# Patient Record
Sex: FEMALE | Race: WHITE | Employment: UNEMPLOYED | URBAN - METROPOLITAN AREA
[De-identification: names, ages, dates, MRNs, and addresses within clinical notes are randomized per-mention and may not be internally consistent; named-entity substitution may affect disease eponyms.]

---

## 2018-04-30 ENCOUNTER — TELEPHONE (OUTPATIENT)
Dept: OTHER | Facility: CLINIC | Age: 53
End: 2018-04-30

## 2018-04-30 DIAGNOSIS — R94.31 ABNORMAL ELECTROCARDIOGRAM: Primary | ICD-10-CM

## 2018-04-30 DIAGNOSIS — R00.0 TACHYCARDIA: ICD-10-CM

## 2018-04-30 NOTE — TELEPHONE ENCOUNTER
4/30/2018    Call Regarding Onboarding [facility] CHOICES     Attempt 1    Message on voicemail     Comments:       Outreach   SB

## 2018-05-09 ENCOUNTER — HOSPITAL ENCOUNTER (OUTPATIENT)
Dept: CARDIOLOGY | Facility: CLINIC | Age: 53
Discharge: HOME | End: 2018-05-09
Attending: PHYSICIAN ASSISTANT | Admitting: PHYSICIAN ASSISTANT
Payer: COMMERCIAL

## 2018-05-09 DIAGNOSIS — R94.31 ABNORMAL ELECTROCARDIOGRAM: ICD-10-CM

## 2018-05-09 DIAGNOSIS — R00.0 TACHYCARDIA: ICD-10-CM

## 2018-05-09 PROCEDURE — 93306 TTE W/DOPPLER COMPLETE: CPT | Mod: 26 | Performed by: INTERNAL MEDICINE

## 2018-05-09 PROCEDURE — 40000264 ECHO COMPLETE WITH OPTISON

## 2018-05-09 PROCEDURE — 25500064 ZZH RX 255 OP 636: Performed by: INTERNAL MEDICINE

## 2018-05-09 RX ADMIN — HUMAN ALBUMIN MICROSPHERES AND PERFLUTREN 3 ML: 10; .22 INJECTION, SOLUTION INTRAVENOUS at 09:40

## 2018-05-24 NOTE — TELEPHONE ENCOUNTER
5/24/2018    Call Regarding Onboarding are Choices    Attempt 2    Message on voicemail     Comments: 0 DEP      Outreach   CC

## 2018-06-14 NOTE — TELEPHONE ENCOUNTER
6/14/2018    Call Regarding Onboarding [facility]     Attempt 3    Message on voicemail     Comments:       Outreach   SB

## 2019-09-07 ENCOUNTER — HOSPITAL ENCOUNTER (INPATIENT)
Facility: CLINIC | Age: 54
LOS: 3 days | Discharge: HOME | DRG: 872 | End: 2019-09-10
Attending: EMERGENCY MEDICINE | Admitting: INTERNAL MEDICINE
Payer: COMMERCIAL

## 2019-09-07 ENCOUNTER — APPOINTMENT (OUTPATIENT)
Dept: ULTRASOUND IMAGING | Facility: CLINIC | Age: 54
DRG: 872 | End: 2019-09-07
Attending: EMERGENCY MEDICINE
Payer: COMMERCIAL

## 2019-09-07 DIAGNOSIS — Z79.4 OTHER SPECIFIED DIABETES MELLITUS WITH COMPLICATION, WITH LONG-TERM CURRENT USE OF INSULIN: Primary | ICD-10-CM

## 2019-09-07 DIAGNOSIS — A41.9 SEPSIS, DUE TO UNSPECIFIED ORGANISM: ICD-10-CM

## 2019-09-07 DIAGNOSIS — E13.8 OTHER SPECIFIED DIABETES MELLITUS WITH COMPLICATION, WITH LONG-TERM CURRENT USE OF INSULIN: Primary | ICD-10-CM

## 2019-09-07 DIAGNOSIS — L03.116 CELLULITIS OF LEFT LOWER EXTREMITY: ICD-10-CM

## 2019-09-07 LAB
ANION GAP SERPL CALCULATED.3IONS-SCNC: 6 MMOL/L (ref 3–14)
BASOPHILS # BLD AUTO: 0 10E9/L (ref 0–0.2)
BASOPHILS NFR BLD AUTO: 0.1 %
BUN SERPL-MCNC: 16 MG/DL (ref 7–30)
CALCIUM SERPL-MCNC: 9.2 MG/DL (ref 8.5–10.1)
CHLORIDE SERPL-SCNC: 102 MMOL/L (ref 94–109)
CO2 BLDCOV-SCNC: 28 MMOL/L (ref 21–28)
CO2 SERPL-SCNC: 30 MMOL/L (ref 20–32)
CREAT SERPL-MCNC: 0.9 MG/DL (ref 0.52–1.04)
CREAT SERPL-MCNC: 0.98 MG/DL (ref 0.52–1.04)
DIFFERENTIAL METHOD BLD: ABNORMAL
EOSINOPHIL # BLD AUTO: 0 10E9/L (ref 0–0.7)
EOSINOPHIL NFR BLD AUTO: 0 %
ERYTHROCYTE [DISTWIDTH] IN BLOOD BY AUTOMATED COUNT: 13.8 % (ref 10–15)
GFR SERPL CREATININE-BSD FRML MDRD: 67 ML/MIN/{1.73_M2}
GFR SERPL CREATININE-BSD FRML MDRD: 73 ML/MIN/{1.73_M2}
GLUCOSE BLDC GLUCOMTR-MCNC: 132 MG/DL (ref 70–99)
GLUCOSE BLDC GLUCOMTR-MCNC: 57 MG/DL (ref 70–99)
GLUCOSE BLDC GLUCOMTR-MCNC: 95 MG/DL (ref 70–99)
GLUCOSE SERPL-MCNC: 73 MG/DL (ref 70–99)
HBA1C MFR BLD: 6.5 % (ref 0–5.6)
HCT VFR BLD AUTO: 37.4 % (ref 35–47)
HGB BLD-MCNC: 12.6 G/DL (ref 11.7–15.7)
IMM GRANULOCYTES # BLD: 0.1 10E9/L (ref 0–0.4)
IMM GRANULOCYTES NFR BLD: 0.4 %
LACTATE BLD-SCNC: 1.5 MMOL/L (ref 0.7–2)
LACTATE BLD-SCNC: 2.3 MMOL/L (ref 0.7–2.1)
LYMPHOCYTES # BLD AUTO: 0.9 10E9/L (ref 0.8–5.3)
LYMPHOCYTES NFR BLD AUTO: 5.6 %
MCH RBC QN AUTO: 32.9 PG (ref 26.5–33)
MCHC RBC AUTO-ENTMCNC: 33.7 G/DL (ref 31.5–36.5)
MCV RBC AUTO: 98 FL (ref 78–100)
MONOCYTES # BLD AUTO: 1 10E9/L (ref 0–1.3)
MONOCYTES NFR BLD AUTO: 6.3 %
NEUTROPHILS # BLD AUTO: 13.9 10E9/L (ref 1.6–8.3)
NEUTROPHILS NFR BLD AUTO: 87.6 %
NRBC # BLD AUTO: 0 10*3/UL
NRBC BLD AUTO-RTO: 0 /100
PCO2 BLDV: 42 MM HG (ref 40–50)
PH BLDV: 7.43 PH (ref 7.32–7.43)
PLATELET # BLD AUTO: 196 10E9/L (ref 150–450)
PLATELET # BLD AUTO: 198 10E9/L (ref 150–450)
PO2 BLDV: 23 MM HG (ref 25–47)
POTASSIUM SERPL-SCNC: 3.7 MMOL/L (ref 3.4–5.3)
RBC # BLD AUTO: 3.83 10E12/L (ref 3.8–5.2)
SAO2 % BLDV FROM PO2: 42 %
SODIUM SERPL-SCNC: 138 MMOL/L (ref 133–144)
WBC # BLD AUTO: 15.8 10E9/L (ref 4–11)

## 2019-09-07 PROCEDURE — 25000132 ZZH RX MED GY IP 250 OP 250 PS 637: Performed by: INTERNAL MEDICINE

## 2019-09-07 PROCEDURE — 25800030 ZZH RX IP 258 OP 636: Performed by: INTERNAL MEDICINE

## 2019-09-07 PROCEDURE — 82803 BLOOD GASES ANY COMBINATION: CPT

## 2019-09-07 PROCEDURE — 80048 BASIC METABOLIC PNL TOTAL CA: CPT | Performed by: EMERGENCY MEDICINE

## 2019-09-07 PROCEDURE — 00000146 ZZHCL STATISTIC GLUCOSE BY METER IP

## 2019-09-07 PROCEDURE — 85049 AUTOMATED PLATELET COUNT: CPT | Performed by: INTERNAL MEDICINE

## 2019-09-07 PROCEDURE — 83036 HEMOGLOBIN GLYCOSYLATED A1C: CPT | Performed by: INTERNAL MEDICINE

## 2019-09-07 PROCEDURE — 25000128 H RX IP 250 OP 636: Performed by: EMERGENCY MEDICINE

## 2019-09-07 PROCEDURE — 36415 COLL VENOUS BLD VENIPUNCTURE: CPT | Performed by: INTERNAL MEDICINE

## 2019-09-07 PROCEDURE — 12000000 ZZH R&B MED SURG/OB

## 2019-09-07 PROCEDURE — 96365 THER/PROPH/DIAG IV INF INIT: CPT

## 2019-09-07 PROCEDURE — 93971 EXTREMITY STUDY: CPT | Mod: LT

## 2019-09-07 PROCEDURE — 99285 EMERGENCY DEPT VISIT HI MDM: CPT | Mod: 25

## 2019-09-07 PROCEDURE — 25000131 ZZH RX MED GY IP 250 OP 636 PS 637: Performed by: INTERNAL MEDICINE

## 2019-09-07 PROCEDURE — 83605 ASSAY OF LACTIC ACID: CPT

## 2019-09-07 PROCEDURE — 25000128 H RX IP 250 OP 636: Performed by: INTERNAL MEDICINE

## 2019-09-07 PROCEDURE — 87040 BLOOD CULTURE FOR BACTERIA: CPT | Performed by: INTERNAL MEDICINE

## 2019-09-07 PROCEDURE — 83605 ASSAY OF LACTIC ACID: CPT | Performed by: INTERNAL MEDICINE

## 2019-09-07 PROCEDURE — 25000132 ZZH RX MED GY IP 250 OP 250 PS 637: Performed by: EMERGENCY MEDICINE

## 2019-09-07 PROCEDURE — 85025 COMPLETE CBC W/AUTO DIFF WBC: CPT | Performed by: EMERGENCY MEDICINE

## 2019-09-07 PROCEDURE — 99223 1ST HOSP IP/OBS HIGH 75: CPT | Mod: AI | Performed by: INTERNAL MEDICINE

## 2019-09-07 PROCEDURE — 82565 ASSAY OF CREATININE: CPT | Performed by: INTERNAL MEDICINE

## 2019-09-07 PROCEDURE — 96361 HYDRATE IV INFUSION ADD-ON: CPT

## 2019-09-07 RX ORDER — PROCHLORPERAZINE 25 MG
25 SUPPOSITORY, RECTAL RECTAL EVERY 12 HOURS PRN
Status: DISCONTINUED | OUTPATIENT
Start: 2019-09-07 | End: 2019-09-10 | Stop reason: HOSPADM

## 2019-09-07 RX ORDER — ATORVASTATIN CALCIUM 80 MG/1
80 TABLET, FILM COATED ORAL DAILY
COMMUNITY

## 2019-09-07 RX ORDER — ACETAMINOPHEN 500 MG
1000 TABLET ORAL 2 TIMES DAILY PRN
Status: DISCONTINUED | OUTPATIENT
Start: 2019-09-07 | End: 2019-09-10 | Stop reason: HOSPADM

## 2019-09-07 RX ORDER — SULFASALAZINE 500 MG/1
1000 TABLET ORAL 2 TIMES DAILY
Status: DISCONTINUED | OUTPATIENT
Start: 2019-09-07 | End: 2019-09-10 | Stop reason: HOSPADM

## 2019-09-07 RX ORDER — IBUPROFEN 400 MG/1
400 TABLET, FILM COATED ORAL 2 TIMES DAILY PRN
Status: DISCONTINUED | OUTPATIENT
Start: 2019-09-07 | End: 2019-09-10 | Stop reason: HOSPADM

## 2019-09-07 RX ORDER — LISINOPRIL 20 MG/1
20 TABLET ORAL DAILY
COMMUNITY

## 2019-09-07 RX ORDER — LISINOPRIL 20 MG/1
20 TABLET ORAL DAILY
Status: DISCONTINUED | OUTPATIENT
Start: 2019-09-07 | End: 2019-09-10 | Stop reason: HOSPADM

## 2019-09-07 RX ORDER — ONDANSETRON 2 MG/ML
4 INJECTION INTRAMUSCULAR; INTRAVENOUS EVERY 6 HOURS PRN
Status: DISCONTINUED | OUTPATIENT
Start: 2019-09-07 | End: 2019-09-10 | Stop reason: HOSPADM

## 2019-09-07 RX ORDER — ASPIRIN 81 MG/1
81 TABLET ORAL DAILY
COMMUNITY

## 2019-09-07 RX ORDER — LACTOBACILLUS RHAMNOSUS GG 10B CELL
1 CAPSULE ORAL AT BEDTIME
Status: DISCONTINUED | OUTPATIENT
Start: 2019-09-07 | End: 2019-09-10 | Stop reason: HOSPADM

## 2019-09-07 RX ORDER — IBUPROFEN 200 MG
400 TABLET ORAL SEE ADMIN INSTRUCTIONS
COMMUNITY

## 2019-09-07 RX ORDER — ASPIRIN 81 MG/1
81 TABLET ORAL DAILY
Status: DISCONTINUED | OUTPATIENT
Start: 2019-09-07 | End: 2019-09-10 | Stop reason: HOSPADM

## 2019-09-07 RX ORDER — MULTIVITAMIN,THERAPEUTIC
1 TABLET ORAL DAILY
COMMUNITY

## 2019-09-07 RX ORDER — CEFAZOLIN SODIUM 2 G/100ML
2 INJECTION, SOLUTION INTRAVENOUS EVERY 8 HOURS
Status: DISCONTINUED | OUTPATIENT
Start: 2019-09-07 | End: 2019-09-10 | Stop reason: HOSPADM

## 2019-09-07 RX ORDER — PREDNISOLONE 5 MG/1
10 TABLET ORAL DAILY
COMMUNITY
End: 2019-09-07

## 2019-09-07 RX ORDER — ACETAMINOPHEN 500 MG
1000 TABLET ORAL ONCE
Status: COMPLETED | OUTPATIENT
Start: 2019-09-07 | End: 2019-09-07

## 2019-09-07 RX ORDER — PREDNISONE 10 MG/1
10 TABLET ORAL DAILY
Status: DISCONTINUED | OUTPATIENT
Start: 2019-09-08 | End: 2019-09-10 | Stop reason: HOSPADM

## 2019-09-07 RX ORDER — NAPROXEN SODIUM 220 MG
440 TABLET ORAL 2 TIMES DAILY PRN
Status: DISCONTINUED | OUTPATIENT
Start: 2019-09-07 | End: 2019-09-09

## 2019-09-07 RX ORDER — NICOTINE POLACRILEX 4 MG
15-30 LOZENGE BUCCAL
Status: DISCONTINUED | OUTPATIENT
Start: 2019-09-07 | End: 2019-09-10 | Stop reason: HOSPADM

## 2019-09-07 RX ORDER — LEVOTHYROXINE SODIUM 137 UG/1
137 TABLET ORAL DAILY
COMMUNITY

## 2019-09-07 RX ORDER — GLIMEPIRIDE 4 MG/1
4 TABLET ORAL 2 TIMES DAILY
Status: ON HOLD | COMMUNITY
End: 2021-01-01

## 2019-09-07 RX ORDER — ACETAMINOPHEN 500 MG
1000 TABLET ORAL SEE ADMIN INSTRUCTIONS
Status: ON HOLD | COMMUNITY
End: 2020-04-20

## 2019-09-07 RX ORDER — ATORVASTATIN CALCIUM 40 MG/1
80 TABLET, FILM COATED ORAL DAILY
Status: DISCONTINUED | OUTPATIENT
Start: 2019-09-07 | End: 2019-09-10 | Stop reason: HOSPADM

## 2019-09-07 RX ORDER — CEFAZOLIN SODIUM 1 G/3ML
1 INJECTION, POWDER, FOR SOLUTION INTRAMUSCULAR; INTRAVENOUS ONCE
Status: COMPLETED | OUTPATIENT
Start: 2019-09-07 | End: 2019-09-07

## 2019-09-07 RX ORDER — PROCHLORPERAZINE MALEATE 10 MG
10 TABLET ORAL EVERY 6 HOURS PRN
Status: DISCONTINUED | OUTPATIENT
Start: 2019-09-07 | End: 2019-09-10 | Stop reason: HOSPADM

## 2019-09-07 RX ORDER — DEXTROSE MONOHYDRATE 25 G/50ML
25-50 INJECTION, SOLUTION INTRAVENOUS
Status: DISCONTINUED | OUTPATIENT
Start: 2019-09-07 | End: 2019-09-10 | Stop reason: HOSPADM

## 2019-09-07 RX ORDER — HYDROXYCHLOROQUINE SULFATE 200 MG/1
200 TABLET, FILM COATED ORAL 2 TIMES DAILY
COMMUNITY

## 2019-09-07 RX ORDER — NALOXONE HYDROCHLORIDE 0.4 MG/ML
.1-.4 INJECTION, SOLUTION INTRAMUSCULAR; INTRAVENOUS; SUBCUTANEOUS
Status: DISCONTINUED | OUTPATIENT
Start: 2019-09-07 | End: 2019-09-10 | Stop reason: HOSPADM

## 2019-09-07 RX ORDER — GLIMEPIRIDE 4 MG/1
4 TABLET ORAL 2 TIMES DAILY WITH MEALS
Status: DISCONTINUED | OUTPATIENT
Start: 2019-09-07 | End: 2019-09-08

## 2019-09-07 RX ORDER — NAPROXEN SODIUM 220 MG
440 TABLET ORAL SEE ADMIN INSTRUCTIONS
COMMUNITY

## 2019-09-07 RX ORDER — LEVOTHYROXINE SODIUM 125 UG/1
125 TABLET ORAL
Status: DISCONTINUED | OUTPATIENT
Start: 2019-09-08 | End: 2019-09-10 | Stop reason: HOSPADM

## 2019-09-07 RX ORDER — INSULIN GLARGINE 100 [IU]/ML
50 INJECTION, SOLUTION SUBCUTANEOUS AT BEDTIME
Status: ON HOLD | COMMUNITY
End: 2019-09-10

## 2019-09-07 RX ORDER — SODIUM CHLORIDE 9 MG/ML
INJECTION, SOLUTION INTRAVENOUS CONTINUOUS
Status: DISCONTINUED | OUTPATIENT
Start: 2019-09-07 | End: 2019-09-08

## 2019-09-07 RX ORDER — LIDOCAINE 40 MG/G
CREAM TOPICAL
Status: DISCONTINUED | OUTPATIENT
Start: 2019-09-07 | End: 2019-09-10 | Stop reason: HOSPADM

## 2019-09-07 RX ORDER — HYDROXYCHLOROQUINE SULFATE 200 MG/1
200 TABLET, FILM COATED ORAL 2 TIMES DAILY
Status: DISCONTINUED | OUTPATIENT
Start: 2019-09-07 | End: 2019-09-10 | Stop reason: HOSPADM

## 2019-09-07 RX ORDER — ONDANSETRON 4 MG/1
4 TABLET, ORALLY DISINTEGRATING ORAL EVERY 6 HOURS PRN
Status: DISCONTINUED | OUTPATIENT
Start: 2019-09-07 | End: 2019-09-10 | Stop reason: HOSPADM

## 2019-09-07 RX ORDER — SULFASALAZINE 500 MG/1
1000 TABLET ORAL 2 TIMES DAILY
COMMUNITY

## 2019-09-07 RX ORDER — PREDNISONE 5 MG/1
10 TABLET ORAL DAILY
Status: ON HOLD | COMMUNITY
End: 2020-04-20

## 2019-09-07 RX ADMIN — HYDROXYCHLOROQUINE SULFATE 200 MG: 200 TABLET, FILM COATED ENTERAL at 20:31

## 2019-09-07 RX ADMIN — INSULIN GLARGINE 50 UNITS: 100 INJECTION, SOLUTION SUBCUTANEOUS at 20:57

## 2019-09-07 RX ADMIN — LISINOPRIL 20 MG: 20 TABLET ORAL at 15:12

## 2019-09-07 RX ADMIN — GLIMEPIRIDE 4 MG: 4 TABLET ORAL at 17:49

## 2019-09-07 RX ADMIN — ENOXAPARIN SODIUM 40 MG: 40 INJECTION SUBCUTANEOUS at 17:50

## 2019-09-07 RX ADMIN — CEFAZOLIN SODIUM 2 G: 2 INJECTION, SOLUTION INTRAVENOUS at 20:31

## 2019-09-07 RX ADMIN — SULFASALAZINE 1000 MG: 500 TABLET ORAL at 20:31

## 2019-09-07 RX ADMIN — ATORVASTATIN CALCIUM 80 MG: 40 TABLET, FILM COATED ORAL at 15:12

## 2019-09-07 RX ADMIN — ACETAMINOPHEN 1000 MG: 500 TABLET, FILM COATED ORAL at 17:50

## 2019-09-07 RX ADMIN — Medication 1 CAPSULE: at 20:57

## 2019-09-07 RX ADMIN — CEFAZOLIN 1 G: 1 INJECTION, POWDER, FOR SOLUTION INTRAMUSCULAR; INTRAVENOUS at 13:04

## 2019-09-07 RX ADMIN — SODIUM CHLORIDE 1000 ML: 9 INJECTION, SOLUTION INTRAVENOUS at 12:05

## 2019-09-07 RX ADMIN — SODIUM CHLORIDE: 9 INJECTION, SOLUTION INTRAVENOUS at 15:14

## 2019-09-07 RX ADMIN — ACETAMINOPHEN 1000 MG: 500 TABLET, FILM COATED ORAL at 11:40

## 2019-09-07 RX ADMIN — IBUPROFEN 400 MG: 400 TABLET ORAL at 15:11

## 2019-09-07 RX ADMIN — ASPIRIN 81 MG: 81 TABLET, COATED ORAL at 15:12

## 2019-09-07 ASSESSMENT — ACTIVITIES OF DAILY LIVING (ADL)
BATHING: 0-->INDEPENDENT
ADLS_ACUITY_SCORE: 12
AMBULATION: 1-->ASSISTIVE EQUIPMENT
ADLS_ACUITY_SCORE: 12
SWALLOWING: 0-->SWALLOWS FOODS/LIQUIDS WITHOUT DIFFICULTY
DRESS: 0-->INDEPENDENT
RETIRED_EATING: 0-->INDEPENDENT
TOILETING: 0-->INDEPENDENT
FALL_HISTORY_WITHIN_LAST_SIX_MONTHS: NO
TRANSFERRING: 0-->INDEPENDENT
COGNITION: 0 - NO COGNITION ISSUES REPORTED
RETIRED_COMMUNICATION: 0-->UNDERSTANDS/COMMUNICATES WITHOUT DIFFICULTY

## 2019-09-07 ASSESSMENT — MIFFLIN-ST. JEOR: SCORE: 1854.2

## 2019-09-07 NOTE — LETTER
[place] [address] [address]  [address] [place] [address]  [phone]          September 10, 2019    RE:  Pinky Whatley                                                                                                                                                       [address]   [facility] [place] [facility] [address]            To whom it may concern:    Pinky Whatley is under my professional care and was admitted to [facility] from 9/7/19 to 9/10/19. She can return to work from 9/11/19.      Sincerely,        Scot Burns  Hospitalist   [facility]  [phone]

## 2019-09-07 NOTE — PROGRESS NOTES
RECEIVING UNIT ED HANDOFF REVIEW    ED Nurse Handoff Report was reviewed by: Alex Gallo RN on September 7, 2019 at 2:12 PM

## 2019-09-07 NOTE — ED NOTES
"[facility]  ED Nurse Handoff Report    ED Chief complaint: Leg Pain      ED Diagnosis:   Final diagnoses:   Cellulitis of left lower extremity   Sepsis, due to unspecified organism (H)       Code Status: Ask hospitalist    Allergies: No Known Allergies    Activity level - Baseline/Home:  Independent  Activity Level - Current:   Independent    Patient's Preferred language: English   Needed?: No    Isolation: No  Infection: Not Applicable  Bariatric?: No    Vital Signs:   Vitals:    09/07/19 1108   BP: (!) 159/81   Resp: 16   Temp: 100.1  F (37.8  C)   TempSrc: Oral   SpO2: 95%   Weight: 130.2 kg (287 lb)   Height: 1.549 m (5' 1\")       Cardiac Rhythm: ,        Pain level: 0-10 Pain Scale: 6    Is this patient confused?: No   Does this patient have a guardian?  No         If yes, is there guardianship documents in the Epic \"Code/ACP\" activity?  N/A         Guardian Notified?  N/A  Independence - Suicide Severity Rating Scale Completed?  Yes  If yes, what color did the patient score?  White    Patient Report: Initial Complaint: Leg pain  Focused Assessment: Pt developed leg pain and redness yesterday. Stated she had a fever of 101 F last night and woke up in sweat this morning. Redness includes entire foot up to mid calf. Tender to the touch.   Tests Performed: Ancef  Abnormal Results:   Results for orders placed or performed during the hospital encounter of 09/07/19   US Lower Extremity Venous Duplex Left    Narrative    ULTRASOUND  LOWER EXTREMITY VENOUS DUPLEX LEFT   9/7/2019 12:19 PM     HISTORY: Erythema/warmth/edema.    COMPARISON: None.    FINDINGS: Gray-scale, color and Doppler spectral analysis ultrasound  was performed of the left leg. Compression and augmentation imaging  was performed.    There is no evidence for deep venous thrombosis. There is a Baker cyst  at the left popliteal fossa that is 3.2 x 0.9 x 2.4 cm.      Impression    IMPRESSION: No evidence for DVT within the left " leg. Left Baker's cyst  identified.    HEATHER PALMER MD   CBC with platelets differential   Result Value Ref Range    WBC 15.8 (H) 4.0 - 11.0 10e9/L    RBC Count 3.83 3.8 - 5.2 10e12/L    Hemoglobin 12.6 11.7 - 15.7 g/dL    Hematocrit 37.4 35.0 - 47.0 %    MCV 98 78 - 100 fl    MCH 32.9 26.5 - 33.0 pg    MCHC 33.7 31.5 - 36.5 g/dL    RDW 13.8 10.0 - 15.0 %    Platelet Count 196 150 - 450 10e9/L    Diff Method Automated Method     % Neutrophils 87.6 %    % Lymphocytes 5.6 %    % Monocytes 6.3 %    % Eosinophils 0.0 %    % Basophils 0.1 %    % Immature Granulocytes 0.4 %    Nucleated RBCs 0 0 /100    Absolute Neutrophil 13.9 (H) 1.6 - 8.3 10e9/L    Absolute Lymphocytes 0.9 0.8 - 5.3 10e9/L    Absolute Monocytes 1.0 0.0 - 1.3 10e9/L    Absolute Eosinophils 0.0 0.0 - 0.7 10e9/L    Absolute Basophils 0.0 0.0 - 0.2 10e9/L    Abs Immature Granulocytes 0.1 0 - 0.4 10e9/L    Absolute Nucleated RBC 0.0    Basic metabolic panel   Result Value Ref Range    Sodium 138 133 - 144 mmol/L    Potassium 3.7 3.4 - 5.3 mmol/L    Chloride 102 94 - 109 mmol/L    Carbon Dioxide 30 20 - 32 mmol/L    Anion Gap 6 3 - 14 mmol/L    Glucose 73 70 - 99 mg/dL    Urea Nitrogen 16 7 - 30 mg/dL    Creatinine 0.98 0.52 - 1.04 mg/dL    GFR Estimate 67 >60 mL/min/[1.73_m2]    GFR Estimate If Black 77 >60 mL/min/[1.73_m2]    Calcium 9.2 8.5 - 10.1 mg/dL   ISTAT gases lactate roxann POCT   Result Value Ref Range    Ph Venous 7.43 7.32 - 7.43 pH    PCO2 Venous 42 40 - 50 mm Hg    PO2 Venous 23 (L) 25 - 47 mm Hg    Bicarbonate Venous 28 21 - 28 mmol/L    O2 Sat Venous 42 %    Lactic Acid 2.3 (H) 0.7 - 2.1 mmol/L       Treatments provided: Ancef and fluid    Family Comments: Mom at bedside    OBS brochure/video discussed/provided to patient/family: N/A              Name of person given brochure if not patient: NA              Relationship to patient: NA    ED Medications:   Medications   ceFAZolin (ANCEF) 1 g vial to attach to  ml bag for ADULT or 50 ml bag  for PEDS (1 g Intravenous New Bag 9/7/19 1304)   acetaminophen (TYLENOL) tablet 1,000 mg (1,000 mg Oral Given 9/7/19 1140)   0.9% sodium chloride BOLUS (1,000 mLs Intravenous New Bag 9/7/19 1205)       Drips infusing?:  No    For the majority of the shift this patient was Green.   Interventions performed were None.    Severe Sepsis OR Septic Shock Diagnosis Present:   Yes    Per the ED Provider, Time Zero for severe sepsis or septic shock is:      3 Hour Severe Sepsis Bundle Completion:  1. Initial Lactic Acid Result:   Recent Labs   Lab Test 09/07/19  1139   LACT 2.3*     2. Blood Cultures before Antibiotics: No  3. Broad Spectrum Antibiotics Administered:     Anti-infectives (From now, onward)    Start     Dose/Rate Route Frequency Ordered Stop    09/07/19 1228  ceFAZolin (ANCEF) 1 g vial to attach to  ml bag for ADULT or 50 ml bag for PEDS      1 g  over 30 Minutes Intravenous ONCE 09/07/19 1227          4. 1100 ml of IV fluids have been given so far      6 Hour Severe Sepsis Bundle Completion:    1. Repeat Lactic Acid Level: Not severe sepsis  2. Patient currently on Vasopressors =  No    To be done/followed up on inpatient unit:  Unknown    ED NURSE PHONE NUMBER: [phone]

## 2019-09-07 NOTE — H&P
[facility] History and Physical    Pinky Whatley MRN# [id number]   Age: 53 year old YOB: 1965     Date of Admission:  9/7/2019    [facility]    Primary care provider: Laura Evans          Chief Complaint:   LLE erythema/ fever/chills.    History is obtained from the patient, electronic health record and emergency department physician          History of Present Illness:   This patient is a 53 year old  female with a significant past medical history of RA, diabetes, hypertension, immunosupression and morbid obesity who presents with acute onset fever/chillslast evening. Noticed LLE erythema/ pain. This morning temp was 102. Last night was 101 with chills/ rigors. No recent trauma. No recent illness/ travel. On xeljanz/ prednisone/ methotrexate for RA. Has h/o MRSA but last was tols she is culture free.            Past Medical History:   There are no active problems to display for this patient.    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 7/17 NIL Pap, HPV negative. Plan: cotest in 3 years (7/2020)                                                                                                                            Past Surgical History:    No past surgical history on file.     Bunion - right 2005        Carpel tunnel- left 1993        WISDOM TEETH EXTRACTION          TONSILLECTOMY          bilateral salpingectomy 2013        COLPOSCOPY 2013   normal                  Social History:     Social History     Socioeconomic History     Marital status: Single     Spouse name: Not on file     Number of children: Not on file     Years of education: Not on file     Highest education level: Not on file   Occupational History     Not on file   Social Needs     Financial resource strain: Not on file     Food insecurity:     Worry: Not on file     Inability: Not on file     Transportation needs:     Medical: Not on file     Non-medical: Not on file   Tobacco Use     Smoking status: Never Smoker     Smokeless tobacco: Never Used   Substance and Sexual Activity     Alcohol use: Yes     Comment: rare     Drug use: Never     Sexual activity: Not on file   Lifestyle     Physical activity:     Days per week: Not on file     Minutes per session: Not on file     Stress: Not on file   Relationships     Social connections:     Talks on phone: Not on file     Gets together: Not on file     Attends Je[employer] witness service: Not on file     Active member of club or organization: Not on file     Attends meetings of clubs or organizations: Not on file     Relationship status: Not on file     Intimate partner violence:     Fear of current or ex partner: Not on file     Emotionally abused: Not on file     Physically abused: Not on file     Forced sexual activity: Not on file   Other Topics Concern     Not on file   Social History Narrative     Not on file             Family History:   No family history on file.     Heart Disease Father       Arthritis Mother       Hyperlipidemia Mother       Cancer Paternal Aunt 54yo Brain Ca   Cancer-breast  Paternal Aunt 54yo     Cancer-colon No Family History       Cancer-ovarian No Family History       Cancer-prostate No Family History                Allergies:   No Known Allergies          Medications:     Prior to Admission medications    Medication Sig Last Dose Taking? Auth Provider   acetaminophen (TYLENOL) 500 MG tablet Take 1,000 mg by mouth 2 times daily as needed PRN Yes Unknown, Entered By History   aspirin 81 MG EC tablet Take 81 mg by mouth daily 9/6/2019 Yes Unknown, Entered By History   atorvastatin (LIPITOR) 80 MG tablet Take 80 mg by mouth daily 9/6/2019 Yes Unknown, Entered By History   diphenhydrAMINE-acetaminophen (TYLENOL PM)  MG tablet Take 2 tablets by mouth At Bedtime 9/6/2019 Yes Unknown, Entered By History   glimepiride (AMARYL) 4 MG tablet Take 4 mg by mouth 2 times daily 9/5/2019 Yes Unknown, Entered By History   GLUCOSAMINE-CHONDROITIN PO Take 1 tablet by mouth 2 times daily 9/7/2019 Yes Unknown, Entered By History   hydroxychloroquine (PLAQUENIL) 200 MG tablet Take 200 mg by mouth 2 times daily 9/7/2019 Yes Unknown, Entered By History   ibuprofen (ADVIL/MOTRIN) 200 MG tablet Take 400 mg by mouth 2 times daily as needed PRN Yes Unknown, Entered By History   insulin glargine (BASAGLAR KWIKPEN) 100 UNIT/ML pen Inject 50 Units Subcutaneous At Bedtime 9/6/2019 Yes Unknown, Entered By History   levothyroxine (SYNTHROID/LEVOTHROID) 125 MCG tablet Take 125 mcg by mouth daily 9/7/2019 Yes Unknown, Entered By History   lisinopril (PRINIVIL/ZESTRIL) 20 MG tablet Take 20 mg by mouth daily 9/5/2019 Yes Unknown, Entered By History   metFORMIN (GLUCOPHAGE) 500 MG tablet Take 1,000 mg by mouth 2 times daily (with meals) 9/7/2019 Yes Unknown, Entered By History   methotrexate 2.5 MG tablet Take 22.5 mg by mouth once a week on Monday (9 x 2.5 mg tablet) 9/2/2019 Yes Unknown, Entered By History   multivitamin, therapeutic (THERA-VIT) TABS tablet Take 1 tablet by mouth daily 9/7/2019 Yes Unknown,  "Entered By History   naproxen sodium (ANAPROX) 220 MG tablet Take 440 mg by mouth 2 times daily as needed PRN Yes Unknown, Entered By History   predniSONE (DELTASONE) 5 MG tablet Take 10 mg by mouth daily (2 x 5 mg tablet) 9/7/2019 Yes Unknown, Entered By History   Probiotic Product (PROBIOTIC PO) Take 1 capsule by mouth At Bedtime 9/6/2019 Yes Unknown, Entered By History   sulfaSALAzine (AZULFIDINE) 500 MG tablet Take 1,000 mg by mouth 2 times daily 9/7/2019 Yes Unknown, Entered By History   tofacitinib (XELJANZ) 5 MG tablet Take 5 mg by mouth 2 times daily 9/7/2019 Yes Unknown, Entered By History            Review of Systems:   The Review of Systems is negative in ALL other than noted in the HPI          Physical Exam:   Blood pressure 124/77, pulse 116, temperature 100.1  F (37.8  C), temperature source Oral, resp. rate 16, height 1.549 m (5' 1\"), weight 130.2 kg (287 lb), SpO2 95 %.  GENERAL APPEARANCE: healthy, alert and no distress  EYES: conjunctiva clear, eyes grossly normal  HENT: external ears and nose normal   NECK: supple, no masses or adenopathy  RESP: lungs clear to auscultation - no rales, rhonchi or wheezes  CV: regular  Rhythm, tachycardic, normal S1 S2, no S3 or S4 and no murmur, click or rub   ABDOMEN: soft, mild LLQ tenderness, no HSM or masses and bowel sounds normal  MS: no clubbing, cyanosis; no edema  SKIN: erythema LLE from toes-foot to lower 1/2 for leg in circumferential distribution-small blister-ruptured on the shin. No fungal nails.  NEURO: Normal strength and tone, sensory exam grossly normal, mentation intact and speech normal         Data:     Lab Results   Component Value Date    WBC 15.8 (H) 09/07/2019    HGB 12.6 09/07/2019    HCT 37.4 09/07/2019    MCV 98 09/07/2019     09/07/2019     Lab Results   Component Value Date     09/07/2019    CO2 30 09/07/2019     Lab Results   Component Value Date    BUN 16 09/07/2019     No components found for: SEDRATE  No components " found for: DDIMER  No results found for: BNP  No results found for: TSH  No results found for: TROPONIN  UA RESULTS:  No results for input(s): COLOR, APPEARANCE, URINEGLC, URINEBILI, URINEKETONE, SG, UBLD, URINEPH, PROTEIN, UROBILINOGEN, NITRITE, LEUKEST, RBCU, WBCU in the last 25905 hours.  Liver Function Studies - No results for input(s): PROTTOTAL, ALBUMIN, BILITOTAL, ALKPHOS, AST, ALT, BILIDIRECT in the last 95634 hours.      RADIOLOGY: US LE-neg for DVT     A/P  LLE CELLULITIS with SEPSIS  Presenting with acute onset fever/chills/ tachycardia.. Has significant erythema as described above. Has fever/ leucocytosis. Lactic acidosis. Small open blister seen-?nidus of infection. Is significantly immunosuppressed. Got ancef 1gm/ 1L fluid bolus  in ED prior to blood cultures.  Has h/o MRSA-but later in '17 was told she is culture negative. On prednisone chronically.  -will continue ancef at 2gm q8hrs, blood cx, iv fluids, follow counts in AM. Repeat LA at 3 pm. Will give another liter fluid  bolus.If still spiking high temp, consider adding vanco until cx back. If hypotensive, would hold off meds and give fluids/  stress steroids(on chronic prednisone 10mg/day).    H/O FACIAL ABSCESS/ MRSA '16  Apparently culture free for MRSA 7/26/17 in care everywhere.    DM2  Insulin dependent. Last a1c 6.9 in '17.   -will continue lantus/ ISS high dose. Continue amaryl. Hold metformin until sepsis resolved.    HTN  Stable  -continue meds    HLD  Continue meds    HYPO T4  Continue meds    RA  Continue prednisone/ sulfasalazine. Hold xeljanz -has >10% risk of infections. Methotrexate is weekly.     DVT PROF-lovenox    DISPO  Will be in hospital 1-2 days.     Jea-nPierre Mcfarland MD  [phone]

## 2019-09-07 NOTE — ED TRIAGE NOTES
Left lower ext redness and pain. Started last night with chills. Today woke up with sweating. 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

## 2019-09-07 NOTE — ED PROVIDER NOTES
"History     Chief Complaint:  Leg Pain       HPI   Pinky Whatley is a 53 year old female with a history of diabetes, rheumatoid arthritis on 10 mg prednisone daily and Xelganz, hypertension who presents with left lower extremity pain.  It began being painful last night and she noted a fever to 101.7.  Just prior to going to bed she noticed erythema, warmth, tenderness to her distal foot and ankle.  When she woke up this morning the redness had spread and is more proximal up to her mid shin.  She is having hot and cold flashes.  She does not notice any specific swelling to her left lower extremity that is greater than her right lower extremity.      Allergies:  NKDA    Medications:    Metformin  Lisinopril  Synthroid  Glimepiride  Atorvastatin  Xeljanz  Prednisone  Past Medical History:    Rheumatoid arthritis  Hypertension  Diabetes  GERD  Hypothyroidism  Anemia    Past Surgical History:    Bilateral salpingectomy  Bunionectomy  Tonsillectomy  Colposcopy  Left carpal tunnel surgery  [place] teeth extraction    Family History:    Non-contributory    Social History:   reports that she has never smoked. She has never used smokeless tobacco. She reports that she drinks alcohol. She reports that she does not use drugs.    PCP: Laura Evans     Review of Systems  Positive for erythema, warmth, tenderness, leg pain, fevers  Negative for rash, chest pain, shortness of breath, joint pain  All other review of systems, 10 points, negative    Physical Exam     Patient Vitals for the past 24 hrs:   BP Temp Temp src Heart Rate Resp SpO2 Height Weight   09/07/19 1108 (!) 159/81 100.1  F (37.8  C) Oral 121 16 95 % 1.549 m (5' 1\") 130.2 kg (287 lb)        Physical Exam  General/Appearance: appears stated age, well-groomed, appears mildly uncomfortable, elevated BMI  Eyes: EOMI, no scleral injection, no icterus  ENT: MMM  Neck: supple, nl ROM, no stiffness  Cardiovascular: tachy but regular, nl S1S2, no m/r/g, 2+ pulses in all 4 " extremities, cap refill <2sec  Respiratory: CTAB, good air movement throughout, no wheezes/rhonchi/rales, no increased WOB, no retractions  Back: no lesions  GI: abd soft, non-distended, nttp,  no HSM, no rebound, no guarding, nl BS  MSK: FELIZ, good tone, no bony abnormality  Skin: warm and well-perfused, erythema/warmth/ttp distal to L mid shin  Neuro: GCS 15, alert and oriented, no gross focal neuro deficits  Psych: interacts appropriately  Heme: no petechia, no purpura, no active bleeding      Emergency Department Course     Imaging:    US Lower Extremity Venous Duplex Left   Final Result   IMPRESSION: No evidence for DVT within the left leg. Left Baker's cyst   identified.      HEATHER PALMER MD          Laboratory:  , 3.7, 107, 31, 0.98, 67  Lactic acid 2.3  Glucose 73  CBC 15.8, 12.6, 37.4, 196    Interventions:  Tylenol 1g  Ancef 500mg IV  NS 1L    Emergency Department Course:  Past medical records, nursing notes, and vitals reviewed.  I performed an exam of the patient and obtained history, as documented above.   1330 Pt updated. Will admit    Impression & Plan      Medical Decision Making:  This patient is a 53-year-old female, immunosuppressed on prednisone and Xeljanz, who presents with cellulitis to her left lower extremity.  She is also had a fever to 101.7.  Here she is tachycardic, with an elevated white count and elevated lactate.  Given this I think she would benefit from coming in for IV antibiotics.  She is getting Ancef here.  She will come into the hospitalist service.  Of note I also considered underlying DVT ultrasound is negative except for a Baker's cyst.    Diagnosis:    ICD-10-CM    1. Cellulitis of left lower extremity L03.116    2. Sepsis, due to unspecified organism (H) A41.9             9/7/2019   Kayli Case*        Kayli Case MD  09/07/19 6560

## 2019-09-07 NOTE — PHARMACY-ADMISSION MEDICATION HISTORY
Admission medication history interview status for the 9/7/2019  admission is complete. See EPIC admission navigator for prior to admission medications     Medication history source reliability:Good. Patient had medication list.     Actions taken by pharmacist (provider contacted, etc):None     Additional medication history information not noted on PTA med list :  1. Patient alternates between PRN acetaminophen, ibuprofen, & aleve.    Medication reconciliation/reorder completed by provider prior to medication history? No    Time spent in this activity: 20 minutes     Prior to Admission medications    Medication Sig Last Dose Taking? Auth Provider   acetaminophen (TYLENOL) 500 MG tablet Take 1,000 mg by mouth 2 times daily as needed PRN Yes Unknown, Entered By History   aspirin 81 MG EC tablet Take 81 mg by mouth daily 9/6/2019 Yes Unknown, Entered By History   atorvastatin (LIPITOR) 80 MG tablet Take 80 mg by mouth daily 9/6/2019 Yes Unknown, Entered By History   diphenhydrAMINE-acetaminophen (TYLENOL PM)  MG tablet Take 2 tablets by mouth At Bedtime 9/6/2019 Yes Unknown, Entered By History   glimepiride (AMARYL) 4 MG tablet Take 4 mg by mouth 2 times daily 9/5/2019 Yes Unknown, Entered By History   GLUCOSAMINE-CHONDROITIN PO Take 1 tablet by mouth 2 times daily 9/7/2019 Yes Unknown, Entered By History   hydroxychloroquine (PLAQUENIL) 200 MG tablet Take 200 mg by mouth 2 times daily 9/7/2019 Yes Unknown, Entered By History   ibuprofen (ADVIL/MOTRIN) 200 MG tablet Take 400 mg by mouth 2 times daily as needed PRN Yes Unknown, Entered By History   insulin glargine (BASAGLAR KWIKPEN) 100 UNIT/ML pen Inject 50 Units Subcutaneous At Bedtime 9/6/2019 Yes Unknown, Entered By History   levothyroxine (SYNTHROID/LEVOTHROID) 125 MCG tablet Take 125 mcg by mouth daily 9/7/2019 Yes Unknown, Entered By History   lisinopril (PRINIVIL/ZESTRIL) 20 MG tablet Take 20 mg by mouth daily 9/5/2019 Yes Unknown, Entered By History    metFORMIN (GLUCOPHAGE) 500 MG tablet Take 1,000 mg by mouth 2 times daily (with meals) 9/7/2019 Yes Unknown, Entered By History   methotrexate 2.5 MG tablet Take 22.5 mg by mouth once a week on Monday (9 x 2.5 mg tablet) 9/2/2019 Yes Unknown, Entered By History   multivitamin, therapeutic (THERA-VIT) TABS tablet Take 1 tablet by mouth daily 9/7/2019 Yes Unknown, Entered By History   naproxen sodium (ANAPROX) 220 MG tablet Take 440 mg by mouth 2 times daily as needed PRN Yes Unknown, Entered By History   predniSONE (DELTASONE) 5 MG tablet Take 10 mg by mouth daily (2 x 5 mg tablet) 9/7/2019 Yes Unknown, Entered By History   Probiotic Product (PROBIOTIC PO) Take 1 capsule by mouth At Bedtime 9/6/2019 Yes Unknown, Entered By History   sulfaSALAzine (AZULFIDINE) 500 MG tablet Take 1,000 mg by mouth 2 times daily 9/7/2019 Yes Unknown, Entered By History   tofacitinib (XELJANZ) 5 MG tablet Take 5 mg by mouth 2 times daily 9/7/2019 Yes Unknown, Entered By History

## 2019-09-08 ENCOUNTER — APPOINTMENT (OUTPATIENT)
Dept: GENERAL RADIOLOGY | Facility: CLINIC | Age: 54
DRG: 872 | End: 2019-09-08
Attending: INTERNAL MEDICINE
Payer: COMMERCIAL

## 2019-09-08 ENCOUNTER — APPOINTMENT (OUTPATIENT)
Dept: CT IMAGING | Facility: CLINIC | Age: 54
DRG: 872 | End: 2019-09-08
Attending: INTERNAL MEDICINE
Payer: COMMERCIAL

## 2019-09-08 ENCOUNTER — APPOINTMENT (OUTPATIENT)
Dept: CARDIOLOGY | Facility: CLINIC | Age: 54
DRG: 872 | End: 2019-09-08
Attending: INTERNAL MEDICINE
Payer: COMMERCIAL

## 2019-09-08 LAB
ANION GAP SERPL CALCULATED.3IONS-SCNC: 5 MMOL/L (ref 3–14)
BUN SERPL-MCNC: 11 MG/DL (ref 7–30)
CALCIUM SERPL-MCNC: 8.7 MG/DL (ref 8.5–10.1)
CHLORIDE SERPL-SCNC: 103 MMOL/L (ref 94–109)
CO2 SERPL-SCNC: 26 MMOL/L (ref 20–32)
CREAT SERPL-MCNC: 0.72 MG/DL (ref 0.52–1.04)
ERYTHROCYTE [DISTWIDTH] IN BLOOD BY AUTOMATED COUNT: 13.9 % (ref 10–15)
GFR SERPL CREATININE-BSD FRML MDRD: >90 ML/MIN/{1.73_M2}
GLUCOSE BLDC GLUCOMTR-MCNC: 110 MG/DL (ref 70–99)
GLUCOSE BLDC GLUCOMTR-MCNC: 114 MG/DL (ref 70–99)
GLUCOSE BLDC GLUCOMTR-MCNC: 141 MG/DL (ref 70–99)
GLUCOSE BLDC GLUCOMTR-MCNC: 203 MG/DL (ref 70–99)
GLUCOSE BLDC GLUCOMTR-MCNC: 216 MG/DL (ref 70–99)
GLUCOSE BLDC GLUCOMTR-MCNC: 59 MG/DL (ref 70–99)
GLUCOSE BLDC GLUCOMTR-MCNC: 65 MG/DL (ref 70–99)
GLUCOSE BLDC GLUCOMTR-MCNC: 68 MG/DL (ref 70–99)
GLUCOSE BLDC GLUCOMTR-MCNC: 75 MG/DL (ref 70–99)
GLUCOSE BLDC GLUCOMTR-MCNC: 79 MG/DL (ref 70–99)
GLUCOSE BLDC GLUCOMTR-MCNC: 87 MG/DL (ref 70–99)
GLUCOSE BLDC GLUCOMTR-MCNC: 94 MG/DL (ref 70–99)
GLUCOSE SERPL-MCNC: 140 MG/DL (ref 70–99)
HCT VFR BLD AUTO: 35.1 % (ref 35–47)
HGB BLD-MCNC: 11.4 G/DL (ref 11.7–15.7)
MCH RBC QN AUTO: 32.1 PG (ref 26.5–33)
MCHC RBC AUTO-ENTMCNC: 32.5 G/DL (ref 31.5–36.5)
MCV RBC AUTO: 99 FL (ref 78–100)
NT-PROBNP SERPL-MCNC: 76 PG/ML (ref 0–900)
PLATELET # BLD AUTO: 171 10E9/L (ref 150–450)
POTASSIUM SERPL-SCNC: 3.7 MMOL/L (ref 3.4–5.3)
RBC # BLD AUTO: 3.55 10E12/L (ref 3.8–5.2)
SODIUM SERPL-SCNC: 134 MMOL/L (ref 133–144)
WBC # BLD AUTO: 11.5 10E9/L (ref 4–11)

## 2019-09-08 PROCEDURE — 25800030 ZZH RX IP 258 OP 636: Performed by: INTERNAL MEDICINE

## 2019-09-08 PROCEDURE — 85027 COMPLETE CBC AUTOMATED: CPT | Performed by: INTERNAL MEDICINE

## 2019-09-08 PROCEDURE — 80048 BASIC METABOLIC PNL TOTAL CA: CPT | Performed by: INTERNAL MEDICINE

## 2019-09-08 PROCEDURE — 93306 TTE W/DOPPLER COMPLETE: CPT

## 2019-09-08 PROCEDURE — 71275 CT ANGIOGRAPHY CHEST: CPT

## 2019-09-08 PROCEDURE — 93306 TTE W/DOPPLER COMPLETE: CPT | Mod: 26 | Performed by: INTERNAL MEDICINE

## 2019-09-08 PROCEDURE — 25000128 H RX IP 250 OP 636: Performed by: INTERNAL MEDICINE

## 2019-09-08 PROCEDURE — 83880 ASSAY OF NATRIURETIC PEPTIDE: CPT | Performed by: INTERNAL MEDICINE

## 2019-09-08 PROCEDURE — 71046 X-RAY EXAM CHEST 2 VIEWS: CPT

## 2019-09-08 PROCEDURE — 00000146 ZZHCL STATISTIC GLUCOSE BY METER IP

## 2019-09-08 PROCEDURE — 12000000 ZZH R&B MED SURG/OB

## 2019-09-08 PROCEDURE — 36415 COLL VENOUS BLD VENIPUNCTURE: CPT | Performed by: INTERNAL MEDICINE

## 2019-09-08 PROCEDURE — 25000131 ZZH RX MED GY IP 250 OP 636 PS 637: Performed by: INTERNAL MEDICINE

## 2019-09-08 PROCEDURE — 99232 SBSQ HOSP IP/OBS MODERATE 35: CPT | Performed by: INTERNAL MEDICINE

## 2019-09-08 PROCEDURE — 99222 1ST HOSP IP/OBS MODERATE 55: CPT | Performed by: INTERNAL MEDICINE

## 2019-09-08 PROCEDURE — 25000125 ZZHC RX 250: Performed by: INTERNAL MEDICINE

## 2019-09-08 PROCEDURE — 25000132 ZZH RX MED GY IP 250 OP 250 PS 637: Performed by: INTERNAL MEDICINE

## 2019-09-08 RX ORDER — DIPHENHYDRAMINE HCL 25 MG
50 CAPSULE ORAL
Status: DISCONTINUED | OUTPATIENT
Start: 2019-09-08 | End: 2019-09-10 | Stop reason: HOSPADM

## 2019-09-08 RX ORDER — IOPAMIDOL 755 MG/ML
83 INJECTION, SOLUTION INTRAVASCULAR ONCE
Status: COMPLETED | OUTPATIENT
Start: 2019-09-08 | End: 2019-09-08

## 2019-09-08 RX ORDER — HYDROXYZINE HYDROCHLORIDE 25 MG/1
25 TABLET, FILM COATED ORAL EVERY 6 HOURS PRN
Status: DISCONTINUED | OUTPATIENT
Start: 2019-09-08 | End: 2019-09-10 | Stop reason: HOSPADM

## 2019-09-08 RX ORDER — ACETAMINOPHEN 325 MG/1
650 TABLET ORAL
Status: DISCONTINUED | OUTPATIENT
Start: 2019-09-08 | End: 2019-09-10 | Stop reason: HOSPADM

## 2019-09-08 RX ORDER — FUROSEMIDE 10 MG/ML
20 INJECTION INTRAMUSCULAR; INTRAVENOUS ONCE
Status: COMPLETED | OUTPATIENT
Start: 2019-09-08 | End: 2019-09-08

## 2019-09-08 RX ORDER — HYDROXYZINE HYDROCHLORIDE 25 MG/1
50 TABLET, FILM COATED ORAL EVERY 6 HOURS PRN
Status: DISCONTINUED | OUTPATIENT
Start: 2019-09-08 | End: 2019-09-10 | Stop reason: HOSPADM

## 2019-09-08 RX ADMIN — HYDROXYCHLOROQUINE SULFATE 200 MG: 200 TABLET, FILM COATED ENTERAL at 09:52

## 2019-09-08 RX ADMIN — ATORVASTATIN CALCIUM 80 MG: 40 TABLET, FILM COATED ORAL at 09:51

## 2019-09-08 RX ADMIN — IOPAMIDOL 83 ML: 755 INJECTION, SOLUTION INTRAVENOUS at 18:18

## 2019-09-08 RX ADMIN — DIPHENHYDRAMINE HYDROCHLORIDE 50 MG: 25 CAPSULE ORAL at 22:59

## 2019-09-08 RX ADMIN — SODIUM CHLORIDE: 9 INJECTION, SOLUTION INTRAVENOUS at 01:35

## 2019-09-08 RX ADMIN — DEXTROSE 15 G: 15 GEL ORAL at 06:27

## 2019-09-08 RX ADMIN — CEFAZOLIN SODIUM 2 G: 2 INJECTION, SOLUTION INTRAVENOUS at 13:04

## 2019-09-08 RX ADMIN — HYDROXYCHLOROQUINE SULFATE 200 MG: 200 TABLET, FILM COATED ENTERAL at 21:21

## 2019-09-08 RX ADMIN — FUROSEMIDE 20 MG: 10 INJECTION, SOLUTION INTRAVENOUS at 14:47

## 2019-09-08 RX ADMIN — SULFASALAZINE 1000 MG: 500 TABLET ORAL at 21:21

## 2019-09-08 RX ADMIN — SODIUM CHLORIDE 100 ML: 9 INJECTION, SOLUTION INTRAVENOUS at 18:18

## 2019-09-08 RX ADMIN — ENOXAPARIN SODIUM 40 MG: 40 INJECTION SUBCUTANEOUS at 18:29

## 2019-09-08 RX ADMIN — SULFASALAZINE 1000 MG: 500 TABLET ORAL at 09:51

## 2019-09-08 RX ADMIN — CEFAZOLIN SODIUM 2 G: 2 INJECTION, SOLUTION INTRAVENOUS at 21:20

## 2019-09-08 RX ADMIN — PREDNISONE 10 MG: 10 TABLET ORAL at 09:52

## 2019-09-08 RX ADMIN — LEVOTHYROXINE SODIUM 125 MCG: 125 TABLET ORAL at 09:51

## 2019-09-08 RX ADMIN — INSULIN GLARGINE 40 UNITS: 100 INJECTION, SOLUTION SUBCUTANEOUS at 21:20

## 2019-09-08 RX ADMIN — DEXTROSE 15 G: 15 GEL ORAL at 02:45

## 2019-09-08 RX ADMIN — IBUPROFEN 400 MG: 400 TABLET ORAL at 09:52

## 2019-09-08 RX ADMIN — ACETAMINOPHEN 650 MG: 325 TABLET ORAL at 22:59

## 2019-09-08 RX ADMIN — ASPIRIN 81 MG: 81 TABLET, COATED ORAL at 09:51

## 2019-09-08 RX ADMIN — Medication 1 CAPSULE: at 21:21

## 2019-09-08 RX ADMIN — CEFAZOLIN SODIUM 2 G: 2 INJECTION, SOLUTION INTRAVENOUS at 06:02

## 2019-09-08 RX ADMIN — LISINOPRIL 20 MG: 20 TABLET ORAL at 09:51

## 2019-09-08 RX ADMIN — INSULIN ASPART 3 UNITS: 100 INJECTION, SOLUTION INTRAVENOUS; SUBCUTANEOUS at 18:29

## 2019-09-08 ASSESSMENT — ACTIVITIES OF DAILY LIVING (ADL)
ADLS_ACUITY_SCORE: 12

## 2019-09-08 NOTE — PROVIDER NOTIFICATION
MD Notification    Notified Person: MD Rojas     Notified Person Name:    Notification Date/Time: 9/8/19 1510     Notification Interaction: text page     Purpose of Notification: Babatunde Nguyen, just would like clarification for the history of MRSA with patient and our hospital policy-shouldn't the pt have ISO precautions due to the history? Please advise, thank you!     Orders Received:    Comments:

## 2019-09-08 NOTE — PLAN OF CARE
VSS on RA, CMS intact, up independently in room, voiding in BR, one episode of diarrhea this am, MD aware, SOB with exertion and @ rest, MD aware CXR done IV Lasix 20 mg given, echo done, has cardiology consult and NM Lexiscan ordered, and 114.

## 2019-09-08 NOTE — PLAN OF CARE
DATE & TIME: 9/7/2019 0616-2513     Cognitive Concerns/ Orientation : A+Ox4   BEHAVIOR & AGGRESSION TOOL COLOR: Green, Calm & Cooperative  CIWA SCORE: NA   ABNL VS/O2: RA  MOBILITY: Independent in room with cane  PAIN MANAGMENT: Acetaminophen + Ibuprofen  DIET: Mod Carb  BOWEL/BLADDER: WDL  ABNL LAB/BG: None  DRAIN/DEVICES: None  TELEMETRY RHYTHM: NA  SKIN: Cellulitis of LLE  TESTS/PROCEDURES: None  D/C DAY/GOALS/PLACE: TBD  OTHER IMPORTANT INFO: NA

## 2019-09-08 NOTE — PROVIDER NOTIFICATION
MD Notification    Notified Person: MD    Notified Person Name: Dr Pike     Notification Date/Time:09/08/19 at 0355    Notification Interaction: spoke with Dr Pike via phone    Purpose of Notification: BG of 59 initially, given glucose gel and apple juice, followed hypoglycemic protocol. BG restored, currently . MD notified.     Orders Received: Discontinue Amaryl pills, and continue to monitor.      Comments:

## 2019-09-08 NOTE — PROGRESS NOTES
[facility]    Hospitalist Progress Note    Date of Service (when I saw the patient): 09/08/2019    Assessment & Plan   Pinky Whatley is a 53 year old female who was admitted on 9/7/2019.  A/P  LLE CELLULITIS with SEPSIS  Presenting with acute onset fever/chills/ tachycardia.. Has significant erythema as described above. Has fever/ leucocytosis. Lactic acidosis. Small open blister seen-?nidus of infection. Is significantly immunosuppressed. Got ancef 1gm/ 1L fluid bolus  in ED prior to blood cultures.  Has h/o MRSA-but later in '17 was told she is culture negative. On prednisone chronically.  -will continue ancef at 2gm q8hrs  Ultrasound left lower extremity negative for DVT   Blood cultures negative so far   Wbc count improving from 15k to 11k today     DYSPNEA ON EXERTION;  She gets SOb with exertion and was SOB in room per nursing    Attributes it to anxiety    echo in 2018 showed grade 1 or mild diastolic dysfunction   Check BNP< chest xray today   Stop IVF   Check Echo and marla scan   Cardiology conult placed for evaluation of SOB   High risk for ischemic heart disease with HTN, hyperlipidemia, obesity and DM type2   Needs out pt sleep study      H/O FACIAL ABSCESS/ MRSA '16  Apparently culture free for MRSA 7/26/17 in care everywhere.     DM2  Insulin dependent. Last a1c 6.9 in '17.   -will continue lantus/ ISS high dose. Continued on  amaryl. Hold metformin until sepsis resolved.  Blood sugar was low at 58 overnight   Stopped amaryl  Reduce lantus from 50units to 40units at bedtime today      HTN  Stable  -continue meds     HLD  Continue meds     HYPO T4  Continue meds     RA  Continue prednisone/ sulfasalazine. Hold xeljanz -has >10% risk of infections. Methotrexate is weekly.      DVT PROF-lovenox     DISPO  Will be in hospital 1-2 days.        Peyton Rojas MD  [phone] (P)      Interval History   Was sob with walking in room per nursing today. LLE cellulitis improving. Afebrile  since admission.     -Data reviewed today: I reviewed all new labs and imaging results over the last 24 hours. I personally reviewed no images or EKG's today.    Physical Exam   Temp: 98.3  F (36.8  C) Temp src: Oral BP: 113/65 Pulse: 91 Heart Rate: 98 Resp: 16 SpO2: 95 % O2 Device: None (Room air)    Vitals:    09/07/19 1108   Weight: 130.2 kg (287 lb)     Vital Signs with Ranges  Temp:  [97.1  F (36.2  C)-99.3  F (37.4  C)] 98.3  F (36.8  C)  Pulse:  [] 91  Heart Rate:  [] 98  Resp:  [16-18] 16  BP: (101-136)/(51-84) 113/65  SpO2:  [92 %-97 %] 95 %  I/O last 3 completed shifts:  In: 240 [P.O.:240]  Out: -     Constitutional: Awake, alert, cooperative, no apparent distress, obese . Breathing little heavily   Respiratory: Clear to auscultation bilaterally, no crackles or wheezing  Cardiovascular: Regular rate and rhythm, normal S1 and S2, and no murmur noted  GI: Normal bowel sounds, soft, non-distended, non-tender  Skin/Integumen: No rashes, no cyanosis, LLE cellulitis is improving   Other:     Medications       aspirin  81 mg Oral Daily     atorvastatin  80 mg Oral Daily     ceFAZolin  2 g Intravenous Q8H     enoxaparin  40 mg Subcutaneous Q24H     hydroxychloroquine  200 mg Oral BID     insulin aspart  1-10 Units Subcutaneous TID AC     insulin aspart  1-7 Units Subcutaneous At Bedtime     insulin glargine  50 Units Subcutaneous At Bedtime     lactobacillus rhamnosus (GG)  1 capsule Oral At Bedtime     levothyroxine  125 mcg Oral QAM AC     lisinopril  20 mg Oral Daily     predniSONE  10 mg Oral Daily     sodium chloride (PF)  3 mL Intracatheter Q8H     sulfaSALAzine  1,000 mg Oral BID       Data   Recent Labs   Lab 09/08/19  0655 09/07/19  1451 09/07/19  1137   WBC 11.5*  --  15.8*   HGB 11.4*  --  12.6   MCV 99  --  98    198 196     --  138   POTASSIUM 3.7  --  3.7   CHLORIDE 103  --  102   CO2 26  --  30   BUN 11  --  16   CR 0.72 0.90 0.98   ANIONGAP 5  --  6   CHANTELLE 8.7  --  9.2   GLC  140*  --  73       Recent Results (from the past 24 hour(s))   US Lower Extremity Venous Duplex Left    Narrative    ULTRASOUND  LOWER EXTREMITY VENOUS DUPLEX LEFT   9/7/2019 12:19 PM     HISTORY: Erythema/warmth/edema.    COMPARISON: None.    FINDINGS: Gray-scale, color and Doppler spectral analysis ultrasound  was performed of the left leg. Compression and augmentation imaging  was performed.    There is no evidence for deep venous thrombosis. There is a Baker cyst  at the left popliteal fossa that is 3.2 x 0.9 x 2.4 cm.      Impression    IMPRESSION: No evidence for DVT within the left leg. Left Baker's cyst  identified.    HEATHER PALMER MD

## 2019-09-08 NOTE — CONSULTS
[facility]    Cardiology Consultation     Date of Admission:  9/7/2019    Assessment & Plan   Pinky Whatley is a 53 year old female who was admitted on 9/7/2019. I was asked to see the patient for shortness of breath.    1.  Admission for left lower extremity cellulitis  2.  Shortness of breath and dyspnea on exertion  3.  Hypertension  4.  Hyperlipidemia  5.  Diabetes mellitus  6.  Morbid obesity  7.  Rheumatoid arthritis    It was a pleasure seeing Mrs. Whatley today with the cardiology consult service.  I reviewed these recommendations with her in the hospital in detail today.    She is admitted to the hospital with left lower extremity cellulitis.  There was concern for increasing shortness of breath with minimal ambulation as well as significant shortness of breath whenever she got back into bed until she rested for a while.  She tells me that these been going on for quite some time.  However, she denies symptoms of congestive heart failure including orthopnea, paroxysmal nocturnal dyspnea, or significant lower extremity edema aside from the edema associated with her current cellulitis.    My suspicion that this represents atypical chest pain or acute heart failure is quite low.  Her echocardiogram demonstrates normal left ventricular size and function with an ejection fraction of 60% and no regional wall motion abnormalities.  Her RV size and function is normal.  She has no significant valvular heart disease.  IVC size was normal with normal inspiratory collapse.  No definite signs of pulmonary hypertension.    Given this, I do not think she warrants stress testing at this point nor does she require any diuresis.  She does have elevation of the left hemidiaphragm for an undetermined reason.  It is difficult to say if there is a left lower lobe opacity versus atelectasis.  I think it be reasonable to obtain a CT scan to evaluate for opacity versus atelectasis.  Given that we are obtaining  the CT scan I would favor obtaining with contrast so that we can evaluate for any evidence of pulmonary embolism as well.    We will visit with her tomorrow regarding the results of the CT scan when it returns.  Otherwise, continue current medical therapy.  Cardiology will continue to follow along.  Please page with any questions or concerns.    Michoacano Tadeo MD    Code Status    Full Code    Reason for Consult   Reason for consult: Shortness of breath    Primary Care Physician   Laura Evans    Chief Complaint   Shortness of breath    History is obtained from the patient    History of Present Illness   Pinky Whatley is a 53 year old female who presents with left lower extremity cellulitis.  Cardiology is consulted for recommendations regarding shortness of breath.    She was initially admitted on 9/7/2019 with worsening left lower extremity cellulitis as well as fevers and chills.  Lower extremity ultrasound was negative for DVT.  While ambulating around the hospital or with getting back into bed after ambulating she was severely short of breath.  She attributes this predominantly to anxiety and states this has been going for some time.  However, given her severe symptoms and echocardiogram was obtained and cardiology was consulted.    At the time that I visited with her she was resting comfortably in bed on room air.  She denies any shortness of breath at rest.  She denies any orthopnea or paroxysmal nocturnal dyspnea.  She has not had significant lower extremity edema aside from that of the left lower extremity with the cellulitis.  She denies any recent chest pain or chest discomfort of any kind.    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 7/17 NIL Pap, HPV negative. Plan: cotest in 3 years (7/2020)       Past Surgical History   Bunion - right 2005        Carpel tunnel- left 1993        WISDOM TEETH EXTRACTION          TONSILLECTOMY          bilateral salpingectomy 2013        COLPOSCOPY 2013   normal          Prior to Admission Medications   Prior to Admission Medications   Prescriptions Last Dose Informant Patient Reported? Taking?   GLUCOSAMINE-CHONDROITIN PO 9/7/2019 Self Yes Yes   Sig: Take 1 tablet by mouth 2 times daily   Probiotic Product (PROBIOTIC PO) 9/6/2019 Self Yes Yes   Sig: Take 1 capsule by mouth At Bedtime   acetaminophen (TYLENOL) 500 MG tablet PRN Self Yes Yes   Sig: Take 1,000 mg by mouth 2 times daily as needed   aspirin 81 MG EC tablet 9/6/2019 Self Yes Yes   Sig: Take 81 mg by mouth daily   atorvastatin (LIPITOR) 80 MG tablet 9/6/2019 Self Yes Yes   Sig: Take 80 mg by mouth daily   diphenhydrAMINE-acetaminophen (TYLENOL PM)  MG tablet 9/6/2019 Self Yes Yes   Sig: Take 2 tablets by mouth At Bedtime   glimepiride (AMARYL) 4 MG tablet 9/5/2019 Self Yes Yes   Sig: Take 4 mg by mouth 2 times daily   hydroxychloroquine (PLAQUENIL) 200 MG tablet 9/7/2019 Self Yes Yes   Sig: Take 200 mg by mouth 2 times daily   ibuprofen (ADVIL/MOTRIN) 200 MG tablet PRN Self Yes Yes   Sig: Take 400 mg by mouth 2 times daily as  needed   insulin glargine (BASAGLAR KWIKPEN) 100 UNIT/ML pen 9/6/2019 Self Yes Yes   Sig: Inject 50 Units Subcutaneous At Bedtime   levothyroxine (SYNTHROID/LEVOTHROID) 125 MCG tablet 9/7/2019 Self Yes Yes   Sig: Take 125 mcg by mouth daily   lisinopril (PRINIVIL/ZESTRIL) 20 MG tablet 9/5/2019 Self Yes Yes   Sig: Take 20 mg by mouth daily   metFORMIN (GLUCOPHAGE) 500 MG tablet 9/7/2019 Self Yes Yes   Sig: Take 1,000 mg by mouth 2 times daily (with meals)   methotrexate 2.5 MG tablet 9/2/2019 Self Yes Yes   Sig: Take 22.5 mg by mouth once a week on Monday (9 x 2.5 mg tablet)   multivitamin, therapeutic (THERA-VIT) TABS tablet 9/7/2019 Self Yes Yes   Sig: Take 1 tablet by mouth daily   naproxen sodium (ANAPROX) 220 MG tablet PRN Self Yes Yes   Sig: Take 440 mg by mouth 2 times daily as needed   predniSONE (DELTASONE) 5 MG tablet 9/7/2019 Self Yes Yes   Sig: Take 10 mg by mouth daily (2 x 5 mg tablet)   sulfaSALAzine (AZULFIDINE) 500 MG tablet 9/7/2019 Self Yes Yes   Sig: Take 1,000 mg by mouth 2 times daily   tofacitinib (XELJANZ) 5 MG tablet 9/7/2019 Self Yes Yes   Sig: Take 5 mg by mouth 2 times daily      Facility-Administered Medications: None     Allergies   No Known Allergies    Social History   I have reviewed this patient's social history and updated it with pertinent information if needed. Pinky Whatley  reports that she has never smoked. She has never used smokeless tobacco. She reports that she drinks alcohol. She reports that she does not use drugs.    Family History   Heart Disease Father       Arthritis Mother       Hyperlipidemia Mother       Cancer Paternal Aunt 54yo Brain Ca   Cancer-breast Paternal Aunt 54yo     Cancer-colon No Family History       Cancer-ovarian No Family History       Cancer-prostate No Family History           Review of Systems   The 10 point Review of Systems is negative other than noted in the HPI or here.     Physical Exam   Temp: 98.2  F (36.8  C) Temp src: Oral BP: 114/57  Pulse: 95 Heart Rate: 95 Resp: 16 SpO2: 96 % O2 Device: None (Room air)    Vital Signs with Ranges  Temp:  [97.1  F (36.2  C)-98.6  F (37  C)] 98.2  F (36.8  C)  Pulse:  [91-95] 95  Heart Rate:  [] 95  Resp:  [16-18] 16  BP: (101-117)/(51-69) 114/57  SpO2:  [95 %-97 %] 96 %  287 lbs 0 oz    Constitutional: No apparent distress.   Eyes: No xanthelasma or conjunctivitis  HEENT: Moist oral mucosa  Respiratory: Decreased breath sounds at left lung base.  Otherwise lungs clear to auscultation.  Cardiovascular: Regular rate and rhythm. Normal S1 and S2. No murmurs. No extra heart sounds.  Unable to assess JVP due to body habitus.  Lymph/Hematologic: No purpura or petechiae.  Skin: Left lower extremity cellulitis with skin pen markings to delineate extent.  Extremities: 2+ left and no right peripheral edema.  Neurologic: Moving all extremities. No facial assymmetry.  Psychiatric: Alert and oriented. Answers questions appropriately.    Data   Results for orders placed or performed during the hospital encounter of 09/07/19 (from the past 24 hour(s))   Glucose by meter   Result Value Ref Range    Glucose 57 (L) 70 - 99 mg/dL   Glucose by meter   Result Value Ref Range    Glucose 132 (H) 70 - 99 mg/dL   Glucose by meter   Result Value Ref Range    Glucose 95 70 - 99 mg/dL   Glucose by meter   Result Value Ref Range    Glucose 59 (L) 70 - 99 mg/dL   Glucose by meter   Result Value Ref Range    Glucose 65 (L) 70 - 99 mg/dL   Glucose by meter   Result Value Ref Range    Glucose 79 70 - 99 mg/dL   Glucose by meter   Result Value Ref Range    Glucose 87 70 - 99 mg/dL   Glucose by meter   Result Value Ref Range    Glucose 110 (H) 70 - 99 mg/dL   Glucose by meter   Result Value Ref Range    Glucose 68 (L) 70 - 99 mg/dL   Glucose by meter   Result Value Ref Range    Glucose 75 70 - 99 mg/dL   Glucose by meter   Result Value Ref Range    Glucose 94 70 - 99 mg/dL   Basic metabolic panel   Result Value Ref Range    Sodium 134 133 -  144 mmol/L    Potassium 3.7 3.4 - 5.3 mmol/L    Chloride 103 94 - 109 mmol/L    Carbon Dioxide 26 20 - 32 mmol/L    Anion Gap 5 3 - 14 mmol/L    Glucose 140 (H) 70 - 99 mg/dL    Urea Nitrogen 11 7 - 30 mg/dL    Creatinine 0.72 0.52 - 1.04 mg/dL    GFR Estimate >90 >60 mL/min/[1.73_m2]    GFR Estimate If Black >90 >60 mL/min/[1.73_m2]    Calcium 8.7 8.5 - 10.1 mg/dL   CBC with platelets   Result Value Ref Range    WBC 11.5 (H) 4.0 - 11.0 10e9/L    RBC Count 3.55 (L) 3.8 - 5.2 10e12/L    Hemoglobin 11.4 (L) 11.7 - 15.7 g/dL    Hematocrit 35.1 35.0 - 47.0 %    MCV 99 78 - 100 fl    MCH 32.1 26.5 - 33.0 pg    MCHC 32.5 31.5 - 36.5 g/dL    RDW 13.9 10.0 - 15.0 %    Platelet Count 171 150 - 450 10e9/L   Nt probnp inpatient   Result Value Ref Range    N-Terminal Pro BNP Inpatient 76 0 - 900 pg/mL   Glucose by meter   Result Value Ref Range    Glucose 141 (H) 70 - 99 mg/dL   Glucose by meter   Result Value Ref Range    Glucose 114 (H) 70 - 99 mg/dL   XR Chest 2 Views    Narrative    CHEST TWO VIEWS  2019 12:19 PM     HISTORY: SOB.    COMPARISON: None.      Impression    IMPRESSION: Elevated left hemidiaphragm. Left base atelectasis. Right  lung appears clear. Cardiomegaly.    HEATHER PALMER MD   Echocardiogram Complete    Narrative    [id number]  [id number]  [id number]  [id number]^SANTOS^ANGELA           [facility]  Echocardiography Laboratory  [address] [place] [address]        Name: RADHA JACOBO  MRN: [id number]  : 1965  Study Date: 2019 01:45 PM  Age: 53 yrs  Gender: Female  Patient Location: [facility]  Reason For Study: SOB  Ordering Physician: ANGELA GRAHAM  Referring Physician: Laura Evans  Performed By: Wild Forbes     BSA: 2.2 m2  Height: 61 in  Weight: 287 lb  HR: 90  BP: 117/69 mmHg  _____________________________________________________________________________  __        Procedure  Complete Portable Echo  Adult.  _____________________________________________________________________________  __        Interpretation Summary     1. The left ventricle is normal in structure, function and size. The visual  ejection fraction is estimated at 60%. Normal left ventricular wall motion  2. The right ventricle is normal in structure, function and size.  3. No valve disease.     No changes from echo 5/2018.  _____________________________________________________________________________  __        Left Ventricle  The left ventricle is normal in structure, function and size. There is  borderline concentric left ventricular hypertrophy. The visual ejection  fraction is estimated at 60%. Left ventricular diastolic function is normal.  Normal left ventricular wall motion.     Right Ventricle  The right ventricle is normal in structure, function and size.     Atria  Normal left atrial size. Right atrial size is normal. There is no atrial shunt  seen.     Mitral Valve  There is no mitral regurgitation noted.        Tricuspid Valve  There is mild (1+) tricuspid regurgitation. The right ventricular systolic  pressure is approximated at 36.0 mmHg plus the right atrial pressure.     Aortic Valve  The aortic valve is normal in structure and function.     Pulmonic Valve  The pulmonic valve is normal in structure and function.     Vessels  Normal ascending, transverse (arch), and descending aorta. The inferior vena  cava was normal in size with preserved respiratory variability.     Pericardium  There is no pericardial effusion.        Rhythm  Sinus rhythm was noted.  _____________________________________________________________________________  __  MMode/2D Measurements & Calculations  IVSd: 1.2 cm     LVIDd: 5.1 cm  LVIDs: 3.4 cm  LVPWd: 1.2 cm  FS: 34.5 %  LV mass(C)d: 239.8 grams  LV mass(C)dI: 108.9 grams/m2  Ao root diam: 3.1 cm  LA dimension: 3.1 cm  asc Aorta Diam: 2.9 cm  LA/Ao: 1.00  LVOT diam: 2.2 cm  LVOT area: 3.7 cm2  RWT: 0.46         Doppler Measurements & Calculations  MV E max cash: 64.5 cm/sec  MV A max cash: 87.3 cm/sec  MV E/A: 0.74  MV dec slope: 291.3 cm/sec2  PA acc time: 0.08 sec  TR max cash: 261.5 cm/sec  TR max P.0 mmHg  Pulm Sys Cash: 62.8 cm/sec  Pulm Martinez Cash: 41.9 cm/sec  Pulm S/D: 1.5  E/E' av.2  Lateral E/e': 7.1     Medial E/e': 9.3           _____________________________________________________________________________  __           Report approved by: Saud Baxter 2019 03:16 PM

## 2019-09-08 NOTE — PLAN OF CARE
DATE & TIME: 9/8 from 2300 to 0730  Cognitive Concerns/ Orientation : A&O x 4   BEHAVIOR & AGGRESSION TOOL COLOR: Green, calm and cooperative for cares  CIWA SCORE: N/A   ABNL VS/O2: VSS on RA  MOBILITY: Up independent in the room  PAIN MANAGMENT: Denied  DIET: Mod CHO diet  BOWEL/BLADDER: Continent   ABNL LAB/BG: BG 59 hypoglycemic protocol followed BG restored to 110. Had other hypoglycemic episode, latest   DRAIN/DEVICES: IVF infusing at 100 mL/hr   TELEMETRY RHYTHM: N/A  SKIN: Erythema on left leg,  not exceeding to marking borders, on IV abx  TESTS/PROCEDURES: N/A  D/C DAY/GOALS/PLACE: pending in progress   OTHER IMPORTANT INFO:

## 2019-09-08 NOTE — PLAN OF CARE
Contact Isolation precautions initiated for history of MRSA in a facial wound from Jan 2017. A&Ox4. CMS intermittent baseline tingling in bilateral toes. Bowel sounds audible, passing flatus, no BM's this shift, tolerating mod cho, 2g Na, low fat, and no caffeine diet. VSS. Up independently. C/o left lower leg discomfort, decreased with ibuprofen. Pt scoring green on the Aggression Stop Light Tool. Chest CT with contrast completed this evening. Strict I&O's.  Plan pending-continue to monitor.

## 2019-09-09 LAB
ANION GAP SERPL CALCULATED.3IONS-SCNC: 4 MMOL/L (ref 3–14)
BUN SERPL-MCNC: 9 MG/DL (ref 7–30)
CALCIUM SERPL-MCNC: 8.7 MG/DL (ref 8.5–10.1)
CHLORIDE SERPL-SCNC: 104 MMOL/L (ref 94–109)
CO2 SERPL-SCNC: 29 MMOL/L (ref 20–32)
CREAT SERPL-MCNC: 0.71 MG/DL (ref 0.52–1.04)
ERYTHROCYTE [DISTWIDTH] IN BLOOD BY AUTOMATED COUNT: 13.7 % (ref 10–15)
GFR SERPL CREATININE-BSD FRML MDRD: >90 ML/MIN/{1.73_M2}
GLUCOSE BLDC GLUCOMTR-MCNC: 145 MG/DL (ref 70–99)
GLUCOSE BLDC GLUCOMTR-MCNC: 167 MG/DL (ref 70–99)
GLUCOSE BLDC GLUCOMTR-MCNC: 182 MG/DL (ref 70–99)
GLUCOSE BLDC GLUCOMTR-MCNC: 203 MG/DL (ref 70–99)
GLUCOSE SERPL-MCNC: 146 MG/DL (ref 70–99)
HCT VFR BLD AUTO: 33.9 % (ref 35–47)
HGB BLD-MCNC: 11.1 G/DL (ref 11.7–15.7)
MCH RBC QN AUTO: 32.5 PG (ref 26.5–33)
MCHC RBC AUTO-ENTMCNC: 32.7 G/DL (ref 31.5–36.5)
MCV RBC AUTO: 99 FL (ref 78–100)
PLATELET # BLD AUTO: 171 10E9/L (ref 150–450)
POTASSIUM SERPL-SCNC: 4 MMOL/L (ref 3.4–5.3)
RBC # BLD AUTO: 3.42 10E12/L (ref 3.8–5.2)
SODIUM SERPL-SCNC: 137 MMOL/L (ref 133–144)
T4 FREE SERPL-MCNC: 1.34 NG/DL (ref 0.76–1.46)
TSH SERPL DL<=0.005 MIU/L-ACNC: 5.63 MU/L (ref 0.4–4)
WBC # BLD AUTO: 6.5 10E9/L (ref 4–11)

## 2019-09-09 PROCEDURE — 84443 ASSAY THYROID STIM HORMONE: CPT | Performed by: INTERNAL MEDICINE

## 2019-09-09 PROCEDURE — 93010 ELECTROCARDIOGRAM REPORT: CPT | Performed by: INTERNAL MEDICINE

## 2019-09-09 PROCEDURE — 80048 BASIC METABOLIC PNL TOTAL CA: CPT | Performed by: INTERNAL MEDICINE

## 2019-09-09 PROCEDURE — 00000146 ZZHCL STATISTIC GLUCOSE BY METER IP

## 2019-09-09 PROCEDURE — 12000000 ZZH R&B MED SURG/OB

## 2019-09-09 PROCEDURE — 25000132 ZZH RX MED GY IP 250 OP 250 PS 637: Performed by: INTERNAL MEDICINE

## 2019-09-09 PROCEDURE — 25000131 ZZH RX MED GY IP 250 OP 636 PS 637: Performed by: INTERNAL MEDICINE

## 2019-09-09 PROCEDURE — 84439 ASSAY OF FREE THYROXINE: CPT | Performed by: INTERNAL MEDICINE

## 2019-09-09 PROCEDURE — 25000128 H RX IP 250 OP 636: Performed by: INTERNAL MEDICINE

## 2019-09-09 PROCEDURE — 85027 COMPLETE CBC AUTOMATED: CPT | Performed by: INTERNAL MEDICINE

## 2019-09-09 PROCEDURE — 99232 SBSQ HOSP IP/OBS MODERATE 35: CPT | Performed by: INTERNAL MEDICINE

## 2019-09-09 PROCEDURE — 36415 COLL VENOUS BLD VENIPUNCTURE: CPT | Performed by: INTERNAL MEDICINE

## 2019-09-09 PROCEDURE — 93005 ELECTROCARDIOGRAM TRACING: CPT

## 2019-09-09 RX ADMIN — ATORVASTATIN CALCIUM 80 MG: 40 TABLET, FILM COATED ORAL at 08:33

## 2019-09-09 RX ADMIN — SULFASALAZINE 1000 MG: 500 TABLET ORAL at 08:33

## 2019-09-09 RX ADMIN — CEFAZOLIN SODIUM 2 G: 2 INJECTION, SOLUTION INTRAVENOUS at 21:27

## 2019-09-09 RX ADMIN — INSULIN ASPART 1 UNITS: 100 INJECTION, SOLUTION INTRAVENOUS; SUBCUTANEOUS at 08:33

## 2019-09-09 RX ADMIN — HYDROXYCHLOROQUINE SULFATE 200 MG: 200 TABLET, FILM COATED ENTERAL at 08:33

## 2019-09-09 RX ADMIN — ENOXAPARIN SODIUM 40 MG: 40 INJECTION SUBCUTANEOUS at 05:33

## 2019-09-09 RX ADMIN — LISINOPRIL 20 MG: 20 TABLET ORAL at 08:33

## 2019-09-09 RX ADMIN — PREDNISONE 10 MG: 10 TABLET ORAL at 08:33

## 2019-09-09 RX ADMIN — SULFASALAZINE 1000 MG: 500 TABLET ORAL at 21:26

## 2019-09-09 RX ADMIN — ENOXAPARIN SODIUM 40 MG: 40 INJECTION SUBCUTANEOUS at 17:50

## 2019-09-09 RX ADMIN — DIPHENHYDRAMINE HYDROCHLORIDE 50 MG: 25 CAPSULE ORAL at 21:26

## 2019-09-09 RX ADMIN — ASPIRIN 81 MG: 81 TABLET, COATED ORAL at 08:33

## 2019-09-09 RX ADMIN — CEFAZOLIN SODIUM 2 G: 2 INJECTION, SOLUTION INTRAVENOUS at 12:34

## 2019-09-09 RX ADMIN — LEVOTHYROXINE SODIUM 125 MCG: 125 TABLET ORAL at 07:01

## 2019-09-09 RX ADMIN — Medication 1 CAPSULE: at 21:25

## 2019-09-09 RX ADMIN — CEFAZOLIN SODIUM 2 G: 2 INJECTION, SOLUTION INTRAVENOUS at 05:34

## 2019-09-09 RX ADMIN — INSULIN ASPART 2 UNITS: 100 INJECTION, SOLUTION INTRAVENOUS; SUBCUTANEOUS at 17:50

## 2019-09-09 RX ADMIN — INSULIN ASPART 3 UNITS: 100 INJECTION, SOLUTION INTRAVENOUS; SUBCUTANEOUS at 12:35

## 2019-09-09 RX ADMIN — HYDROXYZINE HYDROCHLORIDE 50 MG: 25 TABLET ORAL at 21:26

## 2019-09-09 RX ADMIN — HYDROXYCHLOROQUINE SULFATE 200 MG: 200 TABLET, FILM COATED ENTERAL at 21:25

## 2019-09-09 RX ADMIN — INSULIN GLARGINE 40 UNITS: 100 INJECTION, SOLUTION SUBCUTANEOUS at 22:41

## 2019-09-09 ASSESSMENT — ACTIVITIES OF DAILY LIVING (ADL)
ADLS_ACUITY_SCORE: 12

## 2019-09-09 NOTE — PLAN OF CARE
A/Ox4. Up independently with cane. Voiding adequately per BR. VSS on RA. CMS intact with edema and redness to LE. Denies pain. Progressing per POC. Will cont to monitor.

## 2019-09-09 NOTE — PROGRESS NOTES
"[facility] Cardiology Progress Note  Date of Service: 2019  Primary Cardiologist: None    Pinky Whatley is a 53 year old female admitted on 2019 with cellulitis, cardiology consulted for shortness of breath.     Subjective: Feeling well today. MALCOLM unchanged, chronic, when she \"over-does it.\" No chest pain.     Assessment:  1. Dyspnea on exertion, chronic   - Low suspicion for CHF or angina.    - BNP WNL. Unchanged with single dose IV Lasix.   - TTE unremarkable.   - CT chest showed atelectasis, cannot rule out superimposed PNA. No PE or coronary calcification appreciated.   - Patient personally attributes to anxiety.     2. LLE cellulitis with some associated edema, sepsis   - Ultrasound neg for DVT.   - Abx per hospitalist service.    3. HTN, controlled  4. HLP - LDL 58 in 2017, on Lipitor 80  5. DMII - a1c 6.5, on metformin  6. Morbid obesity - Body mass index is 54.23 kg/m .  7. Rheumatoid arthritis on methotrexate, limited mobility from this  8. Hypothyroid  9. FH of CAD - father  of MI in late 60's  10. No hx tobacco use    Plan:   1. Will obtain an EKG and TFT's. If benign, no further cardiac w/u is warranted at this time.   2. Agree with outpatient sleep study.   3. We will be happy to see her on a PRN basis in the clinic. She should follow up with her PCP upon discharge.     Elsa Skinner PA-C, DEVIN  Pager: [phone]  Text Page  (7:30am - 4pm M-F)   __________________________________________________________________________    Physical Exam   Temp: 98.6  F (37  C) Temp src: Oral BP: 121/77 Pulse: 87 Heart Rate: 87 Resp: 16 SpO2: 98 % O2 Device: None (Room air)    Vitals:    19 1108   Weight: 130.2 kg (287 lb)       GENERAL:  The patient is in no apparent distress.   NECK: CVP appears normal, no masses or thyromegaly.  PULMONARY:  There is a normal respiratory effort. Clear lungs to auscultation bilaterally.   CARDIOVASCULAR:  RRR, normal S1 S2, no m/r/g.  GI:  Non " tender abdomen with normoactive bowel sounds and no hepatosplenomegaly. There are no masses palpable.   EXTREMITIES:  LLE erythema confined to lower leg, with 1+ edema pedal-pretibial region.   VASCULAR: 2+ Pulses bilaterally in upper and lower extremities.    Medications       aspirin  81 mg Oral Daily     atorvastatin  80 mg Oral Daily     ceFAZolin  2 g Intravenous Q8H     enoxaparin  40 mg Subcutaneous Q12H     hydroxychloroquine  200 mg Oral BID     insulin aspart  1-10 Units Subcutaneous TID AC     insulin aspart  1-7 Units Subcutaneous At Bedtime     insulin glargine  40 Units Subcutaneous At Bedtime     lactobacillus rhamnosus (GG)  1 capsule Oral At Bedtime     levothyroxine  125 mcg Oral QAM AC     lisinopril  20 mg Oral Daily     predniSONE  10 mg Oral Daily     sodium chloride (PF)  3 mL Intracatheter Q8H     sulfaSALAzine  1,000 mg Oral BID       Data   Most Recent 3 CBC's:  Recent Labs   Lab Test 09/09/19  0609 09/08/19  0655 09/07/19  1451 09/07/19  1137   WBC 6.5 11.5*  --  15.8*   HGB 11.1* 11.4*  --  12.6   MCV 99 99  --  98    171 198 196     Most Recent 3 BMP's:  Recent Labs   Lab Test 09/09/19  0609 09/08/19  0655 09/07/19  1451 09/07/19  1137    134  --  138   POTASSIUM 4.0 3.7  --  3.7   CHLORIDE 104 103  --  102   CO2 29 26  --  30   BUN 9 11  --  16   CR 0.71 0.72 0.90 0.98   ANIONGAP 4 5  --  6   CHANTELLE 8.7 8.7  --  9.2   * 140*  --  73     Most Recent 2 LFT's:No lab results found.  Most Recent 3 Troponin's:No lab results found.  Most Recent 3 BNP's:  Recent Labs   Lab Test 09/08/19  0655   NTBNPI 76     Most Recent Cholesterol Panel:No lab results found.  Most Recent TSH and T4:No lab results found.  Most Recent Hemoglobin A1c:  Recent Labs   Lab Test 09/07/19  1451   A1C 6.5*

## 2019-09-09 NOTE — PLAN OF CARE
Alert and oriented x 4. VS stable, on RA and no complaints of pain. She stated that her right leg still felt stiff but was not painful. She was up walking once in the room this afternoon and tolerated that activity well.

## 2019-09-09 NOTE — PLAN OF CARE
Pt up IND in room.  Redness improving.  Voiding in BR - strict I&Os.   Plan for possible discharge tomorrow.

## 2019-09-09 NOTE — CONSULTS
Outpatient Sleep Study has been scheduled.      Oct 14, 2019  9:00 AM CDT  New Sleep Patient with Bennett Ezra Goltz, PA-C  [facility] ([facility]) [address] [address] [phone]

## 2019-09-09 NOTE — PLAN OF CARE
Alert and oriented x 4. VSS, on room air. Up independently in room. Denies pain.  +3 edema on left foot. Redness LLE.  IV saline lock

## 2019-09-09 NOTE — PROGRESS NOTES
Quick note, addendum to cards progress note from earlier today:  - EKG reviewed, SR with PRWP, probably due to obesity  - TSH high but T4f WNL.     Cardiology will sign off.   Discussed with Dr. Broussard.

## 2019-09-09 NOTE — PROGRESS NOTES
[facility]    Hospitalist Progress Note    Date of Service (when I saw the patient): 09/09/2019    Assessment & Plan   Pinky Whatley is a 53 year old female who was admitted on 9/7/2019.  A/P  LLE CELLULITIS with SEPSIS  Presenting with acute onset fever/chills/ tachycardia.. Has significant erythema as described above. Has fever/ leucocytosis. Lactic acidosis. Small open blister seen-?nidus of infection. Is significantly immunosuppressed. Got ancef 1gm/ 1L fluid bolus  in ED prior to blood cultures.  Has h/o MRSA-but later in '17 was told she is culture negative. On prednisone chronically.  -will continue ancef at 2gm q8hrs today   Will plan on discharging with oral keflex   Ultrasound left lower extremity negative for DVT   Blood cultures negative so far   Wbc count improving from 15k to 11k- 6.5K  today     DYSPNEA ON EXERTION due to mild fluid overload, obesity and anxiety ;  She gets SOb with exertion and was SOB in room per nursing    Attributes it to anxiety   Stopped  IVF . Given 20mg lasix one dose with good diuresis   Echo showed normal EF at 60%, no wall motion abnormalities, RV was normal and no valvular disease    Cardiology conult placed  And thought her symptoms are due to MARIA L needs out pt sleep study   Cardiology signed off   Ct chest was done was negative for PE and showed LLL and left upper lobe atelectasis and left diaphragmatic elevation seen       H/O FACIAL ABSCESS/ MRSA '16  Apparently culture free for MRSA 7/26/17 in care everywhere.     DM2  Insulin dependent. Last a1c 6.9 in '17.   -will continue lantus/ ISS high dose. Continued on  amaryl. Hold metformin until sepsis resolved.  Blood sugar was low at 58 overnight on 9/7   Stopped amaryl  Reduced lantus from 50units to 40units at bedtime on 9/8/19  Blood sugars are variable      HTN  Stable  -continue meds     HLD  Continue meds     HYPO T4  Continue meds     RA  Continue prednisone/ sulfasalazine. Hold xeljanz -has >10%  risk of infections. Methotrexate is weekly.      DVT PROF-lovenox     DISPO  Will be in hospital 1-2 days.        Peyton Rojas MD  [phone] (P)      Interval History   SOB improved today . LLE cellulitis improving. Afebrile since admission.     -Data reviewed today: I reviewed all new labs and imaging results over the last 24 hours. I personally reviewed no images or EKG's today.    Physical Exam   Temp: 98.6  F (37  C) Temp src: Oral BP: 121/77 Pulse: 87 Heart Rate: 87 Resp: 16 SpO2: 98 % O2 Device: None (Room air)    Vitals:    09/07/19 1108   Weight: 130.2 kg (287 lb)     Vital Signs with Ranges  Temp:  [98  F (36.7  C)-98.6  F (37  C)] 98.6  F (37  C)  Pulse:  [87-97] 87  Heart Rate:  [87-95] 87  Resp:  [16] 16  BP: (114-123)/(57-77) 121/77  SpO2:  [96 %-98 %] 98 %  I/O last 3 completed shifts:  In: 2956.67 [P.O.:960; I.V.:1996.67]  Out: 2800 [Urine:2800]    Constitutional: Awake, alert, cooperative, no apparent distress, obese   Respiratory: Clear to auscultation bilaterally, no crackles or wheezing  Cardiovascular: Regular rate and rhythm, normal S1 and S2, and no murmur noted  GI: Normal bowel sounds, soft, non-distended, non-tender  Skin/Integumen: No rashes, no cyanosis, LLE cellulitis is slowly improving   Other:     Medications       aspirin  81 mg Oral Daily     atorvastatin  80 mg Oral Daily     ceFAZolin  2 g Intravenous Q8H     enoxaparin  40 mg Subcutaneous Q12H     hydroxychloroquine  200 mg Oral BID     insulin aspart  1-10 Units Subcutaneous TID AC     insulin aspart  1-7 Units Subcutaneous At Bedtime     insulin glargine  40 Units Subcutaneous At Bedtime     lactobacillus rhamnosus (GG)  1 capsule Oral At Bedtime     levothyroxine  125 mcg Oral QAM AC     lisinopril  20 mg Oral Daily     predniSONE  10 mg Oral Daily     sodium chloride (PF)  3 mL Intracatheter Q8H     sulfaSALAzine  1,000 mg Oral BID       Data   Recent Labs   Lab 09/09/19  0609 09/08/19  0655 09/07/19  1451 09/07/19  1137    WBC 6.5 11.5*  --  15.8*   HGB 11.1* 11.4*  --  12.6   MCV 99 99  --  98    171 198 196    134  --  138   POTASSIUM 4.0 3.7  --  3.7   CHLORIDE 104 103  --  102   CO2 29 26  --  30   BUN 9 11  --  16   CR 0.71 0.72 0.90 0.98   ANIONGAP 4 5  --  6   CHANTELLE 8.7 8.7  --  9.2   * 140*  --  73       Recent Results (from the past 24 hour(s))   CT Chest Pulmonary Embolism w Contrast    Narrative    CT CHEST PULMONARY EMBOLISM WITH CONTRAST   9/8/2019 6:28 PM     HISTORY: Shortness of breath. Worsening shortness of breath and  dyspnea on exertion.  Elevated left hemidiaphragm on x-ray.    TECHNIQUE:  83 mL Isovue-370. Radiation dose for this scan was reduced  using automated exposure control, adjustment of the mA and/or kV  according to patient size, or iterative reconstruction technique.    COMPARISON: None.    FINDINGS:  No evidence of pulmonary embolism or acute thoracic aortic  abnormality. No pneumothorax. No pleural or pericardial effusion.  There is airspace consolidation with volume loss in the left lower  lobe. There is also some volume loss in the posterior aspect of the  left upper lobe. No pathologically enlarged thoracic or axillary lymph  nodes. No pulmonary nodule or mass.    No worrisome abnormality in the visualized portions of the upper  abdomen. No lytic or blastic bone lesions.      Impression    IMPRESSION:  1. No evidence of pulmonary embolism.  2. Airspace consolidation in the left lower lobe and posterior aspect  of the left upper lobe associated with volume loss predominantly  represents atelectasis. A superimposed pneumonia is not excluded.    KAY POTTER MD

## 2019-09-10 VITALS
OXYGEN SATURATION: 97 % | WEIGHT: 287 LBS | BODY MASS INDEX: 54.19 KG/M2 | DIASTOLIC BLOOD PRESSURE: 80 MMHG | SYSTOLIC BLOOD PRESSURE: 126 MMHG | RESPIRATION RATE: 16 BRPM | HEIGHT: 61 IN | HEART RATE: 83 BPM | TEMPERATURE: 97.6 F

## 2019-09-10 LAB
CREAT SERPL-MCNC: 0.7 MG/DL (ref 0.52–1.04)
GFR SERPL CREATININE-BSD FRML MDRD: >90 ML/MIN/{1.73_M2}
GLUCOSE BLDC GLUCOMTR-MCNC: 136 MG/DL (ref 70–99)
GLUCOSE BLDC GLUCOMTR-MCNC: 221 MG/DL (ref 70–99)
GLUCOSE SERPL-MCNC: 141 MG/DL (ref 70–99)
PLATELET # BLD AUTO: 177 10E9/L (ref 150–450)

## 2019-09-10 PROCEDURE — 00000146 ZZHCL STATISTIC GLUCOSE BY METER IP

## 2019-09-10 PROCEDURE — 85049 AUTOMATED PLATELET COUNT: CPT | Performed by: INTERNAL MEDICINE

## 2019-09-10 PROCEDURE — 25000128 H RX IP 250 OP 636: Performed by: INTERNAL MEDICINE

## 2019-09-10 PROCEDURE — 82947 ASSAY GLUCOSE BLOOD QUANT: CPT | Performed by: INTERNAL MEDICINE

## 2019-09-10 PROCEDURE — 25000131 ZZH RX MED GY IP 250 OP 636 PS 637: Performed by: INTERNAL MEDICINE

## 2019-09-10 PROCEDURE — 36415 COLL VENOUS BLD VENIPUNCTURE: CPT | Performed by: INTERNAL MEDICINE

## 2019-09-10 PROCEDURE — 82565 ASSAY OF CREATININE: CPT | Performed by: INTERNAL MEDICINE

## 2019-09-10 PROCEDURE — 25000132 ZZH RX MED GY IP 250 OP 250 PS 637: Performed by: INTERNAL MEDICINE

## 2019-09-10 PROCEDURE — 99239 HOSP IP/OBS DSCHRG MGMT >30: CPT | Performed by: HOSPITALIST

## 2019-09-10 RX ORDER — INSULIN GLARGINE 100 [IU]/ML
40 INJECTION, SOLUTION SUBCUTANEOUS AT BEDTIME
Status: ON HOLD
Start: 2019-09-10 | End: 2020-04-20

## 2019-09-10 RX ADMIN — SULFASALAZINE 1000 MG: 500 TABLET ORAL at 09:20

## 2019-09-10 RX ADMIN — HYDROXYCHLOROQUINE SULFATE 200 MG: 200 TABLET, FILM COATED ENTERAL at 09:19

## 2019-09-10 RX ADMIN — IBUPROFEN 400 MG: 400 TABLET ORAL at 06:17

## 2019-09-10 RX ADMIN — ATORVASTATIN CALCIUM 80 MG: 40 TABLET, FILM COATED ORAL at 09:19

## 2019-09-10 RX ADMIN — LISINOPRIL 20 MG: 20 TABLET ORAL at 09:20

## 2019-09-10 RX ADMIN — CEFAZOLIN SODIUM 2 G: 2 INJECTION, SOLUTION INTRAVENOUS at 05:38

## 2019-09-10 RX ADMIN — INSULIN ASPART 1 UNITS: 100 INJECTION, SOLUTION INTRAVENOUS; SUBCUTANEOUS at 09:18

## 2019-09-10 RX ADMIN — LEVOTHYROXINE SODIUM 125 MCG: 125 TABLET ORAL at 06:47

## 2019-09-10 RX ADMIN — PREDNISONE 10 MG: 10 TABLET ORAL at 09:19

## 2019-09-10 RX ADMIN — ASPIRIN 81 MG: 81 TABLET, COATED ORAL at 09:20

## 2019-09-10 RX ADMIN — INSULIN ASPART 3 UNITS: 100 INJECTION, SOLUTION INTRAVENOUS; SUBCUTANEOUS at 13:16

## 2019-09-10 RX ADMIN — ENOXAPARIN SODIUM 40 MG: 40 INJECTION SUBCUTANEOUS at 05:38

## 2019-09-10 ASSESSMENT — ACTIVITIES OF DAILY LIVING (ADL)
ADLS_ACUITY_SCORE: 12
ADLS_ACUITY_SCORE: 12
ADLS_ACUITY_SCORE: 14
ADLS_ACUITY_SCORE: 12

## 2019-09-10 NOTE — DISCHARGE SUMMARY
[facility]  Discharge Summary        Pinky Whatley MRN# [id number]   YOB: 1965 Age: 53 year old     Date of Admission:  9/7/2019  Date of Discharge:  9/10/2019  Admitting Physician:  Jean-Pierre Mcfarland MD  Discharge Physician: Scot Burns MD  Discharging Service: Hospitalist     Primary Provider: Laura Evans  Primary Care Physician Phone Number: [phone]         Discharge Diagnoses/Problem Oriented Hospital Course (Providers):    Pinky Whatley was admitted on 9/7/2019 by Jean-Pierre Mcfarland MD and I would refer you to their history and physical.  The following problems were addressed during her hospitalization:    Pinky Whatley is a 53 year old female with PMH of RA (on chronic prednisone), diabetes, hypertension, dyslipidemia, hypothyroidism, GERD, chronic anemia who was admitted on 9/7/2019 after she presented with fever, chills and noted with likely LLE cellulitis.     # likely LLE CELLULITIS with SEPSIS, sepsis resolved  - clinically improved and sepsis resolved  - does have significant left LE swelling, more than right  -was empirically started on IV Ancef,  on the day of discharge cellulitis improving and seems more like venous stasis changes  - Fever and leukocytosis trended down, wbc 15.8---6.5  - blood cultures from 9/7 with no growth so far; US 9/7 was negative for DVT  -discharge on PO Augmentin to complete one week of antibiotic course  -also ordered ACE wraps to decrease leg swelling     # Dyspnea on exertion due to mild fluid overload, obesity and anxiety ;  - CT chest 9/8 was negative for PE and showed LLL and left upper lobe atelectasis and left diaphragmatic elevation   - not hypoxic, saturated high 90s on room air  - outpatient sleep study arranged  - got intermittent lasix dose  - Echo showed normal EF at 60%, no wall motion abnormalities, RV was normal and no valvular disease    - evaluated by Cardiology and thought her symptoms are due to MARIA L     # DM2  with episode of hypoglycemia  - Insulin dependent, a1c 6.5  - Blood sugar was low at 58 on admission, likely sec to sepsis; PTA Lantus decreased from 50units to 40units  - subsequent blood sugars have been 150s--200s with no episodes of hypoglycemia  - resumed PTA metformin and Amaryl on discharge     # HTN, DLD, Hypothyroidism  Stable  -continue PTA meds     # RA  -Continue PTA prednisone/ sulfasalazine; resumed xeljanz and methotrexate on discharge    DVT prophylaxis: Full code            Brief Hospital Stay Summary Sent Home With Patient in AVS:               Pending Results:        Unresulted Labs Ordered in the Past 30 Days of this Admission     Date and Time Order Name Status Description    9/9/2019 0609 T4 free In process     9/7/2019 1429 Blood culture Preliminary     9/7/2019 1429 Blood culture Preliminary             Discharge Instructions and Follow-Up:      Follow-up Appointments     Follow-up and recommended labs and tests       Follow up with primary care provider, Laura Evans, within 7 days for   hospital follow- up.  The following labs/tests are recommended: repeat   CBC, BMP.    Apply ACE wraps 12 hours a day to decrease leg swelling.    Sleep study as scheduled                 Discharge Disposition:      Discharged to home        Discharge Medications:        Current Discharge Medication List      START taking these medications    Details   amoxicillin-clavulanate (AUGMENTIN) 875-125 MG tablet Take 1 tablet by mouth 2 times daily for 5 days  Qty: 10 tablet, Refills: 0    Associated Diagnoses: Cellulitis of left lower extremity         CONTINUE these medications which have CHANGED    Details   insulin glargine (BASAGLAR KWIKPEN) 100 UNIT/ML pen Inject 40 Units Subcutaneous At Bedtime    Comments: If Basaglar is not covered by insurance, may substitute Lantus at same dose and frequency.    Associated Diagnoses: Other specified diabetes mellitus with complication, with long-term current use of  "insulin (H)         CONTINUE these medications which have NOT CHANGED    Details   acetaminophen (TYLENOL) 500 MG tablet Take 1,000 mg by mouth 2 times daily as needed      aspirin 81 MG EC tablet Take 81 mg by mouth daily      atorvastatin (LIPITOR) 80 MG tablet Take 80 mg by mouth daily      diphenhydrAMINE-acetaminophen (TYLENOL PM)  MG tablet Take 2 tablets by mouth At Bedtime      glimepiride (AMARYL) 4 MG tablet Take 4 mg by mouth 2 times daily      GLUCOSAMINE-CHONDROITIN PO Take 1 tablet by mouth 2 times daily      hydroxychloroquine (PLAQUENIL) 200 MG tablet Take 200 mg by mouth 2 times daily      ibuprofen (ADVIL/MOTRIN) 200 MG tablet Take 400 mg by mouth 2 times daily as needed      levothyroxine (SYNTHROID/LEVOTHROID) 125 MCG tablet Take 125 mcg by mouth daily      lisinopril (PRINIVIL/ZESTRIL) 20 MG tablet Take 20 mg by mouth daily      metFORMIN (GLUCOPHAGE) 500 MG tablet Take 1,000 mg by mouth 2 times daily (with meals)      methotrexate 2.5 MG tablet Take 22.5 mg by mouth once a week on Monday (9 x 2.5 mg tablet)      multivitamin, therapeutic (THERA-VIT) TABS tablet Take 1 tablet by mouth daily      naproxen sodium (ANAPROX) 220 MG tablet Take 440 mg by mouth 2 times daily as needed      predniSONE (DELTASONE) 5 MG tablet Take 10 mg by mouth daily (2 x 5 mg tablet)      Probiotic Product (PROBIOTIC PO) Take 1 capsule by mouth At Bedtime      sulfaSALAzine (AZULFIDINE) 500 MG tablet Take 1,000 mg by mouth 2 times daily      tofacitinib (XELJANZ) 5 MG tablet Take 5 mg by mouth 2 times daily               Allergies:       No Known Allergies        Consultations This Hospital Stay:      Consultation during this admission received from cardiology        Condition and Physical on Discharge:      Discharge condition: Stable   Vitals: Blood pressure 126/80, pulse 83, temperature 97.6  F (36.4  C), temperature source Oral, resp. rate 16, height 1.549 m (5' 1\"), weight 130.2 kg (287 lb), SpO2 97 %. "     Constitutional: Awake, alert, cooperative, no apparent distress, obese   Respiratory: Clear to auscultation bilaterally, no crackles or wheezing  Cardiovascular: Regular rate and rhythm, normal S1 and S2, and no murmur noted  GI: Normal bowel sounds, soft, non-distended, non-tender  Skin/Integumen: No rashes, no cyanosis, LLE cellulitis is slowly improving, no local warmth or tenderness, seems more like venous stasis changes; LLE edema >right   Other:                  Discharge Time:      Greater than 30 minutes.        Image Results From This Hospital Stay (For Non-EPIC Providers):        Results for orders placed or performed during the hospital encounter of 09/07/19   US Lower Extremity Venous Duplex Left    Narrative    ULTRASOUND  LOWER EXTREMITY VENOUS DUPLEX LEFT   9/7/2019 12:19 PM     HISTORY: Erythema/warmth/edema.    COMPARISON: None.    FINDINGS: Gray-scale, color and Doppler spectral analysis ultrasound  was performed of the left leg. Compression and augmentation imaging  was performed.    There is no evidence for deep venous thrombosis. There is a Baker cyst  at the left popliteal fossa that is 3.2 x 0.9 x 2.4 cm.      Impression    IMPRESSION: No evidence for DVT within the left leg. Left Baker's cyst  identified.    HEATHER PALMER MD   XR Chest 2 Views    Narrative    CHEST TWO VIEWS  9/8/2019 12:19 PM     HISTORY: SOB.    COMPARISON: None.      Impression    IMPRESSION: Elevated left hemidiaphragm. Left base atelectasis. Right  lung appears clear. Cardiomegaly.    HEATHER PALMER MD   CT Chest Pulmonary Embolism w Contrast    Narrative    CT CHEST PULMONARY EMBOLISM WITH CONTRAST   9/8/2019 6:28 PM     HISTORY: Shortness of breath. Worsening shortness of breath and  dyspnea on exertion.  Elevated left hemidiaphragm on x-ray.    TECHNIQUE:  83 mL Isovue-370. Radiation dose for this scan was reduced  using automated exposure control, adjustment of the mA and/or kV  according to patient size, or iterative  reconstruction technique.    COMPARISON: None.    FINDINGS:  No evidence of pulmonary embolism or acute thoracic aortic  abnormality. No pneumothorax. No pleural or pericardial effusion.  There is airspace consolidation with volume loss in the left lower  lobe. There is also some volume loss in the posterior aspect of the  left upper lobe. No pathologically enlarged thoracic or axillary lymph  nodes. No pulmonary nodule or mass.    No worrisome abnormality in the visualized portions of the upper  abdomen. No lytic or blastic bone lesions.      Impression    IMPRESSION:  1. No evidence of pulmonary embolism.  2. Airspace consolidation in the left lower lobe and posterior aspect  of the left upper lobe associated with volume loss predominantly  represents atelectasis. A superimposed pneumonia is not excluded.    KAY POTTER MD           Most Recent Lab Results In EPIC (For Non-EPIC Providers):    Most Recent 3 CBC's:  Recent Labs   Lab Test 09/10/19  0700 09/09/19  0609 09/08/19 0655 09/07/19  1137   WBC  --  6.5 11.5*  --  15.8*   HGB  --  11.1* 11.4*  --  12.6   MCV  --  99 99  --  98    171 171   < > 196    < > = values in this interval not displayed.      Most Recent 3 BMP's:  Recent Labs   Lab Test 09/10/19  0700 09/09/19 0609 09/08/19 0655 09/07/19  1137   NA  --  137 134  --  138   POTASSIUM  --  4.0 3.7  --  3.7   CHLORIDE  --  104 103  --  102   CO2  --  29 26  --  30   BUN  --  9 11  --  16   CR 0.70 0.71 0.72   < > 0.98   ANIONGAP  --  4 5  --  6   CHANTELLE  --  8.7 8.7  --  9.2   * 146* 140*  --  73    < > = values in this interval not displayed.     Most Recent 3 Troponin's:No lab results found.    Invalid input(s): TROP, TROPONINIES  Most Recent 3 INR's:No lab results found.  Most Recent 2 LFT's:No lab results found.  Most Recent Cholesterol Panel:No lab results found.  Most Recent 6 Bacteria Isolates From Any Culture (See EPIC Reports for Culture Details):  Recent Labs   Lab Test  09/07/19  1450 09/07/19  1415   CULT No growth after 3 days No growth after 3 days     Most Recent TSH, T4 and HgbA1c:   Recent Labs   Lab Test 09/09/19  0609 09/07/19  1451   TSH 5.63*  --    T4 1.34  --    A1C  --  6.5*

## 2019-09-10 NOTE — PROGRESS NOTES
Patient seen and examined    No acute issues overnight  remains afebrile  no episodes of hypoglycemia    Eager to go home today    Discharge home today on PO Augmentin  ordered ACE wraps    Please see discharge summary for details

## 2019-09-10 NOTE — PLAN OF CARE
Pt a/o up with cane lt leg swellon pink. Ac done with insulin coverage. Aces put on lt leg . Pt dced with mom belongings and meds and work sheet.   02-Mar-2018

## 2019-09-10 NOTE — PROGRESS NOTES
Pt A/O x4. CMS intact. LLE is pink from the cellulitis, but improving from original marked outline on calf. VSS on RA. Up independently; Voiding adequately in BR. Taking Ibuprofen for pain; well controlled. Continue to monitor.

## 2019-09-11 LAB — INTERPRETATION ECG - MUSE: NORMAL

## 2019-09-13 LAB
BACTERIA SPEC CULT: NO GROWTH
BACTERIA SPEC CULT: NO GROWTH
Lab: NORMAL
Lab: NORMAL
SPECIMEN SOURCE: NORMAL
SPECIMEN SOURCE: NORMAL

## 2019-10-01 ENCOUNTER — HEALTH MAINTENANCE LETTER (OUTPATIENT)
Age: 54
End: 2019-10-01

## 2019-10-14 ENCOUNTER — OFFICE VISIT (OUTPATIENT)
Dept: SLEEP MEDICINE | Facility: CLINIC | Age: 54
End: 2019-10-14
Payer: COMMERCIAL

## 2019-10-14 VITALS
OXYGEN SATURATION: 92 % | DIASTOLIC BLOOD PRESSURE: 83 MMHG | BODY MASS INDEX: 55.32 KG/M2 | HEIGHT: 61 IN | WEIGHT: 293 LBS | HEART RATE: 120 BPM | RESPIRATION RATE: 18 BRPM | SYSTOLIC BLOOD PRESSURE: 138 MMHG

## 2019-10-14 DIAGNOSIS — R06.83 SNORING: Primary | ICD-10-CM

## 2019-10-14 DIAGNOSIS — I10 ESSENTIAL HYPERTENSION: ICD-10-CM

## 2019-10-14 DIAGNOSIS — R40.0 SLEEPINESS: ICD-10-CM

## 2019-10-14 PROCEDURE — 99204 OFFICE O/P NEW MOD 45 MIN: CPT | Performed by: PHYSICIAN ASSISTANT

## 2019-10-14 RX ORDER — ZOLPIDEM TARTRATE 5 MG/1
TABLET ORAL
Qty: 1 TABLET | Refills: 0 | Status: ON HOLD | OUTPATIENT
Start: 2019-10-14 | End: 2020-04-20

## 2019-10-14 ASSESSMENT — MIFFLIN-ST. JEOR: SCORE: 1902.28

## 2019-10-14 NOTE — PROGRESS NOTES
"Chief Complaint   Patient presents with     Sleep Problem       Initial /83   Pulse 120   Resp 18   Ht 1.549 m (5' 1\")   Wt 135 kg (297 lb 9.6 oz)   LMP  (LMP Unknown)   SpO2 92%   BMI 56.23 kg/m   Estimated body mass index is 56.23 kg/m  as calculated from the following:    Height as of this encounter: 1.549 m (5' 1\").    Weight as of this encounter: 135 kg (297 lb 9.6 oz).    Medication Reconciliation: complete    Neck circumference: 43 centimeters.    Isabella Mccallum  [facility] - Specialist    "

## 2019-10-14 NOTE — PROGRESS NOTES
Sleep Consultation:    Date on this visit: 10/14/2019    Pinky Whatley is sent by No ref. provider found for a sleep consultation regarding sleepiness.    Primary Physician: Laura Evans     Pinky Whatley presents at the request of her cardiologist during recent admission due dyspnea and due to feeling tired for about 6 months. Her medical history is significant for rheumatoid arthritis, DM 2, HTN, hypothyroidism, chronic anemia, recent admission for LLE cellulitis and sepsis.     She has recently been undergoing evaluation for shortness of breath with activity at least a year. Chest XR showed elevated left hemidiaphragm, left base atelectasis and cardiomegaly. A chest CT showed: Airspace consolidation in the left lower lobe and posterior aspect of the left upper lobe associated with volume loss predominantly represents atelectasis. A superimposed pneumonia is not excluded.    ECHO on 9/7/19 showed: 1. The left ventricle is normal in structure, function and size. The visual  ejection fraction is estimated at 60%. Normal left ventricular wall motion  2. The right ventricle is normal in structure, function and size.  3. No valve disease.    Pinky goes to bed at 10 PM and goes to sleep at 11:00 PM during the week. She used to go bed an hour earlier until she moved in with her mother about 1.5 years ago. She stays up to watch TV with her mother. She wakes up at 5:00 AM with an alarm. She has been hitting the snooze more. She falls asleep in 15 minutes.  Pinky denies difficulty falling asleep.  She takes Tylenol PM for her knee pain and help her stay asleep. She wakes up 1-2 times a night for 15 minutes before falling back to sleep.  Pinky wakes up to go to the bathroom (1-2x) and pain.  On weekends, Pinky goes to sleep at 10:00-11:00 PM.  She wakes up at 7:30 AM without an alarm. She used to stay in bed resting until 8-8:30 AM. She falls asleep in 15 minutes.  Patient gets an average of 5-6 hours of sleep per  night. She feels rested when she wakes, but after about an hour and a half later, she dozes inadvertently. That can happen 2-3 times per day.     Patient does use electronics in bed and watch TV in bed and does not worry in bed about anything and read in bed.     Pinky does not do shift work.  She works day shifts. She works for [employer]. 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  She lives with her mother.    Pinky does snore but she does not know how often or how loudly. Patient does not have a regular bed partner, so has not had anyone comment. She was at a camp recently and her roommate did not comment. There is no report of gasping and snorting.  She does not have witnessed apneas.  Patient sleeps on her side. She has no snort arousals, morning dry mouth, morning confusion and restless legs. She infrequently wakes with headaches and it is usually related to her blood sugar being off. Pinky denies any bruxism, sleep walking, sleep talking, dream enactment, sleep paralysis, cataplexy and hypnogogic/hypnopompic hallucinations.    Pinky has difficulty breathing through her nose, denies claustrophobia, reflux at night, heartburn and depression.      Pinky has lost and regained about 40 pounds over the last 5 years.  Patient describes themself as a night person.  She would prefer to go to sleep at 10:00-11:00 PM and wake up at 8:00 AM.  Patient's [place] Sleepiness score 10/24 consistent with mild daytime sleepiness.      Pinky denies taking naps. She takes frequent inadvertant naps (2-3 times per day). She dozes at work and watching TV in the evening. She denies dozing while driving.  Patient was counseled on the importance of driving while alert, to pull over if drowsy, or nap before getting into the vehicle if sleepy.  She uses 1-2 cups/day of coffee, 0-1 sodas/day. Last caffeine intake is usually before mid afternoon.    Allergies:    No Known Allergies    Medications:    Current  Outpatient Medications   Medication Sig Dispense Refill     aspirin 81 MG EC tablet Take 81 mg by mouth daily       atorvastatin (LIPITOR) 80 MG tablet Take 80 mg by mouth daily       diphenhydrAMINE-acetaminophen (TYLENOL PM)  MG tablet Take 2 tablets by mouth At Bedtime       glimepiride (AMARYL) 4 MG tablet Take 4 mg by mouth 2 times daily       hydroxychloroquine (PLAQUENIL) 200 MG tablet Take 200 mg by mouth 2 times daily       insulin glargine (BASAGLAR KWIKPEN) 100 UNIT/ML pen Inject 40 Units Subcutaneous At Bedtime       levothyroxine (SYNTHROID/LEVOTHROID) 125 MCG tablet Take 125 mcg by mouth daily       lisinopril (PRINIVIL/ZESTRIL) 20 MG tablet Take 20 mg by mouth daily       metFORMIN (GLUCOPHAGE) 500 MG tablet Take 1,000 mg by mouth 2 times daily (with meals)       methotrexate 2.5 MG tablet Take 22.5 mg by mouth once a week on Monday (9 x 2.5 mg tablet)       multivitamin, therapeutic (THERA-VIT) TABS tablet Take 1 tablet by mouth daily       naproxen sodium (ANAPROX) 220 MG tablet Take 440 mg by mouth 2 times daily as needed       Probiotic Product (PROBIOTIC PO) Take 1 capsule by mouth At Bedtime       sulfaSALAzine (AZULFIDINE) 500 MG tablet Take 1,000 mg by mouth 2 times daily       tofacitinib (XELJANZ) 5 MG tablet Take 5 mg by mouth 2 times daily       acetaminophen (TYLENOL) 500 MG tablet Take 1,000 mg by mouth 2 times daily as needed       GLUCOSAMINE-CHONDROITIN PO Take 1 tablet by mouth 2 times daily       ibuprofen (ADVIL/MOTRIN) 200 MG tablet Take 400 mg by mouth 2 times daily as needed       predniSONE (DELTASONE) 5 MG tablet Take 10 mg by mouth daily (2 x 5 mg tablet)         Problem List:  Patient Active Problem List    Diagnosis Date Noted     Sepsis (H) 09/07/2019     Priority: Medium     Hypercholesteremia 06/15/2016     Priority: Medium     Hypothyroidism (acquired) 11/16/2015     Priority: Medium     Morbid obesity with BMI of 40.0-44.9, adult (H) 11/16/2015     Priority:  Medium     Anemia of chronic disease 11/16/2015     Priority: Medium     Gastroesophageal reflux disease without esophagitis 08/20/2015     Priority: Medium     Allergic rhinitis due to pollen 08/20/2015     Priority: Medium     Uncontrolled type 2 diabetes mellitus with complication, with long-term current use of insulin (H) 05/18/2015     Priority: Medium     Herpes simplex type 2 infection 12/02/2013     Priority: Medium     Overview:   On buttock cleft 12/2/2013        HTN (hypertension) 11/21/2013     Priority: Medium     Carpal tunnel syndrome 02/27/2013     Priority: Medium     Overview:   Right side       Arthritis, rheumatoid (H) 03/25/2010     Priority: Medium        Past Medical/Surgical History:  Past Medical History:   Diagnosis Date     Arthritis     rheumatoid     Diabetes (H)      Past Surgical History:   Procedure Laterality Date     CARPAL TUNNEL RELEASE RT/LT       ENT SURGERY      tonsillectomy     ORTHOPEDIC SURGERY      bunion       Social History:  Social History     Socioeconomic History     Marital status: Single     Spouse name: Not on file     Number of children: Not on file     Years of education: Not on file     Highest education level: Not on file   Occupational History     Not on file   Social Needs     Financial resource strain: Not on file     Food insecurity:     Worry: Not on file     Inability: Not on file     Transportation needs:     Medical: Not on file     Non-medical: Not on file   Tobacco Use     Smoking status: Never Smoker     Smokeless tobacco: Never Used   Substance and Sexual Activity     Alcohol use: Yes     Comment: rare     Drug use: Never     Sexual activity: Not on file   Lifestyle     Physical activity:     Days per week: Not on file     Minutes per session: Not on file     Stress: Not on file   Relationships     Social connections:     Talks on phone: Not on file     Gets together: Not on file     Attends Christianity service: Not on file     Active member of club  or organization: Not on file     Attends meetings of clubs or organizations: Not on file     Relationship status: Not on file     Intimate partner violence:     Fear of current or ex partner: Not on file     Emotionally abused: Not on file     Physically abused: Not on file     Forced sexual activity: Not on file   Other Topics Concern     Parent/sibling w/ CABG, MI or angioplasty before 65F 55M? Not Asked   Social History Narrative     Not on file       Family History:  Family History   Problem Relation Age of Onset     Hypertension Mother      Heart Murmur Mother      Coronary Artery Disease Father      Valvular heart disease Paternal Grandfather        Review of Systems:  A complete review of systems reviewed by me is negative with the exeption of what has been mentioned in the history of present illness.  CONSTITUTIONAL: NEGATIVE for fever, chills, sweats or night sweats, drug allergies.  CONSTITUTIONAL:  POSITIVE for  weight gain and weight loss  EYES: NEGATIVE for changes in vision, blind spots, double vision.  ENT: NEGATIVE for ear pain, sore throat, post-nasal drip, runny nose, bloody nose  ENT:  POSITIVE for  sinus pain  CARDIAC: NEGATIVE for fast heartbeats or fluttering in chest, chest pain or pressure, breathlessness when lying flat.  CARDIAC:  POSITIVE for  swollen legs, swollen feet and HTN  NEUROLOGIC: NEGATIVE headaches, weakness or numbness in the arms or legs.  DERMATOLOGIC: NEGATIVE for rashes, new moles or change in mole(s)  DERMATOLOGIC:  POSITIVE for  Skin changes  PULMONARY: NEGATIVE SOB at rest, SOB with activity, dry cough, productive cough, coughing up blood, wheezing or whistling when breathing.    GASTROINTESTINAL: NEGATIVE for nausea or vomitting, loose or watery stools, fat or grease in stools, constipation, abdominal pain, bowel movements black in color or blood noted.  GENITOURINARY: NEGATIVE for pain during urination, blood in urine, urinating more frequently than usual, irregular  "menstrual periods.  MUSCULOSKELETAL:  POSITIVE for  muscle pain, bone or joint pain and swollen joints  ENDOCRINE: NEGATIVE for increased thirst or urination.  ENDOCRINE:  POSITIVE for  diabetes: morning glucose 85; afternoon glucose 113  LYMPHATIC: NEGATIVE for swollen lymph nodes, lumps or bumps in the breasts or nipple discharge.    Physical Examination:  Vitals: /83   Pulse 120   Resp 18   Ht 1.549 m (5' 1\")   Wt 135 kg (297 lb 9.6 oz)   LMP  (LMP Unknown)   SpO2 92%   BMI 56.23 kg/m   When rechecked later in the visit, her pulse was 110 bpm and SpO2 94-95%.     Neck Cir (cm): 43 cm    Colon Total Score 10/14/2019   Total score - Colon 10       NGUYỄN Total Score: 11 (10/14/19 0909)    GENERAL APPEARANCE: healthy, alert, no distress and cooperative  EYES: Eyes grossly normal to inspection, PERRL, conjunctivae and sclerae normal and lids and lashes normal  HENT: nose and mouth without ulcers or lesions, oropharynx small and tongue base enlarged  NECK: no adenopathy, no asymmetry, masses, or scars, thyroid normal to palpation and trachea midline and normal to palpation  RESP: lungs clear to auscultation - no rales, rhonchi or wheezes  CV: rates fast and rhythm regular, normal S1 S2, no S3 or S4, no murmur, click or rub and no irregular beats  LYMPHATICS: no cervical adenopathy  MS: left lower leg had a reddish discoloration and 1+ pitting.  NEURO: Normal strength and tone, mentation intact, speech normal and cranial nerves 2-12 intact  Mallampati Class: I.  Tonsillar Stage: 0  surgically removed.    Impression/Plan:    (R06.83) Snoring  (primary encounter diagnosis), (R40.0) Sleepiness, (Z68.43) BMI 50.0-59.9, adult (H), (I10) Essential hypertension  Comment: Pinky presents at the request of her cardiologist for evaluation of sleep disordered breathing. Pinky has been more tired in the last 6 months or so and having shortness of breath (primarily with activity) in the last year.  She does believe that " she snores, but does not know how frequently or how severely as she does not have a bed partner.  Therefore, she also does not have observed apneas, snorts or gasps.  Her ESS is 10/24 suggesting mildly excessive sleepiness.  She dozes at work occasionally, as well as while watching TV in the evening.  She has reduced her sleep time since moving in with her mother.  She stays up later watching TV and sleeps in less on weekends.  Her STOP BANG score is elevated at 6; snoring, tired, HTN, BMI >35 (56), age> 50 (53), neck circumference> 40 cm (43 cm).  Negative risk factors include; no observed apnea, female gender.  Her SPO2 was 92% when she was being roomed.  It was 94-95% after she was allowed to rest.  Her CO2 on metabolic panel has been as high as 30.  She is at risk for hypoventilation given her BMI. Chest XR showed elevated left hemidiaphragm, left base atelectasis and cardiomegaly. A chest CT showed: Airspace consolidation in the left lower lobe and posterior aspect of the left upper lobe associated with volume loss predominantly represents atelectasis. A superimposed pneumonia is not excluded.  Recent echo was normal, LVEF 60%.  Plan: Comprehensive Sleep Study        Split night PSG with CPAP/BiPAP titration if indicated. TCM. She was given a prescription for a tab of zolpidem 5 mg for the study      Literature provided regarding sleep apnea.      She will follow up with me in approximately two weeks after her sleep study has been competed to review the results and discuss plan of care.       Polysomnography reviewed.  Obstructive sleep apnea reviewed.  Complications of untreated sleep apnea were reviewed.  45 minutes was spent during this visit, over 50% in counseling and coordination of care.   Bennett Goltz, PA-C    CC: No ref. provider found

## 2019-10-14 NOTE — PATIENT INSTRUCTIONS
"MY TREATMENT INFORMATION FOR SLEEP APNEA-  Pinky Whatley    DOCTOR : Bennett Ezra Goltz, PA-C  SLEEP CENTER : [facility]     MY CONTACT NUMBER: [phone]    Am I having a sleep study at a sleep center?  Make sure you have an appointment for the study before you leave!    Am I having a home sleep study?  Watch this video:  [email or web address]/watch?v=CteI_[email or web address]&[email or web address]  Please verify your insurance coverage with your insurance carrier    Frequently asked questions:  1. What is Obstructive Sleep Apnea (MARIA L)? MARIA L is the most common type of sleep apnea. Apnea means, \"without breath.\"  Apnea is most often caused by narrowing or collapse of the upper airway as muscles relax during sleep.   Almost everyone has occasional apneas. Most people with sleep apnea have had brief interruptions at night frequently for many years.  The severity of sleep apnea is related to how frequent and severe the events are.   2. What are the consequences of MARIA L? Symptoms include: feeling sleepy during the day, snoring loudly, gasping or stopping of breathing, trouble sleeping, and occasionally morning headaches or heartburn at night.  Sleepiness can be serious and even increase the risk of falling asleep while driving. Other health consequences may include development of high blood pressure and other cardiovascular disease in persons who are susceptible. Untreated MARIA L  can contribute to heart disease, stroke and diabetes.   3. What are the treatment options? In most situations, sleep apnea is a lifelong disease that must be managed with daily therapy. Medications are not effective for sleep apnea and surgery is generally not considered until other therapies have been tried. Your treatment is your choice . Continuous Positive Airway (CPAP) works right away and is the therapy that is effective in nearly everyone. An oral device to hold your jaw forward is usually the next most reliable option. Other options " include postioning devices (to keep you off your back), weight loss, and surgery including a tongue pacing device. There is more detail about some of these options below.    Important tips for using CPAP and similar devices   Know your equipment:  CPAP is continuous positive airway pressure that prevents obstructive sleep apnea by keeping the throat from collapsing while you are sleeping. In most cases, the device is  smart  and can slowly self-adjusts if your throat collapses and keeps a record every day of how well you are treated-this information is available to you and your care team.  BPAP is bilevel positive airway pressure that keeps your throat open and also assists each breath with a pressure boost to maintain adequate breathing.  Special kinds of BPAP are used in patients who have inadequate breathing from lung or heart disease. In most cases, the device is  smart  and can slowly self-adjusts to assist breathing. Like CPAP, the device keeps a record of how well you are treated.  Your mask is your connection to the device. You get to choose what feels most comfortable and the staff will help to make sure if fits. Here: are some examples of the different masks that are available:       Key points to remember on your journey with sleep apnea:  1. Sleep study.  PAP devices often need to be adjusted during a sleep study to show that they are effective and adjusted right.  2. Good tips to remember: Try wearing just the mask during a quiet time during the day so your body adapts to wearing it. A humidifier is recommended for comfort in most cases to prevent drying of your nose and throat. Allergy medication from your provider may help you if you are having nasal congestion.  3. Getting settled-in. It takes more than one night for most of us to get used to wearing a mask. Try wearing just the mask during a quiet time during the day so your body adapts to wearing it. A humidifier is recommended for comfort in most  cases. Our team will work with you carefully on the first day and will be in contact within 4 days and again at 2 and 4 weeks for advice and remote device adjustments. Your therapy is evaluated by the device each day.   4. Use it every night. The more you are able to sleep naturally for 7-8 hours, the more likely you will have good sleep and to prevent health risks or symptoms from sleep apnea. Even if you use it 4 hours it helps. Occasionally all of us are unable to use a medical therapy, in sleep apnea, it is not dangerous to miss one night.   5. Communicate. Call our skilled team on the number provided on the first day if your visit for problems that make it difficult to wear the device. Over 2 out of 3 patients can learn to wear the device long-term with help from our team. Remember to call our team or your sleep providers if you are unable to wear the device as we may have other solutions for those who cannot adapt to mask CPAP therapy. It is recommended that you sleep your sleep provider within the first 3 months and yearly after that if you are not having problems.   6. Use it for your health. We encourage use of CPAP masks during daytime quiet periods to allow your face and brain to adapt to the sensation of CPAP so that it will be a more natural sensation to awaken to at night or during naps. This can be very useful during the first few weeks or months of adapting to CPAP though it does not help medically to wear CPAP during wakefulness and  should not be used as a strategy just to meet guidelines.  7. Take care of your equipment. Make sure you clean your mask and tubing using directions every day and that your filter and mask are replaced as recommended or if they are not working.     BESIDES CPAP, WHAT OTHER THERAPIES ARE THERE?    Positioning Device  Positioning devices are generally used when sleep apnea is mild and only occurs on your back.This example shows a pillow that straps around the waist. It  may be appropriate for those whose sleep study shows milder sleep apnea that occurs primarily when lying flat on one's back. Preliminary studies have shown benefit but effectiveness at home may need to be verified by a home sleep test. These devices are generally not covered by medical insurance.  Examples of devices that maintain sleeping on the back to prevent snoring and mild sleep apnea.    Belt type body positioner  Http://[employer].com/    Electronic reminder  Http://nightshifttherapy.com/  Http://www.[employer].au/      Oral Appliance  What is oral appliance therapy?  An oral appliance device fits on your teeth at night like a retainer used after having braces. The device is made by a specialized dentist and requires several visits over 1-2 months before a manufactured device is made to fit your teeth and is adjusted to prevent your sleep apnea. Once an oral device is working properly, snoring should be improved. A home sleep test may be recommended at that time if to determine whether the sleep apnea is adequately treated.       Some things to remember:  -Oral devices are often, but not always, covered by your medical insurance. Be sure to check with your insurance provider.   -If you are referred for oral therapy, you will be given a list of specialized dentists to consider or you may choose to visit the Web site of the American Academy of Dental Sleep Medicine  -Oral devices are less likely to work if you have severe sleep apnea or are extremely overweight.     More detailed information  An oral appliance is a small acrylic device that fits over the upper and lower teeth  (similar to a retainer or a mouth guard). This device slightly moves jaw forward, which moves the base of the tongue forward, opens the airway, improves breathing for effective treat snoring and obstructive sleep apnea in perhaps 7 out of 10 people .  The best working devices are custom-made by a dental device  after a mold is  made of the teeth 1, 2, 3.  When is an oral appliance indicated?  Oral appliance therapy is recommended as a first-line treatment for patients with primary snoring, mild sleep apnea, and for patients with moderate sleep apnea who prefer appliance therapy to use of CPAP4, 5. Severity of sleep apnea is determined by sleep testing and is based on the number of respiratory events per hour of sleep.   How successful is oral appliance therapy?  The success rate of oral appliance therapy in patients with mild sleep apnea is 75-80% while in patients with moderate sleep apnea it is 50-70%. The chance of success in patients with severe sleep apnea is 40-50%. The research also shows that oral appliances have a beneficial effect on the cardiovascular health of MARIA L patients at the same magnitude as CPAP therapy7.  Oral appliances should be a second-line treatment in cases of severe sleep apnea, but if not completely successful then a combination therapy utilizing CPAP plus oral appliance therapy may be effective. Oral appliances tend to be effective in a broad range of patients although studies show that the patients who have the highest success are females, younger patients, those with milder disease, and less severe obesity. 3, 6.   Finding a dentist that practices dental sleep medicine  Specific training is available through the American Academy of Dental Sleep Medicine for dentists interested in working in the field of sleep. To find a dentist who is educated in the field of sleep and the use of oral appliances, near you, visit the Web site of the American Academy of Dental Sleep Medicine.    References  1. Chandler, et al. Objectively measured vs self-reported compliance during oral appliance therapy for sleep-disordered breathing. Chest 2013; 144(5): 8557-2803.  2. Eugenie et al. Objective measurement of compliance during oral appliance therapy for sleep-disordered breathing. Thorax 2013; 68(1): 91-96.  3. Thomas  et al. Mandibular advancement devices in 620 men and women with MARIA L and snoring: tolerability and predictors of treatment success. Chest 2004; 125: 1857-5775.  4. Justyn et al. Oral appliances for snoring and MARIA L: a review. Sleep 2006; 29: 244-262.  5. Bela et al. Oral appliance treatment for MARIA L: an update. J Clin Sleep Med 2014; 10(2): 215-227.  6. Vivi et al. Predictors of OSAH treatment outcome. J Dent Res 2007; 86: 4175-2438.    Weight Loss:    Weight loss is a long-term strategy that may improve sleep apnea in some patients.    Weight management is a personal decision and the decision should be based on your interest and the potential benefits.  If you are interested in exploring weight loss strategies, the following discussion covers the impact on weight loss on sleep apnea and the approaches that may be successful.    Being overweight does not necessarily mean you will have health consequences.  Those who have BMI over 35 or over 27 with existing medical conditions carries greater risk.   Weight loss decreases severity of sleep apnea in most people with obesity. For those with mild obesity who have developed snoring with weight gain, even 15-30 pound weight loss can improve and occasionally eliminate sleep apnea.  Structured and life-long dietary and health habits are necessary to lose weight and keep healthier weight levels.     Though there may be significant health benefits from weight loss, long-term weight loss is very difficult to achieve- studies show success with dietary management in less than 10% of people. In addition, substantial weight loss may require years of dietary control and may be difficult if patients have severe obesity. In these cases, surgical management may be considered.  Finally, older individuals who have tolerated obesity without health complications may be less likely to benefit from weight loss strategies.      Your BMI is Body mass index is 56.23 kg/m .  Weight  management is a personal decision.  If you are interested in exploring weight loss strategies, the following discussion covers the approaches that may be successful. Body mass index (BMI) is one way to tell whether you are at a healthy weight, overweight, or obese. It measures your weight in relation to your height.  A BMI of 18.5 to 24.9 is in the healthy range. A person with a BMI of 25 to 29.9 is considered overweight, and someone with a BMI of 30 or greater is considered obese. More than two-thirds of American adults are considered overweight or obese.  Being overweight or obese increases the risk for further weight gain. Excess weight may lead to heart disease and diabetes.  Creating and following plans for healthy eating and physical activity may help you improve your health.  Weight control is part of healthy lifestyle and includes exercise, emotional health, and healthy eating habits. Careful eating habits lifelong are the mainstay of weight control. Though there are significant health benefits from weight loss, long-term weight loss with diet alone may be very difficult to achieve- studies show long-term success with dietary management in less than 10% of people. Attaining a healthy weight may be especially difficult to achieve in those with severe obesity. In some cases, medications, devices and surgical management might be considered.  What can you do?  If you are overweight or obese and are interested in methods for weight loss, you should discuss this with your provider.     Consider reducing daily calorie intake by 500 calories.     Keep a food journal.     Avoiding skipping meals, consider cutting portions instead.    Diet combined with exercise helps maintain muscle while optimizing fat loss. Strength training is particularly important for building and maintaining muscle mass. Exercise helps reduce stress, increase energy, and improves fitness. Increasing exercise without diet control, however, may  not burn enough calories to loose weight.       Start walking three days a week 10-20 minutes at a time    Work towards walking thirty minutes five days a week     Eventually, increase the speed of your walking for 1-2 minutes at time    In addition, we recommend that you review healthy lifestyles and methods for weight loss available through the National Institutes of Health patient information sites:  http://win.niddk.nih.gov/publications/index.htm    And look into health and wellness programs that may be available through your health insurance provider, employer, local community center, or jamil club.    Weight management plan: Patient was referred to their PCP to discuss a diet and exercise plan.    Surgery:  Surgery for obstructive sleep apnea is considered generally only when other therapies fail to work. Surgery may be discussed with you if you are having a difficult time tolerating CPAP and or when there is an abnormal structure that requires surgical correction.  Nose and throat surgeries often enlarge the airway to prevent collapse.  Most of these surgeries create pain for 1-2 weeks and up to half of the most common surgeries are not effective throughout life.  You should carefully discuss the benefits and drawbacks to surgery with your sleep provider and surgeon to determine if it is the best solution for you.   More information  Surgery for MARIA L is directed at areas that are responsible for narrowing or complete obstruction of the airway during sleep.  There are a wide range of procedures available to enlarge and/or stabilize the airway to prevent blockage of breathing in the three major areas where it can occur: the palate, tongue, and nasal regions.  Successful surgical treatment depends on the accurate identification of the factors responsible for obstructive sleep apnea in each person.  A personalized approach is required because there is no single treatment that works well for everyone.  Because of  anatomic variation, consultation with an examination by a sleep surgeon is a critical first step in determining what surgical options are best for each patient.  In some cases, examination during sedation may be recommended in order to guide the selection of procedures.  Patients will be counseled about risks and benefits as well as the typical recovery course after surgery. Surgery is typically not a cure for a person s MARIA L.  However, surgery will often significantly improve one s MARIA L severity (termed  success rate ).  Even in the absence of a cure, surgery will decrease the cardiovascular risk associated with OSA7; improve overall quality of life8 (sleepiness, functionality, sleep quality, etc).  Palate Procedures:  Patients with MARIA L often have narrowing of their airway in the region of their tonsils and uvula.  The goals of palate procedures are to widen the airway in this region as well as to help the tissues resist collapse.  Modern palate procedure techniques focus on tissue conservation and soft tissue rearrangement, rather than tissue removal.  Often the uvula is preserved in this procedure. Residual sleep apnea is common in patient after pharyngoplasty with an average reduction in sleep apnea events of 33%2.    Tongue Procedures:  ExamWhile patients are awake, the muscles that surround the throat are active and keep this region open for breathing. These muscles relax during sleep, allowing the tongue and other structures to collapse and block breathing.  There are several different tongue procedures available.  Selection of a tongue base procedure depends on characteristics seen on physical exam.  Generally, procedures are aimed at removing bulky tissues in this area or preventing the back of the tongue from falling back during sleep.  Success rates for tongue surgery range from 50-62%3.  Hypoglossal Nerve Stimulation:  Hypoglossal nerve stimulation has recently received approval from the [place] Food  and Drug Administration for the treatment of obstructive sleep apnea.  This is based on research showing that the system was safe and effective in treating sleep apnea6.  Results showed that the median AHI score decreased 68%, from 29.3 to 9.0. This therapy uses an implant system that senses breathing patterns and delivers mild stimulation to airway muscles, which keeps the airway open during sleep.  The system consists of three fully implanted components: a small generator (similar in size to a pacemaker), a breathing sensor, and a stimulation lead.  Using a small handheld remote, a patient turns the therapy on before bed and off upon awakening.    Candidates for this device must be greater than 22 years of age, have moderate to severe MARIA L (AHI between 20-65), BMI less than 32, have tried CPAP/oral appliance without success, and have appropriate upper airway anatomy (determined by a sleep endoscopy performed by Dr. Mabry).  Hypoglossal Nerve Stimulation Pathway:    The sleep surgeon s office will work with the patient through the insurance prior-authorization process (including communications and appeals).    Nasal Procedures:  Nasal obstruction can interfere with nasal breathing during the day and night.  Studies have shown that relief of nasal obstruction can improve the ability of some patients to tolerate positive airway pressure therapy for obstructive sleep apnea1.  Treatment options include medications such as nasal saline, topical corticosteroid and antihistamine sprays, and oral medications such as antihistamines or decongestants. Non-surgical treatments can include external nasal dilators for selected patients. If these are not successful by themselves, surgery can improve the nasal airway either alone or in combination with these other options.  Combination Procedures:  Combination of surgical procedures and other treatments may be recommended, particularly if patients have more than one area of narrowing  or persistent positional disease.  The success rate of combination surgery ranges from 66-80%2,3.    References  1. Lizett MACE. The Role of the Nose in Snoring and Obstructive Sleep Apnoea: An Update.  Eur Arch Otorhinolaryngol. 2011; 268: 1365-73.  2.  Cody SM; Melania JA; Karen JR; Pallanch JF; Bhavin MB; Roddy SG; Nroberto CLEMENS. Surgical modifications of the upper airway for obstructive sleep apnea in adults: a systematic review and meta-analysis. SLEEP 2010;33(10):3459-2094. Yoan MORTON. Hypopharyngeal surgery in obstructive sleep apnea: an evidence-based medicine review.  Arch Otolaryngol Head Neck Surg. 2006 Feb;132(2):206-13.  3. Festus YH1, Pancho Y, Trung TOM. The efficacy of anatomically based multilevel surgery for obstructive sleep apnea. Otolaryngol Head Neck Surg. 2003 Oct;129(4):327-35.  4. Yoan MORTON, Goldberg A. Hypopharyngeal Surgery in Obstructive Sleep Apnea: An Evidence-Based Medicine Review. Arch Otolaryngol Head Neck Surg. 2006 Feb;132(2):206-13.  5. July PJ et al. Upper-Airway Stimulation for Obstructive Sleep Apnea.  N Engl J Med. 2014 Jan 9;370(2):139-49.  6. Sixto Y et al. Increased Incidence of Cardiovascular Disease in Middle-aged Men with Obstructive Sleep Apnea. Am J Respir Crit Care Med; 2002 166: 159-165  7. Víctor EM et al. Studying Life Effects and Effectiveness of Palatopharyngoplasty (SLEEP) study: Subjective Outcomes of Isolated Uvulopalatopharyngoplasty. Otolaryngol Head Neck Surg. 2011; 144: 623-631.

## 2019-10-28 ENCOUNTER — TELEPHONE (OUTPATIENT)
Dept: SLEEP MEDICINE | Facility: CLINIC | Age: 54
End: 2019-10-28

## 2019-10-28 NOTE — TELEPHONE ENCOUNTER
Left voice mail with patient to call and reschedule her follow-up with Damion as Damion is out of the office this day 01/03/2020).    Rivka Bruner

## 2019-11-15 ENCOUNTER — THERAPY VISIT (OUTPATIENT)
Dept: SLEEP MEDICINE | Facility: CLINIC | Age: 54
End: 2019-11-15
Payer: COMMERCIAL

## 2019-11-15 DIAGNOSIS — R06.83 SNORING: ICD-10-CM

## 2019-11-15 DIAGNOSIS — R40.0 SLEEPINESS: ICD-10-CM

## 2019-11-15 DIAGNOSIS — I10 ESSENTIAL HYPERTENSION: ICD-10-CM

## 2019-11-15 PROCEDURE — 95811 POLYSOM 6/>YRS CPAP 4/> PARM: CPT | Performed by: PSYCHIATRY & NEUROLOGY

## 2019-11-18 LAB — SLPCOMP: NORMAL

## 2019-11-18 NOTE — PROCEDURES
" SLEEP STUDY INTERPRETATION  SPLIT NIGHT STUDY      Patient: RADHA JACOBO  YOB: 1965  Study Date: 11/15/2019  MRN: [id number]  Referring Provider: SELF  Ordering Provider: Ubaldo Keller MD    Indications for Polysomnography: The patient is a 54 y old Female who is 5' 1\" and weighs 297.0 lbs. Her BMI is 56.8, [place] sleepiness scale 10.0 and neck circumference is 43.0 cm. Relevant medical history includes snoring, witnessed apneas. A diagnostic polysomnogram was performed to evaluate for sleep apnea/hypoventilation/hypoxemia. After 163.5 minutes of sleep time the patient exhibited sufficient respiratory events qualifying her for a CPAP trial which was then initiated.    Polysomnogram Data: A full night polysomnogram recorded the standard physiologic parameters including EEG, EOG, EMG, ECG, nasal and oral airflow. Respiratory parameters of chest and abdominal movements were recorded with respiratory inductance plethysmography. Oxygen saturation was recorded by pulse oximetry.  Hypopnea scoring rule used: 1B 4%    Diagnostic PSG  Sleep Architecture: Sleep fragmentation, Minimal REM  The total recording time of the polysomnogram was 191.3 minutes. The total sleep time was 163.5 minutes. Sleep latency was 5.3 minutes. REM latency was 142.5 minutes. Arousal index was 54.3 arousals per hour. Sleep efficiency was 85.5%. Wake after sleep onset was 17.5 minutes. The patient spent 12.2% of total sleep time in Stage N1, 69.4% in Stage N2, 17.7% in Stage N3, and 0.6% in REM. Time in REM supine was 0 minutes.    Respiration: Severe MARIA L with hypoxemia    Events ? The polysomnogram revealed a presence of 8 obstructive, - central, and - mixed apneas resulting in an apnea index of 2.9 events per hour. There were 127 obstructive hypopneas and - central hypopneas resulting in an obstructive hypopnea index of 46.6 and central hypopnea index of - events per hour. The combined apnea/hypopnea index was 49.5 events per " hour (central apnea/hypopnea index was - events per hour).  The REM AHI was 60.0 events per hour. The supine AHI was - events per hour. The RERA index was 13.6 events per hour. The RDI was 63.1 events per hour.    Snoring - was reported as loud.    Respiratory rate and pattern - was notable for normal respiratory rate and pattern.    Sustained Sleep Associated Hypoventilation - Transcutaneous carbon dioxide monitoring was used, however significant hypoventilation was not present with a maximum change from 26.9 to 35.7 mmHg and 0 minutes at or greater than 55 mmHg.    Sleep Associated Hypoxemia - (Greater than 5 minutes O2 sat at or below 88%) was present. Baseline oxygen saturation was 91.1%. Lowest oxygen saturation was 72.5%. Time spent less than or equal to 88% was 31.0 minutes. Time spent less than or equal to 89% was 47.1 minutes.     Treatment PSG  Sleep Architecture: Decreased sleep fragmentation + REM  At 02:14:37 AM the patient was placed on PAP treatment and was titrated at pressures ranging from CPAP 5 cmH2O up to CPAP 15 cmH2O. The total recording time of the treatment portion of the study was 276.5 minutes. The total sleep time was 237.0 minutes. During the treatment portion of the study the sleep latency was 11.0 minutes. REM latency was 19.5 minutes. Arousal index was 17.7 arousals per hour. Sleep efficiency was 85.7%. Wake after sleep onset was 26.0 minutes. The patient spent 5.7% of total sleep time in Stage N1, 46.0% in Stage N2, 15.8% in Stage N3, and 32.5% in REM. Time in REM supine was 0 minutes.     Respiration: Sleep disordered breathing noted on the baseline portion of the study was nearly fully resolved with CPAP therapy.  Of note the patient did not have REM supine during the study.      The final pressure was CPAP 15 cmH2O with an AHI of 8.6 events per hour. Time in REM supine on final pressure was - minutes.     Movement Activity:     Periodic Limb Movements  o During the study, there  were 0 PLMs recorded.     REM EMG Activity - Excessive muscle activity was not present.    Nocturnal Behavior - Abnormal sleep related behaviors were not noted.    Bruxism - None apparent.    Cardiac Summary: Limited 1 lead EKG study with signal frequently obscured by artifact.  Regardless this investigation demonstrated narrow QRS complexes that appeared to be preceded by P waves suggestive of sinus rhythm.  Intermittent tachycardia.  During the diagnostic portion of the study, the average pulse rate was 83.1 bpm. The minimum pulse rate was 64.9 bpm while the maximum pulse rate was 107.0 bpm.    During the treatment portion of the study, the average pulse rate was 75.2 bpm. The minimum pulse rate was 59.0 bpm while the maximum pulse rate was 98.1 bpm.     Assessment:     Severe MARIA L with hypoxemia     Sleep disordered breathing noted on the baseline portion of the study was nearly fully resolved with CPAP therapy.  Of note the patient did not have REM supine during the study.      Recommendations:    Treatment of MARIA L with Auto?titrating PAP therapy with a range of 8 cmH2O to 20 cmH2O. Recommend clinical follow up with sleep management team, including review of compliance measures.    Patient may be a candidate for dental appliance through referral to Sleep Dentistry for the treatment of obstructive sleep apnea and/or socially disruptive snoring.    If devices are not acceptable or effective, patient may benefit from evaluation of possible surgical options for the treatment of obstructive sleep apnea and/or socially disruptive snoring. If she is interested, would recommend referral to specialized ENT?Sleep provider.    Advice regarding the risks of drowsy driving.    Suggest optimizing sleep schedule and avoiding sleep deprivation.    Weight management     Diagnostic Codes: G47.33, G47.36, G47.9      Ubaldo Keller MD 11-

## 2019-12-15 ENCOUNTER — HEALTH MAINTENANCE LETTER (OUTPATIENT)
Age: 54
End: 2019-12-15

## 2020-03-22 ENCOUNTER — HEALTH MAINTENANCE LETTER (OUTPATIENT)
Age: 55
End: 2020-03-22

## 2020-04-19 ENCOUNTER — APPOINTMENT (OUTPATIENT)
Dept: GENERAL RADIOLOGY | Facility: CLINIC | Age: 55
DRG: 417 | End: 2020-04-19
Attending: NURSE PRACTITIONER
Payer: COMMERCIAL

## 2020-04-19 ENCOUNTER — APPOINTMENT (OUTPATIENT)
Dept: ULTRASOUND IMAGING | Facility: CLINIC | Age: 55
DRG: 417 | End: 2020-04-19
Attending: NURSE PRACTITIONER
Payer: COMMERCIAL

## 2020-04-19 ENCOUNTER — HOSPITAL ENCOUNTER (INPATIENT)
Facility: CLINIC | Age: 55
LOS: 9 days | Discharge: HOME HEALTH | DRG: 417 | End: 2020-04-29
Attending: NURSE PRACTITIONER | Admitting: HOSPITALIST
Payer: COMMERCIAL

## 2020-04-19 DIAGNOSIS — Z79.4 TYPE 2 DIABETES MELLITUS WITHOUT COMPLICATION, WITH LONG-TERM CURRENT USE OF INSULIN (H): ICD-10-CM

## 2020-04-19 DIAGNOSIS — K81.9 CHOLECYSTITIS: ICD-10-CM

## 2020-04-19 DIAGNOSIS — E11.9 TYPE 2 DIABETES MELLITUS WITHOUT COMPLICATION, WITH LONG-TERM CURRENT USE OF INSULIN (H): ICD-10-CM

## 2020-04-19 DIAGNOSIS — R10.9 ABDOMINAL PAIN, ACUTE: ICD-10-CM

## 2020-04-19 DIAGNOSIS — E87.70 HYPERVOLEMIA, UNSPECIFIED HYPERVOLEMIA TYPE: ICD-10-CM

## 2020-04-19 DIAGNOSIS — R91.8 OPACITIES OF BOTH LUNGS PRESENT ON CHEST X-RAY: ICD-10-CM

## 2020-04-19 DIAGNOSIS — R09.02 HYPOXIA: ICD-10-CM

## 2020-04-19 DIAGNOSIS — R10.11 RUQ ABDOMINAL PAIN: ICD-10-CM

## 2020-04-19 DIAGNOSIS — K83.09 ASCENDING CHOLANGITIS (H): ICD-10-CM

## 2020-04-19 DIAGNOSIS — K81.9 CHOLECYSTITIS: Primary | ICD-10-CM

## 2020-04-19 LAB
ALBUMIN SERPL-MCNC: 3.9 G/DL (ref 3.4–5)
ALBUMIN UR-MCNC: NEGATIVE MG/DL
ALP SERPL-CCNC: 112 U/L (ref 40–150)
ALT SERPL W P-5'-P-CCNC: 321 U/L (ref 0–50)
ANION GAP SERPL CALCULATED.3IONS-SCNC: 10 MMOL/L (ref 3–14)
APPEARANCE UR: CLEAR
AST SERPL W P-5'-P-CCNC: 483 U/L (ref 0–45)
BASOPHILS # BLD AUTO: 0 10E9/L (ref 0–0.2)
BASOPHILS NFR BLD AUTO: 0.2 %
BILIRUB SERPL-MCNC: 1.9 MG/DL (ref 0.2–1.3)
BILIRUB UR QL STRIP: NEGATIVE
BUN SERPL-MCNC: 25 MG/DL (ref 7–30)
CALCIUM SERPL-MCNC: 9.8 MG/DL (ref 8.5–10.1)
CHLORIDE SERPL-SCNC: 99 MMOL/L (ref 94–109)
CO2 SERPL-SCNC: 24 MMOL/L (ref 20–32)
COLOR UR AUTO: YELLOW
CREAT SERPL-MCNC: 0.8 MG/DL (ref 0.52–1.04)
DIFFERENTIAL METHOD BLD: ABNORMAL
EOSINOPHIL # BLD AUTO: 0.1 10E9/L (ref 0–0.7)
EOSINOPHIL NFR BLD AUTO: 1.2 %
ERYTHROCYTE [DISTWIDTH] IN BLOOD BY AUTOMATED COUNT: 14.1 % (ref 10–15)
GFR SERPL CREATININE-BSD FRML MDRD: 84 ML/MIN/{1.73_M2}
GLUCOSE SERPL-MCNC: 251 MG/DL (ref 70–99)
GLUCOSE UR STRIP-MCNC: >1000 MG/DL
HCG SERPL QL: NEGATIVE
HCT VFR BLD AUTO: 37.2 % (ref 35–47)
HGB BLD-MCNC: 12.1 G/DL (ref 11.7–15.7)
HGB UR QL STRIP: NEGATIVE
IMM GRANULOCYTES # BLD: 0 10E9/L (ref 0–0.4)
IMM GRANULOCYTES NFR BLD: 0.1 %
INTERPRETATION ECG - MUSE: NORMAL
KETONES UR STRIP-MCNC: 80 MG/DL
LEUKOCYTE ESTERASE UR QL STRIP: NEGATIVE
LIPASE SERPL-CCNC: 167 U/L (ref 73–393)
LYMPHOCYTES # BLD AUTO: 0.7 10E9/L (ref 0.8–5.3)
LYMPHOCYTES NFR BLD AUTO: 6.8 %
MCH RBC QN AUTO: 31.1 PG (ref 26.5–33)
MCHC RBC AUTO-ENTMCNC: 32.5 G/DL (ref 31.5–36.5)
MCV RBC AUTO: 96 FL (ref 78–100)
MONOCYTES # BLD AUTO: 0.8 10E9/L (ref 0–1.3)
MONOCYTES NFR BLD AUTO: 8.3 %
MUCOUS THREADS #/AREA URNS LPF: PRESENT /LPF
NEUTROPHILS # BLD AUTO: 8.4 10E9/L (ref 1.6–8.3)
NEUTROPHILS NFR BLD AUTO: 83.4 %
NITRATE UR QL: NEGATIVE
NRBC # BLD AUTO: 0 10*3/UL
NRBC BLD AUTO-RTO: 0 /100
NT-PROBNP SERPL-MCNC: 9 PG/ML (ref 0–900)
PH UR STRIP: 5 PH (ref 5–7)
PLATELET # BLD AUTO: 223 10E9/L (ref 150–450)
POTASSIUM SERPL-SCNC: 4.4 MMOL/L (ref 3.4–5.3)
PROT SERPL-MCNC: 6.9 G/DL (ref 6.8–8.8)
RBC # BLD AUTO: 3.89 10E12/L (ref 3.8–5.2)
RBC #/AREA URNS AUTO: <1 /HPF (ref 0–2)
SODIUM SERPL-SCNC: 133 MMOL/L (ref 133–144)
SOURCE: ABNORMAL
SP GR UR STRIP: 1.02 (ref 1–1.03)
SQUAMOUS #/AREA URNS AUTO: 1 /HPF (ref 0–1)
TROPONIN I SERPL-MCNC: <0.015 UG/L (ref 0–0.04)
UROBILINOGEN UR STRIP-MCNC: NORMAL MG/DL (ref 0–2)
WBC # BLD AUTO: 10.1 10E9/L (ref 4–11)
WBC #/AREA URNS AUTO: <1 /HPF (ref 0–5)

## 2020-04-19 PROCEDURE — 25000128 H RX IP 250 OP 636: Performed by: NURSE PRACTITIONER

## 2020-04-19 PROCEDURE — 81001 URINALYSIS AUTO W/SCOPE: CPT | Performed by: NURSE PRACTITIONER

## 2020-04-19 PROCEDURE — 80053 COMPREHEN METABOLIC PANEL: CPT | Performed by: NURSE PRACTITIONER

## 2020-04-19 PROCEDURE — 96376 TX/PRO/DX INJ SAME DRUG ADON: CPT

## 2020-04-19 PROCEDURE — 96361 HYDRATE IV INFUSION ADD-ON: CPT

## 2020-04-19 PROCEDURE — 84484 ASSAY OF TROPONIN QUANT: CPT | Performed by: NURSE PRACTITIONER

## 2020-04-19 PROCEDURE — 84703 CHORIONIC GONADOTROPIN ASSAY: CPT | Performed by: NURSE PRACTITIONER

## 2020-04-19 PROCEDURE — 25800030 ZZH RX IP 258 OP 636: Performed by: NURSE PRACTITIONER

## 2020-04-19 PROCEDURE — 93005 ELECTROCARDIOGRAM TRACING: CPT

## 2020-04-19 PROCEDURE — 25000128 H RX IP 250 OP 636: Performed by: EMERGENCY MEDICINE

## 2020-04-19 PROCEDURE — 71046 X-RAY EXAM CHEST 2 VIEWS: CPT

## 2020-04-19 PROCEDURE — 85025 COMPLETE CBC W/AUTO DIFF WBC: CPT | Performed by: NURSE PRACTITIONER

## 2020-04-19 PROCEDURE — 96375 TX/PRO/DX INJ NEW DRUG ADDON: CPT

## 2020-04-19 PROCEDURE — 83690 ASSAY OF LIPASE: CPT | Performed by: NURSE PRACTITIONER

## 2020-04-19 PROCEDURE — 99285 EMERGENCY DEPT VISIT HI MDM: CPT | Mod: 25

## 2020-04-19 PROCEDURE — 96374 THER/PROPH/DIAG INJ IV PUSH: CPT | Mod: 59

## 2020-04-19 PROCEDURE — 83880 ASSAY OF NATRIURETIC PEPTIDE: CPT | Performed by: NURSE PRACTITIONER

## 2020-04-19 PROCEDURE — 76705 ECHO EXAM OF ABDOMEN: CPT

## 2020-04-19 RX ORDER — ONDANSETRON 2 MG/ML
4 INJECTION INTRAMUSCULAR; INTRAVENOUS
Status: COMPLETED | OUTPATIENT
Start: 2020-04-19 | End: 2020-04-19

## 2020-04-19 RX ORDER — ONDANSETRON 2 MG/ML
4 INJECTION INTRAMUSCULAR; INTRAVENOUS ONCE
Status: DISCONTINUED | OUTPATIENT
Start: 2020-04-19 | End: 2020-04-20

## 2020-04-19 RX ORDER — ONDANSETRON 2 MG/ML
4 INJECTION INTRAMUSCULAR; INTRAVENOUS EVERY 30 MIN PRN
Status: DISCONTINUED | OUTPATIENT
Start: 2020-04-19 | End: 2020-04-20

## 2020-04-19 RX ORDER — HYDROMORPHONE HYDROCHLORIDE 1 MG/ML
0.5 INJECTION, SOLUTION INTRAMUSCULAR; INTRAVENOUS; SUBCUTANEOUS
Status: COMPLETED | OUTPATIENT
Start: 2020-04-19 | End: 2020-04-19

## 2020-04-19 RX ORDER — HYDROMORPHONE HYDROCHLORIDE 1 MG/ML
0.5 INJECTION, SOLUTION INTRAMUSCULAR; INTRAVENOUS; SUBCUTANEOUS
Status: DISCONTINUED | OUTPATIENT
Start: 2020-04-19 | End: 2020-04-20

## 2020-04-19 RX ORDER — IOPAMIDOL 755 MG/ML
135 INJECTION, SOLUTION INTRAVASCULAR ONCE
Status: COMPLETED | OUTPATIENT
Start: 2020-04-19 | End: 2020-04-20

## 2020-04-19 RX ADMIN — HYDROMORPHONE HYDROCHLORIDE 0.5 MG: 1 INJECTION, SOLUTION INTRAMUSCULAR; INTRAVENOUS; SUBCUTANEOUS at 21:45

## 2020-04-19 RX ADMIN — SODIUM CHLORIDE 1000 ML: 9 INJECTION, SOLUTION INTRAVENOUS at 21:41

## 2020-04-19 RX ADMIN — HYDROMORPHONE HYDROCHLORIDE 0.5 MG: 1 INJECTION, SOLUTION INTRAMUSCULAR; INTRAVENOUS; SUBCUTANEOUS at 23:59

## 2020-04-19 RX ADMIN — ONDANSETRON 4 MG: 2 INJECTION INTRAMUSCULAR; INTRAVENOUS at 23:56

## 2020-04-19 RX ADMIN — ONDANSETRON 4 MG: 2 INJECTION INTRAMUSCULAR; INTRAVENOUS at 21:44

## 2020-04-19 RX ADMIN — HYDROMORPHONE HYDROCHLORIDE 0.5 MG: 1 INJECTION, SOLUTION INTRAMUSCULAR; INTRAVENOUS; SUBCUTANEOUS at 22:23

## 2020-04-19 ASSESSMENT — ENCOUNTER SYMPTOMS
DYSURIA: 0
ABDOMINAL PAIN: 1
FREQUENCY: 0
DIFFICULTY URINATING: 0
BACK PAIN: 1
HEMATURIA: 0

## 2020-04-19 ASSESSMENT — MIFFLIN-ST. JEOR: SCORE: 1908.17

## 2020-04-19 NOTE — LETTER
Transition Communication Hand-off for Care Transitions to Next Level of Care Provider    Name: Pinky Whatley  : 1965  MRN #: [id number]  Primary Care Provider: Laura Evans     Primary Clinic: [address]  [place] [address]     Reason for Hospitalization:  RUQ abdominal pain [R10.11]  Hypoxia [R09.02]  Opacities of both lungs present on chest x-ray [R91.8]  Admit Date/Time: 2020  9:23 PM  Discharge Date: 20  Payor Source: Payor: [employer] / Plan: [employer] INDIVIDUAL FAMILY PLANS WITH [employer] / Product Type: HMO /     Readmission Assessment Measure (YAYA) Risk Score/category: Average             Reason for Communication Hand-off Referral:   Pt needs Lab work BMP with follow up appointment with PCP for post hospitalization visit.    Discharge Plan:  Home with [facility] care services skilled nursing and PT.       Concern for non-adherence with plan of care:   Y/N NO   Discharge Needs Assessment:  Needs      Most Recent Value   # of Referrals Placed by [facility]  Homecare            Follow-up specialty is recommended: No    Follow-up plan:  No future appointments.    Any outstanding tests or procedures:        Referrals     Future Labs/Procedures    Home care nursing referral     Comments:    RN skilled nursing visit. RN to assess vital signs and weight, incision for signs/symptoms of infection, hydration, nutrition and bowel status and draw BMP on 20.    Discharge to home with [facility] Care services. The staff will call to schedule the first visit. Their phone number is [phone].     Your provider has ordered home care nursing services. If you have not been contacted within 2 days of your discharge please call the inpatient department phone number at [phone] .    Home Care PT Referral for Hospital Discharge     Comments:    PT to eval and treat    Your provider has ordered home care - physical therapy. If you have not been contacted within 2 days of your discharge please call the  department phone number listed on the top of this document.            Key Recommendations:      Nedra Braun RN    AVS/Discharge Summary is the source of truth; this is a helpful guide for improved communication of patient story

## 2020-04-20 ENCOUNTER — APPOINTMENT (OUTPATIENT)
Dept: GENERAL RADIOLOGY | Facility: CLINIC | Age: 55
DRG: 417 | End: 2020-04-20
Attending: HOSPITALIST
Payer: COMMERCIAL

## 2020-04-20 ENCOUNTER — APPOINTMENT (OUTPATIENT)
Dept: CT IMAGING | Facility: CLINIC | Age: 55
DRG: 417 | End: 2020-04-20
Attending: EMERGENCY MEDICINE
Payer: COMMERCIAL

## 2020-04-20 ENCOUNTER — ANESTHESIA (OUTPATIENT)
Dept: SURGERY | Facility: CLINIC | Age: 55
DRG: 417 | End: 2020-04-20
Payer: COMMERCIAL

## 2020-04-20 ENCOUNTER — APPOINTMENT (OUTPATIENT)
Dept: GENERAL RADIOLOGY | Facility: CLINIC | Age: 55
DRG: 417 | End: 2020-04-20
Attending: NURSE PRACTITIONER
Payer: COMMERCIAL

## 2020-04-20 ENCOUNTER — ANESTHESIA EVENT (OUTPATIENT)
Dept: SURGERY | Facility: CLINIC | Age: 55
DRG: 417 | End: 2020-04-20
Payer: COMMERCIAL

## 2020-04-20 ENCOUNTER — APPOINTMENT (OUTPATIENT)
Dept: GENERAL RADIOLOGY | Facility: CLINIC | Age: 55
DRG: 417 | End: 2020-04-20
Attending: INTERNAL MEDICINE
Payer: COMMERCIAL

## 2020-04-20 PROBLEM — R10.9 ABDOMINAL PAIN, ACUTE: Status: ACTIVE | Noted: 2020-04-20

## 2020-04-20 LAB
ALBUMIN SERPL-MCNC: 3.6 G/DL (ref 3.4–5)
ALP SERPL-CCNC: 139 U/L (ref 40–150)
ALT SERPL W P-5'-P-CCNC: 387 U/L (ref 0–50)
ANION GAP SERPL CALCULATED.3IONS-SCNC: 7 MMOL/L (ref 3–14)
AST SERPL W P-5'-P-CCNC: 369 U/L (ref 0–45)
BASE DEFICIT BLDA-SCNC: 10.8 MMOL/L
BASE DEFICIT BLDA-SCNC: 2.2 MMOL/L
BASE DEFICIT BLDA-SCNC: 9.4 MMOL/L
BILIRUB SERPL-MCNC: 3.7 MG/DL (ref 0.2–1.3)
BUN SERPL-MCNC: 20 MG/DL (ref 7–30)
CA-I BLD-MCNC: 4.8 MG/DL (ref 4.4–5.2)
CA-I BLD-MCNC: 4.9 MG/DL (ref 4.4–5.2)
CA-I SERPL ISE-MCNC: 5 MG/DL (ref 4.4–5.2)
CALCIUM SERPL-MCNC: 8.9 MG/DL (ref 8.5–10.1)
CHLORIDE SERPL-SCNC: 100 MMOL/L (ref 94–109)
CO2 SERPL-SCNC: 26 MMOL/L (ref 20–32)
CREAT SERPL-MCNC: 0.92 MG/DL (ref 0.52–1.04)
ERCP: NORMAL
ERYTHROCYTE [DISTWIDTH] IN BLOOD BY AUTOMATED COUNT: 14.3 % (ref 10–15)
GFR SERPL CREATININE-BSD FRML MDRD: 72 ML/MIN/{1.73_M2}
GLUCOSE BLDC GLUCOMTR-MCNC: 160 MG/DL (ref 70–99)
GLUCOSE BLDC GLUCOMTR-MCNC: 162 MG/DL (ref 70–99)
GLUCOSE BLDC GLUCOMTR-MCNC: 176 MG/DL (ref 70–99)
GLUCOSE BLDC GLUCOMTR-MCNC: 197 MG/DL (ref 70–99)
GLUCOSE BLDC GLUCOMTR-MCNC: 206 MG/DL (ref 70–99)
GLUCOSE BLDC GLUCOMTR-MCNC: 241 MG/DL (ref 70–99)
GLUCOSE BLDC GLUCOMTR-MCNC: 276 MG/DL (ref 70–99)
GLUCOSE SERPL-MCNC: 254 MG/DL (ref 70–99)
HBA1C MFR BLD: 6.7 % (ref 0–5.6)
HCO3 BLD-SCNC: 16 MMOL/L (ref 21–28)
HCO3 BLD-SCNC: 19 MMOL/L (ref 21–28)
HCO3 BLD-SCNC: 24 MMOL/L (ref 21–28)
HCT VFR BLD AUTO: 37.2 % (ref 35–47)
HGB BLD-MCNC: 12 G/DL (ref 11.7–15.7)
INR PPP: 1.09 (ref 0.86–1.14)
LACTATE BLD-SCNC: 1.9 MMOL/L (ref 0.7–2)
LACTATE BLD-SCNC: 2 MMOL/L (ref 0.7–2)
LACTATE BLD-SCNC: 3.8 MMOL/L (ref 0.7–2)
MAGNESIUM SERPL-MCNC: 1.5 MG/DL (ref 1.6–2.3)
MCH RBC QN AUTO: 31.3 PG (ref 26.5–33)
MCHC RBC AUTO-ENTMCNC: 32.3 G/DL (ref 31.5–36.5)
MCV RBC AUTO: 97 FL (ref 78–100)
OXYHGB MFR BLD: 94 % (ref 92–100)
OXYHGB MFR BLD: 95 % (ref 92–100)
OXYHGB MFR BLD: 96 % (ref 92–100)
PCO2 BLD: 36 MM HG (ref 35–45)
PCO2 BLD: 49 MM HG (ref 35–45)
PCO2 BLD: 55 MM HG (ref 35–45)
PH BLD: 7.16 PH (ref 7.35–7.45)
PH BLD: 7.24 PH (ref 7.35–7.45)
PH BLD: 7.31 PH (ref 7.35–7.45)
PLATELET # BLD AUTO: 185 10E9/L (ref 150–450)
PO2 BLD: 100 MM HG (ref 80–105)
PO2 BLD: 106 MM HG (ref 80–105)
PO2 BLD: 82 MM HG (ref 80–105)
POTASSIUM SERPL-SCNC: 4.2 MMOL/L (ref 3.4–5.3)
PROCALCITONIN SERPL-MCNC: 0.74 NG/ML
PROT SERPL-MCNC: 6.8 G/DL (ref 6.8–8.8)
RBC # BLD AUTO: 3.84 10E12/L (ref 3.8–5.2)
SARS-COV-2 PCR COMMENT: NORMAL
SARS-COV-2 RNA SPEC QL NAA+PROBE: NEGATIVE
SARS-COV-2 RNA SPEC QL NAA+PROBE: NORMAL
SODIUM SERPL-SCNC: 133 MMOL/L (ref 133–144)
SPECIMEN SOURCE: NORMAL
SPECIMEN SOURCE: NORMAL
TROPONIN I SERPL-MCNC: <0.015 UG/L (ref 0–0.04)
WBC # BLD AUTO: 6.8 10E9/L (ref 4–11)

## 2020-04-20 PROCEDURE — 99291 CRITICAL CARE FIRST HOUR: CPT | Mod: 25 | Performed by: INTERNAL MEDICINE

## 2020-04-20 PROCEDURE — 25000125 ZZHC RX 250: Performed by: NURSE ANESTHETIST, CERTIFIED REGISTERED

## 2020-04-20 PROCEDURE — 84145 PROCALCITONIN (PCT): CPT | Performed by: HOSPITALIST

## 2020-04-20 PROCEDURE — 96376 TX/PRO/DX INJ SAME DRUG ADON: CPT

## 2020-04-20 PROCEDURE — 74330 X-RAY BILE/PANC ENDOSCOPY: CPT

## 2020-04-20 PROCEDURE — 80053 COMPREHEN METABOLIC PANEL: CPT | Performed by: HOSPITALIST

## 2020-04-20 PROCEDURE — 25000566 ZZH SEVOFLURANE, EA 15 MIN: Performed by: INTERNAL MEDICINE

## 2020-04-20 PROCEDURE — 82330 ASSAY OF CALCIUM: CPT | Performed by: INTERNAL MEDICINE

## 2020-04-20 PROCEDURE — C1887 CATHETER, GUIDING: HCPCS | Performed by: INTERNAL MEDICINE

## 2020-04-20 PROCEDURE — 25000128 H RX IP 250 OP 636: Performed by: NURSE ANESTHETIST, CERTIFIED REGISTERED

## 2020-04-20 PROCEDURE — 25000128 H RX IP 250 OP 636: Performed by: HOSPITALIST

## 2020-04-20 PROCEDURE — 36415 COLL VENOUS BLD VENIPUNCTURE: CPT | Performed by: HOSPITALIST

## 2020-04-20 PROCEDURE — 25000128 H RX IP 250 OP 636: Performed by: NURSE PRACTITIONER

## 2020-04-20 PROCEDURE — 25000132 ZZH RX MED GY IP 250 OP 250 PS 637: Performed by: INTERNAL MEDICINE

## 2020-04-20 PROCEDURE — 40000275 ZZH STATISTIC RCP TIME EA 10 MIN

## 2020-04-20 PROCEDURE — 25000131 ZZH RX MED GY IP 250 OP 636 PS 637: Performed by: NURSE PRACTITIONER

## 2020-04-20 PROCEDURE — 36620 INSERTION CATHETER ARTERY: CPT | Performed by: INTERNAL MEDICINE

## 2020-04-20 PROCEDURE — 74177 CT ABD & PELVIS W/CONTRAST: CPT

## 2020-04-20 PROCEDURE — 20000003 ZZH R&B ICU

## 2020-04-20 PROCEDURE — 85027 COMPLETE CBC AUTOMATED: CPT | Performed by: HOSPITALIST

## 2020-04-20 PROCEDURE — 83036 HEMOGLOBIN GLYCOSYLATED A1C: CPT | Performed by: HOSPITALIST

## 2020-04-20 PROCEDURE — 3E043XZ INTRODUCTION OF VASOPRESSOR INTO CENTRAL VEIN, PERCUTANEOUS APPROACH: ICD-10-PCS | Performed by: INTERNAL MEDICINE

## 2020-04-20 PROCEDURE — 25000125 ZZHC RX 250: Performed by: EMERGENCY MEDICINE

## 2020-04-20 PROCEDURE — 25800030 ZZH RX IP 258 OP 636: Performed by: INTERNAL MEDICINE

## 2020-04-20 PROCEDURE — 25800030 ZZH RX IP 258 OP 636: Performed by: HOSPITALIST

## 2020-04-20 PROCEDURE — 87635 SARS-COV-2 COVID-19 AMP PRB: CPT | Performed by: EMERGENCY MEDICINE

## 2020-04-20 PROCEDURE — 82805 BLOOD GASES W/O2 SATURATION: CPT | Performed by: INTERNAL MEDICINE

## 2020-04-20 PROCEDURE — P9041 ALBUMIN (HUMAN),5%, 50ML: HCPCS | Performed by: NURSE ANESTHETIST, CERTIFIED REGISTERED

## 2020-04-20 PROCEDURE — 25000128 H RX IP 250 OP 636: Performed by: EMERGENCY MEDICINE

## 2020-04-20 PROCEDURE — 83605 ASSAY OF LACTIC ACID: CPT | Performed by: INTERNAL MEDICINE

## 2020-04-20 PROCEDURE — C1769 GUIDE WIRE: HCPCS | Performed by: INTERNAL MEDICINE

## 2020-04-20 PROCEDURE — 83605 ASSAY OF LACTIC ACID: CPT | Performed by: HOSPITALIST

## 2020-04-20 PROCEDURE — 25800030 ZZH RX IP 258 OP 636

## 2020-04-20 PROCEDURE — 93005 ELECTROCARDIOGRAM TRACING: CPT

## 2020-04-20 PROCEDURE — 25000125 ZZHC RX 250: Performed by: INTERNAL MEDICINE

## 2020-04-20 PROCEDURE — 99254 IP/OBS CNSLTJ NEW/EST MOD 60: CPT | Performed by: SURGERY

## 2020-04-20 PROCEDURE — 25000128 H RX IP 250 OP 636: Performed by: INTERNAL MEDICINE

## 2020-04-20 PROCEDURE — 36000060 ZZH SURGERY LEVEL 3 W FLUORO 1ST 30 MIN: Performed by: INTERNAL MEDICINE

## 2020-04-20 PROCEDURE — 36000058 ZZH SURGERY LEVEL 3 EA 15 ADDTL MIN: Performed by: INTERNAL MEDICINE

## 2020-04-20 PROCEDURE — 25800030 ZZH RX IP 258 OP 636: Performed by: NURSE PRACTITIONER

## 2020-04-20 PROCEDURE — 25800030 ZZH RX IP 258 OP 636: Performed by: NURSE ANESTHETIST, CERTIFIED REGISTERED

## 2020-04-20 PROCEDURE — 71045 X-RAY EXAM CHEST 1 VIEW: CPT

## 2020-04-20 PROCEDURE — 99223 1ST HOSP IP/OBS HIGH 75: CPT | Mod: AI | Performed by: HOSPITALIST

## 2020-04-20 PROCEDURE — 40000986 XR CHEST PORT 1 VW

## 2020-04-20 PROCEDURE — 83735 ASSAY OF MAGNESIUM: CPT | Performed by: HOSPITALIST

## 2020-04-20 PROCEDURE — 36556 INSERT NON-TUNNEL CV CATH: CPT | Performed by: INTERNAL MEDICINE

## 2020-04-20 PROCEDURE — 0FC98ZZ EXTIRPATION OF MATTER FROM COMMON BILE DUCT, VIA NATURAL OR ARTIFICIAL OPENING ENDOSCOPIC: ICD-10-PCS | Performed by: INTERNAL MEDICINE

## 2020-04-20 PROCEDURE — 27210286 ZZH BALLOON ADDITIONAL: Performed by: INTERNAL MEDICINE

## 2020-04-20 PROCEDURE — 40000008 ZZH STATISTIC AIRWAY CARE

## 2020-04-20 PROCEDURE — C2617 STENT, NON-COR, TEM W/O DEL: HCPCS | Performed by: INTERNAL MEDICINE

## 2020-04-20 PROCEDURE — 0F798DZ DILATION OF COMMON BILE DUCT WITH INTRALUMINAL DEVICE, VIA NATURAL OR ARTIFICIAL OPENING ENDOSCOPIC: ICD-10-PCS | Performed by: INTERNAL MEDICINE

## 2020-04-20 PROCEDURE — 37000009 ZZH ANESTHESIA TECHNICAL FEE, EACH ADDTL 15 MIN: Performed by: INTERNAL MEDICINE

## 2020-04-20 PROCEDURE — 85610 PROTHROMBIN TIME: CPT | Performed by: HOSPITALIST

## 2020-04-20 PROCEDURE — 84484 ASSAY OF TROPONIN QUANT: CPT | Performed by: HOSPITALIST

## 2020-04-20 PROCEDURE — 25000125 ZZHC RX 250

## 2020-04-20 PROCEDURE — 37000008 ZZH ANESTHESIA TECHNICAL FEE, 1ST 30 MIN: Performed by: INTERNAL MEDICINE

## 2020-04-20 PROCEDURE — 99291 CRITICAL CARE FIRST HOUR: CPT | Performed by: NURSE PRACTITIONER

## 2020-04-20 PROCEDURE — 25000131 ZZH RX MED GY IP 250 OP 636 PS 637: Performed by: HOSPITALIST

## 2020-04-20 PROCEDURE — 94002 VENT MGMT INPAT INIT DAY: CPT

## 2020-04-20 PROCEDURE — 00000146 ZZHCL STATISTIC GLUCOSE BY METER IP

## 2020-04-20 DEVICE — IMPLANTABLE DEVICE: Type: IMPLANTABLE DEVICE | Site: BILE DUCT | Status: FUNCTIONAL

## 2020-04-20 RX ORDER — BISACODYL 10 MG
10 SUPPOSITORY, RECTAL RECTAL DAILY PRN
Status: DISCONTINUED | OUTPATIENT
Start: 2020-04-20 | End: 2020-04-29 | Stop reason: HOSPADM

## 2020-04-20 RX ORDER — AMOXICILLIN 250 MG
1 CAPSULE ORAL 2 TIMES DAILY PRN
Status: DISCONTINUED | OUTPATIENT
Start: 2020-04-20 | End: 2020-04-29 | Stop reason: HOSPADM

## 2020-04-20 RX ORDER — PIPERACILLIN SODIUM, TAZOBACTAM SODIUM 3; .375 G/15ML; G/15ML
3.38 INJECTION, POWDER, LYOPHILIZED, FOR SOLUTION INTRAVENOUS EVERY 6 HOURS
Status: DISPENSED | OUTPATIENT
Start: 2020-04-20 | End: 2020-04-27

## 2020-04-20 RX ORDER — POTASSIUM CHLORIDE 29.8 MG/ML
20 INJECTION INTRAVENOUS
Status: DISCONTINUED | OUTPATIENT
Start: 2020-04-20 | End: 2020-04-29 | Stop reason: HOSPADM

## 2020-04-20 RX ORDER — NALOXONE HYDROCHLORIDE 0.4 MG/ML
.1-.4 INJECTION, SOLUTION INTRAMUSCULAR; INTRAVENOUS; SUBCUTANEOUS
Status: DISCONTINUED | OUTPATIENT
Start: 2020-04-20 | End: 2020-04-29 | Stop reason: HOSPADM

## 2020-04-20 RX ORDER — MAGNESIUM SULFATE HEPTAHYDRATE 40 MG/ML
2 INJECTION, SOLUTION INTRAVENOUS ONCE
Status: COMPLETED | OUTPATIENT
Start: 2020-04-20 | End: 2020-04-20

## 2020-04-20 RX ORDER — NALOXONE HYDROCHLORIDE 0.4 MG/ML
.1-.4 INJECTION, SOLUTION INTRAMUSCULAR; INTRAVENOUS; SUBCUTANEOUS
Status: DISCONTINUED | OUTPATIENT
Start: 2020-04-20 | End: 2020-04-21

## 2020-04-20 RX ORDER — HYDROCODONE BITARTRATE AND ACETAMINOPHEN 5; 325 MG/1; MG/1
1-2 TABLET ORAL EVERY 4 HOURS PRN
Status: DISCONTINUED | OUTPATIENT
Start: 2020-04-20 | End: 2020-04-24

## 2020-04-20 RX ORDER — ACETAMINOPHEN 325 MG/1
650 TABLET ORAL SEE ADMIN INSTRUCTIONS
Status: ON HOLD | COMMUNITY
End: 2020-04-24

## 2020-04-20 RX ORDER — LIDOCAINE 40 MG/G
CREAM TOPICAL
Status: DISCONTINUED | OUTPATIENT
Start: 2020-04-20 | End: 2020-04-21

## 2020-04-20 RX ORDER — NITROGLYCERIN 0.4 MG/1
0.4 TABLET SUBLINGUAL EVERY 5 MIN PRN
Status: DISCONTINUED | OUTPATIENT
Start: 2020-04-20 | End: 2020-04-29 | Stop reason: HOSPADM

## 2020-04-20 RX ORDER — NALOXONE HYDROCHLORIDE 0.4 MG/ML
.1-.4 INJECTION, SOLUTION INTRAMUSCULAR; INTRAVENOUS; SUBCUTANEOUS
Status: DISCONTINUED | OUTPATIENT
Start: 2020-04-20 | End: 2020-04-20

## 2020-04-20 RX ORDER — LIDOCAINE HYDROCHLORIDE 20 MG/ML
INJECTION, SOLUTION INFILTRATION; PERINEURAL PRN
Status: DISCONTINUED | OUTPATIENT
Start: 2020-04-20 | End: 2020-04-20

## 2020-04-20 RX ORDER — POTASSIUM CHLORIDE 1500 MG/1
20-40 TABLET, EXTENDED RELEASE ORAL
Status: DISCONTINUED | OUTPATIENT
Start: 2020-04-20 | End: 2020-04-29 | Stop reason: HOSPADM

## 2020-04-20 RX ORDER — CHLORHEXIDINE GLUCONATE ORAL RINSE 1.2 MG/ML
15 SOLUTION DENTAL EVERY 12 HOURS
Status: DISCONTINUED | OUTPATIENT
Start: 2020-04-20 | End: 2020-04-21

## 2020-04-20 RX ORDER — LEVOFLOXACIN 5 MG/ML
500 INJECTION, SOLUTION INTRAVENOUS EVERY 24 HOURS
Status: DISCONTINUED | OUTPATIENT
Start: 2020-04-20 | End: 2020-04-20

## 2020-04-20 RX ORDER — NICOTINE POLACRILEX 4 MG
15-30 LOZENGE BUCCAL
Status: DISCONTINUED | OUTPATIENT
Start: 2020-04-20 | End: 2020-04-22

## 2020-04-20 RX ORDER — DEXTROSE MONOHYDRATE 25 G/50ML
25-50 INJECTION, SOLUTION INTRAVENOUS
Status: DISCONTINUED | OUTPATIENT
Start: 2020-04-20 | End: 2020-04-22

## 2020-04-20 RX ORDER — POTASSIUM CHLORIDE 7.45 MG/ML
10 INJECTION INTRAVENOUS
Status: DISCONTINUED | OUTPATIENT
Start: 2020-04-20 | End: 2020-04-29 | Stop reason: HOSPADM

## 2020-04-20 RX ORDER — POLYETHYLENE GLYCOL 3350 17 G/17G
17 POWDER, FOR SOLUTION ORAL DAILY PRN
Status: DISCONTINUED | OUTPATIENT
Start: 2020-04-20 | End: 2020-04-29 | Stop reason: HOSPADM

## 2020-04-20 RX ORDER — ALBUMIN, HUMAN INJ 5% 5 %
SOLUTION INTRAVENOUS CONTINUOUS PRN
Status: DISCONTINUED | OUTPATIENT
Start: 2020-04-20 | End: 2020-04-20

## 2020-04-20 RX ORDER — ONDANSETRON 2 MG/ML
4 INJECTION INTRAMUSCULAR; INTRAVENOUS EVERY 6 HOURS PRN
Status: DISCONTINUED | OUTPATIENT
Start: 2020-04-20 | End: 2020-04-29 | Stop reason: HOSPADM

## 2020-04-20 RX ORDER — FENTANYL CITRATE 50 UG/ML
INJECTION, SOLUTION INTRAMUSCULAR; INTRAVENOUS PRN
Status: DISCONTINUED | OUTPATIENT
Start: 2020-04-20 | End: 2020-04-20

## 2020-04-20 RX ORDER — NOREPINEPHRINE BITARTRATE 0.06 MG/ML
0.03-0.4 INJECTION, SOLUTION INTRAVENOUS CONTINUOUS
Status: DISCONTINUED | OUTPATIENT
Start: 2020-04-20 | End: 2020-04-22

## 2020-04-20 RX ORDER — CALCIUM CHLORIDE 100 MG/ML
1 INJECTION INTRAVENOUS; INTRAVENTRICULAR ONCE
Status: COMPLETED | OUTPATIENT
Start: 2020-04-20 | End: 2020-04-20

## 2020-04-20 RX ORDER — FLUMAZENIL 0.1 MG/ML
0.2 INJECTION, SOLUTION INTRAVENOUS
Status: ACTIVE | OUTPATIENT
Start: 2020-04-20 | End: 2020-04-21

## 2020-04-20 RX ORDER — POTASSIUM CL/LIDO/0.9 % NACL 10MEQ/0.1L
10 INTRAVENOUS SOLUTION, PIGGYBACK (ML) INTRAVENOUS
Status: DISCONTINUED | OUTPATIENT
Start: 2020-04-20 | End: 2020-04-29 | Stop reason: HOSPADM

## 2020-04-20 RX ORDER — SODIUM CHLORIDE 9 MG/ML
INJECTION, SOLUTION INTRAVENOUS CONTINUOUS
Status: DISCONTINUED | OUTPATIENT
Start: 2020-04-20 | End: 2020-04-22

## 2020-04-20 RX ORDER — NOREPINEPHRINE BITARTRATE 0.06 MG/ML
INJECTION, SOLUTION INTRAVENOUS
Status: COMPLETED
Start: 2020-04-20 | End: 2020-04-20

## 2020-04-20 RX ORDER — PROPOFOL 10 MG/ML
INJECTION, EMULSION INTRAVENOUS PRN
Status: DISCONTINUED | OUTPATIENT
Start: 2020-04-20 | End: 2020-04-20

## 2020-04-20 RX ORDER — ACETAMINOPHEN 650 MG/1
650 SUPPOSITORY RECTAL EVERY 4 HOURS PRN
Status: DISCONTINUED | OUTPATIENT
Start: 2020-04-20 | End: 2020-04-29 | Stop reason: HOSPADM

## 2020-04-20 RX ORDER — MAGNESIUM SULFATE HEPTAHYDRATE 40 MG/ML
4 INJECTION, SOLUTION INTRAVENOUS EVERY 4 HOURS PRN
Status: DISCONTINUED | OUTPATIENT
Start: 2020-04-20 | End: 2020-04-29 | Stop reason: HOSPADM

## 2020-04-20 RX ORDER — EPHEDRINE SULFATE 50 MG/ML
INJECTION, SOLUTION INTRAMUSCULAR; INTRAVENOUS; SUBCUTANEOUS PRN
Status: DISCONTINUED | OUTPATIENT
Start: 2020-04-20 | End: 2020-04-20

## 2020-04-20 RX ORDER — LIDOCAINE 40 MG/G
CREAM TOPICAL
Status: DISCONTINUED | OUTPATIENT
Start: 2020-04-20 | End: 2020-04-29 | Stop reason: HOSPADM

## 2020-04-20 RX ORDER — ACETAMINOPHEN 325 MG/1
650 TABLET ORAL EVERY 4 HOURS PRN
Status: DISCONTINUED | OUTPATIENT
Start: 2020-04-20 | End: 2020-04-29 | Stop reason: HOSPADM

## 2020-04-20 RX ORDER — HYDROMORPHONE HYDROCHLORIDE 1 MG/ML
.3-.5 INJECTION, SOLUTION INTRAMUSCULAR; INTRAVENOUS; SUBCUTANEOUS
Status: DISCONTINUED | OUTPATIENT
Start: 2020-04-20 | End: 2020-04-21

## 2020-04-20 RX ORDER — HEPARIN SODIUM 5000 [USP'U]/.5ML
5000 INJECTION, SOLUTION INTRAVENOUS; SUBCUTANEOUS EVERY 8 HOURS
Status: DISCONTINUED | OUTPATIENT
Start: 2020-04-20 | End: 2020-04-20

## 2020-04-20 RX ORDER — PROPOFOL 10 MG/ML
5-30 INJECTION, EMULSION INTRAVENOUS CONTINUOUS
Status: DISCONTINUED | OUTPATIENT
Start: 2020-04-20 | End: 2020-04-21

## 2020-04-20 RX ORDER — AMOXICILLIN 250 MG
2 CAPSULE ORAL 2 TIMES DAILY PRN
Status: DISCONTINUED | OUTPATIENT
Start: 2020-04-20 | End: 2020-04-29 | Stop reason: HOSPADM

## 2020-04-20 RX ORDER — PREDNISONE 5 MG/1
4 TABLET ORAL DAILY
COMMUNITY

## 2020-04-20 RX ORDER — ONDANSETRON 4 MG/1
4 TABLET, ORALLY DISINTEGRATING ORAL EVERY 6 HOURS PRN
Status: DISCONTINUED | OUTPATIENT
Start: 2020-04-20 | End: 2020-04-29 | Stop reason: HOSPADM

## 2020-04-20 RX ORDER — POTASSIUM CHLORIDE 1.5 G/1.58G
20-40 POWDER, FOR SOLUTION ORAL
Status: DISCONTINUED | OUTPATIENT
Start: 2020-04-20 | End: 2020-04-29 | Stop reason: HOSPADM

## 2020-04-20 RX ADMIN — SODIUM CHLORIDE: 9 INJECTION, SOLUTION INTRAVENOUS at 03:26

## 2020-04-20 RX ADMIN — PHENYLEPHRINE HYDROCHLORIDE 200 MCG: 10 INJECTION INTRAVENOUS at 15:10

## 2020-04-20 RX ADMIN — SODIUM CHLORIDE: 9 INJECTION, SOLUTION INTRAVENOUS at 17:45

## 2020-04-20 RX ADMIN — HYDROCORTISONE SODIUM SUCCINATE 100 MG: 100 INJECTION, POWDER, FOR SOLUTION INTRAMUSCULAR; INTRAVENOUS at 15:15

## 2020-04-20 RX ADMIN — Medication 5 MG: at 15:14

## 2020-04-20 RX ADMIN — CHLORHEXIDINE GLUCONATE 15 ML: 1.2 RINSE ORAL at 19:41

## 2020-04-20 RX ADMIN — INSULIN GLARGINE 15 UNITS: 100 INJECTION, SOLUTION SUBCUTANEOUS at 04:19

## 2020-04-20 RX ADMIN — ALBUMIN HUMAN: 0.05 INJECTION, SOLUTION INTRAVENOUS at 15:55

## 2020-04-20 RX ADMIN — Medication 3 ML: at 15:23

## 2020-04-20 RX ADMIN — PHENYLEPHRINE HYDROCHLORIDE 400 MCG: 10 INJECTION INTRAVENOUS at 14:51

## 2020-04-20 RX ADMIN — INSULIN ASPART 1 UNITS: 100 INJECTION, SOLUTION INTRAVENOUS; SUBCUTANEOUS at 11:59

## 2020-04-20 RX ADMIN — NOREPINEPHRINE BITARTRATE 16000 MCG: 1 INJECTION, SOLUTION, CONCENTRATE INTRAVENOUS at 13:35

## 2020-04-20 RX ADMIN — PHENYLEPHRINE HYDROCHLORIDE 200 MCG: 10 INJECTION INTRAVENOUS at 15:13

## 2020-04-20 RX ADMIN — PHENYLEPHRINE HYDROCHLORIDE 200 MCG: 10 INJECTION INTRAVENOUS at 15:09

## 2020-04-20 RX ADMIN — PHENYLEPHRINE HYDROCHLORIDE 400 MCG: 10 INJECTION INTRAVENOUS at 14:58

## 2020-04-20 RX ADMIN — EPINEPHRINE 30 MCG: 1 INJECTION INTRAMUSCULAR; INTRAVENOUS; SUBCUTANEOUS at 15:29

## 2020-04-20 RX ADMIN — LIDOCAINE HYDROCHLORIDE 100 MG: 20 INJECTION, SOLUTION INFILTRATION; PERINEURAL at 14:37

## 2020-04-20 RX ADMIN — PHENYLEPHRINE HYDROCHLORIDE 150 MCG: 10 INJECTION INTRAVENOUS at 15:05

## 2020-04-20 RX ADMIN — PROPOFOL 40 MG: 10 INJECTION, EMULSION INTRAVENOUS at 14:37

## 2020-04-20 RX ADMIN — EPINEPHRINE 0.08 MCG/KG/MIN: 1 INJECTION INTRAMUSCULAR; INTRAVENOUS; SUBCUTANEOUS at 15:52

## 2020-04-20 RX ADMIN — HYDROMORPHONE HYDROCHLORIDE 0.5 MG: 1 INJECTION, SOLUTION INTRAMUSCULAR; INTRAVENOUS; SUBCUTANEOUS at 01:22

## 2020-04-20 RX ADMIN — VASOPRESSIN 2.4 UNITS/HR: 20 INJECTION INTRAVENOUS at 15:13

## 2020-04-20 RX ADMIN — INSULIN ASPART 3 UNITS: 100 INJECTION, SOLUTION INTRAVENOUS; SUBCUTANEOUS at 04:19

## 2020-04-20 RX ADMIN — Medication 2 ML: at 15:20

## 2020-04-20 RX ADMIN — INSULIN ASPART 2 UNITS: 100 INJECTION, SOLUTION INTRAVENOUS; SUBCUTANEOUS at 19:40

## 2020-04-20 RX ADMIN — PROPOFOL 50 MCG/KG/MIN: 10 INJECTION, EMULSION INTRAVENOUS at 22:32

## 2020-04-20 RX ADMIN — EPINEPHRINE 20 MCG: 1 INJECTION INTRAMUSCULAR; INTRAVENOUS; SUBCUTANEOUS at 15:49

## 2020-04-20 RX ADMIN — IOPAMIDOL 135 ML: 755 INJECTION, SOLUTION INTRAVENOUS at 00:30

## 2020-04-20 RX ADMIN — Medication 2 ML: at 15:14

## 2020-04-20 RX ADMIN — SODIUM CHLORIDE: 9 INJECTION, SOLUTION INTRAVENOUS at 08:36

## 2020-04-20 RX ADMIN — SODIUM CHLORIDE 1000 ML: 9 INJECTION, SOLUTION INTRAVENOUS at 07:20

## 2020-04-20 RX ADMIN — PHENYLEPHRINE HYDROCHLORIDE 200 MCG: 10 INJECTION INTRAVENOUS at 14:47

## 2020-04-20 RX ADMIN — Medication 5 MG: at 15:05

## 2020-04-20 RX ADMIN — Medication 6 ML: at 15:24

## 2020-04-20 RX ADMIN — INSULIN ASPART 3 UNITS: 100 INJECTION, SOLUTION INTRAVENOUS; SUBCUTANEOUS at 22:39

## 2020-04-20 RX ADMIN — PROPOFOL 50 MCG/KG/MIN: 10 INJECTION, EMULSION INTRAVENOUS at 20:04

## 2020-04-20 RX ADMIN — SODIUM CHLORIDE 100 ML: 9 INJECTION, SOLUTION INTRAVENOUS at 00:30

## 2020-04-20 RX ADMIN — PIPERACILLIN SODIUM AND TAZOBACTAM SODIUM 3.38 G: 3; .375 INJECTION, POWDER, LYOPHILIZED, FOR SOLUTION INTRAVENOUS at 19:06

## 2020-04-20 RX ADMIN — SODIUM CHLORIDE 1000 ML: 9 INJECTION, SOLUTION INTRAVENOUS at 13:51

## 2020-04-20 RX ADMIN — SODIUM CHLORIDE 250 ML: 9 INJECTION, SOLUTION INTRAVENOUS at 05:00

## 2020-04-20 RX ADMIN — Medication 2 ML: at 15:21

## 2020-04-20 RX ADMIN — Medication 5 MG: at 15:42

## 2020-04-20 RX ADMIN — SUCCINYLCHOLINE CHLORIDE 100 MG: 20 INJECTION, SOLUTION INTRAMUSCULAR; INTRAVENOUS; PARENTERAL at 14:38

## 2020-04-20 RX ADMIN — FENTANYL CITRATE 50 MCG: 50 INJECTION, SOLUTION INTRAMUSCULAR; INTRAVENOUS at 14:47

## 2020-04-20 RX ADMIN — HYDROMORPHONE HYDROCHLORIDE 0.5 MG: 1 INJECTION, SOLUTION INTRAMUSCULAR; INTRAVENOUS; SUBCUTANEOUS at 03:44

## 2020-04-20 RX ADMIN — EPINEPHRINE 20 MCG: 1 INJECTION INTRAMUSCULAR; INTRAVENOUS; SUBCUTANEOUS at 15:37

## 2020-04-20 RX ADMIN — MAGNESIUM SULFATE HEPTAHYDRATE 2 G: 40 INJECTION, SOLUTION INTRAVENOUS at 08:14

## 2020-04-20 RX ADMIN — EPINEPHRINE 0.1 MCG/KG/MIN: 1 INJECTION INTRAMUSCULAR; INTRAVENOUS; SUBCUTANEOUS at 18:33

## 2020-04-20 RX ADMIN — PIPERACILLIN SODIUM AND TAZOBACTAM SODIUM 3.38 G: 3; .375 INJECTION, POWDER, LYOPHILIZED, FOR SOLUTION INTRAVENOUS at 23:15

## 2020-04-20 RX ADMIN — Medication 5 MG: at 15:16

## 2020-04-20 RX ADMIN — PROPOFOL 60 MCG/KG/MIN: 10 INJECTION, EMULSION INTRAVENOUS at 18:25

## 2020-04-20 RX ADMIN — HYDROMORPHONE HYDROCHLORIDE 0.5 MG: 1 INJECTION, SOLUTION INTRAMUSCULAR; INTRAVENOUS; SUBCUTANEOUS at 05:46

## 2020-04-20 RX ADMIN — INSULIN GLARGINE 15 UNITS: 100 INJECTION, SOLUTION SUBCUTANEOUS at 21:48

## 2020-04-20 RX ADMIN — CALCIUM CHLORIDE 1 G: 100 INJECTION INTRAVENOUS; INTRAVENTRICULAR at 16:29

## 2020-04-20 RX ADMIN — PIPERACILLIN SODIUM AND TAZOBACTAM SODIUM 3.38 G: 3; .375 INJECTION, POWDER, LYOPHILIZED, FOR SOLUTION INTRAVENOUS at 11:57

## 2020-04-20 RX ADMIN — EPINEPHRINE 20 MCG: 1 INJECTION INTRAMUSCULAR; INTRAVENOUS; SUBCUTANEOUS at 15:39

## 2020-04-20 RX ADMIN — MIDAZOLAM 2 MG: 1 INJECTION INTRAMUSCULAR; INTRAVENOUS at 14:46

## 2020-04-20 RX ADMIN — EPINEPHRINE 20 MCG: 1 INJECTION INTRAMUSCULAR; INTRAVENOUS; SUBCUTANEOUS at 15:43

## 2020-04-20 RX ADMIN — PHENYLEPHRINE HYDROCHLORIDE 200 MCG: 10 INJECTION INTRAVENOUS at 15:16

## 2020-04-20 RX ADMIN — EPINEPHRINE 20 MCG: 1 INJECTION INTRAMUSCULAR; INTRAVENOUS; SUBCUTANEOUS at 15:27

## 2020-04-20 RX ADMIN — ROCURONIUM BROMIDE 50 MG: 10 INJECTION INTRAVENOUS at 15:39

## 2020-04-20 RX ADMIN — LEVOFLOXACIN 500 MG: 5 INJECTION, SOLUTION INTRAVENOUS at 04:15

## 2020-04-20 ASSESSMENT — ACTIVITIES OF DAILY LIVING (ADL)
RETIRED_COMMUNICATION: 0-->UNDERSTANDS/COMMUNICATES WITHOUT DIFFICULTY
FALL_HISTORY_WITHIN_LAST_SIX_MONTHS: NO
ADLS_ACUITY_SCORE: 16
AMBULATION: 1-->ASSISTIVE EQUIPMENT
BATHING: 0-->INDEPENDENT
ADLS_ACUITY_SCORE: 16
COGNITION: 0 - NO COGNITION ISSUES REPORTED
ADLS_ACUITY_SCORE: 16
DRESS: 0-->INDEPENDENT
TRANSFERRING: 1-->ASSISTIVE EQUIPMENT
ADLS_ACUITY_SCORE: 16
SWALLOWING: 0-->SWALLOWS FOODS/LIQUIDS WITHOUT DIFFICULTY
RETIRED_EATING: 0-->INDEPENDENT
TOILETING: 0-->INDEPENDENT

## 2020-04-20 ASSESSMENT — LIFESTYLE VARIABLES: TOBACCO_USE: 0

## 2020-04-20 NOTE — ED PROVIDER NOTES
History     Chief Complaint:  Abdominal Pain    The history is provided by the patient.      Pinky Whatley is a 54 year old female type 2 diabetic with a history of Rheumatoid arthritis (on hydroxychloroquine, methotrexate, tofacitinib), Type II diabetes (on metformin, basaglar, glimepiride) GERD, hypothyroidism, hypertension, and hyperlipidemia (on aspirin), who presents with upper abdominal pain and radiating right back pain rated as a 10/10, starting today at 1030. She also states that she is experiencing shortness of breath from her pain. The patient initially believed that her pain was due to gas, but the pain was persistent despite taking tylenol, eating crackers, drinking Sprite, and defecating. She had a similar episode when she had gallstones in the past, but her symptoms resided. She denies fevers, cough, shortness of breath, chest pain, dysuria, and lower abdominal pain.    Complete Portable Echo 9/8/19  Interpretation Summary  1. The left ventricle is normal in structure, function and size. The visual  ejection fraction is estimated at 60%. Normal left ventricular wall motion  2. The right ventricle is normal in structure, function and size.  3. No valve disease.    Allergies:  No Known Drug Allergies     Medications:    ASA  Lipitor  Amaryl  Glucosamine chondroitin  Plaquenil  Basaglar kwikpen  Synthroid/Levothroid   Prinivil/Zestril  Glucophage  Methotrexate  Anaprox  Deltasone  Azulfidine  Xeljanz  Ambien  Levemir Flextouch    Past Medical History:    Arthritis, rheumatoid  Diabetes mellitus type 2  Carpal tunnel syndrome  HTN  Herpes simplex type 2 infection  GERD with esophagitis  Anemia due to chronic disease  Morbid obesity  Hypothyroidism  Hypercholesteremia  Sepsis   Gallstones  Fatty liver  Carpal tunnel syndrome, Right  TOA    Past Surgical History:    Carpal tunnel release, RT/LT  Tonsillectomy  Bunion  [place] teeth extraction  Bilateral salpingectomy  Colposcopy    Family History:   "  Mother: HTN, Heart murmur, arthritis, hyperlipidemia  Father: CAD    Social History:  The patient was unaccompanied to the ED.  Smoking Status: no  Smokeless Tobacco: no  Alcohol Use: yes  Drug Use: no  Marital Status:  Single [1]     Review of Systems   Cardiovascular: Negative for chest pain.   Gastrointestinal: Positive for abdominal pain (upper).   Genitourinary: Negative for difficulty urinating, dysuria, frequency, hematuria and urgency.   Musculoskeletal: Positive for back pain.   All other systems reviewed and are negative.    Physical Exam     Patient Vitals for the past 24 hrs:   BP Temp Temp src Pulse Heart Rate Resp SpO2 Height Weight   04/19/20 2223 -- -- -- -- -- -- 96 % -- --   04/19/20 2220 -- -- -- -- -- -- (!) 86 % -- --   04/19/20 2129 (!) 148/72 97.6  F (36.4  C) Oral 90 90 25 97 % 1.549 m (5' 1\") 136.1 kg (300 lb)       Physical Exam  Nursing notes reviewed. Vitals reviewed.  General: Alert. Well kept.  Eyes:  Conjunctiva non-injected, non-icteric.  Neck/Throat: Moist mucous membranes.  Normal voice.  Cardiac: Regular rhythm. Normal heart sounds with no murmur/rubs/click. 2+ radial pulses.   Pulmonary: Clear and equal breath sounds bilaterally. No crackles/rales. No wheezing  Abdomen: Soft. Right upper quadrant tenderness with no CVA tenderness.  Musculoskeletal: Normal gross range of motion of all 4 extremities.    Neurological: Alert and oriented x4.   Skin: Warm and dry without rashes or petechiae. Normal appearance of visualized exposed skin.  Psych: Affect normal. Good eye contact.    Emergency Department Course     ECG:  Indication: abdominal pain  ECG taken at 2151, ECG read at 2155 by Dr. Glenys VILLALPANDO and Alison Harvey DNP  Normal sinus rhythm  Left axis deviation  Anterolateral infarct, age undetermined  Abnormal ECG  No significant changes compared to EKG dated 09/09/2019  Rate 88 bpm. CT interval 156. QRS duration 92. QT/QTc 388/469. P-R-T axes 51 -38 64.    Imaging:  Radiology " findings were communicated with the patient who voiced understanding of the findings.    Chest XR,  PA & LAT   Final Result   IMPRESSION: Low lung volumes. Bilateral perihilar airspace opacity, correlate for pulmonary edema. Bibasilar atelectasis. Mildly enlarged cardiac silhouette. Atherosclerotic aorta. No acute osseous abnormality.      US Abdomen Limited    (Results Pending)     Laboratory:  Laboratory findings were communicated with the patient who voiced understanding of the findings.    CBC: AWNL (WBC 10.1, HGB 12.1, )  CMP: Glucose 251(H), Bilirubin total 1.9(H), (H), and (H) o/w within normal limits (Creatinine 0.80)  Lipase: 167  Troponin (Collected 2138): <0.015  HCG Qualitative pregnancy: NEgative     UA with Microscopic reflex to culture: pending    Interventions:  Medications   HYDROmorphone (PF) (DILAUDID) injection 0.5 mg (0.5 mg Intravenous Given 4/19/20 2223)   ondansetron (ZOFRAN) injection 4 mg (has no administration in time range)   0.9% sodium chloride BOLUS (1,000 mLs Intravenous New Bag 4/19/20 2141)   ondansetron (ZOFRAN) injection 4 mg (4 mg Intravenous Given 4/19/20 2144)   Emergency Department Course:  Past medical records, nursing notes, and vitals reviewed.    (2130)   I performed an exam of the patient as documented above.     EKG obtained in the ED, see results above.     IV was inserted and blood was drawn for laboratory testing, results above.    The patient provided a urine sample here in the emergency department. This was sent for laboratory testing, findings above.    The patient was sent for a US abdomen limited while in the emergency department, results above.     I will sign the patient out to my partner Dr. Fields pending imaging studies.     Impression & Plan   Medical Decision Making:  Pinky Whatley is a 54 year old female who presents for epigastric and RUQ abdominal pain since 10:00.  A broad differential diagnosis was considered including  colitis, appendicitis, intestinal cramping, pyelonephritis, UTI, kidney stone, constipation, diverticulitis, ileus, obstruction, aortic aneurysm, referred cardiac pain was considered.  On exam she has epigastric and RUQ tenderness with associated nausea and retching.  EKG shows no ischemia and troponin returns negative making ACS unlikely.  She is not pregnant. CMP with hyperglycemia 251 and bilirubin 1.9, , .   The patient had an episode of hypoxia with oxygenation 87-88% on room air.  CXR obtained and shows bilateral perihilar airspace opacity.  BNP ordered.  She has CT of the chest from 9/8/19 showing airspace consolidation of the LLL. I will sign the patient out to my partner Dr. Fields pending US results with plan for discharge or admission determined by results.     Diagnosis:    ICD-10-CM    1. Opacities of both lungs present on chest x-ray  R91.8 Nt probnp inpatient     Nt probnp inpatient     CANCELED: BNP    Bilateral perihilar airspace opacity   2. RUQ abdominal pain  R10.11        Scribe Disclosure:  I, Cole Munguia, am serving as a scribe at 9:26 PM on 4/19/2020 to document services personally performed by Alison Harvey DNP based on my observations and the provider's statements to me.   4/19/2020    EMERGENCY DEPARTMENT       Alison Harvey CNP  04/19/20 [id number]

## 2020-04-20 NOTE — PLAN OF CARE
Admission to floor 0245. Pt A&Ox4. Up Ax1 GB/cane. Pt reports bilat abdominal pain that radiates to back, PRN IV dilaudid given x2. On 4L NC. -145 sustained, tele ST, resp 24-30s (pt states has been breathing rapidly d/t pain). MD notified; 250ml fluid bolus and EKG ordered. EKG ST. Lactic fired, came back at 1.9. Bruising, otherwise skin intact. +2 edema to BLE. COVID R/O, contact/droplet iso maintained. NS infusing at 75ml/hr. NPO except meds. OBGYN and surgery to consult.     RRT called 0703 for sustained -150s and increased labored breathing, resp 22-30s. 1L fluid bolus ordered, possible transfer to [facility]. Report given to day shift bedside nurse and flyer.

## 2020-04-20 NOTE — PROGRESS NOTES
Sepsis and acute cholangitis with impacted stone.  Decompression of biliary system with removal of large amount of pus and stone. S/P Sphincterotomy and stent placement.    Plan: NPO, I/V antibiotics and and ICU care.  Further evaluation by surgery for lap edouard    Valentin Hernandez GI

## 2020-04-20 NOTE — H&P
[facility]    History and Physical  Hospitalist       Date of Admission:  4/19/2020  Date of Service (when I saw the patient): 04/20/20    Assessment & Plan   Pinky Whatley is a 54 year old female who presents with abdominal pain. Admitted for further evaluation and treatment.     Abdominal pain: Patient presents emergency department with upper abdominal pain and radiation to the right side of the back rating it initially at a 10 out of 10.  Patient states that she is shortness of breath from her pain in her abdomen, with initial gas pain but persisting despite Tylenol and attempting to eat crackers and drink soda.  Not alleviated from defecation.  Patient say that she does not want a total gallstones in the past, but symptoms have resolved since previous episode.  Patient denies fevers, dysuria, vomiting, blood in the stool or urine, rash, ear pain, eye pain, sore throat, headache.  Patient with mild hypoxia in the emergency department.  Initial laboratory studies show elevated bilirubin at 1.9, with a ALT level of 321 and an AST level of 483, negative pregnancy testing, basic natruretic peptide is 9, lipase is 167, with a troponin of less than 0.015.  Glucose is elevated at 251 on admission.  Complete blood count shows white blood cell count of 10.1 with hemoglobin of 12.1 and a platelet count of 223.  Urinalysis shows greater than 1000 glucose, 80 of ketones, and mucus present.  Chest abdomen and pelvis with contrast CT scan shows no visualized pulmonary embolism, infiltrate and atelectasis with consolidation in the left base is present, distended gallbladder with bile duct dilatation, left adnexa cystic area measuring up to 4.6, see report for further details.  Ultrasound of the abdomen shows gallstones with mild gallbladder wall thickening, no pericholecystic fluid and negative sonographic Donald sign, mildly dilated common bile duct, with hepatomegaly, see report for further  details.  -Surgery consulted.  -Supportive measures.  -Close hemodynamic monitoring.  -Gentle IV fluids.  -Pain management.    Shortness of breath, infiltrate and consolidation in the left base, history of obesity hypoventilation syndrome potentially, possible pneumonia: Patient with CT of the chest showing left base infiltrate and/or atelectasis with consolidation.  Chest x-ray shows low lung volumes, bilateral perihilar airspace opacity, possible pulmonary edema, bibasilar atelectasis, mildly enlarged cardiac silhouette, see report for further details.  -Coronavirus rule out.  -Isolation precautions.  -Procalcitonin elevated.   -Empiric levaquin.     Left adnexa cystic area: Measuring up to 4.6 cm on CT scan of the abdomen.  -Pelvic ultrasound.  -OB consulted.    History of diabetes: Patient maintained on Amaryl and metformin as an outpatient.  -Insulin sliding scale.  -Reduced prior to admission glargine 40 units at bedtime to 15 units at bedtime.    Cardiac, hyperlipidemia, hypertension: Patient maintained on atorvastatin and aspirin in the outpatient setting.  -Continue prior to admission statin when verified by pharmacy.  -Continue prior to admission lisinopril when verified by pharmacy.    Inflammatory arthropathy, rheumatoid arthritis: Patient maintained on Plaquenil and methotrexate as outpatient.  Patient also taking prednisone in the outpatient setting.  -Continue prior to admission methotrexate when verified by pharmacy.  -Continue prior to admission prednisone when verified by pharmacy.  -Continue prior to admission sulfasalazine when verified by pharmacy.  -Continue prior to admission Plaquenil when verified by pharmacy.  -Patient is taking Xeljanz in the outpatient setting.    Hypothyroidism: Stable.  -Continue prior to admission levothyroxine when verified by pharmacy.    History of insomnia: Stable.  -Continue prior to admission zolpidem when verified by pharmacy.    DVT Prophylaxis: Pneumatic  Compression Devices  Code Status: Full Code    Disposition: Inpatient.    Dr. Daniel Dodson D.O.  [facility] Hospitalist  Pager [phone]    Primary Care Physician   Laura Evans    Chief Complaint   Abdominal pain    History is obtained from the patient and medical records.     History of Present Illness   Pinky Whatley is a 54 year old female who presents with abdominal pain. Admitted for further evaluation and treatment. Patient presents emergency department with upper abdominal pain and radiation to the right side of the back rating it initially at a 10 out of 10.  Patient states that she is shortness of breath from her pain in her abdomen, with initial gas pain but persisting despite Tylenol and attempting to eat crackers and drink soda.  Not alleviated from defecation.  Patient say that she does not want a total gallstones in the past, but symptoms have resolved since previous episode.  Patient denies fevers, dysuria, vomiting, blood in the stool or urine, rash, ear pain, eye pain, sore throat, headache.  Patient with mild hypoxia in the emergency department.  Initial laboratory studies show elevated bilirubin at 1.9, with a ALT level of 321 and an AST level of 483, negative pregnancy testing, basic natruretic peptide is 9, lipase is 167, with a troponin of less than 0.015.  Glucose is elevated at 251 on admission.  Complete blood count shows white blood cell count of 10.1 with hemoglobin of 12.1 and a platelet count of 223.  Urinalysis shows greater than 1000 glucose, 80 of ketones, and mucus present.  Chest abdomen and pelvis with contrast CT scan shows no visualized pulmonary embolism, infiltrate and atelectasis with consolidation in the left base is present, distended gallbladder with bile duct dilatation, left adnexa cystic area measuring up to 4.6, see report for further details.  Ultrasound of the abdomen shows gallstones with mild gallbladder wall thickening, no pericholecystic  fluid and negative sonographic Donald sign, mildly dilated common bile duct, with hepatomegaly, see report for further details.    Past Medical History    I have reviewed this patient's medical history and updated it with pertinent information if needed.   Past Medical History:   Diagnosis Date     Arthritis     rheumatoid     Diabetes (H)        Past Surgical History   I have reviewed this patient's surgical history and updated it with pertinent information if needed.  Past Surgical History:   Procedure Laterality Date     CARPAL TUNNEL RELEASE RT/LT       ENT SURGERY      tonsillectomy     ORTHOPEDIC SURGERY      bunion       Prior to Admission Medications   Prior to Admission Medications   Prescriptions Last Dose Informant Patient Reported? Taking?   GLUCOSAMINE-CHONDROITIN PO  Self Yes No   Sig: Take 1 tablet by mouth 2 times daily   Probiotic Product (PROBIOTIC PO)  Self Yes No   Sig: Take 1 capsule by mouth At Bedtime   acetaminophen (TYLENOL) 500 MG tablet  Self Yes No   Sig: Take 1,000 mg by mouth 2 times daily as needed   aspirin 81 MG EC tablet  Self Yes No   Sig: Take 81 mg by mouth daily   atorvastatin (LIPITOR) 80 MG tablet  Self Yes No   Sig: Take 80 mg by mouth daily   diphenhydrAMINE-acetaminophen (TYLENOL PM)  MG tablet  Self Yes No   Sig: Take 2 tablets by mouth At Bedtime   glimepiride (AMARYL) 4 MG tablet  Self Yes No   Sig: Take 4 mg by mouth 2 times daily   hydroxychloroquine (PLAQUENIL) 200 MG tablet  Self Yes No   Sig: Take 200 mg by mouth 2 times daily   ibuprofen (ADVIL/MOTRIN) 200 MG tablet  Self Yes No   Sig: Take 400 mg by mouth 2 times daily as needed   insulin glargine (BASAGLAR KWIKPEN) 100 UNIT/ML pen   No No   Sig: Inject 40 Units Subcutaneous At Bedtime   levothyroxine (SYNTHROID/LEVOTHROID) 125 MCG tablet  Self Yes No   Sig: Take 125 mcg by mouth daily   lisinopril (PRINIVIL/ZESTRIL) 20 MG tablet  Self Yes No   Sig: Take 20 mg by mouth daily   metFORMIN (GLUCOPHAGE) 500 MG  tablet  Self Yes No   Sig: Take 1,000 mg by mouth 2 times daily (with meals)   methotrexate 2.5 MG tablet  Self Yes No   Sig: Take 22.5 mg by mouth once a week on Monday (9 x 2.5 mg tablet)   multivitamin, therapeutic (THERA-VIT) TABS tablet  Self Yes No   Sig: Take 1 tablet by mouth daily   naproxen sodium (ANAPROX) 220 MG tablet  Self Yes No   Sig: Take 440 mg by mouth 2 times daily as needed   predniSONE (DELTASONE) 5 MG tablet  Self Yes No   Sig: Take 10 mg by mouth daily (2 x 5 mg tablet)   sulfaSALAzine (AZULFIDINE) 500 MG tablet  Self Yes No   Sig: Take 1,000 mg by mouth 2 times daily   tofacitinib (XELJANZ) 5 MG tablet  Self Yes No   Sig: Take 5 mg by mouth 2 times daily   zolpidem (AMBIEN) 5 MG tablet   No No   Sig: Take tablet by mouth 15 minutes prior to sleep, for Sleep Study      Facility-Administered Medications: None     Allergies   No Known Allergies    Social History   I have reviewed this patient's social history and updated it with pertinent information if needed. Pinky CARTWRIGHT Chacorta  reports that she has never smoked. She has never used smokeless tobacco. She reports current alcohol use. She reports that she does not use drugs.    Family History   I have reviewed this patient's family history and updated it with pertinent information if needed.   Family History   Problem Relation Age of Onset     Hypertension Mother      Heart Murmur Mother      Coronary Artery Disease Father      Valvular heart disease Paternal Grandfather        Review of Systems   The 10 point Review of Systems is negative other than noted in the HPI or here.     Physical Exam   Temp: 97.6  F (36.4  C) Temp src: Oral BP: 139/78 Pulse: 110 Heart Rate: 90 Resp: 25 SpO2: 91 % O2 Device: Nasal cannula Oxygen Delivery: 2 LPM  Vital Signs with Ranges  Temp:  [97.6  F (36.4  C)] 97.6  F (36.4  C)  Pulse:  [] 110  Heart Rate:  [90] 90  Resp:  [25] 25  BP: (139-148)/(72-78) 139/78  SpO2:  [86 %-97 %] 91 %  300 lbs 0 oz    GENERAL:  Alert and oriented. NAD. Conversational, appropriate.   HEENT: Normocephalic. EOMI. No icterus or injection. Nares normal.   LUNGS: Decrement to bases. No dyspnea at rest.   HEART: Regular rate. Extremities perfused.   ABDOMEN: Obese.  Soft, mildly tender, and distended.  No peritoneal signs.  Positive bowel sounds.   EXTREMITIES: No LE edema noted.   NEUROLOGIC: Moves extremities x4 on command. No acute focal neurologic abnormalities noted.  Follows commands.    Data   Data reviewed today:  I personally reviewed both laboratory and imaging data.   Recent Labs   Lab 04/19/20  2138   WBC 10.1   HGB 12.1   MCV 96         POTASSIUM 4.4   CHLORIDE 99   CO2 24   BUN 25   CR 0.80   ANIONGAP 10   CHANTELLE 9.8   *   ALBUMIN 3.9   PROTTOTAL 6.9   BILITOTAL 1.9*   ALKPHOS 112   *   *   LIPASE 167   TROPI <0.015       Recent Results (from the past 24 hour(s))   Chest XR,  PA & LAT    Narrative    EXAM: XR CHEST 2 VW  LOCATION: [facility]  DATE/TIME: 4/19/2020 10:16 PM    INDICATION: Shortness of breath  COMPARISON: None.      Impression    IMPRESSION: Low lung volumes. Bilateral perihilar airspace opacity, correlate for pulmonary edema. Bibasilar atelectasis. Mildly enlarged cardiac silhouette. Atherosclerotic aorta. No acute osseous abnormality.   US Abdomen Limited    Narrative    EXAM: US ABDOMEN LIMITED  LOCATION: [facility]  DATE/TIME: 4/19/2020 10:49 PM    INDICATION: Retinal quadrant pain  COMPARISON: None.  TECHNIQUE: Limited abdominal ultrasound.    FINDINGS:    GALLBLADDER: Gallstones with gallbladder wall thickening. No pericholecystic fluid. Sonographic Donald sign reported as negative.    BILE DUCTS: Dilated common bile duct. The common duct measures 9 mm.    LIVER: Enlarged liver measuring up to 17.8 cm. No focal mass.    RIGHT KIDNEY: No hydronephrosis.    PANCREAS: The visualized portions are normal.    No ascites.      Impression    IMPRESSION:  1.   Gallstones with mild gallbladder wall thickening. No pericholecystic fluid and negative sonographic Donald sign. Findings are equivocal, correlate to exclude cholecystitis.  2.  Mildly dilated common bile duct, may reflect prior stone passage.  3.  Hepatomegaly.   CT Chest (PE) Abdomen Pelvis w Contrast    Narrative    EXAM: CT CHEST PE ABDOMEN PELVIS W CONTRAST  LOCATION: [facility]  DATE/TIME: 4/19/2020 11:57 PM    INDICATION: Epigastric pain. Hypoxia with ambulation.  COMPARISON: 09/08/2019.  TECHNIQUE: CT angiogram chest and routine CT abdomen pelvis with IV contrast. Arterial phase through the chest and venous phase through the abdomen and pelvis. 2D and 3D MIP reconstructions were preformed by the CT technologist. Dose reduction techniques   were used.   CONTRAST: 135 mL Isovue-370.    FINDINGS:  ANGIOGRAM CHEST: No visualized pulmonary embolism. Slightly limited by body habitus, suboptimal pulmonary artery opacification and motion. No large central emboli. Normal caliber thoracic aorta.    LUNGS AND PLEURA: Mild infiltrate or atelectasis with small areas of consolidation at the left base and lingula. Minimal atelectasis right base. No pleural fluid or pneumothorax.    MEDIASTINUM/AXILLAE: Normal.    HEPATOBILIARY: Distended gallbladder. Mild intra and extrahepatic bile duct dilatation.    PANCREAS: Normal.    SPLEEN: Normal.    ADRENAL GLANDS: Normal.    KIDNEYS/BLADDER: Normal.    BOWEL: No bowel obstruction. No ascites or free air. Small fat-containing umbilical hernia.    LYMPH NODES: Normal.    PELVIC ORGANS: Uterus is present. Several cystic areas in the left adnexa measuring up to 4.6 cm.    MUSCULOSKELETAL: Degenerative changes visualized spine.      Impression    IMPRESSION:  1.  No visualized pulmonary embolism. Exam slightly limited.  2.  Infiltrate or atelectasis with some consolidation lingula and left base.  3.  Distended gallbladder with mild bile duct dilatation. Correlate  clinically. Consider right upper quadrant ultrasound.  4.  Left adnexa cystic areas measuring up to 4.6 cm. Consider outpatient short-term follow-up pelvic ultrasound for further evaluation.

## 2020-04-20 NOTE — CONSULTS
[facility] General Surgery Consultation    Pinky Whatley MRN# [id number]   YOB: 1965 Age: 54 year old      Date of Admission:  4/19/2020  Date of Consult: 4/20/2020         Assessment and Plan:   Patient is a 54 year old female with cholecystitis and elevated LFTs, possible choledocholithiasis    PLAN:  -Medical management per primary team  -IV antibiotics  -Recommend consult to GI for possible ERCP, as ascending cholangitis could explain the patient's rapidly escalating clinical course  -Follow-up recommendations from gynecology consult for management of left adnexal mass  -Would not recommend proceeding with surgical intervention until patient's clinical condition has stabilized; however, anticipate patient will undergo laparoscopic cholecystectomy in the next couple of days         Requesting Physician:      Dr. Dodson        Chief Complaint:     Chief Complaint   Patient presents with     Abdominal Pain          History of Present Illness:   Pinky Whatley is a 54 year old female who was seen in consultation at the request of Dr. Dodson who presented with abdominal pain.  Patient reports acute onset of abdominal pain starting yesterday.  She states the pain was located in the midepigastric region and radiated around her to her right upper quadrant and into her back.  She attempted to take gas medication and apply heat without improvement in her symptoms.  She denies any significant associated nausea or vomiting.  She reports she was not having any fevers or chills at home, though she was dizzy.  Patient does describe 1 previous episode of similar symptoms greater than 10 years ago.  Past abdominal surgical history is significant for laparoscopic left salpingectomy for what sounds like tubo-ovarian abscess.  At this time, the patient reports that her abdominal pain has improved.          Physical Exam:   Blood pressure 94/60, pulse 117, temperature 100.2  F (37.9  C),  "temperature source Oral, resp. rate (!) 32, height 1.549 m (5' 1\"), weight 136.1 kg (300 lb), SpO2 93 %.  300 lbs 0 oz   Body mass index is 56.68 kg/m .    General: Vital signs reviewed, in no mild distress  Eyes: Anicteric  HENT: Normocephalic, atraumatic, trachea midline   Respiratory: Breathing mildly labored  Cardiovascular: Regular rhythm, tachycardic  GI: Abdomen soft, nondistended, mildly tender with palpation in the midepigastric and right upper quadrant areas  Musculoskeletal: No gross deformities  Neurologic: Grossly nonfocal exam  Psychiatric: Normal mood, affect and insight  Integumentary: Warm and clammy         Past Medical History:     Past Medical History:   Diagnosis Date     Arthritis     rheumatoid     Diabetes (H)             Past Surgical History:     Past Surgical History:   Procedure Laterality Date     CARPAL TUNNEL RELEASE RT/LT       ENT SURGERY      tonsillectomy     ORTHOPEDIC SURGERY      bunion            Current Medications:           insulin aspart  1-6 Units Subcutaneous Q4H     insulin glargine  15 Units Subcutaneous At Bedtime     levofloxacin  500 mg Intravenous Q24H     magnesium sulfate  2 g Intravenous Once     sodium chloride (PF)  3 mL Intracatheter Q8H     sodium chloride (PF)  3 mL Intracatheter Q8H       acetaminophen, acetaminophen, bisacodyl, glucose **OR** dextrose **OR** glucagon, HYDROcodone-acetaminophen, HYDROmorphone, lidocaine 4%, lidocaine 4%, lidocaine (buffered or not buffered), lidocaine (buffered or not buffered), magnesium sulfate, melatonin, naloxone, nitroGLYcerin, ondansetron **OR** ondansetron, polyethylene glycol, potassium chloride, potassium chloride with lidocaine, potassium chloride, potassium chloride, potassium chloride, senna-docusate **OR** senna-docusate, sodium chloride (PF), sodium chloride (PF)         Home Medications:     Prior to Admission medications    Medication Sig Last Dose Taking? Auth Provider   acetaminophen (TYLENOL) 500 MG " tablet Take 1,000 mg by mouth 2 times daily as needed   Unknown, Entered By History   aspirin 81 MG EC tablet Take 81 mg by mouth daily   Unknown, Entered By History   atorvastatin (LIPITOR) 80 MG tablet Take 80 mg by mouth daily   Unknown, Entered By History   diphenhydrAMINE-acetaminophen (TYLENOL PM)  MG tablet Take 2 tablets by mouth At Bedtime   Unknown, Entered By History   glimepiride (AMARYL) 4 MG tablet Take 4 mg by mouth 2 times daily   Unknown, Entered By History   GLUCOSAMINE-CHONDROITIN PO Take 1 tablet by mouth 2 times daily   Unknown, Entered By History   hydroxychloroquine (PLAQUENIL) 200 MG tablet Take 200 mg by mouth 2 times daily   Unknown, Entered By History   ibuprofen (ADVIL/MOTRIN) 200 MG tablet Take 400 mg by mouth 2 times daily as needed   Unknown, Entered By History   insulin glargine (BASAGLAR KWIKPEN) 100 UNIT/ML pen Inject 40 Units Subcutaneous At Bedtime   Scot Burns MD   levothyroxine (SYNTHROID/LEVOTHROID) 125 MCG tablet Take 125 mcg by mouth daily   Unknown, Entered By History   lisinopril (PRINIVIL/ZESTRIL) 20 MG tablet Take 20 mg by mouth daily   Unknown, Entered By History   metFORMIN (GLUCOPHAGE) 500 MG tablet Take 1,000 mg by mouth 2 times daily (with meals)   Unknown, Entered By History   methotrexate 2.5 MG tablet Take 22.5 mg by mouth once a week on Monday (9 x 2.5 mg tablet)   Unknown, Entered By History   multivitamin, therapeutic (THERA-VIT) TABS tablet Take 1 tablet by mouth daily   Unknown, Entered By History   naproxen sodium (ANAPROX) 220 MG tablet Take 440 mg by mouth 2 times daily as needed   Unknown, Entered By History   predniSONE (DELTASONE) 5 MG tablet Take 10 mg by mouth daily (2 x 5 mg tablet)   Unknown, Entered By History   Probiotic Product (PROBIOTIC PO) Take 1 capsule by mouth At Bedtime   Unknown, Entered By History   sulfaSALAzine (AZULFIDINE) 500 MG tablet Take 1,000 mg by mouth 2 times daily   Unknown, Entered By History   tofacitinib  (XELJANZ) 5 MG tablet Take 5 mg by mouth 2 times daily   Unknown, Entered By History   zolpidem (AMBIEN) 5 MG tablet Take tablet by mouth 15 minutes prior to sleep, for Sleep Study   Goltz, Bennett Ezra, PA-C            Allergies:   No Known Allergies         Family History:     Family History   Problem Relation Age of Onset     Hypertension Mother      Heart Murmur Mother      Coronary Artery Disease Father      Valvular heart disease Paternal Grandfather            Social History:   Pinky Jacobo  reports that she has never smoked. She has never used smokeless tobacco. She reports current alcohol use. She reports that she does not use drugs.          Review of Systems:   Constitutional: No fevers or chills  Eyes: No blurred or double vision  HENT: Denies headaches, No rhinorrhea, No sore throat  Respiratory: shortness of breath  Cardiovascular: Denies chest pain or palpitations  Gastrointestinal: Abdominal pain  Genitourinary: No hematuria or dysuria  Musculoskeletal: Denies arthralgias or myalgias  Neurologic: No numbness or tingling  Integumentary: No skin rashes         Labs/Imaging   All new lab and imaging data was reviewed.   Recent Labs   Lab 04/20/20 0603 04/19/20 2138   WBC 6.8 10.1   HGB 12.0 12.1   HCT 37.2 37.2   MCV 97 96    223     Recent Labs   Lab 04/20/20  0603 04/19/20 2138    133   POTASSIUM 4.2 4.4   CHLORIDE 100 99   CO2 26 24   ANIONGAP 7 10   * 251*   BUN 20 25   CR 0.92 0.80   GFRESTIMATED 72 84   GFRESTBLACK 83 >90   CHANTELLE 8.9 9.8   MAG 1.5*  --    PROTTOTAL 6.8 6.9   ALBUMIN 3.6 3.9   BILITOTAL 3.7* 1.9*   ALKPHOS 139 112   * 483*   * 321*     Recent Labs   Lab 04/20/20  0603   INR 1.09     Recent Labs   Lab 04/19/20 2138   LIPASE 167     Recent Labs   Lab 04/20/20  0603 04/19/20 2138   TROPI <0.015 <0.015   Results for PINKY JACOBO (MRN [id number]) as of 4/20/2020 08:32   Ref. Range 4/20/2020 06:03   Lactate for Sepsis Protocol  Latest Ref Range: 0.7 - 2.0 mmol/L 1.9       I have personally reviewed the imaging studies-   CT CHEST PE ABDOMEN PELVIS W CONTRAST  LOCATION: [facility]  DATE/TIME: 4/19/2020 11:57 PM                                                                      IMPRESSION:  1.  No visualized pulmonary embolism. Exam slightly limited.  2.  Infiltrate or atelectasis with some consolidation lingula and left base.  3.  Distended gallbladder with mild bile duct dilatation. Correlate clinically. Consider right upper quadrant ultrasound.  4.  Left adnexa cystic areas measuring up to 4.6 cm. Consider outpatient short-term follow-up pelvic ultrasound for further evaluation.    US ABDOMEN LIMITED  LOCATION: [facility]  DATE/TIME: 4/19/2020 10:49 PM     FINDINGS:     GALLBLADDER: Gallstones with gallbladder wall thickening. No pericholecystic fluid. Sonographic Donald sign reported as negative.     BILE DUCTS: Dilated common bile duct. The common duct measures 9 mm.                                                                   IMPRESSION:  1.  Gallstones with mild gallbladder wall thickening. No pericholecystic fluid and negative sonographic Donald sign. Findings are equivocal, correlate to exclude cholecystitis.  2.  Mildly dilated common bile duct, may reflect prior stone passage.  3.  Hepatomegaly.      Agustina Nettles MD

## 2020-04-20 NOTE — PHARMACY-ADMISSION MEDICATION HISTORY
"Pharmacy Medication History  Admission medication history interview status for the 4/19/2020  admission is complete. See EPIC admission navigator for prior to admission medications     Medication history sources: Patient and [facility] in [place] [phone]  Medication history source reliability: Moderate  Adherence assessment: Moderate    Significant changes made to the medication list:        Additional medication history information:     Medication reconciliation completed by provider prior to medication history? Yes    Time spent in this activity: 30min       Prior to Admission medications    Medication Sig Last Dose Taking? Auth Provider   acetaminophen (TYLENOL) 325 MG tablet Take 650 mg by mouth See Admin Instructions Per patient she takes APAP or ibuprofen or naproxen twice a day. She \"changes it up\" every day but takes otc medication for pain twice a day. 4/19/2020 at Unknown time Yes Unknown, Entered By History   aspirin 81 MG EC tablet Take 81 mg by mouth daily 4/19/2020 at Unknown time Yes Unknown, Entered By History   atorvastatin (LIPITOR) 80 MG tablet Take 80 mg by mouth daily 4/19/2020 at Unknown time Yes Unknown, Entered By History   diphenhydrAMINE-acetaminophen (TYLENOL PM)  MG tablet Take 2 tablets by mouth nightly as needed  Past Week at Unknown time Yes Unknown, Entered By History   glimepiride (AMARYL) 4 MG tablet Take 4 mg by mouth 2 times daily 4/19/2020 at Unknown time Yes Unknown, Entered By History   GLUCOSAMINE-CHONDROITIN PO Take 1 tablet by mouth 2 times daily 4/19/2020 at Unknown time Yes Unknown, Entered By History   hydroxychloroquine (PLAQUENIL) 200 MG tablet Take 200 mg by mouth 2 times daily 4/19/2020 at Unknown time Yes Unknown, Entered By History   insulin glargine (LANTUS PEN) 100 UNIT/ML pen Inject 50 Units Subcutaneous At Bedtime 4/19/2020 at Unknown time Yes Unknown, Entered By History   levothyroxine (SYNTHROID/LEVOTHROID) 125 MCG tablet Take 125 mcg by " "mouth daily 4/19/2020 at Unknown time Yes Unknown, Entered By History   lisinopril (PRINIVIL/ZESTRIL) 20 MG tablet Take 20 mg by mouth daily 4/19/2020 at Unknown time Yes Unknown, Entered By History   metFORMIN (GLUCOPHAGE) 500 MG tablet Take 1,000 mg by mouth 2 times daily (with meals) 4/19/2020 at Unknown time Yes Unknown, Entered By History   methotrexate 2.5 MG tablet Take 22.5 mg by mouth once a week on Monday (9 x 2.5 mg tablet) 4/13/2020 at Unknown time Yes Unknown, Entered By History   multivitamin, therapeutic (THERA-VIT) TABS tablet Take 1 tablet by mouth daily 4/19/2020 at Unknown time Yes Unknown, Entered By History   predniSONE (DELTASONE) 5 MG tablet Take 5 mg by mouth daily 4/19/2020 at Unknown time Yes Unknown, Entered By History   Probiotic Product (PROBIOTIC PO) Take 1 capsule by mouth At Bedtime 4/19/2020 at Unknown time Yes Unknown, Entered By History   sulfaSALAzine (AZULFIDINE) 500 MG tablet Take 1,000 mg by mouth 2 times daily 4/19/2020 at Unknown time Yes Unknown, Entered By History   tofacitinib (XELJANZ) 5 MG tablet Take 5 mg by mouth 2 times daily 4/19/2020 at Unknown time Yes Unknown, Entered By History   ibuprofen (ADVIL/MOTRIN) 200 MG tablet Take 400 mg by mouth See Admin Instructions Per patient she takes APAP or ibuprofen or naproxen twice a day. She \"changes it up\" every day but takes otc medication for pain twice a day. Unknown at Unknown time  Unknown, Entered By History   naproxen sodium (ANAPROX) 220 MG tablet Take 440 mg by mouth See Admin Instructions Per patient she takes APAP or ibuprofen or naproxen twice a day. She \"changes it up\" every day but takes otc medication for pain twice a day. Unknown at Unknown time  Unknown, Entered By History       "

## 2020-04-20 NOTE — PROCEDURES
[facility]    Arterial line placement    Date/Time: 4/20/2020 2:00 PM  Performed by: Ganesh Burnett MD  Authorized by: Ganesh Burnett MD     UNIVERSAL PROTOCOL   Site Marked: NA  Prior Images Obtained and Reviewed:  NA  Required items: Required blood products, implants, devices and special equipment available    Patient identity confirmed:  Arm band and verbally with patient  NA - No sedation, light sedation, or local anesthesia  Confirmation Checklist:  Patient's identity using two indicators, relevant allergies, procedure was appropriate and matched the consent or emergent situation and correct equipment/implants were available  Time out: Immediately prior to the procedure a time out was called    Universal Protocol: the Joint Commission Universal Protocol was followed    Preparation: Patient was prepped and draped in usual sterile fashion        Indication: Septic shock hemodynamic monitoring  Location: right radial      SEDATION    Patient Sedated: No      PROCEDURE DETAILS      Seldinger technique: Seldinger technique used    Number of Attempts:  2  Post-procedure:  Line sutured and dressing applied  CMS: normal  PROCEDURE   Patient Tolerance:  Patient tolerated the procedure well with no immediate complications    Length of time physician/provider present for 1:1 monitoring during sedation: 0

## 2020-04-20 NOTE — ED PROVIDER NOTES
Pt received in sign out with US pending.  US showed gallstones and some thickening, but no surrounding fluid and reported negative blas's sign.  Pt had tenderness on exam and was still in pain following.  She also appeared somewhat SOB, which she felt was secondary to her epigastric discomfort.  When pt ambulated to bathroom and back, her O2 sats were in the upper 70s.  This quickly recovered with rest and Nasal O2.  She has a h/o sleep apnea and it seems her resting O2 sats are on the low side at baseline.  She has not had fevers or cough.  I did obtain a CT PE along with abd/pelvis.  This did not show a PE or ground glass opacities.  There was infiltrate vs atelectasis of the lingula.  Pt was admitted for pain control and monitoring given US findings and blood work.  I did send a COVID test given SOB.  Pt admitted to Dr. [place] Fields, Ramakrishna LEMA MD  04/20/20 3976

## 2020-04-20 NOTE — PLAN OF CARE
Patient arrived from OBS at 0930 after RRT. Patient alert and oriented, afebrile. Heart rate 110 -125 sinus tachycardia. Difficult to obtain accurate blood pressure on extremities, A line placed by Dr Burnett, dampened and not trusted for measurement. Central line place by Dr Burnett. Levophed started per Md order, blood pressure stabilized. Patient to surgery at 1415 via bed. Patients mother called with update.

## 2020-04-20 NOTE — ANESTHESIA PREPROCEDURE EVALUATION
Anesthesia Pre-Procedure Evaluation    Patient: Pinky Whatley   MRN: [id number] : 1965          Preoperative Diagnosis: Cholecystitis [K81.9]    Procedure(s):  ENDOSCOPIC RETROGRADE CHOLANGIOPANCREATOGRAPHY    Past Medical History:   Diagnosis Date     Arthritis     rheumatoid     Diabetes (H)      Past Surgical History:   Procedure Laterality Date     CARPAL TUNNEL RELEASE RT/LT       ENT SURGERY      tonsillectomy     ORTHOPEDIC SURGERY      bunion       Anesthesia Evaluation     . Pt has had prior anesthetic. Type: General    No history of anesthetic complications          ROS/MED HX    ENT/Pulmonary:      (-) tobacco use   Neurologic:       Cardiovascular:     (+) Dyslipidemia, hypertension----. : . . . :. .       METS/Exercise Tolerance:  >4 METS   Hematologic:     (+) Anemia, -      Musculoskeletal:   (+) arthritis,  other musculoskeletal- Rheumatoid arthitis      GI/Hepatic:     (+) GERD       Renal/Genitourinary:         Endo:     (+) type II DM Using insulin thyroid problem hypothyroidism, Obesity (Class 4), .   (-) Type I DM   Psychiatric:         Infectious Disease:         Malignancy:         Other:    (+) No chance of pregnancy                         Physical Exam      Airway   Mallampati: IV  TM distance: >3 FB  Neck ROM: full    Dental   (+) caps    Cardiovascular   Rhythm and rate: regular and abnormal      Pulmonary (+) decreased breath sounds               Lab Results   Component Value Date    WBC 6.8 2020    HGB 12.0 2020    HCT 37.2 2020     2020     2020    POTASSIUM 4.2 2020    CHLORIDE 100 2020    CO2 26 2020    BUN 20 2020    CR 0.92 2020     (H) 2020    CHANTELLE 8.9 2020    MAG 1.5 (L) 2020    ALBUMIN 3.6 2020    PROTTOTAL 6.8 2020     (H) 2020     (H) 2020    ALKPHOS 139 2020    BILITOTAL 3.7 (H) 2020    LIPASE 167 2020    INR 1.09  "04/20/2020    TSH 5.63 (H) 09/09/2019    T4 1.34 09/09/2019    HCGS Negative 04/19/2020       Preop Vitals  BP Readings from Last 3 Encounters:   04/20/20 (!) 105/28   10/14/19 138/83   09/10/19 126/80    Pulse Readings from Last 3 Encounters:   04/20/20 114   10/14/19 120   09/10/19 83      Resp Readings from Last 3 Encounters:   04/20/20 24   10/14/19 18   09/10/19 16    SpO2 Readings from Last 3 Encounters:   04/20/20 95%   10/14/19 92%   09/10/19 97%      Temp Readings from Last 1 Encounters:   04/20/20 36.8  C (98.2  F) (Oral)    Ht Readings from Last 1 Encounters:   04/19/20 1.549 m (5' 1\")      Wt Readings from Last 1 Encounters:   04/19/20 136.1 kg (300 lb)    Estimated body mass index is 56.68 kg/m  as calculated from the following:    Height as of this encounter: 1.549 m (5' 1\").    Weight as of this encounter: 136.1 kg (300 lb).       Anesthesia Plan      History & Physical Review  History and physical reviewed and following examination; no interval change.    ASA Status:  5 emergent.    NPO Status:  > 8 hours    Plan for General with Intravenous and Propofol induction. Maintenance will be Balanced.    PONV prophylaxis:  Ondansetron (or other 5HT-3)  Additional equipment: Videolaryngoscope, 2nd IV, CVP and Arterial Line (Femoral triple lumen and R radial a-line in place; a-line drawing back well, but unreliable tracing)        Postoperative Care  Postoperative pain management:  IV analgesics and Multi-modal analgesia.      Consents  Anesthetic plan, risks, benefits and alternatives discussed with:  Patient.  Use of blood products discussed: Yes.   Use of blood products discussed with Patient.  Consented to blood products.  .                 Tristan Falcon MD  "

## 2020-04-20 NOTE — ANESTHESIA CARE TRANSFER NOTE
Patient: Pinky Whatley    Procedure(s):  ENDOSCOPIC RETROGRADE CHOLANGIOPANCREATOGRAPHY  Endoscopic Retrograde Cholangiopancreatography, With Sphincterotomy  Endoscopic Retrograde Cholangiopancreatography, With Tube Or Stent Insertion  Endoscopic Retrograde Cholangiopancreatography, With Calculus Removal Using Balloon Catheter    Diagnosis: Cholecystitis [K81.9]  Diagnosis Additional Information: No value filed.    Anesthesia Type:   No value filed.     Note:  Airway :ETT and Ventilator  Patient transferred to:ICU  Comments: Patient connected to SpO2, EKG, and arterial blood pressure transport monitors and accompanied by CRNA, anesthesiologist, circulating RN to ICU room. Patient ventilated by anesthesiologist with ambu via ETT with O2 at 10 liters per minute during transport. COVID negative patient.    On arrival to ICU, endotracheal tube position unchanged, equal, bilateral breath sounds auscultated in ICU room, patient placed on ICU ventilator by respiratory therapist, teeth and oral mucosa intact and unchanged at handoff of care. At anesthesia handoff of care, clinical monitors and alarms on and functioning, report on patient's clinical status given to ICU RN, ICU staff questions answered. Vasopressors medications infusing as listed in anesthesia record. Hemodynamically stable at handoff with SBP 110s.ICU Handoff: Call for PAUSE to initiate/utilize ICU HANDOFF, Identified Patient, Identified Responsible Provider, Reviewed the Pertinent Medical History, Discussed Surgical Course, Reviewed Intra-OP Anesthesia Management and Issues during Anesthesia, Set Expectations for Post Procedure Period and Allowed Opportunity for Questions and Acknowledgement of Understanding      Vitals: (Last set prior to Anesthesia Care Transfer)    CR[facility] VITALS  4/20/2020 1536 - 4/20/2020 1623      4/20/2020             Resp Rate (observed):  14    Resp Rate (set):  14                Electronically Signed By: Hesham Serrano, APRN  CRNA  April 20, 2020  4:20 PM

## 2020-04-20 NOTE — PROGRESS NOTES
Critical Care Progress Note      04/20/2020    Name: Pinky Whatley MRN#: [id number]   Age: 54 year old YOB: 1965     Hsptl Day# 0  ICU DAY #0    MV DAY #0             Problem List:   Active Problems:    Abdominal pain, acute  septic shock            Summary/Hospital Course:     54F DM, RA presented for upper abdominal pain on 4/19.  CT abd/pelv with mild biliary dilation.  The morning of 4/20 she developed tachycardia and increased respiratory rate and was transferred to ICU.  Here she had initially stabilized but subsequently developed hypotension requiring initiation of levophed.      Assessment and plan :     Pinky Whatley IS a 54 year old female admitted on 4/19/2020 for abdominal pain, now septic shock, suspected ascending cholangitis.   I have personally reviewed the daily labs, imaging studies, cultures and discussed the case with referring physician and consulting physicians.     My assessment and plan by system for this patient is as follows:    Neurology/Psychiatry:   1.  Awake, alert and appropriate    Cardiovascular:   1.  Shock, septic:  In setting of probable ascending cholangitis.  Initiated levo and vaso.  Ical and pH ok.  Completing 30 ml/kg bolus.  On zosyn. Lactate ok at 2.0 for now.  2.  Sinus tachycardia    Pulmonary/Ventilator Management:   1. Hypoxemia:  In setting of sepsis as noted above and likely body habitus related.  Continue o2 supplementation.    GI and Nutrition :   1. NPO for now  2. Probable ascending cholagitis:  Appreciate GI support.  Zosyn.    Renal/Fluids/Electrolytes:   1. Creat stable 0.92.    Infectious Disease:   1. Septic source:  Suspect ascending cholangitis.  Zosyn.  Appreciate GI support.    Endocrine:   1. Monitor fsg.  Continue prn aspart for now.    Hematology/Oncology:   1. Wbc 6.8 ok.  2.  hgb 12 ok  3. pltlts 185 ok    IV/Access:   1. Venous access - central line   2. Arterial access - arterial line    ICU Prophylaxis:   1. DVT: Hep Subq/  LMWH/mechanical  2. VAP: HOB 30 degrees, chlorhexidine rinse  3. Stress Ulcer: PPI/H2 blocker  4. Restraints: Nonviolent soft two point restraints required and necessary for patient safety and continued cares and good effect as patient continues to pull at necessary lines, tubes despite education and distraction. Will readdress daily.   5. Wound care - per unit routine   6. Feeding - npo   7. Family Update: pt herself at bedside  8. Disposition - icu           Interim History:     On my visit she continues to have some abdominal pain.         Key Medications:       calcium chloride  1 g Intravenous Once     insulin aspart  1-6 Units Subcutaneous Q4H     insulin glargine  15 Units Subcutaneous At Bedtime     piperacillin-tazobactam  3.375 g Intravenous Q6H     sodium chloride (PF)  3 mL Intracatheter Q8H     sodium chloride (PF)  3 mL Intracatheter Q8H       norepinephrine 16,000 mcg (04/20/20 1335)     sodium chloride 75 mL/hr at 04/20/20 0836     vasopressin (PITRESSIN) infusion ADULT (40 mL)                 Physical Examination:   Temp:  [96.1  F (35.6  C)-100.2  F (37.9  C)] 98.2  F (36.8  C)  Pulse:  [] 114  Heart Rate:  [] 114  Resp:  [22-34] 24  BP: ()/(28-83) 105/28  SpO2:  [86 %-99 %] 95 %    Intake/Output Summary (Last 24 hours) at 4/20/2020 1402  Last data filed at 4/20/2020 1300  Gross per 24 hour   Intake 1325 ml   Output --   Net 1325 ml     Wt Readings from Last 4 Encounters:   04/19/20 136.1 kg (300 lb)   10/14/19 135 kg (297 lb 9.6 oz)   09/07/19 130.2 kg (287 lb)     BP - Mean:  [47-99] 49  Resp: 24    Recent Labs   Lab 04/20/20  1318   PH 7.31*   PCO2 49*   PO2 82   HCO3 24       GEN: no acute distress   HEENT: head ncat, sclera anicteric, OP patent, trachea midline   PULM: tachypniec butunlabored, clear anteriorly    CV/COR: RRR S1S2 no gallop,  No rub, no murmur  ABD: soft nontender, hypoactive bowel sounds, no mass  EXT:  No gross Edema, warm x4  NEURO: awake, alert,  appropriate affect.  Rapid/fluent speech.  Moves all 4 with good strength.  SKIN: no obvious rash  LINES: clean, dry intact         Data:   All data and imaging reviewed     ROUTINE ICU LABS (Last four results)  CMP  Recent Labs   Lab 04/20/20  0603 04/19/20  2138    133   POTASSIUM 4.2 4.4   CHLORIDE 100 99   CO2 26 24   ANIONGAP 7 10   * 251*   BUN 20 25   CR 0.92 0.80   GFRESTIMATED 72 84   GFRESTBLACK 83 >90   CHANTELLE 8.9 9.8   MAG 1.5*  --    PROTTOTAL 6.8 6.9   ALBUMIN 3.6 3.9   BILITOTAL 3.7* 1.9*   ALKPHOS 139 112   * 483*   * 321*     CBC  Recent Labs   Lab 04/20/20  0603 04/19/20  2138   WBC 6.8 10.1   RBC 3.84 3.89   HGB 12.0 12.1   HCT 37.2 37.2   MCV 97 96   MCH 31.3 31.1   MCHC 32.3 32.5   RDW 14.3 14.1    223     INR  Recent Labs   Lab 04/20/20  0603   INR 1.09     Arterial Blood Gas  Recent Labs   Lab 04/20/20  1318   PH 7.31*   PCO2 49*   PO2 82   HCO3 24       All cultures:  No results for input(s): CULT in the last 168 hours.  Recent Results (from the past 24 hour(s))   Chest XR,  PA & LAT    Narrative    EXAM: XR CHEST 2 VW  LOCATION: [facility]  DATE/TIME: 4/19/2020 10:16 PM    INDICATION: Shortness of breath  COMPARISON: None.      Impression    IMPRESSION: Low lung volumes. Bilateral perihilar airspace opacity, correlate for pulmonary edema. Bibasilar atelectasis. Mildly enlarged cardiac silhouette. Atherosclerotic aorta. No acute osseous abnormality.   US Abdomen Limited    Narrative    EXAM: US ABDOMEN LIMITED  LOCATION: [facility]  DATE/TIME: 4/19/2020 10:49 PM    INDICATION: Retinal quadrant pain  COMPARISON: None.  TECHNIQUE: Limited abdominal ultrasound.    FINDINGS:    GALLBLADDER: Gallstones with gallbladder wall thickening. No pericholecystic fluid. Sonographic Donald sign reported as negative.    BILE DUCTS: Dilated common bile duct. The common duct measures 9 mm.    LIVER: Enlarged liver measuring up to 17.8 cm. No focal  mass.    RIGHT KIDNEY: No hydronephrosis.    PANCREAS: The visualized portions are normal.    No ascites.      Impression    IMPRESSION:  1.  Gallstones with mild gallbladder wall thickening. No pericholecystic fluid and negative sonographic Donald sign. Findings are equivocal, correlate to exclude cholecystitis.  2.  Mildly dilated common bile duct, may reflect prior stone passage.  3.  Hepatomegaly.   CT Chest (PE) Abdomen Pelvis w Contrast    Narrative    EXAM: CT CHEST PE ABDOMEN PELVIS W CONTRAST  LOCATION: [facility]  DATE/TIME: 4/19/2020 11:57 PM    INDICATION: Epigastric pain. Hypoxia with ambulation.  COMPARISON: 09/08/2019.  TECHNIQUE: CT angiogram chest and routine CT abdomen pelvis with IV contrast. Arterial phase through the chest and venous phase through the abdomen and pelvis. 2D and 3D MIP reconstructions were preformed by the CT technologist. Dose reduction techniques   were used.   CONTRAST: 135 mL Isovue-370.    FINDINGS:  ANGIOGRAM CHEST: No visualized pulmonary embolism. Slightly limited by body habitus, suboptimal pulmonary artery opacification and motion. No large central emboli. Normal caliber thoracic aorta.    LUNGS AND PLEURA: Mild infiltrate or atelectasis with small areas of consolidation at the left base and lingula. Minimal atelectasis right base. No pleural fluid or pneumothorax.    MEDIASTINUM/AXILLAE: Normal.    HEPATOBILIARY: Distended gallbladder. Mild intra and extrahepatic bile duct dilatation.    PANCREAS: Normal.    SPLEEN: Normal.    ADRENAL GLANDS: Normal.    KIDNEYS/BLADDER: Normal.    BOWEL: No bowel obstruction. No ascites or free air. Small fat-containing umbilical hernia.    LYMPH NODES: Normal.    PELVIC ORGANS: Uterus is present. Several cystic areas in the left adnexa measuring up to 4.6 cm.    MUSCULOSKELETAL: Degenerative changes visualized spine.      Impression    IMPRESSION:  1.  No visualized pulmonary embolism. Exam slightly limited.  2.   Infiltrate or atelectasis with some consolidation lingula and left base.  3.  Distended gallbladder with mild bile duct dilatation. Correlate clinically. Consider right upper quadrant ultrasound.  4.  Left adnexa cystic areas measuring up to 4.6 cm. Consider outpatient short-term follow-up pelvic ultrasound for further evaluation.   XR Chest Port 1 View    Narrative    XR CHEST PORT 1 VW  4/20/2020 8:01 AM       INDICATION: worsening hypoxia and shortness of breath  COMPARISON: 4/19/2020       Impression    IMPRESSION: Elevated left hemidiaphragm. Infiltrates in the lingula  and left lower lobe, similar to previous. Right lung infiltrates have  resolved.    PAL MARINA MD     Labs (personally reviewed/interpreted): see a/p  CT ch/abd/pelv (personally reviewed):  No pe or lung infiltrates.  Changes c/w cholangitis    D/w Dr. Hernandez of GI.    Billing: This patient is critically ill: Yes. Total critical care time today 50 min, excluding procedures.

## 2020-04-20 NOTE — ED NOTES
Patient abd pain severe, o2 sats hanging in the 86-88% on RA, 2 liters started o2 sats 96% now.  PRN pain medication given. Patient is at Xray and then US.

## 2020-04-20 NOTE — ED NOTES
[facility]  ED Nurse Handoff Report    ED Chief complaint: Abdominal Pain      ED Diagnosis:   Final diagnoses:   Opacities of both lungs present on chest x-ray - Bilateral perihilar airspace opacity   RUQ abdominal pain   Hypoxia       Code Status: Full Code    Allergies: No Known Allergies    Patient Story: Presents with middle and RUQ abdominal pain that radiates to the back.  Focused Assessment:  Alert and oriented x4. 18 G IV in R AC. Patient uncomfortable in bed. In 2 L of O2 after pain meds. Independent with cares.    Labs Ordered and Resulted from Time of ED Arrival Up to the Time of Departure from the ED   CBC WITH PLATELETS DIFFERENTIAL - Abnormal; Notable for the following components:       Result Value    Absolute Neutrophil 8.4 (*)     Absolute Lymphocytes 0.7 (*)     All other components within normal limits   COMPREHENSIVE METABOLIC PANEL - Abnormal; Notable for the following components:    Glucose 251 (*)     Bilirubin Total 1.9 (*)      (*)      (*)     All other components within normal limits   LIPASE   TROPONIN I   HCG QUALITATIVE   NT PROBNP INPATIENT   ROUTINE UA WITH MICROSCOPIC REFLEX TO CULTURE   PERIPHERAL IV CATHETER     US Abdomen Limited   Final Result   IMPRESSION:   1.  Gallstones with mild gallbladder wall thickening. No pericholecystic fluid and negative sonographic Donald sign. Findings are equivocal, correlate to exclude cholecystitis.   2.  Mildly dilated common bile duct, may reflect prior stone passage.   3.  Hepatomegaly.      Chest XR,  PA & LAT   Final Result   IMPRESSION: Low lung volumes. Bilateral perihilar airspace opacity, correlate for pulmonary edema. Bibasilar atelectasis. Mildly enlarged cardiac silhouette. Atherosclerotic aorta. No acute osseous abnormality.            Treatments and/or interventions provided: Dilaudid x2, zofran x2, 1 L of NS.   Patient's response to treatments and/or interventions: Episode of vomiting after first  "dose of dilaudid    To be done/followed up on inpatient unit:  Continue with plan of care per admitting MD.    Does this patient have any cognitive concerns?: N/A    Activity level - Baseline/Home:  Independent  Activity Level - Current:   Independent    Patient's Preferred language: English   Needed?: No    Isolation: None  Infection: Not Applicable  Bariatric?: Yes    Vital Signs:   Vitals:    04/19/20 2129 04/19/20 2220 04/19/20 2223   BP: (!) 148/72     Pulse: 90     Resp: 25     Temp: 97.6  F (36.4  C)     TempSrc: Oral     SpO2: 97% (!) 86% 96%   Weight: 136.1 kg (300 lb)     Height: 1.549 m (5' 1\")         Cardiac Rhythm:     Was the PSS-3 completed:   Yes  What interventions are required if any?               Family Comments: No family at bedside, mother drove her here  OBS brochure/video discussed/provided to patient/family: Yes                For the majority of the shift this patient's behavior was Green.     ED NURSE PHONE NUMBER: [phone]         "

## 2020-04-20 NOTE — CONSULTS
Consulted regarding left ovarian cyst.   Chart reviewed including imaging via Abd/pelvic CT.  Pt unavailable as in OR.  Called Dr Ganesh Burnett with plan.  Agree with radiology and recommend follow-up us in 2-3months.  Please call if any further questions.  Johnny Agee MD  [facility]

## 2020-04-20 NOTE — PROGRESS NOTES
RECEIVING UNIT ED HANDOFF REVIEW    ED Nurse Handoff Report was reviewed by: Maggie Beltrán RN on April 20, 2020 at 2:15 AM

## 2020-04-20 NOTE — ANESTHESIA POSTPROCEDURE EVALUATION
Patient: Pinky Whatley    Procedure(s):  ENDOSCOPIC RETROGRADE CHOLANGIOPANCREATOGRAPHY  Endoscopic Retrograde Cholangiopancreatography, With Sphincterotomy  Endoscopic Retrograde Cholangiopancreatography, With Tube Or Stent Insertion  Endoscopic Retrograde Cholangiopancreatography, With Calculus Removal Using Balloon Catheter    Diagnosis:Cholecystitis [K81.9]  Diagnosis Additional Information: No value filed.    Anesthesia Type:  General    Note:  Anesthesia Post Evaluation    Patient location during evaluation: ICU  Patient participation: Unable to evaluate secondary to administered sedation  Pain management: unable to assess  Airway patency: patent  Cardiovascular status: blood pressure returned to baseline and acceptable  Respiratory status: acceptable  Hydration status: stable  PONV: unable to assess       Comments: Patient transported sedated and intubated up the ICU under continuous monitoring. Patient now with norepi, epi and vaso gtt/s along with propofol for sedation. New a-line in L radial artery working well.        Last vitals:  Vitals:    04/20/20 1345 04/20/20 1400 04/20/20 1419   BP: (!) 88/68 (!) 79/21 90/63   Pulse: 115 114    Resp: 27 28    Temp:      SpO2: 96% 98%          Electronically Signed By: Tristan Falcon MD  April 20, 2020  4:53 PM

## 2020-04-20 NOTE — PROGRESS NOTES
MD Notification    Notified Person: MD    Notified Person Name: Dr. Dodson     Notification Date/Time: 4/20/2020 0451    Notification Interaction: Page    Purpose of Notification: -145 sustained, tele ST. Resp 24-30. Please advise.     Orders Received: 12 lead EKG and 250ml fluid bolus ordered.     Comments: Pt states rapid breathing due to abdom pain. IV dilaudid 0.5mg already given before MD page.

## 2020-04-20 NOTE — OR NURSING
Ercp note:  Procedure in OR34.  ERCP with sphincterotomy (dreamtome/wire) and balloon sweep (9/12).  10x7 plastic stent placed.  No specimens obtained.  See MD note for details.

## 2020-04-20 NOTE — ED TRIAGE NOTES
Upper abdominal pain that radiates to her back. Denies any n/v/d. Hx of gallstones- still has gall bladder. Took tylenol with no relief.

## 2020-04-20 NOTE — PLAN OF CARE
A&Ox4, VS unstable - tachycardic 130's-140's, 93% on 4L NC, elevated temp, soft bp's.  C/o 5/10 abdominal pain, declines pain meds. Diabetic. Up Ax1 with cane. NPO. Voiding. IV infusing. Transferred to ICU.

## 2020-04-20 NOTE — CONSULTS
[facility]  Gastroenterology Consultation         Pinky Whatley  [address]   [place] [id number]  54 year old female    Admission Date/Time: 4/19/2020  Primary Care Provider: Laura Evans  Referring / Attending Physician: Elsa Ramirez    We were asked to see the patient in consultation by Dr. Elsa Ramirez for evaluation of abdominal pain hypotension fever elevated liver function tests.      CC: Suspected acute cholangitis and sepsis    HPI:  Pinky Whatley is a 54 year old female who was admitted due to tachycardia shortness of breath hypoxic respiratory failure patient requiring face max for oxygenation patient developed hypotension patient has been resuscitated with IV fluids.  During patient's ER evaluation patient had severe pain radiating to her upper back and significantly elevated liver function tests.  Patient had minimally dilated common bile duct to 9 mm.  There was a question for possible atelectasis versus infiltrate in the left lung base area.  Since hospitalization patient has been progressively getting worse patient is getting more short of breath more abdominal pain patient is requiring significant amount of oxygen.  Patient is currently in the ICU.  Patient has been started on IV pressors along with fluid boluses.  Patient is awake alert mentating fairly uncomfortable with pain since the pain medicines are starting to wear off.  Patient denied any other systemic complaint or any other similar episodes in the past or any other family member being sick at home.    ROS: A comprehensive ten point review of systems was negative aside from those in mentioned in the HPI.      PAST MED HX:  I have reviewed this patient's medical history and updated it with pertinent information if needed.   Past Medical History:   Diagnosis Date     Arthritis     rheumatoid     Diabetes (H)        MEDICATIONS:   Prior to Admission Medications   Prescriptions Last Dose  "Informant Patient Reported? Taking?   GLUCOSAMINE-CHONDROITIN PO 4/19/2020 at Unknown time Self Yes Yes   Sig: Take 1 tablet by mouth 2 times daily   Probiotic Product (PROBIOTIC PO) 4/19/2020 at Unknown time Self Yes Yes   Sig: Take 1 capsule by mouth At Bedtime   acetaminophen (TYLENOL) 325 MG tablet 4/19/2020 at Unknown time Self Yes Yes   Sig: Take 650 mg by mouth See Admin Instructions Per patient she takes APAP or ibuprofen or naproxen twice a day. She \"changes it up\" every day but takes otc medication for pain twice a day.   aspirin 81 MG EC tablet 4/19/2020 at Unknown time Self Yes Yes   Sig: Take 81 mg by mouth daily   atorvastatin (LIPITOR) 80 MG tablet 4/19/2020 at Unknown time Self Yes Yes   Sig: Take 80 mg by mouth daily   diphenhydrAMINE-acetaminophen (TYLENOL PM)  MG tablet Past Week at Unknown time Self Yes Yes   Sig: Take 2 tablets by mouth nightly as needed    glimepiride (AMARYL) 4 MG tablet 4/19/2020 at Unknown time Self Yes Yes   Sig: Take 4 mg by mouth 2 times daily   hydroxychloroquine (PLAQUENIL) 200 MG tablet 4/19/2020 at Unknown time Self Yes Yes   Sig: Take 200 mg by mouth 2 times daily   ibuprofen (ADVIL/MOTRIN) 200 MG tablet Unknown at Unknown time Self Yes No   Sig: Take 400 mg by mouth See Admin Instructions Per patient she takes APAP or ibuprofen or naproxen twice a day. She \"changes it up\" every day but takes otc medication for pain twice a day.   insulin glargine (LANTUS PEN) 100 UNIT/ML pen 4/19/2020 at Unknown time Self Yes Yes   Sig: Inject 50 Units Subcutaneous At Bedtime   levothyroxine (SYNTHROID/LEVOTHROID) 125 MCG tablet 4/19/2020 at Unknown time Self Yes Yes   Sig: Take 125 mcg by mouth daily   lisinopril (PRINIVIL/ZESTRIL) 20 MG tablet 4/19/2020 at Unknown time Self Yes Yes   Sig: Take 20 mg by mouth daily   metFORMIN (GLUCOPHAGE) 500 MG tablet 4/19/2020 at Unknown time Self Yes Yes   Sig: Take 1,000 mg by mouth 2 times daily (with meals)   methotrexate 2.5 MG tablet " "4/13/2020 at Unknown time Self Yes Yes   Sig: Take 22.5 mg by mouth once a week on Monday (9 x 2.5 mg tablet)   multivitamin, therapeutic (THERA-VIT) TABS tablet 4/19/2020 at Unknown time Self Yes Yes   Sig: Take 1 tablet by mouth daily   naproxen sodium (ANAPROX) 220 MG tablet Unknown at Unknown time Self Yes No   Sig: Take 440 mg by mouth See Admin Instructions Per patient she takes APAP or ibuprofen or naproxen twice a day. She \"changes it up\" every day but takes otc medication for pain twice a day.   predniSONE (DELTASONE) 5 MG tablet 4/19/2020 at Unknown time Self Yes Yes   Sig: Take 5 mg by mouth daily   sulfaSALAzine (AZULFIDINE) 500 MG tablet 4/19/2020 at Unknown time Self Yes Yes   Sig: Take 1,000 mg by mouth 2 times daily   tofacitinib (XELJANZ) 5 MG tablet 4/19/2020 at Unknown time Self Yes Yes   Sig: Take 5 mg by mouth 2 times daily      Facility-Administered Medications: None       ALLERGIES: No Known Allergies    SOCIAL HISTORY:  Social History     Tobacco Use     Smoking status: Never Smoker     Smokeless tobacco: Never Used   Substance Use Topics     Alcohol use: Yes     Drug use: Never       FAMILY HISTORY:  Family History   Problem Relation Age of Onset     Hypertension Mother      Heart Murmur Mother      Coronary Artery Disease Father      Valvular heart disease Paternal Grandfather        PHYSICAL EXAM:   General awake alert uncomfortable  Vital Signs with Ranges  Temp: 98.2  F (36.8  C) Temp src: Oral BP: 90/63 Pulse: 114 Heart Rate: 125 Resp: 28 SpO2: 94 % O2 Device: Mechanical Ventilator Oxygen Delivery: 4 LPM  I/O last 3 completed shifts:  In: 2400 [I.V.:400; IV Piggyback:2000]  Out: -     Constitutional: healthy, alert and no distress   Cardiovascular: negative, PMI normal. No lifts, heaves, or thrills. RRR. No murmurs, clicks gallops or rub  Respiratory: negative, Percussion normal. Good diaphragmatic excursion. Lungs clear  Neck: Neck supple. No adenopathy. Thyroid symmetric, normal " size,, Carotids without bruits.  Abdomen: Abdomen soft, non-tender. BS normal. No masses, organomegaly  SKIN: no suspicious lesions or rashes          ADDITIONAL COMMENTS:   I reviewed the patient's new clinical lab test results.   Recent Labs   Lab Test 04/20/20  0603 04/19/20  2138 09/10/19  0700 09/09/19  0609   WBC 6.8 10.1  --  6.5   HGB 12.0 12.1  --  11.1*   MCV 97 96  --  99    223 177 171   INR 1.09  --   --   --      Recent Labs   Lab Test 04/20/20 0603 04/19/20 2138 09/09/19  0609   POTASSIUM 4.2 4.4 4.0   CHLORIDE 100 99 104   CO2 26 24 29   BUN 20 25 9   ANIONGAP 7 10 4     Recent Labs   Lab Test 04/20/20 0603 04/19/20  2347 04/19/20 2138   ALBUMIN 3.6  --  3.9   BILITOTAL 3.7*  --  1.9*   *  --  321*   *  --  483*   PROTEIN  --  Negative  --    LIPASE  --   --  167       I reviewed the patient's new imaging results.        CONSULTATION ASSESSMENT AND PLAN:    Active Problems:    Abdominal pain, acute    Assessment: Very pleasant 54-year-old significantly overweight female with a past medical history significant for diabetes hyperlipidemia hypertension significant obesity who presented with acute onset of severe abdominal pain in the epigastric area and upper back area.  Since then patient has been clinically getting symptomatic with hypotension shortness of breath significant hemodynamic instability.  Patient findings are quite consistent with sepsis findings are quite suggestive of ascending cholangitis with septic shock.  Patient has been ruled out for COVID-19.  I will recommend her to be evaluated with emergent ER and ERCP and decompression of biliary system.  Patient will need further evaluation and surgery for possible cholecystectomy.  Differential diagnosis plan risks benefits were discussed in detail with patient.  Thank you very much for letting me participate in her care.                  Valentin Hernandez MD, FACP  [facility].  Office: [phone]  Cell : [phone]    Chart reviewed.  Biliary colic, possible cholangitis.  Plan for ERCP today.  NPO    Valentin Hernandez GI

## 2020-04-20 NOTE — PROCEDURES
[facility]    Central line    Date/Time: 4/20/2020 1:59 PM  Performed by: Ganesh Burnett MD  Authorized by: Ganesh Burnett MD   Indications: septic shock.    UNIVERSAL PROTOCOL   Site Marked: NA  Prior Images Obtained and Reviewed:  NA  Required items: Required blood products, implants, devices and special equipment available    Patient identity confirmed:  Arm band  NA - No sedation, light sedation, or local anesthesia  Confirmation Checklist:  Patient's identity using two indicators, relevant allergies, procedure was appropriate and matched the consent or emergent situation and correct equipment/implants were available  Time out: Immediately prior to the procedure a time out was called    Universal Protocol: the Joint Commission Universal Protocol was followed    Preparation: Patient was prepped and draped in usual sterile fashion           ANESTHESIA    Local Anesthetic: Lidocaine 1% without epinephrine      SEDATION    Patient Sedated: No      Preparation: skin prepped with 2% chlorhexidine  Skin prep agent dried: skin prep agent completely dried prior to procedure  Sterile barriers: all five maximum sterile barriers used - cap, mask, sterile gown, sterile gloves, and large sterile sheet  Hand hygiene: hand hygiene performed prior to central venous catheter insertion  Location details: right femoral  Patient position: flat  Catheter type: triple lumen  Pre-procedure: landmarks identified  Ultrasound guidance: yes  Sterile ultrasound techniques: sterile gel and sterile probe covers were used  Number of attempts: 1  Successful placement: yes  Post-procedure: line sutured and dressing applied  Assessment: blood return through all ports and free fluid flow    PROCEDURE   Patient Tolerance:  Patient tolerated the procedure well with no immediate complications    Length of time physician/provider present for 1:1 monitoring during sedation: 0

## 2020-04-20 NOTE — ANESTHESIA PROCEDURE NOTES
ARTERIAL LINE PROCEDURE NOTE:  Staff -   Anesthesiologist:  Tristan Falcon MD      Performed By: anesthesiologist         Pre-Procedure  Performed by Tristan Falcon MD  Location: pre-op, OR      Pre-Anesthestic Checklist: patient identified, IV checked, site marked, risks and benefits discussed, informed consent, monitors and equipment checked, pre-op evaluation and at physician/surgeon's request    Timeout  Correct Patient: Yes   Correct Procedure: Yes   Correct Site: Yes   Correct Laterality: Yes   Correct Position: Yes   Site Marked: Yes, N/A   .   Procedure Documentation  Procedure: arterial line  ASA 5  Supine  Insertion Site:left.Skin infiltrated with 2 mL of 1% lidocaine. Injection technique: Seldinger Technique and ultrasound guided (No image obtained for permanent record)  .  .  Patient Prep/Sterile Barriers; all elements of maximal sterile barrier technique followed, mask, hat, sterile gown, sterile gloves, draped, hand hygiene, chlorhexidine gluconate and isopropyl alcohol    Assessment/Narrative    Catheter: 20 gauge, 1.75 in/4.5 cm quick cath (integral wire)      Tegaderm dressing used.    Arterial waveform: Yes IBP within 10% of NIBP: Yes

## 2020-04-20 NOTE — CODE/RAPID RESPONSE
[facility]    RRT Note  4/20/2020   Time Called: 7: 01 AM    RRT called for: HR 140s, RR 22    Assessment & Plan   Tachycardia, appears to be sinus   Acute hypoxic respiratory failure, worsening, possible CAP  RRT called for persistent HR 140s. She has received - ml bolus. EKG done prior to my arrival. She has been endorsing shortness of breath related to abdominal pain, however now is pain free with persistent shortness of breath. On exam her mouth is extremely dry. Bilateral lower extremities with purple lacy pattern to skin, which she tells me is chronic and not dangerous. Bilateral lower extremities warm, appear well perfused, and do not appear acutely threatened. Chest CT in ED with left lung base infiltrate versus atelectasis with consolidation. Lung fields are very diminished with fine crackles in the bases. She has had two negative troponins. Lactate 1.9 this morning, procalcitonin 0.7.     COVID is being ruled out, low risk.     INTERVENTIONS:  - NS 1000- ml bolus as she looks very dry  - being very cautious with rate control as this appears to be sinus tachycardia, will attempt to find underlying cause and treat first   - add on magnesium   - will need repeat EKG when HR lower   - will repeat CXR with worsening hypoxia   - transfer to IMC, due to level of isolation with rule out COVID will need IMC in ICU    - call from Surgery- they are concerned for ascending cholangitis. GI Consult for consideration of ERCP    Discussed with and defer further cares to Dr. Estrella, Hospitalist, and Dr. Burnett, ICU attending.     Code Status: Full Code    Elsa Beltran, BRANDIE, CNP  Hospitalist Service, House Officer  [facility]     Text Page  Pager: [phone]    Allergies   No Known Allergies    Physical Exam   Vital Signs with Ranges:  Temp:  [96.1  F (35.6  C)-98.5  F (36.9  C)] 98.3  F (36.8  C)  Pulse:  [] 117  Heart Rate:  [] 151  Resp:  [22-25] 22  BP:  (106-148)/(59-83) 122/73  SpO2:  [86 %-98 %] 93 %  No intake/output data recorded.    Constitutional: 54- year old female laying in bed.   Pulmonary: Lung fields diminished, fine crackles in bilateral bases. She is hypoxic. Tachypnea. Increased work of breathing.   Cardiovascular: S1, S2 with no obvious murmur, rub, or gallop. Heart tones distant likely due to habitus.   GI: Soft, non-tender, non-distended.  Skin/Integumen: Purple lacy rash on legs which is described to me by patient as chronic and not dangerous.   Neuro: Awake, alert, oriented x 4. Non-focal.   Psych:  Calm, cooperative.   Extremities: Moves all extremities.     Troponin:    Recent Labs   Lab Test 04/20/20  0603   TROPI <0.015       IMAGING: (X-ray/CT/MRI)   Recent Results (from the past 24 hour(s))   Chest XR,  PA & LAT    Narrative    EXAM: XR CHEST 2 VW  LOCATION: [facility]  DATE/TIME: 4/19/2020 10:16 PM    INDICATION: Shortness of breath  COMPARISON: None.      Impression    IMPRESSION: Low lung volumes. Bilateral perihilar airspace opacity, correlate for pulmonary edema. Bibasilar atelectasis. Mildly enlarged cardiac silhouette. Atherosclerotic aorta. No acute osseous abnormality.   US Abdomen Limited    Narrative    EXAM: US ABDOMEN LIMITED  LOCATION: [facility]  DATE/TIME: 4/19/2020 10:49 PM    INDICATION: Retinal quadrant pain  COMPARISON: None.  TECHNIQUE: Limited abdominal ultrasound.    FINDINGS:    GALLBLADDER: Gallstones with gallbladder wall thickening. No pericholecystic fluid. Sonographic Donald sign reported as negative.    BILE DUCTS: Dilated common bile duct. The common duct measures 9 mm.    LIVER: Enlarged liver measuring up to 17.8 cm. No focal mass.    RIGHT KIDNEY: No hydronephrosis.    PANCREAS: The visualized portions are normal.    No ascites.      Impression    IMPRESSION:  1.  Gallstones with mild gallbladder wall thickening. No pericholecystic fluid and negative sonographic Donald sign.  Findings are equivocal, correlate to exclude cholecystitis.  2.  Mildly dilated common bile duct, may reflect prior stone passage.  3.  Hepatomegaly.   CT Chest (PE) Abdomen Pelvis w Contrast    Narrative    EXAM: CT CHEST PE ABDOMEN PELVIS W CONTRAST  LOCATION: [facility]  DATE/TIME: 4/19/2020 11:57 PM    INDICATION: Epigastric pain. Hypoxia with ambulation.  COMPARISON: 09/08/2019.  TECHNIQUE: CT angiogram chest and routine CT abdomen pelvis with IV contrast. Arterial phase through the chest and venous phase through the abdomen and pelvis. 2D and 3D MIP reconstructions were preformed by the CT technologist. Dose reduction techniques   were used.   CONTRAST: 135 mL Isovue-370.    FINDINGS:  ANGIOGRAM CHEST: No visualized pulmonary embolism. Slightly limited by body habitus, suboptimal pulmonary artery opacification and motion. No large central emboli. Normal caliber thoracic aorta.    LUNGS AND PLEURA: Mild infiltrate or atelectasis with small areas of consolidation at the left base and lingula. Minimal atelectasis right base. No pleural fluid or pneumothorax.    MEDIASTINUM/AXILLAE: Normal.    HEPATOBILIARY: Distended gallbladder. Mild intra and extrahepatic bile duct dilatation.    PANCREAS: Normal.    SPLEEN: Normal.    ADRENAL GLANDS: Normal.    KIDNEYS/BLADDER: Normal.    BOWEL: No bowel obstruction. No ascites or free air. Small fat-containing umbilical hernia.    LYMPH NODES: Normal.    PELVIC ORGANS: Uterus is present. Several cystic areas in the left adnexa measuring up to 4.6 cm.    MUSCULOSKELETAL: Degenerative changes visualized spine.      Impression    IMPRESSION:  1.  No visualized pulmonary embolism. Exam slightly limited.  2.  Infiltrate or atelectasis with some consolidation lingula and left base.  3.  Distended gallbladder with mild bile duct dilatation. Correlate clinically. Consider right upper quadrant ultrasound.  4.  Left adnexa cystic areas measuring up to 4.6 cm. Consider  outpatient short-term follow-up pelvic ultrasound for further evaluation.       CBC with Diff:  Recent Labs   Lab Test 04/20/20  0603   WBC 6.8   HGB 12.0   MCV 97      INR 1.09        Comprehensive Metabolic Panel:  Recent Labs   Lab 04/20/20  0603      POTASSIUM 4.2   CHLORIDE 100   CO2 26   ANIONGAP 7   *   BUN 20   CR 0.92   GFRESTIMATED 72   GFRESTBLACK 83   CHANTELLE 8.9   PROTTOTAL 6.8   ALBUMIN 3.6   BILITOTAL 3.7*   ALKPHOS 139   *   *       INR:    Recent Labs   Lab Test 04/20/20  0603   INR 1.09     UA:  Recent Labs   Lab 04/19/20  2347   COLOR Yellow   APPEARANCE Clear   URINEGLC >1000*   URINEBILI Negative   URINEKETONE 80*   SG 1.025   UBLD Negative   URINEPH 5.0   PROTEIN Negative   NITRITE Negative   LEUKEST Negative   RBCU <1   WBCU <1       Time Spent on this Encounter   I spent 70 minutes lf critical care time on the unit/floor managing the care of Pinky Whatley. Upon evaluation, this patient had a high probability of imminent or life-threatening deterioration due to worsening acute hypoxic respiratory failure, which required my direct attention, intervention, and personal management. 100% of my time was spent at the bedside counseling the patient and/or coordinating care regarding services listed in this note.

## 2020-04-20 NOTE — PROGRESS NOTES
Update     Chart reviewed, case discussed with House NP earlier this morning.   Patient now in ICU and requiring pressors.     Discussed with Intensivist who will be taking over as primary service - greatly appreciated.      Hospitalist service will sign off for now.    Dr PARSON

## 2020-04-21 ENCOUNTER — APPOINTMENT (OUTPATIENT)
Dept: CARDIOLOGY | Facility: CLINIC | Age: 55
DRG: 417 | End: 2020-04-21
Attending: INTERNAL MEDICINE
Payer: COMMERCIAL

## 2020-04-21 PROBLEM — N17.9 ACUTE KIDNEY FAILURE, UNSPECIFIED (H): Status: ACTIVE | Noted: 2020-04-21

## 2020-04-21 LAB
ALBUMIN SERPL-MCNC: 3.2 G/DL (ref 3.4–5)
ALP SERPL-CCNC: 129 U/L (ref 40–150)
ALT SERPL W P-5'-P-CCNC: 403 U/L (ref 0–50)
ANION GAP SERPL CALCULATED.3IONS-SCNC: 11 MMOL/L (ref 3–14)
AST SERPL W P-5'-P-CCNC: 306 U/L (ref 0–45)
BASE DEFICIT BLDA-SCNC: 3.8 MMOL/L
BASE DEFICIT BLDA-SCNC: 4.3 MMOL/L
BASE DEFICIT BLDA-SCNC: 5.3 MMOL/L
BILIRUB SERPL-MCNC: 4.3 MG/DL (ref 0.2–1.3)
BUN SERPL-MCNC: 32 MG/DL (ref 7–30)
CA-I SERPL ISE-MCNC: 4.6 MG/DL (ref 4.4–5.2)
CA-I SERPL ISE-MCNC: 4.6 MG/DL (ref 4.4–5.2)
CA-I SERPL ISE-MCNC: 4.8 MG/DL (ref 4.4–5.2)
CALCIUM SERPL-MCNC: 8.9 MG/DL (ref 8.5–10.1)
CHLORIDE SERPL-SCNC: 107 MMOL/L (ref 94–109)
CO2 SERPL-SCNC: 19 MMOL/L (ref 20–32)
CREAT SERPL-MCNC: 2.02 MG/DL (ref 0.52–1.04)
ERYTHROCYTE [DISTWIDTH] IN BLOOD BY AUTOMATED COUNT: 14.6 % (ref 10–15)
GFR SERPL CREATININE-BSD FRML MDRD: 28 ML/MIN/{1.73_M2}
GLUCOSE BLDC GLUCOMTR-MCNC: 147 MG/DL (ref 70–99)
GLUCOSE BLDC GLUCOMTR-MCNC: 157 MG/DL (ref 70–99)
GLUCOSE BLDC GLUCOMTR-MCNC: 161 MG/DL (ref 70–99)
GLUCOSE BLDC GLUCOMTR-MCNC: 164 MG/DL (ref 70–99)
GLUCOSE BLDC GLUCOMTR-MCNC: 197 MG/DL (ref 70–99)
GLUCOSE BLDC GLUCOMTR-MCNC: 203 MG/DL (ref 70–99)
GLUCOSE SERPL-MCNC: 206 MG/DL (ref 70–99)
HCO3 BLD-SCNC: 19 MMOL/L (ref 21–28)
HCO3 BLD-SCNC: 19 MMOL/L (ref 21–28)
HCO3 BLD-SCNC: 21 MMOL/L (ref 21–28)
HCT VFR BLD AUTO: 34.5 % (ref 35–47)
HGB BLD-MCNC: 11 G/DL (ref 11.7–15.7)
LACTATE BLD-SCNC: 1.6 MMOL/L (ref 0.7–2)
LACTATE BLD-SCNC: 2.7 MMOL/L (ref 0.7–2)
LACTATE BLD-SCNC: 3.6 MMOL/L (ref 0.7–2)
MAGNESIUM SERPL-MCNC: 1.9 MG/DL (ref 1.6–2.3)
MCH RBC QN AUTO: 30.6 PG (ref 26.5–33)
MCHC RBC AUTO-ENTMCNC: 31.9 G/DL (ref 31.5–36.5)
MCV RBC AUTO: 96 FL (ref 78–100)
OXYHGB MFR BLD: 95 % (ref 92–100)
OXYHGB MFR BLD: 96 % (ref 92–100)
OXYHGB MFR BLD: 97 % (ref 92–100)
PCO2 BLD: 29 MM HG (ref 35–45)
PCO2 BLD: 31 MM HG (ref 35–45)
PCO2 BLD: 44 MM HG (ref 35–45)
PH BLD: 7.29 PH (ref 7.35–7.45)
PH BLD: 7.41 PH (ref 7.35–7.45)
PH BLD: 7.43 PH (ref 7.35–7.45)
PHOSPHATE SERPL-MCNC: 3.4 MG/DL (ref 2.5–4.5)
PLATELET # BLD AUTO: 170 10E9/L (ref 150–450)
PO2 BLD: 108 MM HG (ref 80–105)
PO2 BLD: 126 MM HG (ref 80–105)
PO2 BLD: 96 MM HG (ref 80–105)
POTASSIUM SERPL-SCNC: 4.2 MMOL/L (ref 3.4–5.3)
PROT SERPL-MCNC: 6.1 G/DL (ref 6.8–8.8)
RBC # BLD AUTO: 3.59 10E12/L (ref 3.8–5.2)
SODIUM SERPL-SCNC: 137 MMOL/L (ref 133–144)
WBC # BLD AUTO: 23.2 10E9/L (ref 4–11)

## 2020-04-21 PROCEDURE — 40000275 ZZH STATISTIC RCP TIME EA 10 MIN

## 2020-04-21 PROCEDURE — 84100 ASSAY OF PHOSPHORUS: CPT | Performed by: HOSPITALIST

## 2020-04-21 PROCEDURE — 83735 ASSAY OF MAGNESIUM: CPT | Performed by: HOSPITALIST

## 2020-04-21 PROCEDURE — 25800030 ZZH RX IP 258 OP 636: Performed by: INTERNAL MEDICINE

## 2020-04-21 PROCEDURE — 82805 BLOOD GASES W/O2 SATURATION: CPT | Performed by: INTERNAL MEDICINE

## 2020-04-21 PROCEDURE — 20000003 ZZH R&B ICU

## 2020-04-21 PROCEDURE — 40000008 ZZH STATISTIC AIRWAY CARE

## 2020-04-21 PROCEDURE — 99232 SBSQ HOSP IP/OBS MODERATE 35: CPT | Performed by: SURGERY

## 2020-04-21 PROCEDURE — 25000125 ZZHC RX 250: Performed by: INTERNAL MEDICINE

## 2020-04-21 PROCEDURE — 93308 TTE F-UP OR LMTD: CPT | Mod: 26 | Performed by: INTERNAL MEDICINE

## 2020-04-21 PROCEDURE — 94003 VENT MGMT INPAT SUBQ DAY: CPT

## 2020-04-21 PROCEDURE — 25000128 H RX IP 250 OP 636: Performed by: NURSE PRACTITIONER

## 2020-04-21 PROCEDURE — 25000131 ZZH RX MED GY IP 250 OP 636 PS 637: Performed by: NURSE PRACTITIONER

## 2020-04-21 PROCEDURE — 82330 ASSAY OF CALCIUM: CPT | Performed by: HOSPITALIST

## 2020-04-21 PROCEDURE — 25000128 H RX IP 250 OP 636: Performed by: INTERNAL MEDICINE

## 2020-04-21 PROCEDURE — 85027 COMPLETE CBC AUTOMATED: CPT | Performed by: HOSPITALIST

## 2020-04-21 PROCEDURE — 25800030 ZZH RX IP 258 OP 636: Performed by: NURSE PRACTITIONER

## 2020-04-21 PROCEDURE — 83605 ASSAY OF LACTIC ACID: CPT | Performed by: INTERNAL MEDICINE

## 2020-04-21 PROCEDURE — 94660 CPAP INITIATION&MGMT: CPT

## 2020-04-21 PROCEDURE — 27210339 ZZH CIRCUIT HUMIDITY W/CPAP BIP

## 2020-04-21 PROCEDURE — 99291 CRITICAL CARE FIRST HOUR: CPT | Performed by: INTERNAL MEDICINE

## 2020-04-21 PROCEDURE — 93325 DOPPLER ECHO COLOR FLOW MAPG: CPT

## 2020-04-21 PROCEDURE — 80053 COMPREHEN METABOLIC PANEL: CPT | Performed by: HOSPITALIST

## 2020-04-21 PROCEDURE — 93325 DOPPLER ECHO COLOR FLOW MAPG: CPT | Mod: 26 | Performed by: INTERNAL MEDICINE

## 2020-04-21 PROCEDURE — 93321 DOPPLER ECHO F-UP/LMTD STD: CPT | Mod: 26 | Performed by: INTERNAL MEDICINE

## 2020-04-21 PROCEDURE — 25500064 ZZH RX 255 OP 636: Performed by: HOSPITALIST

## 2020-04-21 PROCEDURE — 25000128 H RX IP 250 OP 636: Performed by: HOSPITALIST

## 2020-04-21 PROCEDURE — 82805 BLOOD GASES W/O2 SATURATION: CPT | Performed by: NURSE PRACTITIONER

## 2020-04-21 PROCEDURE — 00000146 ZZHCL STATISTIC GLUCOSE BY METER IP

## 2020-04-21 PROCEDURE — 82330 ASSAY OF CALCIUM: CPT | Performed by: INTERNAL MEDICINE

## 2020-04-21 PROCEDURE — 27210338 ZZH CIRCUIT HUMID FACE/TRACH MSK

## 2020-04-21 RX ORDER — HYDROMORPHONE HYDROCHLORIDE 1 MG/ML
0.2 INJECTION, SOLUTION INTRAMUSCULAR; INTRAVENOUS; SUBCUTANEOUS
Status: DISCONTINUED | OUTPATIENT
Start: 2020-04-21 | End: 2020-04-29 | Stop reason: HOSPADM

## 2020-04-21 RX ORDER — LEVOTHYROXINE SODIUM 125 UG/1
125 TABLET ORAL DAILY
Status: DISCONTINUED | OUTPATIENT
Start: 2020-04-21 | End: 2020-04-29 | Stop reason: HOSPADM

## 2020-04-21 RX ADMIN — VASOPRESSIN 2.4 UNITS/HR: 20 INJECTION INTRAVENOUS at 02:46

## 2020-04-21 RX ADMIN — PIPERACILLIN SODIUM AND TAZOBACTAM SODIUM 3.38 G: 3; .375 INJECTION, POWDER, LYOPHILIZED, FOR SOLUTION INTRAVENOUS at 05:51

## 2020-04-21 RX ADMIN — SODIUM CHLORIDE: 9 INJECTION, SOLUTION INTRAVENOUS at 22:12

## 2020-04-21 RX ADMIN — HYDROMORPHONE HYDROCHLORIDE 0.2 MG: 1 INJECTION, SOLUTION INTRAMUSCULAR; INTRAVENOUS; SUBCUTANEOUS at 22:14

## 2020-04-21 RX ADMIN — CHLORHEXIDINE GLUCONATE 15 ML: 1.2 RINSE ORAL at 07:44

## 2020-04-21 RX ADMIN — INSULIN ASPART 1 UNITS: 100 INJECTION, SOLUTION INTRAVENOUS; SUBCUTANEOUS at 22:08

## 2020-04-21 RX ADMIN — PROPOFOL 40 MCG/KG/MIN: 10 INJECTION, EMULSION INTRAVENOUS at 03:49

## 2020-04-21 RX ADMIN — PROPOFOL 40 MCG/KG/MIN: 10 INJECTION, EMULSION INTRAVENOUS at 01:02

## 2020-04-21 RX ADMIN — EPINEPHRINE 0.04 MCG/KG/MIN: 1 INJECTION INTRAMUSCULAR; INTRAVENOUS; SUBCUTANEOUS at 05:07

## 2020-04-21 RX ADMIN — INSULIN ASPART 2 UNITS: 100 INJECTION, SOLUTION INTRAVENOUS; SUBCUTANEOUS at 04:21

## 2020-04-21 RX ADMIN — PROPOFOL 40 MCG/KG/MIN: 10 INJECTION, EMULSION INTRAVENOUS at 09:00

## 2020-04-21 RX ADMIN — PROPOFOL 40 MCG/KG/MIN: 10 INJECTION, EMULSION INTRAVENOUS at 05:51

## 2020-04-21 RX ADMIN — VASOPRESSIN 2.4 UNITS/HR: 20 INJECTION INTRAVENOUS at 22:11

## 2020-04-21 RX ADMIN — INSULIN ASPART 1 UNITS: 100 INJECTION, SOLUTION INTRAVENOUS; SUBCUTANEOUS at 11:14

## 2020-04-21 RX ADMIN — NOREPINEPHRINE BITARTRATE 0.1 MCG/KG/MIN: 1 INJECTION, SOLUTION, CONCENTRATE INTRAVENOUS at 07:42

## 2020-04-21 RX ADMIN — NOREPINEPHRINE BITARTRATE 0.1 MCG/KG/MIN: 1 INJECTION, SOLUTION, CONCENTRATE INTRAVENOUS at 06:56

## 2020-04-21 RX ADMIN — PROPOFOL 40 MCG/KG/MIN: 10 INJECTION, EMULSION INTRAVENOUS at 12:06

## 2020-04-21 RX ADMIN — HYDROCORTISONE SODIUM SUCCINATE 50 MG: 100 INJECTION, POWDER, FOR SOLUTION INTRAMUSCULAR; INTRAVENOUS at 18:01

## 2020-04-21 RX ADMIN — PIPERACILLIN SODIUM AND TAZOBACTAM SODIUM 3.38 G: 3; .375 INJECTION, POWDER, LYOPHILIZED, FOR SOLUTION INTRAVENOUS at 11:17

## 2020-04-21 RX ADMIN — PIPERACILLIN SODIUM AND TAZOBACTAM SODIUM 3.38 G: 3; .375 INJECTION, POWDER, LYOPHILIZED, FOR SOLUTION INTRAVENOUS at 17:15

## 2020-04-21 RX ADMIN — SODIUM CHLORIDE: 9 INJECTION, SOLUTION INTRAVENOUS at 09:32

## 2020-04-21 RX ADMIN — INSULIN ASPART 2 UNITS: 100 INJECTION, SOLUTION INTRAVENOUS; SUBCUTANEOUS at 06:47

## 2020-04-21 RX ADMIN — INSULIN ASPART 1 UNITS: 100 INJECTION, SOLUTION INTRAVENOUS; SUBCUTANEOUS at 15:01

## 2020-04-21 RX ADMIN — INSULIN GLARGINE 15 UNITS: 100 INJECTION, SOLUTION SUBCUTANEOUS at 22:09

## 2020-04-21 RX ADMIN — HUMAN ALBUMIN MICROSPHERES AND PERFLUTREN 5 ML: 10; .22 INJECTION, SOLUTION INTRAVENOUS at 08:22

## 2020-04-21 RX ADMIN — HYDROMORPHONE HYDROCHLORIDE 0.5 MG: 1 INJECTION, SOLUTION INTRAMUSCULAR; INTRAVENOUS; SUBCUTANEOUS at 11:26

## 2020-04-21 RX ADMIN — INSULIN ASPART 1 UNITS: 100 INJECTION, SOLUTION INTRAVENOUS; SUBCUTANEOUS at 18:56

## 2020-04-21 RX ADMIN — HYDROCORTISONE SODIUM SUCCINATE 50 MG: 100 INJECTION, POWDER, FOR SOLUTION INTRAMUSCULAR; INTRAVENOUS at 12:19

## 2020-04-21 RX ADMIN — PIPERACILLIN SODIUM AND TAZOBACTAM SODIUM 3.38 G: 3; .375 INJECTION, POWDER, LYOPHILIZED, FOR SOLUTION INTRAVENOUS at 22:48

## 2020-04-21 ASSESSMENT — ACTIVITIES OF DAILY LIVING (ADL)
ADLS_ACUITY_SCORE: 17

## 2020-04-21 ASSESSMENT — MIFFLIN-ST. JEOR: SCORE: 1917.38

## 2020-04-21 NOTE — PROGRESS NOTES
Pt extubated to bipap 12/5 40% per MD order.  Alarm limit set at 10.  Gel pad in place.  Will continue to follow.  Scott Rios, RT  4/21/2020

## 2020-04-21 NOTE — PROGRESS NOTES
Evening ICU    Repeat ABG with slightly improved pH but still acidotic so I increased RR to 26.  Pressor needs and HR are slightly lower.

## 2020-04-21 NOTE — PLAN OF CARE
Patient vent wean, tolerated well. Extubated at 1435 to BiPAP. Alert and oriented x 4, moves all extremities. Vital signs stable, afebrile. Epi drip weaned to off, weaning levophed drip as tolerated, remains on vasopressin drip.patients mother called with updates.

## 2020-04-21 NOTE — PROGRESS NOTES
[facility]    General Surgery  Daily Note       Assessment and Plan:   Pinky Jacobo is a 54 year old female with Cholecystitis, choledocholithiasis, and ascending cholangitis.  Status post ERCP with clearance of common bile duct and placement of stent on 4/20/2020.  Patient remains vented and on vasopressors in the ICU.    -Medical management per primary team  -Continue IV antibiotics  -Will plan for laparoscopic cholecystectomy when patient has stabilized from a medical standpoint, anticipate later this week  -Okay to extubate from surgical standpoint  -Surgery to follow         Interval History:   Patient remains in the ICU.  Pressor requirements weaning.  Still vented.  Afebrile with improving tachycardia.           Physical Exam:   Temp: 99.3  F (37.4  C) Temp src: Axillary BP: 90/63 Pulse: 114 Heart Rate: 88 Resp: 26 SpO2: 98 % O2 Device: Mechanical Ventilator Oxygen Delivery: 4 LPM    I/O last 3 completed shifts:  In: 4936.28 [I.V.:2686.28; IV Piggyback:2000]  Out: 950 [Urine:950]      Constitutional: Sedated  Respiratory: Vented  Abdomen: Soft, nondistended, no significant guarding with palpation      Data   Recent Labs   Lab 04/21/20  0544 04/20/20  0603 04/19/20  2138   WBC 23.2* 6.8 10.1   HGB 11.0* 12.0 12.1   MCV 96 97 96    185 223   INR  --  1.09  --     133 133   POTASSIUM 4.2 4.2 4.4   CHLORIDE 107 100 99   CO2 19* 26 24   BUN 32* 20 25   CR 2.02* 0.92 0.80   ANIONGAP 11 7 10   CHANTELLE 8.9 8.9 9.8   * 254* 251*   ALBUMIN 3.2* 3.6 3.9   PROTTOTAL 6.1* 6.8 6.9   BILITOTAL 4.3* 3.7* 1.9*   ALKPHOS 129 139 112   * 387* 321*   * 369* 483*   TROPI  --  <0.015 <0.015   Results for PINKY JACOBO (MRN [id number]) as of 4/21/2020 10:07   Ref. Range 4/21/2020 00:44 4/21/2020 04:20 4/21/2020 05:44   Lactic Acid Latest Ref Range: 0.7 - 2.0 mmol/L 3.6 (H)  2.7 (H)       Agustina Nettles MD

## 2020-04-21 NOTE — PROVIDER NOTIFICATION
Crit lab Results for RADHA JACOBO (MRN [id number]) as of 4/20/2020 19:28   Ref. Range 4/20/2020 17:30   pH Arterial Latest Ref Range: 7.35 - 7.45 pH 7.16 (LL)       Tasha VILLALPANDO notified

## 2020-04-21 NOTE — PLAN OF CARE
Neuro-  Pt will wake and follow on sedation.  FELIZ.  Will help with turns.  Able to nod yes and no.  Denies pain.    CV- ST .  Remains on 3 pressors for MAP >65.  Afebrile.  Pt did get a gram of calcium and 500ml of albumin in IR.      Resp-  PT tolerating vent .  PH improved after vent changes.  Increase in RR.  L/S are clear and diminished.    GI/- NPO  for possible lap edouard.  Urine output is >50cc/hr.

## 2020-04-21 NOTE — PROGRESS NOTES
[facility] ICU RESPIRATORY NOTE    Date of Admission: 4/19/2020  Date of Intubation (most recent): 4/20/2020  Reason for Mechanical Ventilation: Septic shock  Number of Days on Mechanical Ventilation: 2  Met Criteria for Spontaneous Breathing Trial: No  Reason for No Spontaneous Breathing Trial: per MD    Significant Events Today: None overnight    ABG Results:   Recent Labs   Lab 04/20/20 2030 04/20/20  1730 04/20/20  1318   PH 7.24* 7.16* 7.31*   PCO2 36 55* 49*   PO2 106* 100 82   HCO3 16* 19* 24     Current Vent Settings: Ventilation Mode: CMV/AC  (Continuous Mandatory Ventilation/ Assist Control)  FiO2 (%): 40 %  Rate Set (breaths/minute): 26 breaths/min  Tidal Volume Set (mL): 480 mL  PEEP (cm H2O): 5 cmH2O  Oxygen Concentration (%): 30 %  Resp: 25    Plan: Continue patient on full vent support and assess for weaning readiness in AM.     Opal Haskins, RT

## 2020-04-21 NOTE — PROGRESS NOTES
Critical Care Progress Note      04/21/2020    Name: Pinky Whatley MRN#: [id number]   Age: 54 year old YOB: 1965     Hsptl Day# 1  ICU DAY #1    MV DAY #1             Problem List:   Active Problems:    Abdominal pain, acute    Acute kidney failure, unspecified (H)  septic shock            Summary/Hospital Course:     54F DM, RA presented for upper abdominal pain on 4/19.  CT abd/pelv with mild biliary dilation.  The morning of 4/20 she developed tachycardia and increased respiratory rate and was transferred to ICU.  Here she had initially stabilized but subsequently developed hypotension requiring initiation of levophed.  Taken for ERCP on 4/20 with successful removal of obstruction and stent placement.  Kept intubated last night due to tachypnea and mildly labored breathing pre-op, as well as body habitus.  Follows commands this morning when sedation weaned.      Assessment and plan :     Pinky Whatley IS a 54 year old female admitted on 4/19/2020 for abdominal pain, now septic shock, suspected ascending cholangitis.   I have personally reviewed the daily labs, imaging studies, cultures and discussed the case with referring physician and consulting physicians.     My assessment and plan by system for this patient is as follows:    Neurology/Psychiatry:   1.  Analgesia:  Prn dilaudid and norco  2.  Sedation:  Propofol drip    Cardiovascular:   1.  Shock, septic:  In setting of ascending cholangitis.  S/p ercp for source control  Continue levo and vaso, now weaned off of epi.  Ical and pH ok.  Completed 30 ml/kg bolus.  On zosyn. Lactate downtrending to 2.7.  Started stress dose steroids as pt is on low dose prednisone at home.  2.  H/o hld/htn:  Holding home meds for now.    Pulmonary/Ventilator Management:   1. Acute hypoxemic respiratory failure, vent-dependent:  In setting of sepsis as noted above and likely body habitus related. Weaning vent today as able.  Possible extubation to bipap if  passes sbt today.    GI and Nutrition :   1. NPO for now  2. Ascending cholagitis:  Appreciate GI support/intervention.  Zosyn.  3. Elevated transaminases:   Slowly rising today to 403 and 306.  Likely due to asc cholangitis and shock. Trend.    Renal/Fluids/Electrolytes:   1. ROBBIE:  Creatinine elevated to 2.02.  In setting of sepsis.  No acute indications for dialysis.  Trend.  2.  Metabolic acidosis:  Resolving.    Infectious Disease:   1. Septic source:  Due to ascending cholangitis.  Continue zosyn.  Appreciate GI support.    Endocrine:   1. Monitor fsg.  Continue prn aspart for now.  2.  Hypothyroid:  Resuming home synthroid.    Hematology/Oncology:   1. Wbc 23.2 elevated in setting of sepsis.  2.  hgb 11 ok  3. pltlts 170 ok    MSK:  1.  H/o RA:  Holding home methotrexate/plaquenil and xeljanz for now.    IV/Access:   1. Venous access - central line   2. Arterial access - arterial line    ICU Prophylaxis:   1. DVT: mechanical  2. VAP: HOB 30 degrees, chlorhexidine rinse  3. Stress Ulcer: PPI  4. Restraints: Nonviolent soft two point restraints required and necessary for patient safety and continued cares and good effect as patient continues to pull at necessary lines, tubes despite education and distraction. Will readdress daily.   5. Wound care - per unit routine   6. Feeding - npo   7. Family Update: mother zaid by phone  8. Disposition - icu           Interim History:     Successful ercp yesterday.  Responding as expected to necessary pressor therapies.  Possible extubation to bipap today if passes sbt.  Unable to obtain histories directly from pt due to underlying intubated/sedated status.         Key Medications:       chlorhexidine  15 mL Mouth/Throat Q12H     insulin aspart  1-6 Units Subcutaneous Q4H     insulin glargine  15 Units Subcutaneous At Bedtime     piperacillin-tazobactam  3.375 g Intravenous Q6H     sodium chloride (PF)  3 mL Intracatheter Q8H       EPINEPHrine IV infusion ADULT Stopped  (04/21/20 1000)     - MEDICATION INSTRUCTIONS -       norepinephrine 0.12 mcg/kg/min (04/21/20 1030)     propofol (DIPRIVAN) infusion 40 mcg/kg/min (04/21/20 0900)     sodium chloride 75 mL/hr at 04/21/20 0932     vasopressin (PITRESSIN) infusion ADULT (40 mL) 2.4 Units/hr (04/21/20 0741)               Physical Examination:   Temp:  [98.2  F (36.8  C)-99.3  F (37.4  C)] 99.3  F (37.4  C)  Pulse:  [113-117] 114  Heart Rate:  [] 85  Resp:  [15-33] 24  BP: ()/(21-68) 90/63  MAP:  [71 mmHg-101 mmHg] 89 mmHg  Arterial Line BP: ()/(59-86) 116/74  FiO2 (%):  [30 %-50 %] 30 %  SpO2:  [94 %-99 %] 98 %      Intake/Output Summary (Last 24 hours) at 4/21/2020 1158  Last data filed at 4/21/2020 1100  Gross per 24 hour   Intake 4392.68 ml   Output 1245 ml   Net 3147.68 ml         Wt Readings from Last 4 Encounters:   04/21/20 137 kg (302 lb 0.5 oz)   10/14/19 135 kg (297 lb 9.6 oz)   09/07/19 130.2 kg (287 lb)     Arterial Line BP: ()/(59-86) 116/74  MAP:  [71 mmHg-101 mmHg] 89 mmHg  BP - Mean:  [24-76] 24  Ventilation Mode: CMV/AC  (Continuous Mandatory Ventilation/ Assist Control)  FiO2 (%): 30 %  Rate Set (breaths/minute): 16 breaths/min  Tidal Volume Set (mL): 450 mL  PEEP (cm H2O): 5 cmH2O  Oxygen Concentration (%): 30 %  Resp: 24    Recent Labs   Lab 04/21/20  1024 04/21/20  0544 04/20/20  2030 04/20/20  1730   PH 7.41 7.43 7.24* 7.16*   PCO2 31* 29* 36 55*   PO2 108* 126* 106* 100   HCO3 19* 19* 16* 19*       GEN: no acute distress   NECK:  supple  HEENT: head ncat, sclera anicteric, OP patent, trachea midline   PULM: tachypniec but unlabored, clear anteriorly    CV/COR: RRR S1S2 no gallop,  No rub, no murmur  ABD: soft nontender, hypoactive bowel sounds, no mass  EXT:  No gross Edema, warm x4  NEURO: sedated but follows when sedation weaned  SKIN: no obvious rash  LINES: clean, dry intact         Data:   All data and imaging reviewed     ROUTINE ICU LABS (Last four results)  CMP  Recent Labs   Lab  04/21/20  0544 04/20/20  0603 04/19/20  2138    133 133   POTASSIUM 4.2 4.2 4.4   CHLORIDE 107 100 99   CO2 19* 26 24   ANIONGAP 11 7 10   * 254* 251*   BUN 32* 20 25   CR 2.02* 0.92 0.80   GFRESTIMATED 28* 72 84   GFRESTBLACK 32* 83 >90   CHANTELLE 8.9 8.9 9.8   MAG 1.9 1.5*  --    PHOS 3.4  --   --    PROTTOTAL 6.1* 6.8 6.9   ALBUMIN 3.2* 3.6 3.9   BILITOTAL 4.3* 3.7* 1.9*   ALKPHOS 129 139 112   * 369* 483*   * 387* 321*     CBC  Recent Labs   Lab 04/21/20  0544 04/20/20  0603 04/19/20  2138   WBC 23.2* 6.8 10.1   RBC 3.59* 3.84 3.89   HGB 11.0* 12.0 12.1   HCT 34.5* 37.2 37.2   MCV 96 97 96   MCH 30.6 31.3 31.1   MCHC 31.9 32.3 32.5   RDW 14.6 14.3 14.1    185 223     INR  Recent Labs   Lab 04/20/20  0603   INR 1.09     Arterial Blood Gas  Recent Labs   Lab 04/21/20  1024 04/21/20  0544 04/20/20  2030 04/20/20  1730   PH 7.41 7.43 7.24* 7.16*   PCO2 31* 29* 36 55*   PO2 108* 126* 106* 100   HCO3 19* 19* 16* 19*       All cultures:  No results for input(s): CULT in the last 168 hours.  Recent Results (from the past 24 hour(s))   XR ERCP    Narrative    ERCP  4/20/2020 3:59 PM     HISTORY: Stent placement.    COMPARISON: None.      Impression    IMPRESSION: Single spot fluoroscopic image demonstrates a stent  overlying the right upper quadrant, presumably biliary, though unable  to confirm without contrast. Total fluoroscopic time 0.7 minutes.    DEVIN VANEGAS MD   XR Chest Port 1 View    Narrative    CHEST PORTABLE ONE VIEW   4/20/2020 6:45 PM     HISTORY: Check ET tube.    COMPARISON: Chest x-ray on same day earlier.      Impression    IMPRESSION: Single portable AP view of the chest was obtained.  Interval intubation with endotracheal tube tip projects over lower  thoracic trachea approximately 1.5 cm from the harshal. Stable  enlargement of the cardiac silhouette and mild pulmonary vascular  congestion. No significant pleural effusion or pneumothorax. Left  basilar pulmonary  opacities, atelectasis versus infection.    NICOLETTE NIEVES MD   Echocardiogram Limited    Narrative    [id number]  [id number]  [id number]  [id number]^BEN^NATALIE^A           [facility]  Echocardiography Laboratory  [address] [place] [id number]        Name: RADHA JACOBO  MRN: [id number]  : 1965  Study Date: 2020 07:53 AM  Age: 54 yrs  Gender: Female  Patient Location: [facility]  Reason For Study: Shock  Ordering Physician: NATALIE CONTRERAS  Referring Physician: MALINDA MELENDREZ  Performed By: Walter Sheldon RDCS     BSA: 2.2 m2  Height: 61 in  Weight: 300 lb  HR: 89  BP: 118/70 mmHg  _____________________________________________________________________________  __        Procedure  Limited Portable Echo Adult. Optison (NDC #5635-6067) given intravenously.  _____________________________________________________________________________  __        Interpretation Summary     Technically challenging study due to patient habitus and patient positioning  on ventilator, even with the use of contrast imaging.     Left ventricular systolic function is mildly reduced.  The visual ejection fraction is estimated at 50%.  Endomyocardial borders are not optimally visualized, however no clear wall  motion abnormalities are noted. On the short-axis views, the basal inferior  wall appears mildly hypokinetic, but this may be a reflection of off-axis  imaging.  Right ventricle is not well visualized, however global systolic function is  mildly reduced.  There is no pericardial effusion.  Left pleural effusion noted.     This study was compared to a TTE from 2019. Global biventricular function is  mildly worse on this present study. The pleural effusion noted is new.  _____________________________________________________________________________  __        Left Ventricle  The left ventricle is normal in size. Left ventricular systolic function is  mildly reduced. The visual ejection fraction is estimated  at 50%. Regional  wall motion abnormalities cannot be excluded due to limited visualization.  Endomyocardial borders are not optimally visualized, however no clear wall  motion abnormalities are noted. On the short-axis views, the basal inferior  wall appears mildly hypokinetic, but this may be a reflection of off-axis  imaging.     Right Ventricle  The right ventricle is mildly dilated. The right ventricle is not well  visualized. Right ventricle is not well visualized, however global systolic  function is mildly reduced.     Mitral Valve  There is trace mitral regurgitation.     Tricuspid Valve  The tricuspid valve is not well visualized. There is trace to mild tricuspid  regurgitation. The right ventricular systolic pressure is approximated at 28.5  mmHg plus the right atrial pressure. Right ventricular systolic pressure is  normal.        Aortic Valve  The aortic valve is not well visualized.     Pulmonic Valve  The pulmonic valve is not well visualized.     Vessels  Dilation of the inferior vena cava is present with abnormal respiratory  variation in diameter. Unable to assess mean RA pressure given the patient is  on a ventilator.     Pericardium  There is no pericardial effusion. Left pleural effusion noted.     _____________________________________________________________________________  __        Doppler Measurements & Calculations  TR max amauri: 266.7 cm/sec  TR max P.5 mmHg              _____________________________________________________________________________  __        Report approved by: Saud Lopez 2020 09:11 AM        Labs (personally reviewed/interpreted): see a/p  Echo (personally reviewed):  Global reduction in bi-V function.  Trace MR. No pericardial effusion.    Billing: This patient is critically ill: Yes. Total critical care time today 50 min, excluding procedures.

## 2020-04-21 NOTE — PROGRESS NOTES
[facility]    Hospitalist Progress Note  Interval History   Still intubated on pressors. Elevated white count w/ minimal improvement w/ LFT.     All other review of systems are negative.    Assessment & Plan   Pinky Whatley is a 54 year old female who was admitted on 4/19/2020. GI consulted for ascending cholangitis s/p ERCP w/ sphincterotomy and stent placement.     Problem: ascending cholangitis and cholecystitis:  S/p ERCP w/ stent placement but no flow in cystic duct  -  Recommend cholecystectomy vs percutaneous cholecystostomy, will let surgery service determine the best modality  -  C/w broad spectrum Abx   -  Rest of the care per ICU team    CPT: 67571    Code Status: Full Code    -Data reviewed today: I reviewed all new labs and imaging results over the last 24 hours.     Physical Exam   Temp: 99.3  F (37.4  C) Temp src: Axillary BP: 90/63 Pulse: 114 Heart Rate: 87 Resp: 24 SpO2: 99 % O2 Device: Mechanical Ventilator Oxygen Delivery: 4 LPM  Vitals:    04/19/20 2129 04/21/20 0230   Weight: 136.1 kg (300 lb) 137 kg (302 lb 0.5 oz)     Vital Signs with Ranges  Temp:  [98.2  F (36.8  C)-99.3  F (37.4  C)] 99.3  F (37.4  C)  Pulse:  [113-120] 114  Heart Rate:  [] 87  Resp:  [15-33] 24  BP: ()/(21-68) 90/63  MAP:  [71 mmHg-101 mmHg] 91 mmHg  Arterial Line BP: ()/(59-86) 116/76  FiO2 (%):  [30 %-50 %] 30 %  SpO2:  [94 %-99 %] 99 %  I/O last 3 completed shifts:  In: 4936.28 [I.V.:2686.28; IV Piggyback:2000]  Out: 950 [Urine:950]    Constitutional: Intubated on 3 pressors  Respiratory: b/l mechanical breath sound  Cardiovascular: Regular rate and rhythm, normal S1 and S2, and no murmur noted  GI: Normal bowel sounds, soft, non-distended, non-tender  Skin/Integumen: No rashes, no cyanosis, no edema  Other:     Oziel Lew MD  (Thuan)  [facility]  Office: [phone]  Cell: [phone], please feel free to call the cell    Medications      EPINEPHrine IV infusion ADULT Stopped (04/21/20 1000)     - MEDICATION INSTRUCTIONS -       norepinephrine 0.1 mcg/kg/min (04/21/20 0742)     propofol (DIPRIVAN) infusion 40 mcg/kg/min (04/21/20 0900)     sodium chloride 75 mL/hr at 04/21/20 0932     vasopressin (PITRESSIN) infusion ADULT (40 mL) 2.4 Units/hr (04/21/20 0741)       chlorhexidine  15 mL Mouth/Throat Q12H     insulin aspart  1-6 Units Subcutaneous Q4H     insulin glargine  15 Units Subcutaneous At Bedtime     piperacillin-tazobactam  3.375 g Intravenous Q6H     sodium chloride (PF)  3 mL Intracatheter Q8H       Data   Recent Labs   Lab 04/21/20  0544 04/20/20  0603 04/19/20  2138   WBC 23.2* 6.8 10.1   HGB 11.0* 12.0 12.1   MCV 96 97 96    185 223   INR  --  1.09  --     133 133   POTASSIUM 4.2 4.2 4.4   CHLORIDE 107 100 99   CO2 19* 26 24   BUN 32* 20 25   CR 2.02* 0.92 0.80   ANIONGAP 11 7 10   CHANTELLE 8.9 8.9 9.8   * 254* 251*   ALBUMIN 3.2* 3.6 3.9   PROTTOTAL 6.1* 6.8 6.9   BILITOTAL 4.3* 3.7* 1.9*   ALKPHOS 129 139 112   * 387* 321*   * 369* 483*   LIPASE  --   --  167   TROPI  --  <0.015 <0.015       Recent Results (from the past 24 hour(s))   XR ERCP    Narrative    ERCP  4/20/2020 3:59 PM     HISTORY: Stent placement.    COMPARISON: None.      Impression    IMPRESSION: Single spot fluoroscopic image demonstrates a stent  overlying the right upper quadrant, presumably biliary, though unable  to confirm without contrast. Total fluoroscopic time 0.7 minutes.    DEVIN VANEGAS MD   XR Chest Port 1 View    Narrative    CHEST PORTABLE ONE VIEW   4/20/2020 6:45 PM     HISTORY: Check ET tube.    COMPARISON: Chest x-ray on same day earlier.      Impression    IMPRESSION: Single portable AP view of the chest was obtained.  Interval intubation with endotracheal tube tip projects over lower  thoracic trachea approximately 1.5 cm from the harshal. Stable  enlargement of the cardiac silhouette and mild pulmonary  vascular  congestion. No significant pleural effusion or pneumothorax. Left  basilar pulmonary opacities, atelectasis versus infection.    NICOLETTE NIEVES MD   Echocardiogram Limited    Narrative    [id number]  [id number]  [id number]  [id number]^BEN^NATALIE^A           [facility]  Echocardiography Laboratory  [address] [place] [address]        Name: RADHA JACOBO  MRN: [id number]  : 1965  Study Date: 2020 07:53 AM  Age: 54 yrs  Gender: Female  Patient [facility]: [facility]  Reason For Study: Shock  Ordering Physician: NATALIE CONTRERAS  Referring Physician: MALINDA MELENDREZ  Performed By: Walter Sheldon RDCS     BSA: 2.2 m2  Height: 61 in  Weight: 300 lb  HR: 89  BP: 118/70 mmHg  _____________________________________________________________________________  __        Procedure  Limited Portable Echo Adult. Optison (NDC #5044-5446) given intravenously.  _____________________________________________________________________________  __        Interpretation Summary     Technically challenging study due to patient habitus and patient positioning  on ventilator, even with the use of contrast imaging.     Left ventricular systolic function is mildly reduced.  The visual ejection fraction is estimated at 50%.  Endomyocardial borders are not optimally visualized, however no clear wall  motion abnormalities are noted. On the short-axis views, the basal inferior  wall appears mildly hypokinetic, but this may be a reflection of off-axis  imaging.  Right ventricle is not well visualized, however global systolic function is  mildly reduced.  There is no pericardial effusion.  Left pleural effusion noted.     This study was compared to a TTE from 2019. Global biventricular function is  mildly worse on this present study. The pleural effusion noted is new.  _____________________________________________________________________________  __        Left Ventricle  The left ventricle is normal in size.  Left ventricular systolic function is  mildly reduced. The visual ejection fraction is estimated at 50%. Regional  wall motion abnormalities cannot be excluded due to limited visualization.  Endomyocardial borders are not optimally visualized, however no clear wall  motion abnormalities are noted. On the short-axis views, the basal inferior  wall appears mildly hypokinetic, but this may be a reflection of off-axis  imaging.     Right Ventricle  The right ventricle is mildly dilated. The right ventricle is not well  visualized. Right ventricle is not well visualized, however global systolic  function is mildly reduced.     Mitral Valve  There is trace mitral regurgitation.     Tricuspid Valve  The tricuspid valve is not well visualized. There is trace to mild tricuspid  regurgitation. The right ventricular systolic pressure is approximated at 28.5  mmHg plus the right atrial pressure. Right ventricular systolic pressure is  normal.        Aortic Valve  The aortic valve is not well visualized.     Pulmonic Valve  The pulmonic valve is not well visualized.     Vessels  Dilation of the inferior vena cava is present with abnormal respiratory  variation in diameter. Unable to assess mean RA pressure given the patient is  on a ventilator.     Pericardium  There is no pericardial effusion. Left pleural effusion noted.     _____________________________________________________________________________  __        Doppler Measurements & Calculations  TR max amauri: 266.7 cm/sec  TR max P.5 mmHg              _____________________________________________________________________________  __        Report approved by: Saud Lopez 2020 09:11 AM           Oziel Lew MD (Richard)  [facility]  Office: [phone]  Cell: [phone]

## 2020-04-22 ENCOUNTER — APPOINTMENT (OUTPATIENT)
Dept: SPEECH THERAPY | Facility: CLINIC | Age: 55
DRG: 417 | End: 2020-04-22
Attending: INTERNAL MEDICINE
Payer: COMMERCIAL

## 2020-04-22 LAB
ALBUMIN SERPL-MCNC: 3 G/DL (ref 3.4–5)
ALP SERPL-CCNC: 191 U/L (ref 40–150)
ALT SERPL W P-5'-P-CCNC: 325 U/L (ref 0–50)
ANION GAP SERPL CALCULATED.3IONS-SCNC: 5 MMOL/L (ref 3–14)
ANION GAP SERPL CALCULATED.3IONS-SCNC: 7 MMOL/L (ref 3–14)
AST SERPL W P-5'-P-CCNC: 184 U/L (ref 0–45)
BILIRUB SERPL-MCNC: 4.2 MG/DL (ref 0.2–1.3)
BUN SERPL-MCNC: 33 MG/DL (ref 7–30)
BUN SERPL-MCNC: 35 MG/DL (ref 7–30)
CALCIUM SERPL-MCNC: 8.9 MG/DL (ref 8.5–10.1)
CALCIUM SERPL-MCNC: 9 MG/DL (ref 8.5–10.1)
CHLORIDE SERPL-SCNC: 108 MMOL/L (ref 94–109)
CHLORIDE SERPL-SCNC: 110 MMOL/L (ref 94–109)
CO2 SERPL-SCNC: 22 MMOL/L (ref 20–32)
CO2 SERPL-SCNC: 26 MMOL/L (ref 20–32)
CREAT SERPL-MCNC: 1.71 MG/DL (ref 0.52–1.04)
CREAT SERPL-MCNC: 1.82 MG/DL (ref 0.52–1.04)
ERYTHROCYTE [DISTWIDTH] IN BLOOD BY AUTOMATED COUNT: 14.5 % (ref 10–15)
GFR SERPL CREATININE-BSD FRML MDRD: 31 ML/MIN/{1.73_M2}
GFR SERPL CREATININE-BSD FRML MDRD: 34 ML/MIN/{1.73_M2}
GLUCOSE BLDC GLUCOMTR-MCNC: 112 MG/DL (ref 70–99)
GLUCOSE BLDC GLUCOMTR-MCNC: 118 MG/DL (ref 70–99)
GLUCOSE BLDC GLUCOMTR-MCNC: 120 MG/DL (ref 70–99)
GLUCOSE BLDC GLUCOMTR-MCNC: 155 MG/DL (ref 70–99)
GLUCOSE BLDC GLUCOMTR-MCNC: 63 MG/DL (ref 70–99)
GLUCOSE BLDC GLUCOMTR-MCNC: 95 MG/DL (ref 70–99)
GLUCOSE SERPL-MCNC: 119 MG/DL (ref 70–99)
GLUCOSE SERPL-MCNC: 171 MG/DL (ref 70–99)
HCT VFR BLD AUTO: 32.2 % (ref 35–47)
HGB BLD-MCNC: 10.4 G/DL (ref 11.7–15.7)
INTERPRETATION ECG - MUSE: NORMAL
MAGNESIUM SERPL-MCNC: 2.2 MG/DL (ref 1.6–2.3)
MAGNESIUM SERPL-MCNC: 2.4 MG/DL (ref 1.6–2.3)
MCH RBC QN AUTO: 31.5 PG (ref 26.5–33)
MCHC RBC AUTO-ENTMCNC: 32.3 G/DL (ref 31.5–36.5)
MCV RBC AUTO: 98 FL (ref 78–100)
PHOSPHATE SERPL-MCNC: 3.5 MG/DL (ref 2.5–4.5)
PLATELET # BLD AUTO: 140 10E9/L (ref 150–450)
POTASSIUM SERPL-SCNC: 3.7 MMOL/L (ref 3.4–5.3)
POTASSIUM SERPL-SCNC: 4.3 MMOL/L (ref 3.4–5.3)
PROT SERPL-MCNC: 6.3 G/DL (ref 6.8–8.8)
RBC # BLD AUTO: 3.3 10E12/L (ref 3.8–5.2)
SODIUM SERPL-SCNC: 139 MMOL/L (ref 133–144)
SODIUM SERPL-SCNC: 139 MMOL/L (ref 133–144)
WBC # BLD AUTO: 19.4 10E9/L (ref 4–11)

## 2020-04-22 PROCEDURE — 99233 SBSQ HOSP IP/OBS HIGH 50: CPT | Performed by: INTERNAL MEDICINE

## 2020-04-22 PROCEDURE — 00000146 ZZHCL STATISTIC GLUCOSE BY METER IP

## 2020-04-22 PROCEDURE — 25800025 ZZH RX 258: Performed by: INTERNAL MEDICINE

## 2020-04-22 PROCEDURE — 80048 BASIC METABOLIC PNL TOTAL CA: CPT | Performed by: INTERNAL MEDICINE

## 2020-04-22 PROCEDURE — 80053 COMPREHEN METABOLIC PANEL: CPT | Performed by: INTERNAL MEDICINE

## 2020-04-22 PROCEDURE — 94660 CPAP INITIATION&MGMT: CPT

## 2020-04-22 PROCEDURE — 99231 SBSQ HOSP IP/OBS SF/LOW 25: CPT | Performed by: SURGERY

## 2020-04-22 PROCEDURE — 25000132 ZZH RX MED GY IP 250 OP 250 PS 637: Performed by: INTERNAL MEDICINE

## 2020-04-22 PROCEDURE — 83735 ASSAY OF MAGNESIUM: CPT | Performed by: INTERNAL MEDICINE

## 2020-04-22 PROCEDURE — 85027 COMPLETE CBC AUTOMATED: CPT | Performed by: INTERNAL MEDICINE

## 2020-04-22 PROCEDURE — 25000128 H RX IP 250 OP 636: Performed by: HOSPITALIST

## 2020-04-22 PROCEDURE — 25000132 ZZH RX MED GY IP 250 OP 250 PS 637: Performed by: NURSE PRACTITIONER

## 2020-04-22 PROCEDURE — 25000128 H RX IP 250 OP 636: Performed by: INTERNAL MEDICINE

## 2020-04-22 PROCEDURE — 92610 EVALUATE SWALLOWING FUNCTION: CPT | Mod: GN | Performed by: SPEECH-LANGUAGE PATHOLOGIST

## 2020-04-22 PROCEDURE — 84100 ASSAY OF PHOSPHORUS: CPT | Performed by: INTERNAL MEDICINE

## 2020-04-22 PROCEDURE — 40000275 ZZH STATISTIC RCP TIME EA 10 MIN

## 2020-04-22 PROCEDURE — 20000003 ZZH R&B ICU

## 2020-04-22 RX ORDER — FUROSEMIDE 10 MG/ML
40 INJECTION INTRAMUSCULAR; INTRAVENOUS EVERY 8 HOURS
Status: COMPLETED | OUTPATIENT
Start: 2020-04-22 | End: 2020-04-23

## 2020-04-22 RX ORDER — DEXTROSE MONOHYDRATE 25 G/50ML
25-50 INJECTION, SOLUTION INTRAVENOUS
Status: DISCONTINUED | OUTPATIENT
Start: 2020-04-22 | End: 2020-04-27

## 2020-04-22 RX ORDER — NICOTINE POLACRILEX 4 MG
15-30 LOZENGE BUCCAL
Status: DISCONTINUED | OUTPATIENT
Start: 2020-04-22 | End: 2020-04-27

## 2020-04-22 RX ORDER — DEXTROSE MONOHYDRATE 100 MG/ML
INJECTION, SOLUTION INTRAVENOUS CONTINUOUS
Status: DISCONTINUED | OUTPATIENT
Start: 2020-04-22 | End: 2020-04-23

## 2020-04-22 RX ADMIN — HYDROCORTISONE SODIUM SUCCINATE 50 MG: 100 INJECTION, POWDER, FOR SOLUTION INTRAMUSCULAR; INTRAVENOUS at 18:08

## 2020-04-22 RX ADMIN — PIPERACILLIN SODIUM AND TAZOBACTAM SODIUM 3.38 G: 3; .375 INJECTION, POWDER, LYOPHILIZED, FOR SOLUTION INTRAVENOUS at 04:56

## 2020-04-22 RX ADMIN — DEXTROSE MONOHYDRATE 50 ML: 25 INJECTION, SOLUTION INTRAVENOUS at 08:56

## 2020-04-22 RX ADMIN — FUROSEMIDE 40 MG: 10 INJECTION, SOLUTION INTRAMUSCULAR; INTRAVENOUS at 16:40

## 2020-04-22 RX ADMIN — HYDROMORPHONE HYDROCHLORIDE 0.2 MG: 1 INJECTION, SOLUTION INTRAMUSCULAR; INTRAVENOUS; SUBCUTANEOUS at 04:59

## 2020-04-22 RX ADMIN — HYDROCORTISONE SODIUM SUCCINATE 50 MG: 100 INJECTION, POWDER, FOR SOLUTION INTRAMUSCULAR; INTRAVENOUS at 00:07

## 2020-04-22 RX ADMIN — HYDROCORTISONE SODIUM SUCCINATE 50 MG: 100 INJECTION, POWDER, FOR SOLUTION INTRAMUSCULAR; INTRAVENOUS at 05:40

## 2020-04-22 RX ADMIN — PIPERACILLIN SODIUM AND TAZOBACTAM SODIUM 3.38 G: 3; .375 INJECTION, POWDER, LYOPHILIZED, FOR SOLUTION INTRAVENOUS at 17:14

## 2020-04-22 RX ADMIN — DEXTROSE MONOHYDRATE: 100 INJECTION, SOLUTION INTRAVENOUS at 10:23

## 2020-04-22 RX ADMIN — LEVOTHYROXINE SODIUM 125 MCG: 125 TABLET ORAL at 08:56

## 2020-04-22 RX ADMIN — FUROSEMIDE 40 MG: 10 INJECTION, SOLUTION INTRAMUSCULAR; INTRAVENOUS at 08:56

## 2020-04-22 RX ADMIN — HYDROCORTISONE SODIUM SUCCINATE 50 MG: 100 INJECTION, POWDER, FOR SOLUTION INTRAMUSCULAR; INTRAVENOUS at 12:02

## 2020-04-22 RX ADMIN — INSULIN ASPART 1 UNITS: 100 INJECTION, SOLUTION INTRAVENOUS; SUBCUTANEOUS at 18:29

## 2020-04-22 RX ADMIN — PIPERACILLIN SODIUM AND TAZOBACTAM SODIUM 3.38 G: 3; .375 INJECTION, POWDER, LYOPHILIZED, FOR SOLUTION INTRAVENOUS at 12:02

## 2020-04-22 RX ADMIN — ACETAMINOPHEN 650 MG: 325 TABLET, FILM COATED ORAL at 14:35

## 2020-04-22 ASSESSMENT — ACTIVITIES OF DAILY LIVING (ADL)
ADLS_ACUITY_SCORE: 16

## 2020-04-22 ASSESSMENT — MIFFLIN-ST. JEOR: SCORE: 2008.38

## 2020-04-22 NOTE — PLAN OF CARE
Pt AO*4. On BiPAP all night. CAM negative. VSS. Weaned off levo/vaso overnight. Clear lung sounds. FELIZ. On sliding scale insulin for glycemic management. Good UOP. Am lab sent and reviewed. Will advance diet in am. Possible transfer from ICU standpoint.  Pan for laparoscopic cholecystectomy when patient is stable later this week. Update mother over the phone. Will continue to monitor.

## 2020-04-22 NOTE — PLAN OF CARE
Patient alert and oriented x 4, moves all extremities well. Vital signs stable, afebrile. Patient was having shortness of breath with minimal exertion, greatly improved after lasix given. Patient on nasal canula, uses BiPAP intermittently Patient seen by speech pathology, clears to have clear liquid diet, tolerating well..Patient complained of headache, tylenol given which provided relief. Patients mother updated per telephone.

## 2020-04-22 NOTE — PROGRESS NOTES
Critical Care Progress Note      04/22/2020    Name: Pinky Whatley MRN#: [id number]   Age: 54 year old YOB: 1965     Hsptl Day# 2  ICU DAY #2               Problem List:   Active Problems:    Abdominal pain, acute    Acute kidney failure, unspecified (H)  septic shock            Summary/Hospital Course:     54F DM, RA presented for upper abdominal pain on 4/19.  CT abd/pelv with mild biliary dilation.  The morning of 4/20 she developed tachycardia and increased respiratory rate and was transferred to ICU.  Here she had initially stabilized but subsequently developed hypotension requiring initiation of levophed.  Taken for ERCP on 4/20 with successful removal of obstruction and stent placement.  Kept intubated that night due to tachypnea and mildly labored breathing pre-op, as well as body habitus.  The afternoon of 4/21 she passed an sbt and was extubated.  Overnight on 4/21-22 she weaned off of pressors.  This morning she says she is feeling better.  Has mild shortness of breath, but her abdominal pain is improving and she denies chest pain, dizzyness and lightheadedness.      Assessment and plan :     Pinky Whatley IS a 54 year old female admitted on 4/19/2020 for abdominal pain, now septic shock, suspected ascending cholangitis.   I have personally reviewed the daily labs, imaging studies, cultures and discussed the case with referring physician and consulting physicians.   My assessment and plan by system for this patient is as follows:    Neurology/Psychiatry:   1.  Analgesia:  Prn dilaudid and norco    Cardiovascular:   1.  Shock, septic: Resolved. In setting of ascending cholangitis.  S/p ercp for source control  On zosyn. Lactate resolved to 1.6.  Continue stress steroids for today.  2.  H/o hld/htn:  Holding home meds for now.    Pulmonary/Ventilator Management:   1. Acute hypoxemia/dyspnea:  In setting of sepsis as noted above and likely body habitus related. Probably some pulmonary  edema too given fluid resuscitation.  Initiating diuresis today.  OOB to chair.  bipap prn for recruitment.    GI and Nutrition :   1. NPO for now--swallow study today  2. Ascending cholagitis:  Appreciate GI support/intervention.  Zosyn.  3. Elevated transaminases:   Downtrending.  [id number], [id number].    Renal/Fluids/Electrolytes:   1. ROBBIE:  Creatinine elevated to 1.71 but now downtrending.  In setting of sepsis.  2.  Hypervolemia:  Following fluid resuscitation for sepsis.  Diuresing.    Infectious Disease:   1. Septic source:  Due to ascending cholangitis.  Continue zosyn.  Appreciate GI support.    Endocrine:   1. Hypoglycemia this morning.  Continue prn aspart for now, stopping lantus.  IV dextrose vs   2.  Hypothyroid:  Resuming home synthroid.    Hematology/Oncology:   1. Wbc 19.4 elevated in setting of sepsis, but now downtrending.  2.  hgb 10.4 ok  3. pltlts 140 acceptable    MSK:  1.  H/o RA:  Holding home methotrexate/plaquenil and xeljanz for now.    IV/Access:   1. Venous access - central line -- remove today   2. Arterial access - arterial line    ICU Prophylaxis:   1. DVT: mechanical  2. Wound care - per unit routine   3. Feeding - npo -- swallow today   4. Family Update: mother zaid by phone  5. Disposition - icu           Interim History:     See above.         Key Medications:       furosemide  40 mg Intravenous Q8H     hydrocortisone sodium succinate PF  50 mg Intravenous Q6H     insulin aspart  1-6 Units Subcutaneous Q4H     insulin glargine  15 Units Subcutaneous At Bedtime     levothyroxine  125 mcg Oral Daily     piperacillin-tazobactam  3.375 g Intravenous Q6H     sodium chloride (PF)  3 mL Intracatheter Q8H       - MEDICATION INSTRUCTIONS -       sodium chloride Stopped (04/22/20 0716)               Physical Examination:   Temp:  [97.6  F (36.4  C)-98.7  F (37.1  C)] 97.8  F (36.6  C)  Heart Rate:  [] 109  Resp:  [14-33] 25  BP: (93)/(61) 93/61  MAP:  [68 mmHg-97 mmHg] 83 mmHg  Arterial  Line BP: ()/(56-77) 116/68  FiO2 (%):  [21 %-40 %] 30 %  SpO2:  [90 %-100 %] 92 %      Intake/Output Summary (Last 24 hours) at 4/21/2020 1158  Last data filed at 4/21/2020 1100  Gross per 24 hour   Intake 4392.68 ml   Output 1245 ml   Net 3147.68 ml         Wt Readings from Last 4 Encounters:   04/22/20 146.1 kg (322 lb 1.5 oz)   10/14/19 135 kg (297 lb 9.6 oz)   09/07/19 130.2 kg (287 lb)     Arterial Line BP: ()/(56-77) 116/68  MAP:  [68 mmHg-97 mmHg] 83 mmHg  Ventilation Mode: PS  (Pressure Support)  FiO2 (%): 30 %  Rate Set (breaths/minute): 16 breaths/min  Tidal Volume Set (mL): 450 mL  PEEP (cm H2O): 5 cmH2O  Pressure Support (cm H2O): 5 cmH2O  Oxygen Concentration (%): 30 %  Resp: 25    Recent Labs   Lab 04/21/20  1230 04/21/20  1024 04/21/20  0544 04/20/20  2030   PH 7.29* 7.41 7.43 7.24*   PCO2 44 31* 29* 36   PO2 96 108* 126* 106*   HCO3 21 19* 19* 16*       GEN: no acute distress, pleasant   NECK:  supple  HEENT: head ncat, sclera anicteric, OP patent, trachea midline   PULM: tachypniec but unlabored, clear anteriorly    CV/COR: RRR S1S2 no gallop,  No rub, no murmur  ABD: soft nontender, hypoactive bowel sounds, no mass  EXT:  No gross Edema, warm x4  NEURO: awake, alert, appropriate, good strength throughout  SKIN: no obvious rash  LINES: clean, dry intact         Data:   All data and imaging reviewed     ROUTINE ICU LABS (Last four results)  [facility]  Recent Labs   Lab 04/22/20  0455 04/21/20  0544 04/20/20  0603 04/19/20  2138    137 133 133   POTASSIUM 4.3 4.2 4.2 4.4   CHLORIDE 110* 107 100 99   CO2 22 19* 26 24   ANIONGAP 7 11 7 10   * 206* 254* 251*   BUN 33* 32* 20 25   CR 1.71* 2.02* 0.92 0.80   GFRESTIMATED 34* 28* 72 84   GFRESTBLACK 39* 32* 83 >90   CHANTELLE 8.9 8.9 8.9 9.8   MAG 2.2 1.9 1.5*  --    PHOS 3.5 3.4  --   --    PROTTOTAL 6.3* 6.1* 6.8 6.9   ALBUMIN 3.0* 3.2* 3.6 3.9   BILITOTAL 4.2* 4.3* 3.7* 1.9*   ALKPHOS 191* 129 139 112   * 306* 369* 483*   *  403* 387* 321*     CBC  Recent Labs   Lab 04/22/20  0455 04/21/20  0544 04/20/20  0603 04/19/20  2138   WBC 19.4* 23.2* 6.8 10.1   RBC 3.30* 3.59* 3.84 3.89   HGB 10.4* 11.0* 12.0 12.1   HCT 32.2* 34.5* 37.2 37.2   MCV 98 96 97 96   MCH 31.5 30.6 31.3 31.1   MCHC 32.3 31.9 32.3 32.5   RDW 14.5 14.6 14.3 14.1   * 170 185 223     INR  Recent Labs   Lab 04/20/20  0603   INR 1.09     Arterial Blood Gas  Recent Labs   Lab 04/21/20  1230 04/21/20  1024 04/21/20  0544 04/20/20  2030   PH 7.29* 7.41 7.43 7.24*   PCO2 44 31* 29* 36   PO2 96 108* 126* 106*   HCO3 21 19* 19* 16*       All cultures:  No results for input(s): CULT in the last 168 hours.  No results found for this or any previous visit (from the past 24 hour(s)).  Labs (personally reviewed/interpreted): see a/p

## 2020-04-22 NOTE — PROGRESS NOTES
[facility]    General Surgery  Daily Note       Assessment and Plan:   Pinky Whatley is a 54 year old female with Cholecystitis, choledocholithiasis, and ascending cholangitis.  Status post ERCP with clearance of common bile duct and placement of stent on 4/20/2020.      -Medical management per primary team  -Continue IV antibiotics  -Will plan for laparoscopic cholecystectomy when patient has stabilized from a medical standpoint, anticipate Thursday or Friday  -Okay for clear liquids as tolerated  -Surgery to follow         Interval History:   Patient remains in the ICU.  Patient has now been extubated and is off pressors.  Afebrile.  White blood cell count improving.  LFTs elevated but stable.  Patient denies significant abdominal pain.           Physical Exam:   Temp: 97.8  F (36.6  C) Temp src: Axillary BP: 93/61   Heart Rate: 106 Resp: 22 SpO2: 96 % O2 Device: Oxymask Oxygen Delivery: 4 LPM    I/O last 3 completed shifts:  In: 2535.9 [I.V.:2535.9]  Out: 1355 [Urine:1355]      Constitutional: Alert and in no acute distress  Respiratory: Breathing nonlabored, BiPAP in place  Abdomen: Soft, nondistended, mildly tender with palpation in the right upper quadrant      Data   Recent Labs   Lab 04/22/20  0455 04/21/20  0544 04/20/20  0603 04/19/20  2138   WBC 19.4* 23.2* 6.8 10.1   HGB 10.4* 11.0* 12.0 12.1   MCV 98 96 97 96   * 170 185 223   INR  --   --  1.09  --     137 133 133   POTASSIUM 4.3 4.2 4.2 4.4   CHLORIDE 110* 107 100 99   CO2 22 19* 26 24   BUN 33* 32* 20 25   CR 1.71* 2.02* 0.92 0.80   ANIONGAP 7 11 7 10   CHANTELLE 8.9 8.9 8.9 9.8   * 206* 254* 251*   ALBUMIN 3.0* 3.2* 3.6 3.9   PROTTOTAL 6.3* 6.1* 6.8 6.9   BILITOTAL 4.2* 4.3* 3.7* 1.9*   ALKPHOS 191* 129 139 112   * 403* 387* 321*   * 306* 369* 483*   TROPI  --   --  <0.015 <0.015       Agustina Nettles MD

## 2020-04-22 NOTE — PROGRESS NOTES
04/22/20 1328   General Information   Onset Date 04/20/20   Start of Care Date 04/22/20   Referring Physician Dr. Burnett   Patient Profile Review/OT: Additional Occupational Profile Info See Profile for full history and prior level of function   Patient/Family Goals Statement Pt agreed to POC goal.   Swallowing Evaluation Bedside swallow evaluation   Behaviorial Observations Alert   Mode of current nutrition Oral diet   Type of oral diet Thin liquid, clear liquids   Respiratory Status O2 Supply   Type of O2 supply Nasal cannula   Comments Per MD note: 54F DM, RA presented for upper abdominal pain on 4/19.  CT abd/pelv with mild biliary dilation.  The morning of 4/20 she developed tachycardia and increased respiratory rate and was transferred to ICU.  Here she had initially stabilized but subsequently developed hypotension requiring initiation of levophed.  Taken for ERCP on 4/20 with successful removal of obstruction and stent placement.  Kept intubated that night due to tachypnea and mildly labored breathing pre-op, as well as body habitus.  The afternoon of 4/21 she passed an sbt and was extubated.  Overnight on 4/21-22 she weaned off of pressors.      RN reports pt coughing with thin liquids and SOB earlier this am.  Surgery planned 4/23 or 4/24.     Clinical Swallow Evaluation   Oral Musculature generally intact  (right decreased ROM noted on labial retraction)   Dentition present and adequate   Laryngeal Function   (mild hoarse voice)   Clinical Swallow Eval: Thin Liquid Texture Trial   Mode of Presentation, Thin Liquids cup;straw   Volume of Liquid or Food Presented 8 ounces   Oral Phase of Swallow WFL   Pharyngeal Phase of Swallow intact   Diagnostic Statement large impulsive sips, no signs of aspiration    Swallow Compensations   Swallow Compensations Reduce amounts;Pacing   Esophageal Phase of Swallow   Patient reports or presents with symptoms of esophageal dysphagia No   Swallow Eval: Clinical  Impressions   Skilled Criteria for Therapy Intervention Skilled criteria met.  Treatment indicated.   Functional Assessment Scale (FAS) 6   Treatment Diagnosis minimal oral-pharyngeal dysphagia   Diet texture recommendations Clear liquid   Recommended Feeding/Eating Techniques   (see below)   Therapy Frequency 3x/week   Predicted Duration of Therapy Intervention (days/wks) 1 week   Anticipated Discharge Disposition home   Risks and Benefits of Treatment have been explained. Yes   Patient, family and/or staff in agreement with Plan of Care Yes   Clinical Impression Comments A bedside swallow evaluation was completed this pm.  RN reported pt was coughing with thin liquids and SOB earlier this am, but now is doing much better after lasix given.  Pt demonstrated a mild hoarse voice, decreased elevation of right lip when attempting to smile, and impulsive large sips, but tolerated thin liquids without overt signs of aspiration.  Unable to trial additional textures given pt is on a clear liquid diet per surgery team.  Recommend continued thin clear liquids with reminders to take small sips and sit up at 90 degrees.  Plan to provide 1-2 follow up swallow Tx sessions after next surgery to insure safety for a regular diet   Total Evaluation Time   Total Evaluation Time (Minutes) 15

## 2020-04-23 PROBLEM — K81.9 CHOLECYSTITIS: Status: ACTIVE | Noted: 2020-04-19

## 2020-04-23 LAB
ALBUMIN SERPL-MCNC: 3 G/DL (ref 3.4–5)
ALP SERPL-CCNC: 324 U/L (ref 40–150)
ALT SERPL W P-5'-P-CCNC: 276 U/L (ref 0–50)
ANION GAP SERPL CALCULATED.3IONS-SCNC: 5 MMOL/L (ref 3–14)
AST SERPL W P-5'-P-CCNC: 117 U/L (ref 0–45)
BILIRUB SERPL-MCNC: 2.1 MG/DL (ref 0.2–1.3)
BUN SERPL-MCNC: 34 MG/DL (ref 7–30)
CALCIUM SERPL-MCNC: 9 MG/DL (ref 8.5–10.1)
CHLORIDE SERPL-SCNC: 106 MMOL/L (ref 94–109)
CO2 SERPL-SCNC: 29 MMOL/L (ref 20–32)
CREAT SERPL-MCNC: 1.87 MG/DL (ref 0.52–1.04)
ERYTHROCYTE [DISTWIDTH] IN BLOOD BY AUTOMATED COUNT: 14.4 % (ref 10–15)
GFR SERPL CREATININE-BSD FRML MDRD: 30 ML/MIN/{1.73_M2}
GLUCOSE BLDC GLUCOMTR-MCNC: 159 MG/DL (ref 70–99)
GLUCOSE BLDC GLUCOMTR-MCNC: 170 MG/DL (ref 70–99)
GLUCOSE BLDC GLUCOMTR-MCNC: 181 MG/DL (ref 70–99)
GLUCOSE BLDC GLUCOMTR-MCNC: 190 MG/DL (ref 70–99)
GLUCOSE BLDC GLUCOMTR-MCNC: 201 MG/DL (ref 70–99)
GLUCOSE BLDC GLUCOMTR-MCNC: 217 MG/DL (ref 70–99)
GLUCOSE SERPL-MCNC: 202 MG/DL (ref 70–99)
HCT VFR BLD AUTO: 34.4 % (ref 35–47)
HGB BLD-MCNC: 11.1 G/DL (ref 11.7–15.7)
MAGNESIUM SERPL-MCNC: 2.1 MG/DL (ref 1.6–2.3)
MCH RBC QN AUTO: 31 PG (ref 26.5–33)
MCHC RBC AUTO-ENTMCNC: 32.3 G/DL (ref 31.5–36.5)
MCV RBC AUTO: 96 FL (ref 78–100)
PHOSPHATE SERPL-MCNC: 3.3 MG/DL (ref 2.5–4.5)
PLATELET # BLD AUTO: 158 10E9/L (ref 150–450)
POTASSIUM SERPL-SCNC: 3.1 MMOL/L (ref 3.4–5.3)
POTASSIUM SERPL-SCNC: 3.3 MMOL/L (ref 3.4–5.3)
PROT SERPL-MCNC: 6.6 G/DL (ref 6.8–8.8)
RBC # BLD AUTO: 3.58 10E12/L (ref 3.8–5.2)
SODIUM SERPL-SCNC: 140 MMOL/L (ref 133–144)
WBC # BLD AUTO: 21 10E9/L (ref 4–11)

## 2020-04-23 PROCEDURE — 40000275 ZZH STATISTIC RCP TIME EA 10 MIN

## 2020-04-23 PROCEDURE — 94660 CPAP INITIATION&MGMT: CPT

## 2020-04-23 PROCEDURE — 80053 COMPREHEN METABOLIC PANEL: CPT | Performed by: INTERNAL MEDICINE

## 2020-04-23 PROCEDURE — 99231 SBSQ HOSP IP/OBS SF/LOW 25: CPT | Performed by: SURGERY

## 2020-04-23 PROCEDURE — 85027 COMPLETE CBC AUTOMATED: CPT | Performed by: INTERNAL MEDICINE

## 2020-04-23 PROCEDURE — 00000146 ZZHCL STATISTIC GLUCOSE BY METER IP

## 2020-04-23 PROCEDURE — 25000128 H RX IP 250 OP 636: Performed by: INTERNAL MEDICINE

## 2020-04-23 PROCEDURE — 20000003 ZZH R&B ICU

## 2020-04-23 PROCEDURE — 99233 SBSQ HOSP IP/OBS HIGH 50: CPT | Performed by: INTERNAL MEDICINE

## 2020-04-23 PROCEDURE — 84132 ASSAY OF SERUM POTASSIUM: CPT | Performed by: INTERNAL MEDICINE

## 2020-04-23 PROCEDURE — 25000132 ZZH RX MED GY IP 250 OP 250 PS 637: Performed by: INTERNAL MEDICINE

## 2020-04-23 PROCEDURE — 84100 ASSAY OF PHOSPHORUS: CPT | Performed by: INTERNAL MEDICINE

## 2020-04-23 PROCEDURE — 25000132 ZZH RX MED GY IP 250 OP 250 PS 637: Performed by: NURSE PRACTITIONER

## 2020-04-23 PROCEDURE — 25000128 H RX IP 250 OP 636: Performed by: HOSPITALIST

## 2020-04-23 PROCEDURE — 83735 ASSAY OF MAGNESIUM: CPT | Performed by: INTERNAL MEDICINE

## 2020-04-23 RX ORDER — SODIUM CHLORIDE 9 MG/ML
INJECTION, SOLUTION INTRAVENOUS CONTINUOUS
Status: DISCONTINUED | OUTPATIENT
Start: 2020-04-23 | End: 2020-04-26

## 2020-04-23 RX ADMIN — INSULIN ASPART 1 UNITS: 100 INJECTION, SOLUTION INTRAVENOUS; SUBCUTANEOUS at 00:12

## 2020-04-23 RX ADMIN — INSULIN ASPART 1 UNITS: 100 INJECTION, SOLUTION INTRAVENOUS; SUBCUTANEOUS at 20:11

## 2020-04-23 RX ADMIN — FUROSEMIDE 40 MG: 10 INJECTION, SOLUTION INTRAMUSCULAR; INTRAVENOUS at 00:03

## 2020-04-23 RX ADMIN — POTASSIUM CHLORIDE 20 MEQ: 1500 TABLET, EXTENDED RELEASE ORAL at 08:51

## 2020-04-23 RX ADMIN — INSULIN ASPART 1 UNITS: 100 INJECTION, SOLUTION INTRAVENOUS; SUBCUTANEOUS at 09:00

## 2020-04-23 RX ADMIN — PIPERACILLIN SODIUM AND TAZOBACTAM SODIUM 3.38 G: 3; .375 INJECTION, POWDER, LYOPHILIZED, FOR SOLUTION INTRAVENOUS at 00:04

## 2020-04-23 RX ADMIN — LEVOTHYROXINE SODIUM 125 MCG: 125 TABLET ORAL at 08:51

## 2020-04-23 RX ADMIN — PIPERACILLIN SODIUM AND TAZOBACTAM SODIUM 3.38 G: 3; .375 INJECTION, POWDER, LYOPHILIZED, FOR SOLUTION INTRAVENOUS at 18:42

## 2020-04-23 RX ADMIN — HYDROCORTISONE SODIUM SUCCINATE 50 MG: 100 INJECTION, POWDER, FOR SOLUTION INTRAMUSCULAR; INTRAVENOUS at 00:03

## 2020-04-23 RX ADMIN — POTASSIUM CHLORIDE 40 MEQ: 1500 TABLET, EXTENDED RELEASE ORAL at 05:44

## 2020-04-23 RX ADMIN — PIPERACILLIN SODIUM AND TAZOBACTAM SODIUM 3.38 G: 3; .375 INJECTION, POWDER, LYOPHILIZED, FOR SOLUTION INTRAVENOUS at 05:40

## 2020-04-23 RX ADMIN — PIPERACILLIN SODIUM AND TAZOBACTAM SODIUM 3.38 G: 3; .375 INJECTION, POWDER, LYOPHILIZED, FOR SOLUTION INTRAVENOUS at 12:38

## 2020-04-23 RX ADMIN — INSULIN ASPART 2 UNITS: 100 INJECTION, SOLUTION INTRAVENOUS; SUBCUTANEOUS at 16:49

## 2020-04-23 RX ADMIN — POTASSIUM CHLORIDE 20 MEQ: 1500 TABLET, EXTENDED RELEASE ORAL at 21:11

## 2020-04-23 RX ADMIN — INSULIN ASPART 2 UNITS: 100 INJECTION, SOLUTION INTRAVENOUS; SUBCUTANEOUS at 04:24

## 2020-04-23 RX ADMIN — HYDROCORTISONE SODIUM SUCCINATE 50 MG: 100 INJECTION, POWDER, FOR SOLUTION INTRAMUSCULAR; INTRAVENOUS at 18:43

## 2020-04-23 RX ADMIN — POTASSIUM CHLORIDE 40 MEQ: 1500 TABLET, EXTENDED RELEASE ORAL at 18:53

## 2020-04-23 RX ADMIN — ACETAMINOPHEN 650 MG: 325 TABLET, FILM COATED ORAL at 20:06

## 2020-04-23 RX ADMIN — INSULIN ASPART 2 UNITS: 100 INJECTION, SOLUTION INTRAVENOUS; SUBCUTANEOUS at 12:40

## 2020-04-23 RX ADMIN — HYDROCORTISONE SODIUM SUCCINATE 50 MG: 100 INJECTION, POWDER, FOR SOLUTION INTRAMUSCULAR; INTRAVENOUS at 05:41

## 2020-04-23 RX ADMIN — PIPERACILLIN SODIUM AND TAZOBACTAM SODIUM 3.38 G: 3; .375 INJECTION, POWDER, LYOPHILIZED, FOR SOLUTION INTRAVENOUS at 23:57

## 2020-04-23 ASSESSMENT — ACTIVITIES OF DAILY LIVING (ADL)
ADLS_ACUITY_SCORE: 16

## 2020-04-23 ASSESSMENT — MIFFLIN-ST. JEOR: SCORE: 1981.38

## 2020-04-23 NOTE — PLAN OF CARE
Pt A&Ox4. Makes needs known. BiPAP all night, tolerated well. VSS. Art line. Lasix given with copious UOP, no BM. K replaced.

## 2020-04-23 NOTE — PROGRESS NOTES
Critical Care Progress Note      04/23/2020    Name: Pikny Whatley MRN#: [id number]   Age: 54 year old YOB: 1965     Hsptl Day# 3  ICU DAY #3               Problem List:   Active Problems:    Abdominal pain, acute    Acute kidney failure, unspecified (H)  septic shock -- resolved           Summary/Hospital Course:     54F DM, RA presented for upper abdominal pain on 4/19.  CT abd/pelv with mild biliary dilation.  The morning of 4/20 she developed tachycardia and increased respiratory rate and was transferred to ICU.  Here she had initially stabilized but subsequently developed hypotension requiring initiation of levophed.  Taken for ERCP on 4/20 with successful removal of obstruction and stent placement.  Kept intubated that night due to tachypnea and mildly labored breathing pre-op, as well as body habitus.  The afternoon of 4/21 she passed an sbt and was extubated.  Overnight on 4/21-22 she weaned off of pressors.  Continues to feel well.  Has mild shortness of breath and breathing is intermittently labored, but improves with prn bipap.  Her abdominal pain is improving and she denies chest pain, dizzyness and lightheadedness.      Assessment and plan :     Pinky Whatley IS a 54 year old female admitted on 4/19/2020 for abdominal pain, now septic shock, suspected ascending cholangitis.   I have personally reviewed the daily labs, imaging studies, cultures and discussed the case with referring physician and consulting physicians.   My assessment and plan by system for this patient is as follows:    Neurology/Psychiatry:   1.  Analgesia:  Prn dilaudid and norco    Cardiovascular:   1.  H/o hld/htn:  Holding home meds for now.  2.  Septic shock:  Resolved.  Weaned steroids to 50 bid today.    Pulmonary/Ventilator Management:   1. Acute hypoxemia/dyspnea:  In setting of sepsis as noted above and likely body habitus related. Probably some pulmonary edema too given fluid resuscitation.  Diuresed  well yesterday; diuresis holiday today as creatinine is staying elevated.  OOB to chair.  bipap prn for recruitment.    GI and Nutrition :   1. Tolerating clears.  2. Ascending cholagitis:  Appreciate GI support/intervention.  Zosyn.  Planning for cholecystectomy tomorrow.  3. Elevated transaminases:   Downtrending.  [id number], [id number].    Renal/Fluids/Electrolytes:   1. ROBBIE:  Creatinine elevated to 1.87.  In setting of recent sepsis.  Continue to monitor.  Diuresis holiday today.  2.  Hypervolemia:  Following fluid resuscitation for sepsis.  Creatinine somewhat elevated; diuresis holiday today.    Infectious Disease:   1. Septic source:  Due to ascending cholangitis.  Continue zosyn.  Appreciate GI support.    Endocrine:   1. Hyperglycemia:  Continue prn aspart for now, holding lantus.  2.  Hypothyroid:  Resuming home synthroid.    Hematology/Oncology:   1. Wbc 21.0 elevated in setting of sepsis, but now stabilized  2.  hgb 11.1 ok  3. pltlts 158 acceptable    MSK:  1.  H/o RA:  Holding home methotrexate/plaquenil and xeljanz for now.    ICU Prophylaxis:   1. DVT: mechanical  2. Wound care - per unit routine   3. Feeding - tolerating clears  4. Family Update: mother zaid by phone  5. Disposition - icu           Interim History:     See above.         Key Medications:       hydrocortisone sodium succinate PF  50 mg Intravenous Q12H     insulin aspart  1-6 Units Subcutaneous Q4H     levothyroxine  125 mcg Oral Daily     piperacillin-tazobactam  3.375 g Intravenous Q6H     sodium chloride (PF)  3 mL Intracatheter Q8H       - MEDICATION INSTRUCTIONS -       sodium chloride                 Physical Examination:   Temp:  [98.3  F (36.8  C)-98.9  F (37.2  C)] 98.5  F (36.9  C)  Heart Rate:  [] 101  Resp:  [10-34] 31  MAP:  [78 mmHg-132 mmHg] 100 mmHg  Arterial Line BP: (108-207)/(27-83) 136/81  FiO2 (%):  [30 %-40 %] 30 %  SpO2:  [92 %-100 %] 94 %        Intake/Output Summary (Last 24 hours) at 4/23/2020 1000  Last  data filed at 4/23/2020 0800  Gross per 24 hour   Intake 1535 ml   Output 6750 ml   Net -5215 ml         Wt Readings from Last 4 Encounters:   04/23/20 143.4 kg (316 lb 2.2 oz)   10/14/19 135 kg (297 lb 9.6 oz)   09/07/19 130.2 kg (287 lb)     Arterial Line BP: (108-207)/(27-83) 136/81  MAP:  [78 mmHg-132 mmHg] 100 mmHg  FiO2 (%): 30 %  Resp: (!) 31    Recent Labs   Lab 04/21/20  1230 04/21/20  1024 04/21/20  0544 04/20/20  2030   PH 7.29* 7.41 7.43 7.24*   PCO2 44 31* 29* 36   PO2 96 108* 126* 106*   HCO3 21 19* 19* 16*       GEN: no acute distress, pleasant   NECK:  supple  HEENT: head ncat, sclera anicteric, OP patent, trachea midline   PULM: tachypniec but unlabored, clear anteriorly    CV/COR: RRR S1S2 no gallop,  No rub, no murmur  ABD: soft nontender, hypoactive bowel sounds, no mass  EXT:  No gross Edema, warm x4  NEURO: awake, alert, appropriate, good strength throughout  SKIN: no obvious rash  LINES: clean, dry intact         Data:   All data and imaging reviewed     ROUTINE ICU LABS (Last four results)  CMP  Recent Labs   Lab 04/23/20  0422 04/22/20  1819 04/22/20  0455 04/21/20  0544 04/20/20  0603    139 139 137 133   POTASSIUM 3.1* 3.7 4.3 4.2 4.2   CHLORIDE 106 108 110* 107 100   CO2 29 26 22 19* 26   ANIONGAP 5 5 7 11 7   * 171* 119* 206* 254*   BUN 34* 35* 33* 32* 20   CR 1.87* 1.82* 1.71* 2.02* 0.92   GFRESTIMATED 30* 31* 34* 28* 72   GFRESTBLACK 35* 36* 39* 32* 83   CHANTELLE 9.0 9.0 8.9 8.9 8.9   MAG 2.1 2.4* 2.2 1.9 1.5*   PHOS 3.3  --  3.5 3.4  --    PROTTOTAL 6.6*  --  6.3* 6.1* 6.8   ALBUMIN 3.0*  --  3.0* 3.2* 3.6   BILITOTAL 2.1*  --  4.2* 4.3* 3.7*   ALKPHOS 324*  --  191* 129 139   *  --  184* 306* 369*   *  --  325* 403* 387*     CBC  Recent Labs   Lab 04/23/20  0422 04/22/20  0455 04/21/20  0544 04/20/20  0603   WBC 21.0* 19.4* 23.2* 6.8   RBC 3.58* 3.30* 3.59* 3.84   HGB 11.1* 10.4* 11.0* 12.0   HCT 34.4* 32.2* 34.5* 37.2   MCV 96 98 96 97   MCH 31.0 31.5 30.6 31.3    MCHC 32.3 32.3 31.9 32.3   RDW 14.4 14.5 14.6 14.3    140* 170 185     INR  Recent Labs   Lab 04/20/20  0603   INR 1.09     Arterial Blood Gas  Recent Labs   Lab 04/21/20  1230 04/21/20  1024 04/21/20  0544 04/20/20  2030   PH 7.29* 7.41 7.43 7.24*   PCO2 44 31* 29* 36   PO2 96 108* 126* 106*   HCO3 21 19* 19* 16*       All cultures:  No results for input(s): CULT in the last 168 hours.  No results found for this or any previous visit (from the past 24 hour(s)).    Labs (personally reviewed/interpreted): see a/p    D/w Dr. Nettles of general surgery.

## 2020-04-23 NOTE — PROGRESS NOTES
CPAP/BiPAP Settings  IPAP: 12  EPAP: 5  Rate: 12  FiO2: 30  Timed Inspiration (sec): .9     CPAP/BiPAP/AVAPS/AVAPS AE Alarms  High Pressure: 20  Low Pressure: 5  Low Pressure Delay:   High Rate (breaths/min): 45  Low Rate (breaths/min): 5  Alarm Volume Level: 10    CPAP/BiPAP/AVAPS/AVAPS AE Patient Assessment  Skin Assessment: skin intact   Barriers Applied: gel pad on   Lung Sounds:     Plan:  RT continue to monitor

## 2020-04-23 NOTE — PROGRESS NOTES
[facility]    General Surgery  Daily Note       Assessment and Plan:   Pinky Whatley is a 54 year old female with Cholecystitis, choledocholithiasis, and ascending cholangitis.  Status post ERCP with clearance of common bile duct and placement of stent on 4/20/2020.      -Medical management per primary team  -Continue IV antibiotics  -Will plan for laparoscopic cholecystectomy when patient has stabilized from a medical standpoint.  Case discussed with intensivist- patient will likely be ready to proceed with surgery tomorrow.  -Okay for clear liquids as tolerated.  NPO atfer midnight.   -Surgery to follow         Interval History:   Patient remains stable.  Afebrile.  White blood cell count elevated but stable.  LFTs improving.  Patient tolerating clear liquids.           Physical Exam:   Temp: 98.5  F (36.9  C) Temp src: Axillary     Heart Rate: 90 Resp: 19 SpO2: 96 % O2 Device: BiPAP/CPAP Oxygen Delivery: 3 LPM    I/O last 3 completed shifts:  In: 1595 [P.O.:1275; I.V.:320]  Out: 7250 [Urine:7250]      Constitutional: Alert and in no acute distress  Respiratory: Breathing nonlabored, BiPAP in place  Abdomen: Soft, nondistended, non tender with palpation in the right upper quadrant      Data   Recent Labs   Lab 04/23/20  0422 04/22/20  1819 04/22/20  0455 04/21/20  0544 04/20/20  0603 04/19/20  2138   WBC 21.0*  --  19.4* 23.2* 6.8 10.1   HGB 11.1*  --  10.4* 11.0* 12.0 12.1   MCV 96  --  98 96 97 96     --  140* 170 185 223   INR  --   --   --   --  1.09  --     139 139 137 133 133   POTASSIUM 3.1* 3.7 4.3 4.2 4.2 4.4   CHLORIDE 106 108 110* 107 100 99   CO2 29 26 22 19* 26 24   BUN 34* 35* 33* 32* 20 25   CR 1.87* 1.82* 1.71* 2.02* 0.92 0.80   ANIONGAP 5 5 7 11 7 10   CHANTELLE 9.0 9.0 8.9 8.9 8.9 9.8   * 171* 119* 206* 254* 251*   ALBUMIN 3.0*  --  3.0* 3.2* 3.6 3.9   PROTTOTAL 6.6*  --  6.3* 6.1* 6.8 6.9   BILITOTAL 2.1*  --  4.2* 4.3* 3.7* 1.9*   ALKPHOS 324*  --  191* 129 139  112   *  --  325* 403* 387* 321*   *  --  184* 306* 369* 483*   TROPI  --   --   --   --  <0.015 <0.015       Agustina Nettles MD

## 2020-04-23 NOTE — PLAN OF CARE
Patient denies pain,02 @ 4 liters, lungs clear , dyspnea with activity, sats 93-95, temp 99.8, axillary, tolerating clear liquids, blood glucose running low 200's,

## 2020-04-24 ENCOUNTER — ANESTHESIA (OUTPATIENT)
Dept: SURGERY | Facility: CLINIC | Age: 55
DRG: 417 | End: 2020-04-24
Payer: COMMERCIAL

## 2020-04-24 ENCOUNTER — ANESTHESIA EVENT (OUTPATIENT)
Dept: SURGERY | Facility: CLINIC | Age: 55
DRG: 417 | End: 2020-04-24
Payer: COMMERCIAL

## 2020-04-24 ENCOUNTER — SURGERY (OUTPATIENT)
Age: 55
End: 2020-04-24
Payer: COMMERCIAL

## 2020-04-24 LAB
ALBUMIN SERPL-MCNC: 2.7 G/DL (ref 3.4–5)
ALP SERPL-CCNC: 297 U/L (ref 40–150)
ALT SERPL W P-5'-P-CCNC: 202 U/L (ref 0–50)
ANION GAP SERPL CALCULATED.3IONS-SCNC: 4 MMOL/L (ref 3–14)
AST SERPL W P-5'-P-CCNC: 74 U/L (ref 0–45)
BILIRUB SERPL-MCNC: 1.2 MG/DL (ref 0.2–1.3)
BUN SERPL-MCNC: 33 MG/DL (ref 7–30)
CALCIUM SERPL-MCNC: 8.8 MG/DL (ref 8.5–10.1)
CHLORIDE SERPL-SCNC: 108 MMOL/L (ref 94–109)
CO2 SERPL-SCNC: 29 MMOL/L (ref 20–32)
CREAT SERPL-MCNC: 1.53 MG/DL (ref 0.52–1.04)
ERYTHROCYTE [DISTWIDTH] IN BLOOD BY AUTOMATED COUNT: 14.6 % (ref 10–15)
GFR SERPL CREATININE-BSD FRML MDRD: 39 ML/MIN/{1.73_M2}
GLUCOSE BLDC GLUCOMTR-MCNC: 150 MG/DL (ref 70–99)
GLUCOSE BLDC GLUCOMTR-MCNC: 174 MG/DL (ref 70–99)
GLUCOSE BLDC GLUCOMTR-MCNC: 189 MG/DL (ref 70–99)
GLUCOSE BLDC GLUCOMTR-MCNC: 194 MG/DL (ref 70–99)
GLUCOSE BLDC GLUCOMTR-MCNC: 213 MG/DL (ref 70–99)
GLUCOSE BLDC GLUCOMTR-MCNC: 215 MG/DL (ref 70–99)
GLUCOSE BLDC GLUCOMTR-MCNC: 256 MG/DL (ref 70–99)
GLUCOSE SERPL-MCNC: 189 MG/DL (ref 70–99)
HCG UR QL: NEGATIVE
HCT VFR BLD AUTO: 32.7 % (ref 35–47)
HGB BLD-MCNC: 10.5 G/DL (ref 11.7–15.7)
MAGNESIUM SERPL-MCNC: 2.3 MG/DL (ref 1.6–2.3)
MCH RBC QN AUTO: 30.9 PG (ref 26.5–33)
MCHC RBC AUTO-ENTMCNC: 32.1 G/DL (ref 31.5–36.5)
MCV RBC AUTO: 96 FL (ref 78–100)
PHOSPHATE SERPL-MCNC: 2.2 MG/DL (ref 2.5–4.5)
PLATELET # BLD AUTO: 149 10E9/L (ref 150–450)
POTASSIUM SERPL-SCNC: 3.8 MMOL/L (ref 3.4–5.3)
POTASSIUM SERPL-SCNC: 3.9 MMOL/L (ref 3.4–5.3)
PROT SERPL-MCNC: 6.1 G/DL (ref 6.8–8.8)
RBC # BLD AUTO: 3.4 10E12/L (ref 3.8–5.2)
SODIUM SERPL-SCNC: 141 MMOL/L (ref 133–144)
WBC # BLD AUTO: 12 10E9/L (ref 4–11)

## 2020-04-24 PROCEDURE — 25000128 H RX IP 250 OP 636: Performed by: NURSE ANESTHETIST, CERTIFIED REGISTERED

## 2020-04-24 PROCEDURE — 36000056 ZZH SURGERY LEVEL 3 1ST 30 MIN: Performed by: SURGERY

## 2020-04-24 PROCEDURE — 37000009 ZZH ANESTHESIA TECHNICAL FEE, EACH ADDTL 15 MIN: Performed by: SURGERY

## 2020-04-24 PROCEDURE — 25000128 H RX IP 250 OP 636: Performed by: PHYSICIAN ASSISTANT

## 2020-04-24 PROCEDURE — 99233 SBSQ HOSP IP/OBS HIGH 50: CPT | Performed by: INTERNAL MEDICINE

## 2020-04-24 PROCEDURE — 25800030 ZZH RX IP 258 OP 636: Performed by: NURSE ANESTHETIST, CERTIFIED REGISTERED

## 2020-04-24 PROCEDURE — 85027 COMPLETE CBC AUTOMATED: CPT | Performed by: INTERNAL MEDICINE

## 2020-04-24 PROCEDURE — 80053 COMPREHEN METABOLIC PANEL: CPT | Performed by: INTERNAL MEDICINE

## 2020-04-24 PROCEDURE — 25000128 H RX IP 250 OP 636: Performed by: HOSPITALIST

## 2020-04-24 PROCEDURE — 36000058 ZZH SURGERY LEVEL 3 EA 15 ADDTL MIN: Performed by: SURGERY

## 2020-04-24 PROCEDURE — 25000128 H RX IP 250 OP 636: Performed by: INTERNAL MEDICINE

## 2020-04-24 PROCEDURE — 71000012 ZZH RECOVERY PHASE 1 LEVEL 1 FIRST HR: Performed by: SURGERY

## 2020-04-24 PROCEDURE — 25000125 ZZHC RX 250: Performed by: SURGERY

## 2020-04-24 PROCEDURE — 25800030 ZZH RX IP 258 OP 636: Performed by: ANESTHESIOLOGY

## 2020-04-24 PROCEDURE — 25000566 ZZH SEVOFLURANE, EA 15 MIN: Performed by: SURGERY

## 2020-04-24 PROCEDURE — 84100 ASSAY OF PHOSPHORUS: CPT | Performed by: INTERNAL MEDICINE

## 2020-04-24 PROCEDURE — 0FT44ZZ RESECTION OF GALLBLADDER, PERCUTANEOUS ENDOSCOPIC APPROACH: ICD-10-PCS | Performed by: SURGERY

## 2020-04-24 PROCEDURE — 40000275 ZZH STATISTIC RCP TIME EA 10 MIN

## 2020-04-24 PROCEDURE — 83735 ASSAY OF MAGNESIUM: CPT | Performed by: INTERNAL MEDICINE

## 2020-04-24 PROCEDURE — 40000170 ZZH STATISTIC PRE-PROCEDURE ASSESSMENT II: Performed by: SURGERY

## 2020-04-24 PROCEDURE — 25000131 ZZH RX MED GY IP 250 OP 636 PS 637: Performed by: ANESTHESIOLOGY

## 2020-04-24 PROCEDURE — 37000008 ZZH ANESTHESIA TECHNICAL FEE, 1ST 30 MIN: Performed by: SURGERY

## 2020-04-24 PROCEDURE — 94660 CPAP INITIATION&MGMT: CPT

## 2020-04-24 PROCEDURE — 25000125 ZZHC RX 250: Performed by: NURSE ANESTHETIST, CERTIFIED REGISTERED

## 2020-04-24 PROCEDURE — 47562 LAPAROSCOPIC CHOLECYSTECTOMY: CPT | Mod: AS | Performed by: PHYSICIAN ASSISTANT

## 2020-04-24 PROCEDURE — 88304 TISSUE EXAM BY PATHOLOGIST: CPT | Mod: 26 | Performed by: HOSPITALIST

## 2020-04-24 PROCEDURE — 00000146 ZZHCL STATISTIC GLUCOSE BY METER IP

## 2020-04-24 PROCEDURE — 47562 LAPAROSCOPIC CHOLECYSTECTOMY: CPT | Performed by: SURGERY

## 2020-04-24 PROCEDURE — 99232 SBSQ HOSP IP/OBS MODERATE 35: CPT | Performed by: PHYSICIAN ASSISTANT

## 2020-04-24 PROCEDURE — 84132 ASSAY OF SERUM POTASSIUM: CPT | Performed by: INTERNAL MEDICINE

## 2020-04-24 PROCEDURE — 71000013 ZZH RECOVERY PHASE 1 LEVEL 1 EA ADDTL HR: Performed by: SURGERY

## 2020-04-24 PROCEDURE — 81025 URINE PREGNANCY TEST: CPT | Performed by: ANESTHESIOLOGY

## 2020-04-24 PROCEDURE — 88304 TISSUE EXAM BY PATHOLOGIST: CPT | Performed by: HOSPITALIST

## 2020-04-24 PROCEDURE — 25000128 H RX IP 250 OP 636: Performed by: ANESTHESIOLOGY

## 2020-04-24 PROCEDURE — 20000003 ZZH R&B ICU

## 2020-04-24 PROCEDURE — 27210794 ZZH OR GENERAL SUPPLY STERILE: Performed by: SURGERY

## 2020-04-24 PROCEDURE — 25000132 ZZH RX MED GY IP 250 OP 250 PS 637: Performed by: PHYSICIAN ASSISTANT

## 2020-04-24 RX ORDER — INDOMETHACIN 50 MG/1
100 SUPPOSITORY RECTAL
Status: DISCONTINUED | OUTPATIENT
Start: 2020-04-24 | End: 2020-04-26

## 2020-04-24 RX ORDER — SODIUM CHLORIDE, SODIUM LACTATE, POTASSIUM CHLORIDE, CALCIUM CHLORIDE 600; 310; 30; 20 MG/100ML; MG/100ML; MG/100ML; MG/100ML
INJECTION, SOLUTION INTRAVENOUS CONTINUOUS
Status: DISCONTINUED | OUTPATIENT
Start: 2020-04-24 | End: 2020-04-24 | Stop reason: HOSPADM

## 2020-04-24 RX ORDER — NALOXONE HYDROCHLORIDE 0.4 MG/ML
.1-.4 INJECTION, SOLUTION INTRAMUSCULAR; INTRAVENOUS; SUBCUTANEOUS
Status: DISCONTINUED | OUTPATIENT
Start: 2020-04-24 | End: 2020-04-24

## 2020-04-24 RX ORDER — ONDANSETRON 4 MG/1
4 TABLET, ORALLY DISINTEGRATING ORAL EVERY 30 MIN PRN
Status: DISCONTINUED | OUTPATIENT
Start: 2020-04-24 | End: 2020-04-24 | Stop reason: HOSPADM

## 2020-04-24 RX ORDER — LIDOCAINE HYDROCHLORIDE 20 MG/ML
INJECTION, SOLUTION INFILTRATION; PERINEURAL PRN
Status: DISCONTINUED | OUTPATIENT
Start: 2020-04-24 | End: 2020-04-24

## 2020-04-24 RX ORDER — HYDROMORPHONE HYDROCHLORIDE 1 MG/ML
.3-.5 INJECTION, SOLUTION INTRAMUSCULAR; INTRAVENOUS; SUBCUTANEOUS EVERY 5 MIN PRN
Status: DISCONTINUED | OUTPATIENT
Start: 2020-04-24 | End: 2020-04-24 | Stop reason: HOSPADM

## 2020-04-24 RX ORDER — FENTANYL CITRATE 50 UG/ML
INJECTION, SOLUTION INTRAMUSCULAR; INTRAVENOUS PRN
Status: DISCONTINUED | OUTPATIENT
Start: 2020-04-24 | End: 2020-04-24

## 2020-04-24 RX ORDER — PROPOFOL 10 MG/ML
INJECTION, EMULSION INTRAVENOUS PRN
Status: DISCONTINUED | OUTPATIENT
Start: 2020-04-24 | End: 2020-04-24

## 2020-04-24 RX ORDER — ONDANSETRON 2 MG/ML
INJECTION INTRAMUSCULAR; INTRAVENOUS PRN
Status: DISCONTINUED | OUTPATIENT
Start: 2020-04-24 | End: 2020-04-24

## 2020-04-24 RX ORDER — GLYCOPYRROLATE 0.2 MG/ML
INJECTION, SOLUTION INTRAMUSCULAR; INTRAVENOUS PRN
Status: DISCONTINUED | OUTPATIENT
Start: 2020-04-24 | End: 2020-04-24

## 2020-04-24 RX ORDER — DEXAMETHASONE SODIUM PHOSPHATE 4 MG/ML
4 INJECTION, SOLUTION INTRA-ARTICULAR; INTRALESIONAL; INTRAMUSCULAR; INTRAVENOUS; SOFT TISSUE
Status: DISCONTINUED | OUTPATIENT
Start: 2020-04-24 | End: 2020-04-24 | Stop reason: HOSPADM

## 2020-04-24 RX ORDER — NEOSTIGMINE METHYLSULFATE 1 MG/ML
VIAL (ML) INJECTION PRN
Status: DISCONTINUED | OUTPATIENT
Start: 2020-04-24 | End: 2020-04-24

## 2020-04-24 RX ORDER — FENTANYL CITRATE 50 UG/ML
25-50 INJECTION, SOLUTION INTRAMUSCULAR; INTRAVENOUS
Status: DISCONTINUED | OUTPATIENT
Start: 2020-04-24 | End: 2020-04-24 | Stop reason: HOSPADM

## 2020-04-24 RX ORDER — OXYCODONE HYDROCHLORIDE 5 MG/1
5-10 TABLET ORAL
Status: DISCONTINUED | OUTPATIENT
Start: 2020-04-24 | End: 2020-04-29 | Stop reason: HOSPADM

## 2020-04-24 RX ORDER — ONDANSETRON 2 MG/ML
4 INJECTION INTRAMUSCULAR; INTRAVENOUS EVERY 30 MIN PRN
Status: DISCONTINUED | OUTPATIENT
Start: 2020-04-24 | End: 2020-04-24 | Stop reason: HOSPADM

## 2020-04-24 RX ORDER — ACETAMINOPHEN 325 MG/1
650 TABLET ORAL EVERY 4 HOURS PRN
COMMUNITY
Start: 2020-04-24

## 2020-04-24 RX ORDER — AMOXICILLIN 250 MG
1-2 CAPSULE ORAL 2 TIMES DAILY PRN
Qty: 20 TABLET | Refills: 0 | Status: ON HOLD | OUTPATIENT
Start: 2020-04-24 | End: 2021-01-01

## 2020-04-24 RX ORDER — DEXAMETHASONE SODIUM PHOSPHATE 4 MG/ML
INJECTION, SOLUTION INTRA-ARTICULAR; INTRALESIONAL; INTRAMUSCULAR; INTRAVENOUS; SOFT TISSUE PRN
Status: DISCONTINUED | OUTPATIENT
Start: 2020-04-24 | End: 2020-04-24

## 2020-04-24 RX ORDER — LIDOCAINE 40 MG/G
CREAM TOPICAL
Status: DISCONTINUED | OUTPATIENT
Start: 2020-04-24 | End: 2020-04-25

## 2020-04-24 RX ORDER — OXYCODONE HYDROCHLORIDE 5 MG/1
5-10 TABLET ORAL EVERY 4 HOURS PRN
Qty: 15 TABLET | Refills: 0 | Status: SHIPPED | OUTPATIENT
Start: 2020-04-24 | End: 2020-04-27

## 2020-04-24 RX ADMIN — PHENYLEPHRINE HYDROCHLORIDE 100 MCG: 10 INJECTION INTRAVENOUS at 09:20

## 2020-04-24 RX ADMIN — DEXAMETHASONE SODIUM PHOSPHATE 4 MG: 4 INJECTION, SOLUTION INTRA-ARTICULAR; INTRALESIONAL; INTRAMUSCULAR; INTRAVENOUS; SOFT TISSUE at 09:35

## 2020-04-24 RX ADMIN — ROCURONIUM BROMIDE 10 MG: 10 INJECTION INTRAVENOUS at 09:10

## 2020-04-24 RX ADMIN — INSULIN ASPART 3 UNITS: 100 INJECTION, SOLUTION INTRAVENOUS; SUBCUTANEOUS at 19:55

## 2020-04-24 RX ADMIN — SODIUM CHLORIDE, POTASSIUM CHLORIDE, SODIUM LACTATE AND CALCIUM CHLORIDE: 600; 310; 30; 20 INJECTION, SOLUTION INTRAVENOUS at 08:45

## 2020-04-24 RX ADMIN — HYDROMORPHONE HYDROCHLORIDE 0.5 MG: 1 INJECTION, SOLUTION INTRAMUSCULAR; INTRAVENOUS; SUBCUTANEOUS at 11:30

## 2020-04-24 RX ADMIN — GLYCOPYRROLATE 0.8 MG: 0.2 INJECTION, SOLUTION INTRAMUSCULAR; INTRAVENOUS at 10:49

## 2020-04-24 RX ADMIN — HYDROCORTISONE SODIUM SUCCINATE 50 MG: 100 INJECTION, POWDER, FOR SOLUTION INTRAMUSCULAR; INTRAVENOUS at 17:30

## 2020-04-24 RX ADMIN — INSULIN ASPART 2 UNITS: 100 INJECTION, SOLUTION INTRAVENOUS; SUBCUTANEOUS at 12:38

## 2020-04-24 RX ADMIN — PHENYLEPHRINE HYDROCHLORIDE 300 MCG: 10 INJECTION INTRAVENOUS at 09:13

## 2020-04-24 RX ADMIN — ROCURONIUM BROMIDE 30 MG: 10 INJECTION INTRAVENOUS at 09:23

## 2020-04-24 RX ADMIN — OXYCODONE HYDROCHLORIDE 5 MG: 5 TABLET ORAL at 16:42

## 2020-04-24 RX ADMIN — INSULIN ASPART 1 UNITS: 100 INJECTION, SOLUTION INTRAVENOUS; SUBCUTANEOUS at 04:25

## 2020-04-24 RX ADMIN — PIPERACILLIN SODIUM AND TAZOBACTAM SODIUM 3.38 G: 3; .375 INJECTION, POWDER, LYOPHILIZED, FOR SOLUTION INTRAVENOUS at 17:30

## 2020-04-24 RX ADMIN — ONDANSETRON 4 MG: 2 INJECTION INTRAMUSCULAR; INTRAVENOUS at 09:40

## 2020-04-24 RX ADMIN — INSULIN ASPART 2 UNITS: 100 INJECTION, SOLUTION INTRAVENOUS; SUBCUTANEOUS at 23:57

## 2020-04-24 RX ADMIN — PIPERACILLIN SODIUM AND TAZOBACTAM SODIUM 3.38 G: 3; .375 INJECTION, POWDER, LYOPHILIZED, FOR SOLUTION INTRAVENOUS at 05:45

## 2020-04-24 RX ADMIN — PIPERACILLIN SODIUM AND TAZOBACTAM SODIUM 3.38 G: 3; .375 INJECTION, POWDER, LYOPHILIZED, FOR SOLUTION INTRAVENOUS at 23:52

## 2020-04-24 RX ADMIN — INSULIN ASPART 2 UNITS: 100 INJECTION, SOLUTION INTRAVENOUS; SUBCUTANEOUS at 13:22

## 2020-04-24 RX ADMIN — PHENYLEPHRINE HYDROCHLORIDE 0.25 MCG/KG/MIN: 10 INJECTION INTRAVENOUS at 09:19

## 2020-04-24 RX ADMIN — SODIUM CHLORIDE, POTASSIUM CHLORIDE, SODIUM LACTATE AND CALCIUM CHLORIDE: 600; 310; 30; 20 INJECTION, SOLUTION INTRAVENOUS at 10:39

## 2020-04-24 RX ADMIN — PROPOFOL 200 MG: 10 INJECTION, EMULSION INTRAVENOUS at 09:10

## 2020-04-24 RX ADMIN — SUCCINYLCHOLINE CHLORIDE 200 MG: 20 INJECTION, SOLUTION INTRAMUSCULAR; INTRAVENOUS; PARENTERAL at 09:10

## 2020-04-24 RX ADMIN — NEOSTIGMINE METHYLSULFATE 5 MG: 1 INJECTION, SOLUTION INTRAVENOUS at 10:49

## 2020-04-24 RX ADMIN — LIDOCAINE HYDROCHLORIDE 100 MG: 20 INJECTION, SOLUTION INFILTRATION; PERINEURAL at 09:10

## 2020-04-24 RX ADMIN — FENTANYL CITRATE 25 MCG: 50 INJECTION, SOLUTION INTRAMUSCULAR; INTRAVENOUS at 10:04

## 2020-04-24 RX ADMIN — BUPIVACAINE HYDROCHLORIDE AND EPINEPHRINE BITARTRATE 56 ML: 5; .005 INJECTION, SOLUTION EPIDURAL; INTRACAUDAL; PERINEURAL at 10:32

## 2020-04-24 RX ADMIN — PHENYLEPHRINE HYDROCHLORIDE 200 MCG: 10 INJECTION INTRAVENOUS at 09:11

## 2020-04-24 RX ADMIN — FENTANYL CITRATE 25 MCG: 50 INJECTION, SOLUTION INTRAMUSCULAR; INTRAVENOUS at 09:10

## 2020-04-24 RX ADMIN — INSULIN ASPART 2 UNITS: 100 INJECTION, SOLUTION INTRAVENOUS; SUBCUTANEOUS at 16:47

## 2020-04-24 RX ADMIN — HYDROCORTISONE SODIUM SUCCINATE 50 MG: 100 INJECTION, POWDER, FOR SOLUTION INTRAMUSCULAR; INTRAVENOUS at 05:41

## 2020-04-24 RX ADMIN — FENTANYL CITRATE 25 MCG: 50 INJECTION, SOLUTION INTRAMUSCULAR; INTRAVENOUS at 09:48

## 2020-04-24 RX ADMIN — INSULIN ASPART 1 UNITS: 100 INJECTION, SOLUTION INTRAVENOUS; SUBCUTANEOUS at 00:03

## 2020-04-24 RX ADMIN — ROCURONIUM BROMIDE 10 MG: 10 INJECTION INTRAVENOUS at 09:42

## 2020-04-24 RX ADMIN — PHENYLEPHRINE HYDROCHLORIDE 200 MCG: 10 INJECTION INTRAVENOUS at 09:16

## 2020-04-24 RX ADMIN — OXYCODONE HYDROCHLORIDE 5 MG: 5 TABLET ORAL at 17:30

## 2020-04-24 ASSESSMENT — ACTIVITIES OF DAILY LIVING (ADL)
ADLS_ACUITY_SCORE: 17
ADLS_ACUITY_SCORE: 16

## 2020-04-24 ASSESSMENT — LIFESTYLE VARIABLES: TOBACCO_USE: 0

## 2020-04-24 ASSESSMENT — MIFFLIN-ST. JEOR: SCORE: 2011.38

## 2020-04-24 NOTE — PROGRESS NOTES
[facility]    Hospitalist Progress Note      Assessment & Plan   Pinky Whatley is a 54 year old female with a past medical history significant for rheumatoid arthritis, type 2 diabetes, hypertension, morbid obesity, HTN, hypothyroidism, and HLD who was admitted on 4/19/2020 after presenting with abdominal pain. She was transferred to the ICU 4/20 due to decompensation ultimately requiring pressor support. Hospitalist service requested 4/24 for transfer of care out of ICU.     Cholecystitis s/p cholecystectomy 4/24/20  Choledocholithiasis with ascending cholangitis s/p sphincterotomy and stent placement 4/20/20  Septic shock related to above, resolved  Patient presented with severe abdominal pain. Imaging showed distended gallbladder with bile duct dilatation and labs notable for LFT elevation. Shortly after admission she developed evidence of sepsis with concern for ascending cholangitis. Ultimately transferred to ICU for pressor support, weaned off 4/22.  She was started on zosyn 4/20 and was taken urgently for ERCP which confirmed acute cholangitis with impacted stone; she underwent sphincterotomy and stent placement at that time. Underwent cholecystectomy once medically stabilized 4/24. LFTs down trending. WBC improving as well. Afebrile >24 hours with stable vital signs.   --General Surgery following, appreciate assistance. Will defer post op cares to their service  --GI following, appreciate assistance  --diet per surgery  --continue zosyn, day 5  --continue to wean stress dose steroids, currently receiving hydrocortisone 50mg q12 hours, will plan to wean to once daily 4/25   --continue prn pain medications    Acute hypoxic respiratory failure, improving  Reported history obesity hypoventilation syndrome   Respiratory status worsened 4/20 in the setting of developing sepsis. She was intubated for procedure 4/20 and remained intubated overnight due to tachypnea and increased WOB. Extubated  4/21. Since then has been on BiPAP at night, prn. CT chest on 4/20 with infiltrate vs atelectasis with consolidation seen lingula and left base. Subsequent CXR with mild pulmonary edema. Received lasix 4/22-4/23 which was stopped due to Cr elevation. Some contribution of body habitus suspected as well. Now stable on 2-3L NC.   --mobilize as able  --wean oxygen as able  --encourage IS  --BiPAP at night and prn through the day  --I&O and weights   --reassess for diuresis in the am     ROBBIE. Cr initially 0.92, peak to 2.02 in the setting of septic shock. Cr has trended down to 1.53. Received some diuresis for suspected volume overload.   --follow BMP  --continue to hold PTA lisinopril, metformin   --reassess volume status tomorrow     Type 2 Diabetes Mellitus. A1C is 6.7. PTA regimen includes glimepiride 4mg bid, metformin 1000mg bid, lantus 50u at bedtime. Since admission has been receiving sliding scale only.   --lantus has been on hold since admission, resume 5 units this evening and up titrate as able   --continue medium sliding scale as needed, will continue q4 hours until po intake increases   --hold PTA oral mediations for now    Rheumatoid arthritis. PTA on hydroxychloroquine, methotrexate, prednisone 5mg daily, sulfasalazine, and xeljanz.   --PTA medications have been on hold in the setting of infection     Left ovarian cyst. Imaging reviewed by OBGYN service 4/20, recommending follow up US in 2-3 months post discharge.     Hypertension. On lisinopril 20mg daily alone PTA.   --lisinopril on hold    HLD. Can resume statin in next few days     Hypothyroidism. Continue PTA levothyroxine     FEN. Clears only today. SLP following.     DVT Prophylaxis: PCDs for now   Code Status: Full Code    Disposition: Expected discharge in 2+ days. May need PT tomorrow    Family (mother) updated by ICU provider today per their note     This patient was seen and examined with Dr. Griffin who agrees with the above plan.    Mildred  Nedra Schneider PA-C    Interval History   Doing well this afternoon. Having some mild pain RUQ after surgery. Denies nausea. Tolerating clears without difficulty. No nausea or vomiting. Denies chest pain, cough, palpitations, dizziness. Feels breathing improved today compared to yesterday. Denies fevers or chills.     -Data reviewed today: I reviewed all new labs and imaging results over the last 24 hours. I personally reviewed no images or EKG's today.    Physical Exam   Temp: 99  F (37.2  C) Temp src: Skin(Spot-on device) BP: 134/85   Heart Rate: 94 Resp: 25 SpO2: 94 % O2 Device: BiPAP/CPAP Oxygen Delivery: 2 LPM  Vitals:    04/22/20 0500 04/23/20 0400 04/24/20 0352   Weight: 146.1 kg (322 lb 1.5 oz) 143.4 kg (316 lb 2.2 oz) 146.4 kg (322 lb 12.1 oz)     Vital Signs with Ranges  Temp:  [96.9  F (36.1  C)-99.2  F (37.3  C)] 99  F (37.2  C)  Heart Rate:  [] 94  Resp:  [0-35] 25  BP: (131-182)/() 134/85  MAP:  [73 mmHg-113 mmHg] 89 mmHg  Arterial Line BP: ()/(56-90) 124/67  FiO2 (%):  [30 %-80 %] 40 %  SpO2:  [94 %-100 %] 94 %  I/O last 3 completed shifts:  In: 691 [P.O.:300; I.V.:391]  Out: 2015 [Urine:2015]    Constitutional: Alert and oriented x4, sitting up in bed. Appears comfortable and is pleasantly conversant   ENT: normal cephalic, dry mucous membranes  Eyes:  Sclera anicteric, EOMI  Respiratory: Lungs clear to auscultation in anterior fields. Mild tachypnea at times but normal effort.   Cardiovascular: Regular rate and rhythm, no murmur, trace-1+ bilateral pedal edema   GI: hypoactive bowel sounds, abdomen soft, obese, appropriately tender without rebound or guarding. Incisions c/d/i  MSK:  Moves all four extremities.  strength +5/5 and equal   Neuro:  Speech is clear. Face symmetric. No focal deficits      Medications     - MEDICATION INSTRUCTIONS -       sodium chloride 10 mL/hr at 04/23/20 1054       hydrocortisone sodium succinate PF  50 mg Intravenous Q12H     insulin aspart  1-6  Units Subcutaneous Q4H     levothyroxine  125 mcg Oral Daily     piperacillin-tazobactam  3.375 g Intravenous Q6H     sodium chloride (PF)  3 mL Intracatheter Q8H     sodium chloride (PF)  3 mL Intracatheter Q8H       Data   Recent Labs   Lab 04/24/20  0425 04/24/20  0105 04/23/20  1645 04/23/20  0422 04/22/20  1819 04/22/20  0455  04/20/20  0603 04/19/20  2138   WBC 12.0*  --   --  21.0*  --  19.4*   < > 6.8 10.1   HGB 10.5*  --   --  11.1*  --  10.4*   < > 12.0 12.1   MCV 96  --   --  96  --  98   < > 97 96   *  --   --  158  --  140*   < > 185 223   INR  --   --   --   --   --   --   --  1.09  --      --   --  140 139 139   < > 133 133   POTASSIUM 3.9 3.8 3.3* 3.1* 3.7 4.3   < > 4.2 4.4   CHLORIDE 108  --   --  106 108 110*   < > 100 99   CO2 29  --   --  29 26 22   < > 26 24   BUN 33*  --   --  34* 35* 33*   < > 20 25   CR 1.53*  --   --  1.87* 1.82* 1.71*   < > 0.92 0.80   ANIONGAP 4  --   --  5 5 7   < > 7 10   CHANTELLE 8.8  --   --  9.0 9.0 8.9   < > 8.9 9.8   *  --   --  202* 171* 119*   < > 254* 251*   ALBUMIN 2.7*  --   --  3.0*  --  3.0*   < > 3.6 3.9   PROTTOTAL 6.1*  --   --  6.6*  --  6.3*   < > 6.8 6.9   BILITOTAL 1.2  --   --  2.1*  --  4.2*   < > 3.7* 1.9*   ALKPHOS 297*  --   --  324*  --  191*   < > 139 112   *  --   --  276*  --  325*   < > 387* 321*   AST 74*  --   --  117*  --  184*   < > 369* 483*   LIPASE  --   --   --   --   --   --   --   --  167   TROPI  --   --   --   --   --   --   --  <0.015 <0.015    < > = values in this interval not displayed.       No results found for this or any previous visit (from the past 24 hour(s)).

## 2020-04-24 NOTE — ANESTHESIA PREPROCEDURE EVALUATION
Anesthesia Pre-Procedure Evaluation    Patient: Pinky Whatley   MRN: [id number] : 1965          Preoperative Diagnosis: Cholecystitis [K81.9]    Procedure(s):  LAPAROSCOPIC CHOLECYSTECTOMY    Past Medical History:   Diagnosis Date     Arthritis     rheumatoid     Diabetes (H)      Past Surgical History:   Procedure Laterality Date     CARPAL TUNNEL RELEASE RT/LT       ENDOSCOPIC RETROGRADE CHOLANGIOPANCREATOGRAM N/A 2020    Procedure: ENDOSCOPIC RETROGRADE CHOLANGIOPANCREATOGRAPHY;  Surgeon: Valentin Hernandez MD;  Location:  OR     ENDOSCOPIC RETROGRADE CHOLANGIOPANCREATOGRAM N/A 2020    Procedure: Endoscopic Retrograde Cholangiopancreatography, With Sphincterotomy;  Surgeon: Valentin Hernandez MD;  Location:  OR     ENDOSCOPIC RETROGRADE CHOLANGIOPANCREATOGRAM N/A 2020    Procedure: Endoscopic Retrograde Cholangiopancreatography, With Tube Or Stent Insertion;  Surgeon: Valentin Hernandez MD;  Location:  OR     ENDOSCOPIC RETROGRADE CHOLANGIOPANCREATOGRAM N/A 2020    Procedure: Endoscopic Retrograde Cholangiopancreatography, With Calculus Removal Using Balloon Catheter;  Surgeon: Valentin Hernandez MD;  Location:  OR     ENT SURGERY      tonsillectomy     ORTHOPEDIC SURGERY      [facility]       Anesthesia Evaluation     . Pt has had prior anesthetic. Type: General    No history of anesthetic complications          ROS/MED HX    ENT/Pulmonary:     (+)sleep apnea, obese, daytime somnolence, doesn't use CPAP , . .   (-) tobacco use   Neurologic:       Cardiovascular:     (+) Dyslipidemia, hypertension----. : . . . :. .       METS/Exercise Tolerance:  >4 METS   Hematologic:     (+) Anemia, -      Musculoskeletal:   (+) arthritis,  other musculoskeletal- Rheumatoid arthitis      GI/Hepatic:     (+) GERD       Renal/Genitourinary:     (+) chronic renal disease (ROBBIE),       Endo:     (+) type II DM Using insulin thyroid problem hypothyroidism, Obesity (Class 4), .   (-)  "Type I DM   Psychiatric:         Infectious Disease:   (+) Recent Fever,       Malignancy:         Other:    (+) No chance of pregnancy                         Physical Exam  Normal systems: cardiovascular, pulmonary and dental    Airway   Mallampati: III  TM distance: >3 FB  Neck ROM: full    Dental     Cardiovascular       Pulmonary             Lab Results   Component Value Date    WBC 12.0 (H) 04/24/2020    HGB 10.5 (L) 04/24/2020    HCT 32.7 (L) 04/24/2020     (L) 04/24/2020     04/24/2020    POTASSIUM 3.9 04/24/2020    CHLORIDE 108 04/24/2020    CO2 29 04/24/2020    BUN 33 (H) 04/24/2020    CR 1.53 (H) 04/24/2020     (H) 04/24/2020    CHANTELLE 8.8 04/24/2020    PHOS 2.2 (L) 04/24/2020    MAG 2.3 04/24/2020    ALBUMIN 2.7 (L) 04/24/2020    PROTTOTAL 6.1 (L) 04/24/2020     (H) 04/24/2020    AST 74 (H) 04/24/2020    ALKPHOS 297 (H) 04/24/2020    BILITOTAL 1.2 04/24/2020    LIPASE 167 04/19/2020    INR 1.09 04/20/2020    TSH 5.63 (H) 09/09/2019    T4 1.34 09/09/2019    HCG Negative 04/24/2020    HCGS Negative 04/19/2020       Preop Vitals  BP Readings from Last 3 Encounters:   04/24/20 (!) 182/88   10/14/19 138/83   09/10/19 126/80    Pulse Readings from Last 3 Encounters:   04/20/20 114   10/14/19 120   09/10/19 83      Resp Readings from Last 3 Encounters:   04/24/20 25   10/14/19 18   09/10/19 16    SpO2 Readings from Last 3 Encounters:   04/24/20 95%   10/14/19 92%   09/10/19 97%      Temp Readings from Last 1 Encounters:   04/24/20 36.5  C (97.7  F) (Temporal)    Ht Readings from Last 1 Encounters:   04/19/20 1.549 m (5' 1\")      Wt Readings from Last 1 Encounters:   04/24/20 146.4 kg (322 lb 12.1 oz)    Estimated body mass index is 60.98 kg/m  as calculated from the following:    Height as of this encounter: 1.549 m (5' 1\").    Weight as of this encounter: 146.4 kg (322 lb 12.1 oz).       Anesthesia Plan      History & Physical Review  History and physical reviewed and following " examination; no interval change.    ASA Status:  3 .    NPO Status:  > 6 hours    Plan for General and ETT with Intravenous induction. Maintenance will be Balanced.    PONV prophylaxis:  Ondansetron (or other 5HT-3) and Dexamethasone or Solumedrol  Additional equipment: Videolaryngoscope        Postoperative Care  Postoperative pain management:  Oral pain medications and IV analgesics.      Consents  Anesthetic plan, risks, benefits and alternatives discussed with:  Patient..                 Karson Aguayo MD

## 2020-04-24 NOTE — PLAN OF CARE
Patient returned from OR @ 1315, sleepy easily arouses to voice + oriented x 4, rate pain 3/10 , patient states she does not need any pain medication at this time, sats 93-95% 02 @ 2 L NC, lungs clear, ABD wounds clean /dry intact , urine output good, Bp stable, denies nausea, tolerating ice chips

## 2020-04-24 NOTE — DISCHARGE INSTRUCTIONS
[facility]  Discharge Instructions: Post-Operative Laparoscopic Cholecystectomy    ACTIVITY    Expect to feel tired after your surgery.  This will gradually resolve.      Take frequent, short walks and increase your activity gradually.      Avoid strenuous physical activity or heavy lifting greater than 15-20 lbs. for 2-3 weeks.  You may climb stairs.    You may drive without restrictions when you are not using any prescription pain medication and feel comfortable in a car.    You may return to work/school when you are comfortable without any prescription pain medication.    WOUND CARE    You may shower 48 hours after the surgery.  Pat your incisions dry and leave them open to air.     You have skin glue on your incisions. Do not pick at the glue; it will gradually peel up and fall off on its own.    Do not soak your incisions in a tub or pool for 2 weeks.     Do not apply any lotions, creams, or ointments to your incisions.    A ridge under your incisions is normal and will gradually resolve.    DIET    Start with liquids, then gradually resume your regular diet as tolerated.  Avoid heavy, spicy, and greasy meals for 2-3 days.    Drink plenty of fluids to stay hydrated.    It is not uncommon to experience some loose stools or diarrhea after surgery.  This is your body s way of adapting to the bile which will slowly drain into your intestine.  A low fat diet may help with this.  This should improve over 1-2 months.    PAIN    Expect some tenderness and discomfort at the incision sites.  Use the prescribed pain medication at your discretion.  Expect gradual resolution of your pain over several days.    You may take ibuprofen with food (unless you have been told not to) instead of or in addition to your prescribed pain medication.  If you are taking Norco or Percocet, do not take any additional acetaminophen/APAP/Tylenol.    Do not drink alcohol or drive while you are taking  pain medications.    You may apply ice to your incisions in 20 minute intervals as needed for the next 48 hours.  After that time, consider switching to heat if you prefer.    EXPECTATIONS    Pain medications can cause constipation.  Limit use when possible.  Take over the counter stool softener/stimulant, such as Colace or Senna, 1-2 times a day with plenty of water.  You may take a mild over the counter laxative, such as Miralax or a suppository, as needed.  You may discontinue these medications once you are having regular bowel movements and/or are no longer taking your narcotic pain medication.       You may have shoulder or upper back discomfort due to the gas used in surgery.  This is temporary and should resolve in 48-72 hours.  Short, frequent walks may help with this.    FOLLOW UP    Our surgical office will contact you in approximately 2 weeks to check on your progress and answer any questions you may have.  If you are doing well, you will not need to return for a follow up appointment.  If any concerns are identified over the phone, we will help you make an appointment to see a provider.     If you have not received a phone call, have any questions or concerns, or would like to be seen, please call us at [phone] and ask to speak with our nurse.  We are located at [address] [place] [address].    CALL OUR OFFICE -[phone] IF YOU HAVE:     Chills or fever above 101 F.    Increased redness, warmth, or drainage at your incisions.    Significant bleeding.    Pain not relieved by your pain medication or rest.    Increasing pain after the first 48 hours.    Any other concerns or questions.    Revised July 2018

## 2020-04-24 NOTE — PROGRESS NOTES
Surgery:    55 yo F with cholecystitis- Will proceed with lap edouard today.  Patient in agreement.    Agustina Nettles MD

## 2020-04-24 NOTE — ANESTHESIA CARE TRANSFER NOTE
Patient: Pinky Whatley    Procedure(s):  LAPAROSCOPIC CHOLECYSTECTOMY    Diagnosis: Cholecystitis [K81.9]  Diagnosis Additional Information: No value filed.    Anesthesia Type:   General, ETT     Note:  Airway :Other (See Comments) (ambu assisted ventilation to PACU - BiPaP in PACU)  Patient transferred to:PACU  Comments: Patient breathing spontaneously.  Follows commands.  Suctioned and extubated.  Spontaneous ventilation with C-Pap.  Transferred to PACU with ambu assisted ventialtion. BiPap in PACU.  Monitors on.  VSS.  Patent IV.  Report and transfer of care to RN.  Handoff Report: Identifed the Patient, Identified the Reponsible Provider, Reviewed the pertinent medical history, Discussed the surgical course, Reviewed Intra-OP anesthesia mangement and issues during anesthesia, Set expectations for post-procedure period and Allowed opportunity for questions and acknowledgement of understanding      Vitals: (Last set prior to Anesthesia Care Transfer)    CRNA VITALS  4/24/2020 1048 - 4/24/2020 1130      4/24/2020             Resp Rate (observed):  (!) 37                Electronically Signed By: BRANDIE Tony CRNA  April 24, 2020  11:30 AM

## 2020-04-24 NOTE — PROGRESS NOTES
Critical Care Progress Note      04/24/2020    Name: Pinky Whatley MRN#: [id number]   Age: 54 year old YOB: 1965     Hsptl Day# 4  ICU DAY #4               Problem List:   Principal Problem:    Cholecystitis  Active Problems:    Abdominal pain, acute    Acute kidney failure, unspecified (H)  septic shock -- resolved         Summary/Hospital Course:     54F DM, RA presented for upper abdominal pain on 4/19.  CT abd/pelv with mild biliary dilation.  The morning of 4/20 she developed tachycardia and increased respiratory rate and was transferred to ICU.  Here she had initially stabilized but subsequently developed hypotension requiring initiation of levophed.  Taken for ERCP on 4/20 with successful removal of obstruction and stent placement.  Kept intubated that night due to tachypnea and mildly labored breathing pre-op, as well as body habitus.  The afternoon of 4/21 she passed an sbt and was extubated.  Overnight on 4/21-22 she weaned off of pressors.  She went for cholecystectomy on 4/24.  On my visit she is feeling well post-op.  Abdominal pain appropriate to procedure  No nausea/vomiting.  No shortness of breath and work of breathing actually appears to be improving.  No dizzyness/lightheadedness.      Assessment and plan :     Pinky Whatley IS a 54 year old female admitted on 4/19/2020 for abdominal pain, now septic shock, suspected ascending cholangitis.   I have personally reviewed the daily labs, imaging studies, cultures and discussed the case with referring physician and consulting physicians.   My assessment and plan by system for this patient is as follows:    Neurology/Psychiatry:   1.  Analgesia:  Prn dilaudid and norco    Cardiovascular:   1.  H/o hld/htn:  Holding home meds for now.  2.  Septic shock:  Resolved.  Weaned steroids to 50 bid on 4/23.  Would wean again on 4/25 to 50 daily, then back to her home dose of pred 5 daily on 4/27.    Pulmonary/Ventilator Management:   1.  Acute hypoxemia/dyspnea:  In setting of sepsis as noted above and likely body habitus related. Probably some pulmonary edema too given fluid resuscitation.  Diuresed well 4/22; diuresis holiday 4/23 for elevated creatinine (now improving 4/24).  OOB to chair.  bipap prn for recruitment.    GI and Nutrition :   1. Tolerating clears.  2. Ascending cholagitis:  Appreciate GI support/intervention.  Zosyn.  Cholecystectomy today (4/24)  3. Elevated transaminases:   Downtrending.  Csp754, alt74.    Renal/Fluids/Electrolytes/:   1. ROBBIE:  Creatinine downtrending 1.53.  In setting of recent sepsis.  Continue to monitor.  Diuresis holiday today given OR visit.  2.  Hypervolemia:  Following fluid resuscitation for sepsis.  Creatinine somewhat elevated but now downtrending; diuresis holiday today for OR.  3.  L ovarian cyst seen on CT:  Reviewed by Dr. Agee (see his note 4/20).  No immediate management needed.  Should be set up on discharge to follow up in GYN clinic in 2-3 months.    Infectious Disease:   1. Ascending cholangitis.  Continue zosyn until 4/27.  Appreciate GI support.    Endocrine:   1. Hyperglycemia:  Continue prn aspart for now, holding lantus.  2.  Hypothyroid:  Resuming home synthroid.    Hematology/Oncology:   1. Wbc 12.0 elevated in setting of sepsis, but now stabilized  2.  hgb 10.5 ok  3. pltlts 149 acceptable    MSK:  1.  H/o RA:  Holding home methotrexate/plaquenil and xeljanz for now.    ICU Prophylaxis:   1. DVT: mechanical  2. Wound care - per unit routine   3. Feeding - tolerating clears  4. Family Update: mother zaid by phone  5. Disposition - icu           Interim History:     See above.         Key Medications:       [Auto Hold] hydrocortisone sodium succinate PF  50 mg Intravenous Q12H     [Auto Hold] insulin aspart  1-6 Units Subcutaneous Q4H     [Auto Hold] levothyroxine  125 mcg Oral Daily     [Auto Hold] piperacillin-tazobactam  3.375 g Intravenous Q6H     [Auto Hold] sodium chloride  (PF)  3 mL Intracatheter Q8H       lactated ringers 25 mL/hr at 04/24/20 0845     lactated ringers       - MEDICATION INSTRUCTIONS -       Another Antibiotic has been ordered.       sodium chloride 10 mL/hr at 04/23/20 1054               Physical Examination:   Temp:  [97.7  F (36.5  C)-99.2  F (37.3  C)] 97.7  F (36.5  C)  Heart Rate:  [] 95  Resp:  [0-35] 25  BP: (182)/(88) 182/88  MAP:  [73 mmHg-113 mmHg] 96 mmHg  Arterial Line BP: ()/(56-90) 139/75  FiO2 (%):  [30 %] 30 %  SpO2:  [94 %-100 %] 95 %        Intake/Output Summary (Last 24 hours) at 4/24/2020 1105  Last data filed at 4/24/2020 1039  Gross per 24 hour   Intake 1701 ml   Output 1395 ml   Net 306 ml         Wt Readings from Last 4 Encounters:   04/24/20 146.4 kg (322 lb 12.1 oz)   10/14/19 135 kg (297 lb 9.6 oz)   09/07/19 130.2 kg (287 lb)     Arterial Line BP: ()/(56-90) 139/75  MAP:  [73 mmHg-113 mmHg] 96 mmHg  BP - Mean:  [117] 117  FiO2 (%): 30 %  Resp: 25    Recent Labs   Lab 04/21/20  1230 04/21/20  1024 04/21/20  0544 04/20/20  2030   PH 7.29* 7.41 7.43 7.24*   PCO2 44 31* 29* 36   PO2 96 108* 126* 106*   HCO3 21 19* 19* 16*       GEN: no acute distress, pleasant   NECK:  supple  HEENT: head ncat, sclera anicteric, OP patent, trachea midline   PULM: tachypniec but unlabored, clear anteriorly    CV/COR: RRR S1S2 no gallop,  No rub, no murmur  ABD: soft, tenderness appropriate to procedure, hypoactive bowel sounds, no mass  EXT:  No gross Edema, warm x4  NEURO: awake, alert, appropriate, good strength throughout  SKIN: no obvious rash  LINES: clean, dry intact         Data:   All data and imaging reviewed     ROUTINE ICU LABS (Last four results)  CMP  Recent Labs   Lab 04/24/20  0425 04/24/20  0105 04/23/20  1645 04/23/20  0422 04/22/20  1819 04/22/20  0455 04/21/20  0544     --   --  140 139 139 137   POTASSIUM 3.9 3.8 3.3* 3.1* 3.7 4.3 4.2   CHLORIDE 108  --   --  106 108 110* 107   CO2 29  --   --  29 26 22 19*    ANIONGAP 4  --   --  5 5 7 11   *  --   --  202* 171* 119* 206*   BUN 33*  --   --  34* 35* 33* 32*   CR 1.53*  --   --  1.87* 1.82* 1.71* 2.02*   GFRESTIMATED 39*  --   --  30* 31* 34* 28*   GFRESTBLACK 45*  --   --  35* 36* 39* 32*   CHANTELLE 8.8  --   --  9.0 9.0 8.9 8.9   MAG 2.3  --   --  2.1 2.4* 2.2 1.9   PHOS 2.2*  --   --  3.3  --  3.5 3.4   PROTTOTAL 6.1*  --   --  6.6*  --  6.3* 6.1*   ALBUMIN 2.7*  --   --  3.0*  --  3.0* 3.2*   BILITOTAL 1.2  --   --  2.1*  --  4.2* 4.3*   ALKPHOS 297*  --   --  324*  --  191* 129   AST 74*  --   --  117*  --  184* 306*   *  --   --  276*  --  325* 403*     CBC  Recent Labs   Lab 04/24/20  0425 04/23/20  0422 04/22/20  0455 04/21/20  0544   WBC 12.0* 21.0* 19.4* 23.2*   RBC 3.40* 3.58* 3.30* 3.59*   HGB 10.5* 11.1* 10.4* 11.0*   HCT 32.7* 34.4* 32.2* 34.5*   MCV 96 96 98 96   MCH 30.9 31.0 31.5 30.6   MCHC 32.1 32.3 32.3 31.9   RDW 14.6 14.4 14.5 14.6   * 158 140* 170     INR  Recent Labs   Lab 04/20/20  0603   INR 1.09     Arterial Blood Gas  Recent Labs   Lab 04/21/20  1230 04/21/20  1024 04/21/20  0544 04/20/20  2030   PH 7.29* 7.41 7.43 7.24*   PCO2 44 31* 29* 36   PO2 96 108* 126* 106*   HCO3 21 19* 19* 16*       All cultures:  No results for input(s): CULT in the last 168 hours.  No results found for this or any previous visit (from the past 24 hour(s)).    Labs (personally reviewed/interpreted): see a/p    D/w hospitalist on call.

## 2020-04-24 NOTE — OR NURSING
PNDS Criteria met. Order received per Dr. Aguayo to transfer back to ICU for continued recovery.  Order received to transfer back to ICU with Nasal Cannula O2, but continue BiPAP in ICU as needed.  Hand-off report to RN.  To 370-1 per bed.

## 2020-04-24 NOTE — PLAN OF CARE
Pt A&O c/o H.A. medicated with PRN Tylenol early in shift, effective.  NPO since midnight.  Mild c/o dyspnea when HOB less than 45 degrees.  Bipap 30% overnight, 2LNC with sats in 97% range.  Making adequate urine.  Incontinent of stool soft brown X2, +flatus.  Mother Shagufta to be called now for update with plan for Lap Candice at 0900.  Report called to O.R. nurse.

## 2020-04-24 NOTE — PLAN OF CARE
SLP: Cancel dysphagia tx session. Pt had a procedure this AM and has now returned with the recommendation for only clear liquids at this time. SLP evaluation on 4/22 deemed thin liquids appropriate and RN reports tolerance of water at this time. SLP will follow and trial more advance textures when medically appropriate.

## 2020-04-24 NOTE — OP NOTE
General Surgery Operative Note    PREOPERATIVE DIAGNOSIS: Cholecystitis, choledocholithiasis with ascending cholangitis     POSTOPERATIVE DIAGNOSIS: Same    PROCEDURE: LAPAROSCOPIC CHOLECYSTECTOMY     ANESTHESIA:  General    SURGEON:  Agustina Nettles MD    ASSISTANT:  Damaris Anderson PA-C assisted in the above procedure. They provided assistance with pre-operative positioning, prepping, and draping of the patient. The assistant provided vital operative assistance with retraction using instruments thus providing the necessary exposure and visualization for the case, manipulation of tissues to achieve hemostasis, and assisted in closure of the wound. Post-operatively they assisted in transfer of the patient off the operative table and transition to the PACU physicians.    INDICATIONS:  Pinky Whatley is a 54 year old female who presented with complaints of abdominal pain. The patient was evaluated with a right upper quadrant ultrasound which revealed evidence of cholelithiasis. The patient's LFTs were elevated.  The patient underwent ERCP with clearance of stone and pus from the common bile duct.  She is recovered from her ascending cholangitis and has been cleared by medicine to undergo further surgical intervention.  The decision was made for the patient to proceed to the operating room for laparoscopic removal of the gallbladder. The risks and benefits of the procedure were discussed with the patient, and consent for the procedure was obtained.     PROCEDURE: The patient was brought to the preoperative area and preoperative antibiotics were administered. The patient was brought back to the operating room and placed in the supine position on the operating table. A time out was completed. Anesthesia was induced and the patient was intubated. The patient was secured to the operating table and their pressure points were padded. The patient's abdomen was prepped and draped in the sterile fashion. Following  infiltration of local anesthetic, the 11 blade scalpel was used to make a small left upper quadrant incision.  Entrance into the abdomen was gained using the 5mm visiport. The patient's abdomen was then fully insufflated. The scope was then placed within the abdomen. Initial inspection revealed no evidence of injury at the port entry site. Under direct visualization, four additional ports were placed, one 5 mm supraumbilical port, one 11 mm subxiphoid port and two 5 mm right lateral ports. The gallbladder was identified, grasped, and retracted cephalad over the dome of the liver. The infundibulum of the gallbladder was grasped and retracted laterally. A combination of careful dissection utilizing the [place] dissector and hook electrocautery was then carried out to carefully dissect out the cystic duct and cystic artery. When both structures could be clearly seen to be coursing directly into the gallbladder and the critical view had been obtained, the patient's cystic duct was doubly clipped proximally, singly clipped distally, and divided. In a similar fashion, the cystic artery was doubly clipped proximally, singly clipped distally, and divided. The hook electrocautery was then used to carefully dissect the gallbladder free from its attachments to the liver bed. When the gallbladder had been completely dissected free, it was placed in an Endo Catch bag and removed through the subxiphoid port. The port was reinserted and the surgical site inspected. Hemostasis of the liver bed was obtained using the electrocautery. The patient's right upper quadrant was then irrigated with normal saline solution. The patient's subxiphoid port was then removed and there was no evidence of bleeding at the port exit site.  The fascia was reapproximated using a figure-of-eight 0 Vicryl stitch and the Nikolas-Erasto fascial closure device. The patient's 5 mm ports were then removed and there was no evidence of bleeding at the port  exit sites. The patient's abdomen was then allowed to desufflate fully.  The port sites were then inspected and hemostasis was obtained using the electrocautery. The port sites were reapproximated using 4-0 Monocryl subcuticular stitches. The incisions were washed and dried and sterile dressings were applied. The lap, needle, and instrument counts were correct at the end of the procedure. The patient tolerated the procedure well and was extubated and taken to the recovery area in stable condition.     ESTIMATED BLOOD LOSS:  10 ml     INTRAOPERATIVE FINDINGS: Gallbladder with mild wall thickening containing many stones    SPECIMENS: Gallbladder and contents     DRAINS: None    CONDITION: The patient will be readmitted to the surgical floor with instructions for postoperative cares and medications for pain management.    Agustina Nettles MD

## 2020-04-24 NOTE — ANESTHESIA POSTPROCEDURE EVALUATION
Patient: Pinky Whatley    Procedure(s):  LAPAROSCOPIC CHOLECYSTECTOMY    Diagnosis:Cholecystitis [K81.9]  Diagnosis Additional Information: No value filed.    Anesthesia Type:  General, ETT    Note:  Anesthesia Post Evaluation    Patient location during evaluation: PACU  Patient participation: Able to fully participate in evaluation  Level of consciousness: awake and alert  Pain management: adequate  Airway patency: patent  Cardiovascular status: acceptable  Respiratory status: acceptable and BIPAP  Hydration status: acceptable  PONV: none     Anesthetic complications: None    Comments: Extubated while wide awake, felt dyspneic with sats in low 90s so placed on BiPAP in PACU (Had been off/on BiPAP in ICU). FiO2 weaned down to 0.40 during stay, however patient not interested in transitioning to a facemask at this time.        Last vitals:  Vitals:    04/24/20 1330 04/24/20 1345 04/24/20 1400   BP:      Pulse:      Resp: 24 23 24   Temp:   37.1  C (98.7  F)   SpO2: 94% 94% 97%         Electronically Signed By: Karson Aguayo MD  April 24, 2020  2:31 PM

## 2020-04-25 ENCOUNTER — APPOINTMENT (OUTPATIENT)
Dept: PHYSICAL THERAPY | Facility: CLINIC | Age: 55
DRG: 417 | End: 2020-04-25
Attending: HOSPITALIST
Payer: COMMERCIAL

## 2020-04-25 LAB
ALBUMIN SERPL-MCNC: 2.9 G/DL (ref 3.4–5)
ALP SERPL-CCNC: 270 U/L (ref 40–150)
ALT SERPL W P-5'-P-CCNC: 181 U/L (ref 0–50)
ANION GAP SERPL CALCULATED.3IONS-SCNC: 2 MMOL/L (ref 3–14)
AST SERPL W P-5'-P-CCNC: 66 U/L (ref 0–45)
BILIRUB SERPL-MCNC: 1 MG/DL (ref 0.2–1.3)
BUN SERPL-MCNC: 31 MG/DL (ref 7–30)
CALCIUM SERPL-MCNC: 9.2 MG/DL (ref 8.5–10.1)
CHLORIDE SERPL-SCNC: 106 MMOL/L (ref 94–109)
CO2 SERPL-SCNC: 31 MMOL/L (ref 20–32)
CREAT SERPL-MCNC: 1.26 MG/DL (ref 0.52–1.04)
ERYTHROCYTE [DISTWIDTH] IN BLOOD BY AUTOMATED COUNT: 14.5 % (ref 10–15)
GFR SERPL CREATININE-BSD FRML MDRD: 49 ML/MIN/{1.73_M2}
GLUCOSE BLDC GLUCOMTR-MCNC: 158 MG/DL (ref 70–99)
GLUCOSE BLDC GLUCOMTR-MCNC: 170 MG/DL (ref 70–99)
GLUCOSE BLDC GLUCOMTR-MCNC: 178 MG/DL (ref 70–99)
GLUCOSE BLDC GLUCOMTR-MCNC: 210 MG/DL (ref 70–99)
GLUCOSE BLDC GLUCOMTR-MCNC: 227 MG/DL (ref 70–99)
GLUCOSE BLDC GLUCOMTR-MCNC: 306 MG/DL (ref 70–99)
GLUCOSE SERPL-MCNC: 199 MG/DL (ref 70–99)
HCT VFR BLD AUTO: 35.4 % (ref 35–47)
HGB BLD-MCNC: 11 G/DL (ref 11.7–15.7)
MAGNESIUM SERPL-MCNC: 2.3 MG/DL (ref 1.6–2.3)
MCH RBC QN AUTO: 30.9 PG (ref 26.5–33)
MCHC RBC AUTO-ENTMCNC: 31.1 G/DL (ref 31.5–36.5)
MCV RBC AUTO: 99 FL (ref 78–100)
PHOSPHATE SERPL-MCNC: 2.9 MG/DL (ref 2.5–4.5)
PLATELET # BLD AUTO: 168 10E9/L (ref 150–450)
POTASSIUM SERPL-SCNC: 4.5 MMOL/L (ref 3.4–5.3)
PROT SERPL-MCNC: 6.6 G/DL (ref 6.8–8.8)
RBC # BLD AUTO: 3.56 10E12/L (ref 3.8–5.2)
SODIUM SERPL-SCNC: 139 MMOL/L (ref 133–144)
WBC # BLD AUTO: 8.7 10E9/L (ref 4–11)

## 2020-04-25 PROCEDURE — 94660 CPAP INITIATION&MGMT: CPT

## 2020-04-25 PROCEDURE — 25000128 H RX IP 250 OP 636: Performed by: PHYSICIAN ASSISTANT

## 2020-04-25 PROCEDURE — 40000275 ZZH STATISTIC RCP TIME EA 10 MIN

## 2020-04-25 PROCEDURE — 85027 COMPLETE CBC AUTOMATED: CPT | Performed by: PHYSICIAN ASSISTANT

## 2020-04-25 PROCEDURE — 36415 COLL VENOUS BLD VENIPUNCTURE: CPT | Performed by: PHYSICIAN ASSISTANT

## 2020-04-25 PROCEDURE — 97116 GAIT TRAINING THERAPY: CPT | Mod: GP

## 2020-04-25 PROCEDURE — 80053 COMPREHEN METABOLIC PANEL: CPT | Performed by: PHYSICIAN ASSISTANT

## 2020-04-25 PROCEDURE — 00000146 ZZHCL STATISTIC GLUCOSE BY METER IP

## 2020-04-25 PROCEDURE — 25000132 ZZH RX MED GY IP 250 OP 250 PS 637: Performed by: PHYSICIAN ASSISTANT

## 2020-04-25 PROCEDURE — 83735 ASSAY OF MAGNESIUM: CPT | Performed by: PHYSICIAN ASSISTANT

## 2020-04-25 PROCEDURE — 97530 THERAPEUTIC ACTIVITIES: CPT | Mod: GP

## 2020-04-25 PROCEDURE — 97161 PT EVAL LOW COMPLEX 20 MIN: CPT | Mod: GP

## 2020-04-25 PROCEDURE — 25000128 H RX IP 250 OP 636: Performed by: INTERNAL MEDICINE

## 2020-04-25 PROCEDURE — 12000000 ZZH R&B MED SURG/OB

## 2020-04-25 PROCEDURE — 99232 SBSQ HOSP IP/OBS MODERATE 35: CPT | Performed by: HOSPITALIST

## 2020-04-25 PROCEDURE — 25000131 ZZH RX MED GY IP 250 OP 636 PS 637: Performed by: PHYSICIAN ASSISTANT

## 2020-04-25 PROCEDURE — 84100 ASSAY OF PHOSPHORUS: CPT | Performed by: PHYSICIAN ASSISTANT

## 2020-04-25 RX ADMIN — PIPERACILLIN SODIUM AND TAZOBACTAM SODIUM 3.38 G: 3; .375 INJECTION, POWDER, LYOPHILIZED, FOR SOLUTION INTRAVENOUS at 17:30

## 2020-04-25 RX ADMIN — INSULIN ASPART 4 UNITS: 100 INJECTION, SOLUTION INTRAVENOUS; SUBCUTANEOUS at 20:59

## 2020-04-25 RX ADMIN — HYDROCORTISONE SODIUM SUCCINATE 50 MG: 100 INJECTION, POWDER, FOR SOLUTION INTRAMUSCULAR; INTRAVENOUS at 05:53

## 2020-04-25 RX ADMIN — INSULIN ASPART 1 UNITS: 100 INJECTION, SOLUTION INTRAVENOUS; SUBCUTANEOUS at 08:29

## 2020-04-25 RX ADMIN — INSULIN ASPART 1 UNITS: 100 INJECTION, SOLUTION INTRAVENOUS; SUBCUTANEOUS at 03:42

## 2020-04-25 RX ADMIN — INSULIN GLARGINE 5 UNITS: 100 INJECTION, SOLUTION SUBCUTANEOUS at 21:07

## 2020-04-25 RX ADMIN — PIPERACILLIN SODIUM AND TAZOBACTAM SODIUM 3.38 G: 3; .375 INJECTION, POWDER, LYOPHILIZED, FOR SOLUTION INTRAVENOUS at 05:53

## 2020-04-25 RX ADMIN — INSULIN ASPART 1 UNITS: 100 INJECTION, SOLUTION INTRAVENOUS; SUBCUTANEOUS at 12:06

## 2020-04-25 RX ADMIN — HYDROCORTISONE SODIUM SUCCINATE 50 MG: 100 INJECTION, POWDER, FOR SOLUTION INTRAMUSCULAR; INTRAVENOUS at 17:30

## 2020-04-25 RX ADMIN — PIPERACILLIN SODIUM AND TAZOBACTAM SODIUM 3.38 G: 3; .375 INJECTION, POWDER, LYOPHILIZED, FOR SOLUTION INTRAVENOUS at 11:51

## 2020-04-25 RX ADMIN — INSULIN ASPART 2 UNITS: 100 INJECTION, SOLUTION INTRAVENOUS; SUBCUTANEOUS at 16:59

## 2020-04-25 RX ADMIN — LEVOTHYROXINE SODIUM 125 MCG: 125 TABLET ORAL at 08:35

## 2020-04-25 RX ADMIN — ACETAMINOPHEN 650 MG: 325 TABLET, FILM COATED ORAL at 08:26

## 2020-04-25 RX ADMIN — ACETAMINOPHEN 650 MG: 325 TABLET, FILM COATED ORAL at 13:12

## 2020-04-25 ASSESSMENT — ACTIVITIES OF DAILY LIVING (ADL)
ADLS_ACUITY_SCORE: 16

## 2020-04-25 ASSESSMENT — MIFFLIN-ST. JEOR: SCORE: 1988.38

## 2020-04-25 NOTE — PROGRESS NOTES
Pt A/O x4 follows commands, denies SOB on 2L NC throughout shift. 4/10 pain in abdomen ice applied declined medication. Able to make needs known VSS will continue to monitor

## 2020-04-25 NOTE — PROGRESS NOTES
Patient up in chair  Pain well controlled, reports abdominal soreness  Denies nausea  Tolerating clear liquids  Reports that she is passing flatus    Afebrile with normal vital signs  Abdomen obese but soft, appropriate  Labs reviewed, LFTs continue to improve, white count normalized    Assessment/plan: Status post laparoscopic cholecystectomy, postoperative day #1.  Okay to advance diet to full liquids.  Okay to transfer from the unit from our perspective.  Surgical service will continue to follow.

## 2020-04-25 NOTE — PROGRESS NOTES
04/25/20 1500   Quick Adds   Type of Visit Initial PT Evaluation   Living Environment   Lives With parent(s)   Living Arrangements condominium   Home Accessibility stairs to enter home   Number of Stairs, Main Entrance 2   Stair Railings, Main Entrance railing on right side (ascending)   Self-Care   Usual Activity Tolerance moderate   Current Activity Tolerance moderate   Functional Level Prior   Ambulation 1-->assistive equipment   Transferring 1-->assistive equipment   Toileting 0-->independent   Bathing 0-->independent   Fall history within last six months no   Which of the above functional risks had a recent onset or change? ambulation;transferring   General Information   Onset of Illness/Injury or Date of Surgery - Date 04/19/20   Referring Physician Alex Mccoy, DO   Patient/Family Goals Statement Discharge home    Pertinent History of Current Problem (include personal factors and/or comorbidities that impact the POC) Patient admitted on 4/19/2020 for abdominal pain. Patient now cholecystitis s/p Choledocholithiasis with ascending cholangitis s/p sphincterotomy and stent placement 4/20/20 and cholecystectomy 4/24/20. Patient with PMH of rheumatoid arthritis, type 2 diabetes, hypertension, morbid obesity, HTN, hypothyroidism, and HLD.    Precautions/Limitations fall precautions   Weight-Bearing Status - LLE full weight-bearing   Weight-Bearing Status - RLE full weight-bearing   General Observations Patient sitting in chair upon arrival of therapist, agreeable to working with PT    Cognitive Status Examination   Orientation orientation to person, place and time   Level of Consciousness alert   Follows Commands and Answers Questions 100% of the time;able to follow multistep instructions   Pain Assessment   Patient Currently in Pain Yes, see Vital Sign flowsheet  (notes some pain at incision site, not rated on scale of 0-10)   Posture    Posture Forward head position;Protracted shoulders   Range of  "Motion (ROM)   ROM Comment B LE ROM WNL    Strength   Strength Comments Not formally assessed but at least 3+/5 with functional transfers and gait   Bed Mobility   Bed Mobility Comments Sit>supine with mod A    Transfer Skills   Transfer Comments Sit>stand from chair with FWW and Min to mod A    Gait   Gait Comments Pre gait activities with FWW and Min A, few steps from bed to chair    Balance   Balance Comments Some unsteadiness initially but improved with progressed mobility and gait    General Therapy Interventions   Planned Therapy Interventions bed mobility training;gait training;strengthening;transfer training;risk factor education   Clinical Impression   Criteria for Skilled Therapeutic Intervention yes, treatment indicated   PT Diagnosis Impaired mobility and gait    Influenced by the following impairments pain, weakness, decreased tolerance to activity    Functional limitations due to impairments bed mobility, transfers, gait, stairs   Clinical Presentation Stable/Uncomplicated   Clinical Presentation Rationale Based on PMH, current presentation, and social support    Clinical Decision Making (Complexity) Low complexity   Therapy Frequency Daily   Predicted Duration of Therapy Intervention (days/wks) 4 days   Anticipated Equipment Needs at Discharge   (FWW??)   Anticipated Discharge Disposition Home with Assist;Home with Home Therapy   Risk & Benefits of therapy have been explained Yes   Patient, Family & other staff in agreement with plan of care Yes   [facility] [employer] TM \"6 Clicks\"   2016, Trustees of [facility], under license to [employer].  All rights reserved.   6 Clicks Short Forms Basic Mobility Inpatient Short Form   [facility]  \"6 Clicks\" V.2 Basic Mobility Inpatient Short Form   1. Turning from your back to your side while in a flat bed without using bedrails? 4 - None   2. Moving from lying on your back to sitting on the side of a flat bed without using bedrails? 2 " - A Lot   3. Moving to and from a bed to a chair (including a wheelchair)? 2 - A Lot   4. Standing up from a chair using your arms (e.g., wheelchair, or bedside chair)? 2 - A Lot   5. To walk in hospital room? 3 - A Little   6. Climbing 3-5 steps with a railing? 2 - A Lot   Basic Mobility Raw Score (Score out of 24.Lower scores equate to lower levels of function) 15   Total Evaluation Time   Total Evaluation Time (Minutes) 6

## 2020-04-25 NOTE — PLAN OF CARE
Arrived to floor at 1400. A&Ox4. VSS on 2L O2. Up with two. Brianda removed, DTV. Blood sugar checks. Full liquid diet. Lap sites with liquid bandage. Pain with tylenol. Up to chair, tolerated well.

## 2020-04-25 NOTE — PROGRESS NOTES
3-7p  Neuro: A/OX4.   Pulm: LS diminished/clear  CV: SR w/ WNL BP  : Lamar patent w/ adequate UOP  GI: hypoactive BS. Passing flatus per pt.   Extremities: purposeful movements  Skin: lap edouard sites on abdomen covered with liquid dressings.   Lines: PIVx1. Seda removed from L wrist  Family: mother updated via phone  Plan: awaiting for transfer to [facility].

## 2020-04-25 NOTE — PROGRESS NOTES
[facility]    Hospitalist Progress Note      Assessment & Plan   Pinky Whatley is a 54 year old female with a past medical history significant for rheumatoid arthritis, type 2 diabetes, hypertension, morbid obesity, HTN, hypothyroidism, and HLD who was admitted on 4/19/2020 after presenting with abdominal pain. She was transferred to the ICU 4/20 due to decompensation ultimately requiring pressor support. Hospitalist service requested 4/24 for transfer of care out of ICU.     Cholecystitis s/p cholecystectomy 4/24/20  Choledocholithiasis with ascending cholangitis s/p sphincterotomy and stent placement 4/20/20  Septic shock related to above, resolved  Patient presented with severe abdominal pain. Imaging showed distended gallbladder with bile duct dilatation and labs notable for LFT elevation. Shortly after admission she developed evidence of sepsis with concern for ascending cholangitis. Ultimately transferred to ICU for pressor support, weaned off 4/22.  She was started on zosyn 4/20 and was taken urgently for ERCP which confirmed acute cholangitis with impacted stone; she underwent sphincterotomy and stent placement at that time. Underwent cholecystectomy once medically stabilized 4/24. LFTs down trending. WBC improving as well. Afebrile >24 hours with stable vital signs.   --General Surgery following, appreciate assistance. Will defer post op cares to their service  --GI following, appreciate assistance  --diet per surgery  --continue zosyn  --wean stress down steroids back to her home prednisone 5 mg daily  - PT consult  --continue prn pain medications    Acute hypoxic respiratory failure, improving  Reported history obesity hypoventilation syndrome   Respiratory status worsened 4/20 in the setting of developing sepsis. She was intubated for procedure 4/20 and remained intubated overnight due to tachypnea and increased WOB. Extubated 4/21. Since then has been on BiPAP at night, prn. CT chest  on 4/20 with infiltrate vs atelectasis with consolidation seen lingula and left base. Subsequent CXR with mild pulmonary edema. Received lasix 4/22-4/23 which was stopped due to Cr elevation.   --mobilize as able  --wean oxygen as able  --encourage IS  --BiPAP at night and prn through the day  --I&O and weights   --would avoid more diuretics for now,  No urgent indication for diureses     ROBBIE. Cr initially 0.92, peak to 2.02 in the setting of septic shock. Cr has trended down to 1.53. Received some diuresis for suspected volume overload.   --follow BMP  --continue to hold PTA lisinopril, metformin   --reassess volume status tomorrow     Type 2 Diabetes Mellitus. A1C is 6.7. PTA regimen includes glimepiride 4mg bid, metformin 1000mg bid, lantus 50u at bedtime. Since admission has been receiving sliding scale only.   --lantus has been on hold since admission, resume 5 units this evening and up titrate as able   --continue medium sliding scale as needed, will continue q4 hours until po intake increases   --hold PTA oral mediations for now    Rheumatoid arthritis. PTA on hydroxychloroquine, methotrexate, prednisone 5mg daily, sulfasalazine, and xeljanz.   --PTA medications have been on hold in the setting of infection     Left ovarian cyst. Imaging reviewed by OBGYN service 4/20, recommending follow up US in 2-3 months post discharge.     Hypertension. On lisinopril 20mg daily alone PTA.   --lisinopril on hold    HLD. Can resume statin in next few days     Hypothyroidism. Continue PTA levothyroxine     FEN. Full liquids    DVT Prophylaxis: PCDs for now   Code Status: Full Code    Disposition: Expected discharge in 2-3 days    Alex Mccoy DO  Hospitalist [facility]  Pager: [phone]      Interval History   Feeling better, ready for more diet.    -Data reviewed today: I reviewed all new labs and imaging results over the last 24 hours. I personally reviewed no images or EKG's today.    Physical Exam   Temp: 98.8  F (37.1   C) Temp src: Axillary BP: 135/83 Pulse: 100 Heart Rate: 96 Resp: 25 SpO2: 94 % O2 Device: Nasal cannula Oxygen Delivery: 2 LPM  Vitals:    04/23/20 0400 04/24/20 0352 04/25/20 0344   Weight: 143.4 kg (316 lb 2.2 oz) 146.4 kg (322 lb 12.1 oz) 144.1 kg (317 lb 10.9 oz)     Vital Signs with Ranges  Temp:  [98.5  F (36.9  C)-99.3  F (37.4  C)] 98.8  F (37.1  C)  Pulse:  [] 100  Heart Rate:  [77-99] 96  Resp:  [11-32] 25  BP: (126-157)/(75-98) 135/83  MAP:  [88 mmHg] 88 mmHg  Arterial Line BP: (121)/(67) 121/67  SpO2:  [88 %-100 %] 94 %  I/O last 3 completed shifts:  In: 2134.5 [P.O.:1860; I.V.:274.5]  Out: 1750 [Urine:1750]    Constitutional: Alert and oriented x4, sitting up in bed. Appears comfortable and is pleasantly conversant   ENT: normal cephalic, dry mucous membranes  Eyes:  Sclera anicteric, EOMI  Respiratory: Lungs clear to auscultation in anterior fields. Mild tachypnea at times but normal effort.   Cardiovascular: Regular rate and rhythm, no murmur, trace-1+ bilateral pedal edema   GI: hypoactive bowel sounds, abdomen soft, obese, appropriately tender without rebound or guarding. Incisions c/d/i  MSK:  Moves all four extremities.  strength +5/5 and equal   Neuro:  Speech is clear. Face symmetric. No focal deficits      Medications     - MEDICATION INSTRUCTIONS -       sodium chloride 10 mL/hr at 04/25/20 0730       hydrocortisone sodium succinate PF  50 mg Intravenous Q12H     [START ON 4/26/2020] hydrocortisone sodium succinate PF  50 mg Intravenous Daily     insulin aspart  1-6 Units Subcutaneous Q4H     insulin glargine  5 Units Subcutaneous At Bedtime     levothyroxine  125 mcg Oral Daily     piperacillin-tazobactam  3.375 g Intravenous Q6H     sodium chloride (PF)  3 mL Intracatheter Q8H       Data   Recent Labs   Lab 04/25/20  0457 04/24/20  0425 04/24/20  0105  04/23/20  0422  04/20/20  0603 04/19/20  2138   WBC 8.7 12.0*  --   --  21.0*   < > 6.8 10.1   HGB 11.0* 10.5*  --   --  11.1*   <  > 12.0 12.1   MCV 99 96  --   --  96   < > 97 96    149*  --   --  158   < > 185 223   INR  --   --   --   --   --   --  1.09  --     141  --   --  140   < > 133 133   POTASSIUM 4.5 3.9 3.8   < > 3.1*   < > 4.2 4.4   CHLORIDE 106 108  --   --  106   < > 100 99   CO2 31 29  --   --  29   < > 26 24   BUN 31* 33*  --   --  34*   < > 20 25   CR 1.26* 1.53*  --   --  1.87*   < > 0.92 0.80   ANIONGAP 2* 4  --   --  5   < > 7 10   CHANTELLE 9.2 8.8  --   --  9.0   < > 8.9 9.8   * 189*  --   --  202*   < > 254* 251*   ALBUMIN 2.9* 2.7*  --   --  3.0*   < > 3.6 3.9   PROTTOTAL 6.6* 6.1*  --   --  6.6*   < > 6.8 6.9   BILITOTAL 1.0 1.2  --   --  2.1*   < > 3.7* 1.9*   ALKPHOS 270* 297*  --   --  324*   < > 139 112   * 202*  --   --  276*   < > 387* 321*   AST 66* 74*  --   --  117*   < > 369* 483*   LIPASE  --   --   --   --   --   --   --  167   TROPI  --   --   --   --   --   --  <0.015 <0.015    < > = values in this interval not displayed.       No results found for this or any previous visit (from the past 24 hour(s)).

## 2020-04-25 NOTE — PLAN OF CARE
Discharge Planner PT   Patient plan for discharge: Hopeful to return home  Current status: Evaluation completed and treatment initiated. Patient admitted on 4/19/2020 and now s/p sphincterotomy and stent placement on 4/20/2020 for choledocholithiasis with ascending cholangitis; and cholecystectomy on 4/24/2020. Patient lives in a condo with her mom with 2 steps to enter with surface to hold on to/rail support. Patient mod I with SEC at baseline.    SpO2 monitored throughout session, on 2L O2. Dipping to upper 80s with activity but recovering with rest breaks. Sit<>stand with min to mod A and FWW, more assist need for transfers from lower surface chair. Gait of 35 feet with FWW and CGA, noting some SOB and pain with ambulation, improved with seated rest. Sit>supine with mod A, SpO2 dipping to low 80s as patient notes poor tolerance to bed flat; elevated HOB and increased O2 to 4L for patient to recover to at least 92% within 2 minutes, A x 2 to scoot up to HOB. All needs in reach and patient in supine at end of session.   Barriers to return to prior living situation: Current level of A, decreased tolerance to activity, pain, stairs   Recommendations for discharge: Home with A for stairs and HH PT   Rationale for recommendations: Anticipate that during length of hospital stay and continued skilled therapy intervention, patient will progress to level of A for safe discharge home with A for stairs as needed, use of FWW for mobility/ambulation, and HH PT as needed. Patient would qualify for HH PT as she requires use of AD and significant taxing effort to leave the home. If patient does not progress as anticipated during stay, would require discharge to TCU, will update recommendations as appropriate.        Entered by: Charo Johnson 04/25/2020 4:40 PM

## 2020-04-26 ENCOUNTER — APPOINTMENT (OUTPATIENT)
Dept: SPEECH THERAPY | Facility: CLINIC | Age: 55
DRG: 417 | End: 2020-04-26
Payer: COMMERCIAL

## 2020-04-26 ENCOUNTER — APPOINTMENT (OUTPATIENT)
Dept: PHYSICAL THERAPY | Facility: CLINIC | Age: 55
DRG: 417 | End: 2020-04-26
Attending: HOSPITALIST
Payer: COMMERCIAL

## 2020-04-26 LAB
ALBUMIN SERPL-MCNC: 3.1 G/DL (ref 3.4–5)
ALP SERPL-CCNC: 259 U/L (ref 40–150)
ALT SERPL W P-5'-P-CCNC: 142 U/L (ref 0–50)
ANION GAP SERPL CALCULATED.3IONS-SCNC: 6 MMOL/L (ref 3–14)
AST SERPL W P-5'-P-CCNC: 46 U/L (ref 0–45)
BILIRUB SERPL-MCNC: 1 MG/DL (ref 0.2–1.3)
BUN SERPL-MCNC: 25 MG/DL (ref 7–30)
CALCIUM SERPL-MCNC: 9.5 MG/DL (ref 8.5–10.1)
CHLORIDE SERPL-SCNC: 106 MMOL/L (ref 94–109)
CO2 SERPL-SCNC: 29 MMOL/L (ref 20–32)
CREAT SERPL-MCNC: 1.07 MG/DL (ref 0.52–1.04)
ERYTHROCYTE [DISTWIDTH] IN BLOOD BY AUTOMATED COUNT: 14.2 % (ref 10–15)
GFR SERPL CREATININE-BSD FRML MDRD: 59 ML/MIN/{1.73_M2}
GLUCOSE BLDC GLUCOMTR-MCNC: 179 MG/DL (ref 70–99)
GLUCOSE BLDC GLUCOMTR-MCNC: 226 MG/DL (ref 70–99)
GLUCOSE BLDC GLUCOMTR-MCNC: 275 MG/DL (ref 70–99)
GLUCOSE BLDC GLUCOMTR-MCNC: 334 MG/DL (ref 70–99)
GLUCOSE BLDC GLUCOMTR-MCNC: 336 MG/DL (ref 70–99)
GLUCOSE SERPL-MCNC: 170 MG/DL (ref 70–99)
HCT VFR BLD AUTO: 38 % (ref 35–47)
HGB BLD-MCNC: 12 G/DL (ref 11.7–15.7)
MAGNESIUM SERPL-MCNC: 2.2 MG/DL (ref 1.6–2.3)
MCH RBC QN AUTO: 31 PG (ref 26.5–33)
MCHC RBC AUTO-ENTMCNC: 31.6 G/DL (ref 31.5–36.5)
MCV RBC AUTO: 98 FL (ref 78–100)
PHOSPHATE SERPL-MCNC: 1.8 MG/DL (ref 2.5–4.5)
PHOSPHATE SERPL-MCNC: 1.8 MG/DL (ref 2.5–4.5)
PLATELET # BLD AUTO: 193 10E9/L (ref 150–450)
POTASSIUM SERPL-SCNC: 3.8 MMOL/L (ref 3.4–5.3)
PROT SERPL-MCNC: 6.9 G/DL (ref 6.8–8.8)
RBC # BLD AUTO: 3.87 10E12/L (ref 3.8–5.2)
SODIUM SERPL-SCNC: 141 MMOL/L (ref 133–144)
WBC # BLD AUTO: 10.7 10E9/L (ref 4–11)

## 2020-04-26 PROCEDURE — 00000146 ZZHCL STATISTIC GLUCOSE BY METER IP

## 2020-04-26 PROCEDURE — 99207 ZZC CDG-MDM COMPONENT: MEETS MODERATE - UP CODED: CPT | Performed by: HOSPITALIST

## 2020-04-26 PROCEDURE — 25000128 H RX IP 250 OP 636: Performed by: HOSPITALIST

## 2020-04-26 PROCEDURE — 83735 ASSAY OF MAGNESIUM: CPT | Performed by: PHYSICIAN ASSISTANT

## 2020-04-26 PROCEDURE — 25000131 ZZH RX MED GY IP 250 OP 636 PS 637: Performed by: PHYSICIAN ASSISTANT

## 2020-04-26 PROCEDURE — 25000132 ZZH RX MED GY IP 250 OP 250 PS 637: Performed by: HOSPITALIST

## 2020-04-26 PROCEDURE — 84100 ASSAY OF PHOSPHORUS: CPT | Performed by: HOSPITALIST

## 2020-04-26 PROCEDURE — 80053 COMPREHEN METABOLIC PANEL: CPT | Performed by: PHYSICIAN ASSISTANT

## 2020-04-26 PROCEDURE — 85027 COMPLETE CBC AUTOMATED: CPT | Performed by: PHYSICIAN ASSISTANT

## 2020-04-26 PROCEDURE — 99233 SBSQ HOSP IP/OBS HIGH 50: CPT | Performed by: HOSPITALIST

## 2020-04-26 PROCEDURE — 97116 GAIT TRAINING THERAPY: CPT | Mod: GP

## 2020-04-26 PROCEDURE — 94660 CPAP INITIATION&MGMT: CPT

## 2020-04-26 PROCEDURE — 84100 ASSAY OF PHOSPHORUS: CPT | Performed by: PHYSICIAN ASSISTANT

## 2020-04-26 PROCEDURE — 25000132 ZZH RX MED GY IP 250 OP 250 PS 637: Performed by: PHYSICIAN ASSISTANT

## 2020-04-26 PROCEDURE — 25000128 H RX IP 250 OP 636: Performed by: PHYSICIAN ASSISTANT

## 2020-04-26 PROCEDURE — 40000275 ZZH STATISTIC RCP TIME EA 10 MIN

## 2020-04-26 PROCEDURE — 25800030 ZZH RX IP 258 OP 636: Performed by: HOSPITALIST

## 2020-04-26 PROCEDURE — 12000000 ZZH R&B MED SURG/OB

## 2020-04-26 PROCEDURE — 25000125 ZZHC RX 250: Performed by: HOSPITALIST

## 2020-04-26 PROCEDURE — 92526 ORAL FUNCTION THERAPY: CPT | Mod: GN | Performed by: SPEECH-LANGUAGE PATHOLOGIST

## 2020-04-26 PROCEDURE — 36415 COLL VENOUS BLD VENIPUNCTURE: CPT | Performed by: HOSPITALIST

## 2020-04-26 PROCEDURE — 36415 COLL VENOUS BLD VENIPUNCTURE: CPT | Performed by: PHYSICIAN ASSISTANT

## 2020-04-26 RX ORDER — PREDNISONE 5 MG/1
5 TABLET ORAL DAILY
Status: DISCONTINUED | OUTPATIENT
Start: 2020-04-27 | End: 2020-04-29 | Stop reason: HOSPADM

## 2020-04-26 RX ORDER — FUROSEMIDE 10 MG/ML
40 INJECTION INTRAMUSCULAR; INTRAVENOUS ONCE
Status: COMPLETED | OUTPATIENT
Start: 2020-04-26 | End: 2020-04-26

## 2020-04-26 RX ORDER — FUROSEMIDE 10 MG/ML
40 INJECTION INTRAMUSCULAR; INTRAVENOUS DAILY
Status: DISCONTINUED | OUTPATIENT
Start: 2020-04-27 | End: 2020-04-27

## 2020-04-26 RX ADMIN — INSULIN ASPART 3 UNITS: 100 INJECTION, SOLUTION INTRAVENOUS; SUBCUTANEOUS at 21:22

## 2020-04-26 RX ADMIN — PHENOL 1 ML: 1.5 LIQUID ORAL at 08:56

## 2020-04-26 RX ADMIN — INSULIN ASPART 4 UNITS: 100 INJECTION, SOLUTION INTRAVENOUS; SUBCUTANEOUS at 11:45

## 2020-04-26 RX ADMIN — PIPERACILLIN SODIUM AND TAZOBACTAM SODIUM 3.38 G: 3; .375 INJECTION, POWDER, LYOPHILIZED, FOR SOLUTION INTRAVENOUS at 17:27

## 2020-04-26 RX ADMIN — INSULIN GLARGINE 5 UNITS: 100 INJECTION, SOLUTION SUBCUTANEOUS at 21:22

## 2020-04-26 RX ADMIN — HYDROCORTISONE SODIUM SUCCINATE 50 MG: 100 INJECTION, POWDER, FOR SOLUTION INTRAMUSCULAR; INTRAVENOUS at 08:48

## 2020-04-26 RX ADMIN — PIPERACILLIN SODIUM AND TAZOBACTAM SODIUM 3.38 G: 3; .375 INJECTION, POWDER, LYOPHILIZED, FOR SOLUTION INTRAVENOUS at 00:18

## 2020-04-26 RX ADMIN — POTASSIUM PHOSPHATE, MONOBASIC AND POTASSIUM PHOSPHATE, DIBASIC 20 MMOL: 224; 236 INJECTION, SOLUTION INTRAVENOUS at 08:56

## 2020-04-26 RX ADMIN — LEVOTHYROXINE SODIUM 125 MCG: 125 TABLET ORAL at 08:40

## 2020-04-26 RX ADMIN — PIPERACILLIN SODIUM AND TAZOBACTAM SODIUM 3.38 G: 3; .375 INJECTION, POWDER, LYOPHILIZED, FOR SOLUTION INTRAVENOUS at 06:25

## 2020-04-26 RX ADMIN — POTASSIUM PHOSPHATE, MONOBASIC AND POTASSIUM PHOSPHATE, DIBASIC 20 MMOL: 224; 236 INJECTION, SOLUTION INTRAVENOUS at 19:56

## 2020-04-26 RX ADMIN — INSULIN ASPART 4 UNITS: 100 INJECTION, SOLUTION INTRAVENOUS; SUBCUTANEOUS at 17:25

## 2020-04-26 RX ADMIN — PIPERACILLIN SODIUM AND TAZOBACTAM SODIUM 3.38 G: 3; .375 INJECTION, POWDER, LYOPHILIZED, FOR SOLUTION INTRAVENOUS at 11:44

## 2020-04-26 RX ADMIN — INSULIN ASPART 1 UNITS: 100 INJECTION, SOLUTION INTRAVENOUS; SUBCUTANEOUS at 08:50

## 2020-04-26 RX ADMIN — FUROSEMIDE 40 MG: 10 INJECTION, SOLUTION INTRAVENOUS at 08:38

## 2020-04-26 RX ADMIN — INSULIN ASPART 2 UNITS: 100 INJECTION, SOLUTION INTRAVENOUS; SUBCUTANEOUS at 00:17

## 2020-04-26 RX ADMIN — INSULIN ASPART 1 UNITS: 100 INJECTION, SOLUTION INTRAVENOUS; SUBCUTANEOUS at 04:22

## 2020-04-26 RX ADMIN — Medication 1 LOZENGE: at 08:56

## 2020-04-26 ASSESSMENT — ACTIVITIES OF DAILY LIVING (ADL)
ADLS_ACUITY_SCORE: 16

## 2020-04-26 ASSESSMENT — MIFFLIN-ST. JEOR: SCORE: 1998.89

## 2020-04-26 NOTE — PROGRESS NOTES
"SPIRITUAL HEALTH SERVICES: Tele-Encounter  Patient Location (Hospital, Unit): [facility], [id number]  Spoke with (patient, family relationship): pt      Referral Source: LOS      DATA: Pt stated that she is getting much better compare with when she first got in. Pt shared that she has a cousin (here in the state) and a uncle (in [place]) who are both pastors. Pt said that she has some girlfriends she always talk and text with, and pt is well support by families and friends. Pt is aware that [facility] is available per [facility] request.        PLAN:  No fellow up plan at this time. [facility] will remain available for any future needs.     THALIA Meehan    ______________________________    Type of service:  Telephone Visit     has received verbal consent for a TelephoneVisit from the patient? \"Yes\"    Distance Provider Location: designated [facility] office or home office (secure setting)    Mode of Communication: telephone (via [email or web address] phone or [employer] tele-call-number ([phone]))      "

## 2020-04-26 NOTE — PROGRESS NOTES
Looking better  Denies pain  Denies nausea  Tolerating diet  Passing gas    Af vss  abd soft  Labs reviewed    A/p  Advance to regular diet  Home when medically cleared

## 2020-04-26 NOTE — PLAN OF CARE
A+O VSS on bipap overnight. LS diminished, dyspneic on exertion. Tele NSR. Bowels hypo, flatus+, no BM. Lap sites w/ liquid bandage. Voiding adequately. Denies pain. Up w/ 1 and walker.

## 2020-04-26 NOTE — PLAN OF CARE
Discharge Planner PT   Patient plan for discharge: Hope for return to home  Current status: Greeted patient sitting up in recliner and agreeable to therapy. Engaged patient in sit <> stand with FWW at CGA, patient requiring multiple attempts to achieve full standing. Engaged patient in ambulation with FWW at CGA with cues for technique for a total distance of 125ft. Patient is most limited by fatigue and onset of SOB symptoms. Upon return to room patient O2 sats at 82%. Verbal cues for breathing once in seated postion. Patient requires 2-3min of seated breathing to recover to >90% on room air. Patient declines further activity. Concluded session with patient sitting up in recliner with all needs in reach.   Barriers to return to prior living situation: Current level of A, decreased tolerance to activity, pain, stairs   Recommendations for discharge: Home with A for stairs and HH PT   Rationale for recommendations: Anticipate that during length of hospital stay and continued skilled therapy intervention, patient will progress to level of A for safe discharge home with A for stairs as needed, use of FWW for mobility/ambulation, and HH PT as needed. Patient would qualify for HH PT as she requires use of AD and significant taxing effort to leave the home. If patient does not progress as anticipated during stay, would require discharge to TCU, will update recommendations as appropriate.        Entered by: Lizy Bucio 04/26/2020 3:59 PM

## 2020-04-26 NOTE — PROGRESS NOTES
[facility]    Hospitalist Progress Note      Assessment & Plan   Pinky Whatley is a 54 year old female with a past medical history significant for rheumatoid arthritis, type 2 diabetes, hypertension, morbid obesity, HTN, hypothyroidism, and HLD who was admitted on 4/19/2020 after presenting with abdominal pain. She was transferred to the ICU 4/20 due to decompensation ultimately requiring pressor support. Hospitalist service requested 4/24 for transfer of care out of ICU.     Cholecystitis s/p cholecystectomy 4/24/20  Choledocholithiasis with ascending cholangitis s/p sphincterotomy and stent placement 4/20/20  Septic shock related to above, resolved  Patient presented with severe abdominal pain. Imaging showed distended gallbladder with bile duct dilatation and labs notable for LFT elevation. Shortly after admission she developed evidence of sepsis with concern for ascending cholangitis. Ultimately transferred to ICU for pressor support, weaned off 4/22.  She was started on zosyn 4/20 and was taken urgently for ERCP which confirmed acute cholangitis with impacted stone; she underwent sphincterotomy and stent placement at that time. Underwent cholecystectomy once medically stabilized 4/24. LFTs down trending. WBC improving as well. Afebrile >24 hours with stable vital signs.   --General Surgery following, appreciate assistance. Will defer post op cares to their service  --diet per surgery  --continue zosyn  --wean stress down steroids back to her home prednisone 5 mg daily  - PT consult  --continue prn pain medications    Acute hypoxic respiratory failure, improving  Reported history obesity hypoventilation syndrome   Respiratory status worsened 4/20 in the setting of developing sepsis. She was intubated for procedure 4/20 and remained intubated overnight due to tachypnea and increased WOB. Extubated 4/21. Since then has been on BiPAP at night, prn. CT chest on 4/20 with infiltrate vs atelectasis  with consolidation seen lingula and left base. Subsequent CXR with mild pulmonary edema. Received lasix 4/22-4/23 which was stopped due to Cr elevation.   --mobilize as able  --wean oxygen as able  --encourage IS  --BiPAP at night and prn through the day  --I&O and weights   -- with improving creatinine/uop, will restart daily IV lasix with strict ins and outs and daily weights     ROBBIE. Cr initially 0.92, peak to 2.02 in the setting of septic shock.   Creatinine did trend down, but some increase after diuretics were restarted  Now since normalized  --follow BMP  --continue to hold PTA lisinopril, metformin     Type 2 Diabetes Mellitus.   A1C is 6.7. PTA regimen includes glimepiride 4mg bid, metformin 1000mg bid, lantus 50u at bedtime. Since admission has been receiving sliding scale only.   --5 units of lantus adjust as needed; still needs to start full diet and get entirely off the IV hydrocortisone  --continue medium sliding scale as needed, will continue q4 hours until po intake increases   --hold PTA oral mediations for now    Rheumatoid arthritis. PTA on hydroxychloroquine, methotrexate, prednisone 5mg daily, sulfasalazine, and xeljanz.   --PTA medications have been on hold in the setting of infection    Left ovarian cyst. Imaging reviewed by OBGYN service 4/20, recommending follow up US in 2-3 months post discharge.     Hypertension. On lisinopril 20mg daily alone PTA.   --lisinopril on hold    HLD. Can resume statin in next few days     Hypothyroidism. Continue PTA levothyroxine     FEN. Full liquids    DVT Prophylaxis: PCDs for now, heparin when ok with surgery  Code Status: Full Code    Disposition: Expected discharge in 2-3 days    Alex Mccoy DO  Hospitalist [facility]  Pager: [phone]      Interval History   Feels more swollen. Some shortness of breath, but able to walk to the bathroom.     -Data reviewed today: I reviewed all new labs and imaging results over the last 24 hours. I personally reviewed  no images or EKG's today.    Physical Exam   Temp: 97.8  F (36.6  C) Temp src: Oral BP: 126/75 Pulse: 93 Heart Rate: 90 Resp: 18 SpO2: 96 % O2 Device: Nasal cannula Oxygen Delivery: 2 LPM  Vitals:    04/24/20 0352 04/25/20 0344 04/26/20 0638   Weight: 146.4 kg (322 lb 12.1 oz) 144.1 kg (317 lb 10.9 oz) 145.2 kg (320 lb)     Vital Signs with Ranges  Temp:  [97.8  F (36.6  C)-98.8  F (37.1  C)] 97.8  F (36.6  C)  Pulse:  [] 93  Heart Rate:  [80-96] 90  Resp:  [14-25] 18  BP: (124-157)/(75-97) 126/75  SpO2:  [94 %-98 %] 96 %  I/O last 3 completed shifts:  In: 1435 [P.O.:1280; I.V.:155]  Out: 1495 [Urine:1495]    Constitutional: Alert and oriented x4, sitting up in bed. Appears comfortable and is pleasantly conversant   Respiratory: Lungs clear to auscultation in anterior fields. Mild tachypnea at times but normal effort.   Cardiovascular: Regular rate and rhythm, no murmur, trace-1+ bilateral pedal edema   GI: hypoactive bowel sounds, abdomen soft, obese, appropriately tender without rebound or guarding. Incisions c/d/i  MSK:  Moves all four extremities.  strength +5/5 and equal.   Neuro:  Speech is clear. Face symmetric. No focal deficits      Medications     - MEDICATION INSTRUCTIONS -       sodium chloride 10 mL/hr at 04/25/20 0730       furosemide  40 mg Intravenous Once     hydrocortisone sodium succinate PF  50 mg Intravenous Daily     insulin aspart  1-6 Units Subcutaneous Q4H     insulin glargine  5 Units Subcutaneous At Bedtime     levothyroxine  125 mcg Oral Daily     piperacillin-tazobactam  3.375 g Intravenous Q6H     [START ON 4/27/2020] predniSONE  5 mg Oral Daily     sodium chloride (PF)  3 mL Intracatheter Q8H       Data   Recent Labs   Lab 04/26/20  0642 04/25/20  0457 04/24/20  0425  04/20/20  0603 04/19/20  2138   WBC 10.7 8.7 12.0*   < > 6.8 10.1   HGB 12.0 11.0* 10.5*   < > 12.0 12.1   MCV 98 99 96   < > 97 96    168 149*   < > 185 223   INR  --   --   --   --  1.09  --      139 141   < > 133 133   POTASSIUM 3.8 4.5 3.9   < > 4.2 4.4   CHLORIDE 106 106 108   < > 100 99   CO2 29 31 29   < > 26 24   BUN 25 31* 33*   < > 20 25   CR 1.07* 1.26* 1.53*   < > 0.92 0.80   ANIONGAP 6 2* 4   < > 7 10   CHANTELLE 9.5 9.2 8.8   < > 8.9 9.8   * 199* 189*   < > 254* 251*   ALBUMIN 3.1* 2.9* 2.7*   < > 3.6 3.9   PROTTOTAL 6.9 6.6* 6.1*   < > 6.8 6.9   BILITOTAL 1.0 1.0 1.2   < > 3.7* 1.9*   ALKPHOS 259* 270* 297*   < > 139 112   * 181* 202*   < > 387* 321*   AST 46* 66* 74*   < > 369* 483*   LIPASE  --   --   --   --   --  167   TROPI  --   --   --   --  <0.015 <0.015    < > = values in this interval not displayed.       No results found for this or any previous visit (from the past 24 hour(s)).

## 2020-04-26 NOTE — PLAN OF CARE
"Discharge Planner SLP   Patient plan for discharge: Reported going home instead of rehab due to concern for COVID  Current status: SLP: Okay for regular diet per MD. Pt denied dysphagia symptoms with full liquids. Saltine crackers and thin liquids trialed with adequate mastication. Pt talking while chewing and suspect pharyngeal retention with cough and pt reported a little piece \"went down the wrong tube.\" Reviewed s/sx of aspiration, swallow strategies, and self-selection of soft/moist foods. Pt verbalized understanding.     Continue regular diet and thin liquids with pt up in a chair. Avoid distractions and talking while eating.     Barriers to return to prior living situation: None from SLP  Recommendations for discharge: Home  Rationale for recommendations: Plan 1-2 sessions for meal follow up. Expect that pt will meet SLP goals during hospitalization       Entered by: Rosemarie Yadav 04/26/2020 9:56 AM       "

## 2020-04-26 NOTE — PLAN OF CARE
A&Ox4. VSS on 2-3L O2. Up with assist of one and FWW. Abdominal incision WNL, liquid bandage. Tolerating diet. Fluid restriction 2000mL. Voiding adequately. Lasix given. Phos replaced.

## 2020-04-26 NOTE — PROGRESS NOTES
"BRIEF NUTRITION ASSESSMENT      REASON FOR ASSESSMENT:  Pinky Whatley is a 54 year old female seen by Registered Dietitian for LOS    NUTRITION HISTORY:  Pt is on a regular diet at home  She lives with her mom who prepares the evening meal (main meal of the day with meat/starch, veg).  Otherwise, she tried to prepare simple foods for herself for breakfast and lunch.  She tries to watch sugars and salt.  Usual appetite and intake has been good.  No food allergies/intolerances.    CURRENT DIET AND INTAKE:  Diet:  Regular, FR 2000 mL/day              Pt was NPO/CL x 6 days and then diet was advanced to Regular today.  She tolerated breakfast well.  She is aware of the recommendation to watch spicy and fatty foods at first.    4/20:  Intubated   4/21:  Extubated   4/22:  SLP bedside swallow = minimal oral-pharyngeal dysphagia   4/24:  Surgery = Lap edouard    4/26:  SLP: Okay for regular diet per MD. Pt denied dysphagia symptoms with full liquids     ANTHROPOMETRICS:  Height: 5' 1\"    Admit Wt:    Weight:  137 kg (4/21)  Current Wt:  145.2 kg (up 8.2 kg since admit)  Admit Body mass index is 57.1 kg/m .   Weight Status: Obesity Grade III BMI >40  IBW:  47.7 kg  %IBW: 287%  Weight History:  Pt thinks she's gained some weight over the past year due to mobility issues with RA and also has cysts in her back which makes it difficult to stand.    Wt Readings from Last 20 Encounters:   04/26/20 145.2 kg (320 lb)   10/14/19 135 kg (297 lb 9.6 oz)   09/07/19 130.2 kg (287 lb)       LABS:  Labs noted    MALNUTRITION:  Visual Nutrition Focused Physical Assessment (NFPA) not completed due to distancing restrictions with COVID-19 pandemic. Patient does not meet two of the following criteria necessary for diagnosing malnutrition.   Pt was combination NPO/CL x 6 days  Edema - generalized 2+ - doubt nutritional    NUTRITION INTERVENTION:  Nutrition Diagnosis:  No nutrition diagnosis at this time.    Implementation:  Nutrition " Education: Per Provider order if indicated    FOLLOW UP/MONITORING:   Will re-evaluate in 7 - 10 days, or sooner, if re-consulted.    Mayra Gee, RD, LD, CNSC

## 2020-04-27 ENCOUNTER — APPOINTMENT (OUTPATIENT)
Dept: SPEECH THERAPY | Facility: CLINIC | Age: 55
DRG: 417 | End: 2020-04-27
Payer: COMMERCIAL

## 2020-04-27 ENCOUNTER — APPOINTMENT (OUTPATIENT)
Dept: PHYSICAL THERAPY | Facility: CLINIC | Age: 55
DRG: 417 | End: 2020-04-27
Payer: COMMERCIAL

## 2020-04-27 LAB
ALBUMIN SERPL-MCNC: 2.8 G/DL (ref 3.4–5)
ALP SERPL-CCNC: 219 U/L (ref 40–150)
ALT SERPL W P-5'-P-CCNC: 103 U/L (ref 0–50)
ANION GAP SERPL CALCULATED.3IONS-SCNC: 4 MMOL/L (ref 3–14)
AST SERPL W P-5'-P-CCNC: 33 U/L (ref 0–45)
BILIRUB SERPL-MCNC: 0.8 MG/DL (ref 0.2–1.3)
BUN SERPL-MCNC: 23 MG/DL (ref 7–30)
CALCIUM SERPL-MCNC: 8.9 MG/DL (ref 8.5–10.1)
CHLORIDE SERPL-SCNC: 104 MMOL/L (ref 94–109)
CO2 SERPL-SCNC: 33 MMOL/L (ref 20–32)
CREAT SERPL-MCNC: 1.07 MG/DL (ref 0.52–1.04)
ERYTHROCYTE [DISTWIDTH] IN BLOOD BY AUTOMATED COUNT: 14.1 % (ref 10–15)
GFR SERPL CREATININE-BSD FRML MDRD: 59 ML/MIN/{1.73_M2}
GLUCOSE BLDC GLUCOMTR-MCNC: 176 MG/DL (ref 70–99)
GLUCOSE BLDC GLUCOMTR-MCNC: 202 MG/DL (ref 70–99)
GLUCOSE BLDC GLUCOMTR-MCNC: 225 MG/DL (ref 70–99)
GLUCOSE BLDC GLUCOMTR-MCNC: 231 MG/DL (ref 70–99)
GLUCOSE BLDC GLUCOMTR-MCNC: 258 MG/DL (ref 70–99)
GLUCOSE BLDC GLUCOMTR-MCNC: 266 MG/DL (ref 70–99)
GLUCOSE BLDC GLUCOMTR-MCNC: 275 MG/DL (ref 70–99)
GLUCOSE BLDC GLUCOMTR-MCNC: 329 MG/DL (ref 70–99)
GLUCOSE SERPL-MCNC: 193 MG/DL (ref 70–99)
HCT VFR BLD AUTO: 35.3 % (ref 35–47)
HGB BLD-MCNC: 11.4 G/DL (ref 11.7–15.7)
MAGNESIUM SERPL-MCNC: 2 MG/DL (ref 1.6–2.3)
MCH RBC QN AUTO: 31.2 PG (ref 26.5–33)
MCHC RBC AUTO-ENTMCNC: 32.3 G/DL (ref 31.5–36.5)
MCV RBC AUTO: 97 FL (ref 78–100)
PHOSPHATE SERPL-MCNC: 3 MG/DL (ref 2.5–4.5)
PHOSPHATE SERPL-MCNC: 3.3 MG/DL (ref 2.5–4.5)
PLATELET # BLD AUTO: 194 10E9/L (ref 150–450)
POTASSIUM SERPL-SCNC: 3.4 MMOL/L (ref 3.4–5.3)
PROT SERPL-MCNC: 6.3 G/DL (ref 6.8–8.8)
RBC # BLD AUTO: 3.65 10E12/L (ref 3.8–5.2)
SODIUM SERPL-SCNC: 141 MMOL/L (ref 133–144)
WBC # BLD AUTO: 9.7 10E9/L (ref 4–11)

## 2020-04-27 PROCEDURE — 40000275 ZZH STATISTIC RCP TIME EA 10 MIN

## 2020-04-27 PROCEDURE — 94660 CPAP INITIATION&MGMT: CPT

## 2020-04-27 PROCEDURE — 85027 COMPLETE CBC AUTOMATED: CPT | Performed by: PHYSICIAN ASSISTANT

## 2020-04-27 PROCEDURE — 83735 ASSAY OF MAGNESIUM: CPT | Performed by: PHYSICIAN ASSISTANT

## 2020-04-27 PROCEDURE — 25000131 ZZH RX MED GY IP 250 OP 636 PS 637: Performed by: HOSPITALIST

## 2020-04-27 PROCEDURE — 99233 SBSQ HOSP IP/OBS HIGH 50: CPT | Performed by: HOSPITALIST

## 2020-04-27 PROCEDURE — 25000132 ZZH RX MED GY IP 250 OP 250 PS 637: Performed by: HOSPITALIST

## 2020-04-27 PROCEDURE — 36415 COLL VENOUS BLD VENIPUNCTURE: CPT | Performed by: HOSPITALIST

## 2020-04-27 PROCEDURE — 12000000 ZZH R&B MED SURG/OB

## 2020-04-27 PROCEDURE — 99207 ZZC CDG-MDM COMPONENT: MEETS MODERATE - UP CODED: CPT | Performed by: HOSPITALIST

## 2020-04-27 PROCEDURE — 84100 ASSAY OF PHOSPHORUS: CPT | Performed by: HOSPITALIST

## 2020-04-27 PROCEDURE — 94640 AIRWAY INHALATION TREATMENT: CPT | Mod: 76

## 2020-04-27 PROCEDURE — 36415 COLL VENOUS BLD VENIPUNCTURE: CPT | Performed by: PHYSICIAN ASSISTANT

## 2020-04-27 PROCEDURE — 25000132 ZZH RX MED GY IP 250 OP 250 PS 637: Performed by: PHYSICIAN ASSISTANT

## 2020-04-27 PROCEDURE — 84100 ASSAY OF PHOSPHORUS: CPT | Performed by: PHYSICIAN ASSISTANT

## 2020-04-27 PROCEDURE — 80053 COMPREHEN METABOLIC PANEL: CPT | Performed by: PHYSICIAN ASSISTANT

## 2020-04-27 PROCEDURE — 00000146 ZZHCL STATISTIC GLUCOSE BY METER IP

## 2020-04-27 PROCEDURE — 92526 ORAL FUNCTION THERAPY: CPT | Mod: GN | Performed by: SPEECH-LANGUAGE PATHOLOGIST

## 2020-04-27 PROCEDURE — 25000128 H RX IP 250 OP 636: Performed by: PHYSICIAN ASSISTANT

## 2020-04-27 PROCEDURE — 97116 GAIT TRAINING THERAPY: CPT | Mod: GP | Performed by: PHYSICAL THERAPIST

## 2020-04-27 PROCEDURE — 25000128 H RX IP 250 OP 636: Performed by: HOSPITALIST

## 2020-04-27 RX ORDER — SULFASALAZINE 500 MG/1
1000 TABLET ORAL 2 TIMES DAILY
Status: DISCONTINUED | OUTPATIENT
Start: 2020-04-27 | End: 2020-04-29 | Stop reason: HOSPADM

## 2020-04-27 RX ORDER — FUROSEMIDE 10 MG/ML
20 INJECTION INTRAMUSCULAR; INTRAVENOUS DAILY
Status: DISCONTINUED | OUTPATIENT
Start: 2020-04-27 | End: 2020-04-29 | Stop reason: HOSPADM

## 2020-04-27 RX ORDER — HYDROXYCHLOROQUINE SULFATE 200 MG/1
200 TABLET, FILM COATED ORAL 2 TIMES DAILY
Status: DISCONTINUED | OUTPATIENT
Start: 2020-04-27 | End: 2020-04-29 | Stop reason: HOSPADM

## 2020-04-27 RX ORDER — DEXTROSE MONOHYDRATE 25 G/50ML
25-50 INJECTION, SOLUTION INTRAVENOUS
Status: DISCONTINUED | OUTPATIENT
Start: 2020-04-27 | End: 2020-04-29 | Stop reason: HOSPADM

## 2020-04-27 RX ORDER — NICOTINE POLACRILEX 4 MG
15-30 LOZENGE BUCCAL
Status: DISCONTINUED | OUTPATIENT
Start: 2020-04-27 | End: 2020-04-29 | Stop reason: HOSPADM

## 2020-04-27 RX ADMIN — HYDROXYCHLOROQUINE SULFATE 200 MG: 200 TABLET, FILM COATED ORAL at 20:29

## 2020-04-27 RX ADMIN — PIPERACILLIN SODIUM AND TAZOBACTAM SODIUM 3.38 G: 3; .375 INJECTION, POWDER, LYOPHILIZED, FOR SOLUTION INTRAVENOUS at 06:48

## 2020-04-27 RX ADMIN — FUROSEMIDE 20 MG: 10 INJECTION, SOLUTION INTRAMUSCULAR; INTRAVENOUS at 09:34

## 2020-04-27 RX ADMIN — PREDNISONE 5 MG: 5 TABLET ORAL at 09:34

## 2020-04-27 RX ADMIN — LEVOTHYROXINE SODIUM 125 MCG: 125 TABLET ORAL at 09:34

## 2020-04-27 RX ADMIN — SULFASALAZINE 1000 MG: 500 TABLET ORAL at 20:29

## 2020-04-27 RX ADMIN — INSULIN ASPART 3 UNITS: 100 INJECTION, SOLUTION INTRAVENOUS; SUBCUTANEOUS at 01:01

## 2020-04-27 RX ADMIN — PIPERACILLIN SODIUM AND TAZOBACTAM SODIUM 3.38 G: 3; .375 INJECTION, POWDER, LYOPHILIZED, FOR SOLUTION INTRAVENOUS at 01:01

## 2020-04-27 RX ADMIN — INSULIN ASPART 2 UNITS: 100 INJECTION, SOLUTION INTRAVENOUS; SUBCUTANEOUS at 09:34

## 2020-04-27 RX ADMIN — PIPERACILLIN SODIUM AND TAZOBACTAM SODIUM 3.38 G: 3; .375 INJECTION, POWDER, LYOPHILIZED, FOR SOLUTION INTRAVENOUS at 12:59

## 2020-04-27 RX ADMIN — INSULIN ASPART 3 UNITS: 100 INJECTION, SOLUTION INTRAVENOUS; SUBCUTANEOUS at 04:27

## 2020-04-27 RX ADMIN — PIPERACILLIN SODIUM AND TAZOBACTAM SODIUM 3.38 G: 3; .375 INJECTION, POWDER, LYOPHILIZED, FOR SOLUTION INTRAVENOUS at 18:45

## 2020-04-27 RX ADMIN — SULFASALAZINE 1000 MG: 500 TABLET ORAL at 13:30

## 2020-04-27 RX ADMIN — HYDROXYCHLOROQUINE SULFATE 200 MG: 200 TABLET, FILM COATED ORAL at 13:30

## 2020-04-27 RX ADMIN — INSULIN GLARGINE 10 UNITS: 100 INJECTION, SOLUTION SUBCUTANEOUS at 21:35

## 2020-04-27 ASSESSMENT — ACTIVITIES OF DAILY LIVING (ADL)
ADLS_ACUITY_SCORE: 16

## 2020-04-27 ASSESSMENT — MIFFLIN-ST. JEOR: SCORE: 1997.38

## 2020-04-27 NOTE — PLAN OF CARE
Discharge Planner SLP   Patient plan for discharge: Home  Current status: Swallow Tx was provided this am.  Pt tolerated regular solids and thin liquids by straw without signs of aspiration given min-no cues to use safe swallow strategies.  Goal has been met.  Plan to discontinue SLP swallow Tx at this time.  Recommend a continued regular diet and thin liquids, small bites/sips, sit at 90 degrees.  Barriers to return to prior living situation: No SLP barriers  Recommendations for discharge: No SLP needs  Rationale for recommendations: No SLP needs       Entered by: Hattie Oneill 04/27/2020 8:41 AM      Speech Language Therapy Discharge Summary    Reason for therapy discharge:    All goals and outcomes met, no further needs identified.    Progress towards therapy goal(s). See goals on Care Plan in [facility] electronic health record for goal details.  Goals met    Therapy recommendation(s):    No further therapy is recommended.

## 2020-04-27 NOTE — PLAN OF CARE
[id number]: Vital signs stable on 2L NC, used BIPAP at night, back to NC this morning. A&Ox 4. Up with A1, GB and walker. Tele NSR. CMS intact. Lung sounds clear.  Regular diet w fluid restrict 2000 mL. Denies nausea. Continent B/B. Adequate urinary output, up to bathroom. Bowel sounds active. Abd 3 lap site incisions closed with liquid bandage, WDL. mepilex to coccyx and to low back CDI. R PIV is s/l.phos replaced, recheck was 3.3. Plan for discharge tbd. Continue to monitor.

## 2020-04-27 NOTE — CONSULTS
Care Transition Initial Assessment - RN        Met with: Patient.  DATA   Principal Problem:    Cholecystitis  Active Problems:    Abdominal pain, acute    Acute kidney failure, unspecified (H)       Cognitive Status: awake, alert and oriented.        Contact information and PCP information verified: Yes  Lives With: parent(s)   Living Arrangements: condominium        Insurance concerns: No Insurance issues identified  ASSESSMENT  Patient currently receives the following services:  none        Identified issues/concerns regarding health management: none, well supported at home, lives with her mother    Only 2 stairs to enter their town home. Reviewed recommendation for home PT. Patient will think about it overnight, care coordinator to follow up with patient tomorrow.   PLAN  Financial costs for the patient include per insurance .  Patient given options and choices for discharge yes .  Patient/family is agreeable to the plan?  Yes: home and will think about HHC  Patient anticipates discharging to home .     Patient anticipates needs for home equipment: No, borrowing a walker from family  Transportation/person available to transport on day of discharge  is mother,  and have they been notified/set up not yet  Plan/Disposition: Home   Appointments: see S      Care  (CTS) will continue to follow as needed.    Lizzy Camacho RN   [facility]   Phone [phone]

## 2020-04-27 NOTE — PLAN OF CARE
Discharge Planner PT   Patient plan for discharge: Home  Current status: Patient sitting in chair. Needs extra time and effort to get to standing but no physical assist needed. Tolerated amb 200 feet total with ww with 3 standing rests due to SOB (O2 sat decreased to 85% and HR between 115 and 125) and right ankle and knee pain the second time. Left up in chair with needs close.   Barriers to return to prior living situation: Has 2 steps to enter, decerased activity tolerance.  Recommendations for discharge: Home with assist on stairs and HHPT.  Rationale for recommendations: Patient currently well below baseline with independence and balance. She would benefit from HHPT to address balance and endurance as well as independence. HHPT appropriate as getting out of the home would be a taxing effort and she is using a 2ww. She reports her mom is getting her her grandma's walker to use.        Entered by: Pascale Solares 04/27/2020 3:12 PM

## 2020-04-27 NOTE — PROGRESS NOTES
[facility]    Hospitalist Progress Note      Assessment & Plan   Pinky Whatley is a 54 year old female with a past medical history significant for rheumatoid arthritis, type 2 diabetes, hypertension, morbid obesity, HTN, hypothyroidism, and HLD who was admitted on 4/19/2020 after presenting with abdominal pain. She was transferred to the ICU 4/20 due to decompensation ultimately requiring pressor support. Hospitalist service requested 4/24 for transfer of care out of ICU.     Cholecystitis s/p cholecystectomy 4/24/20  Choledocholithiasis with ascending cholangitis s/p sphincterotomy and stent placement 4/20/20  Septic shock related to above, resolved  Patient presented with severe abdominal pain. Imaging showed distended gallbladder with bile duct dilatation and labs notable for LFT elevation. Shortly after admission she developed evidence of sepsis with concern for ascending cholangitis. Ultimately transferred to ICU for pressor support, weaned off 4/22.  She was started on zosyn 4/20 and was taken urgently for ERCP which confirmed acute cholangitis with impacted stone; she underwent sphincterotomy and stent placement at that time. Underwent cholecystectomy once medically stabilized 4/24. LFTs down trending. WBC improving as well. Afebrile >24 hours with stable vital signs.   --General Surgery signed off  --mod carb diet  --continue zosyn, transition to augmentin on discharge  --prednisone 5 mg  - PT consult rec home  --continue prn pain medications    Acute hypoxic respiratory failure, improving  Reported history obesity hypoventilation syndrome   Respiratory status worsened 4/20 in the setting of developing sepsis. She was intubated for procedure 4/20 and remained intubated overnight due to tachypnea and increased WOB. Extubated 4/21. Since then has been on BiPAP at night, prn. CT chest on 4/20 with infiltrate vs atelectasis with consolidation seen lingula and left base. Subsequent CXR with  mild pulmonary edema. Received lasix 4/22-4/23 which was stopped due to Cr elevation.   --mobilize as able  --wean oxygen as able  --encourage IS  --BiPAP at night and prn through the day  --I&O and weights   -- continue IV diuresis    ROBBIE. Cr initially 0.92, peak to 2.02 in the setting of septic shock.   Creatinine did trend down, but some increase after diuretics were restarted  Now since normalized  --follow BMP daily while diuresing  --continue to hold PTA lisinopril, metformin     Type 2 Diabetes Mellitus.   A1C is 6.7. PTA regimen includes glimepiride 4mg bid, metformin 1000mg bid, lantus 50u at bedtime. Since admission has been receiving sliding scale only.   --increase 10 units  --continue medium sliding scale as needed  -- add on 3 units premeal  --hold PTA oral mediations for now    Rheumatoid arthritis. PTA on hydroxychloroquine, methotrexate, prednisone 5mg daily, sulfasalazine, and xeljanz.   --can resume sulfasalazine and plaquinel    Left ovarian cyst. Imaging reviewed by OBGYN service 4/20, recommending follow up US in 2-3 months post discharge.     Hypertension. On lisinopril 20mg daily alone PTA.   --lisinopril on hold    HLD. Can resume statin on discharge    Hypothyroidism. Continue PTA levothyroxine     FEN. Full liquids    DVT Prophylaxis: PCDs  Code Status: Full Code    Disposition: Expected discharge 1-2 days when off oxygen after diuresis. To home    Alex Mccoy DO  Hospitalist [facility]  Pager: [phone]      Interval History   Swelling improved, urinated very well after IV lasix yesterday. Tolerating diet and walking.    -Data reviewed today: I reviewed all new labs and imaging results over the last 24 hours. I personally reviewed no images or EKG's today.    Physical Exam   Temp: 98.8  F (37.1  C) Temp src: Oral BP: 123/71 Pulse: 94 Heart Rate: 98 Resp: 18 SpO2: 96 % O2 Device: Nasal cannula Oxygen Delivery: 2 LPM  Vitals:    04/25/20 0344 04/26/20 0638 04/27/20 0625   Weight: 144.1 kg  (317 lb 10.9 oz) 145.2 kg (320 lb) 145 kg (319 lb 10.7 oz)     Vital Signs with Ranges  Temp:  [97.5  F (36.4  C)-98.8  F (37.1  C)] 98.8  F (37.1  C)  Pulse:  [] 94  Heart Rate:  [87-98] 98  Resp:  [16-18] 18  BP: (119-134)/(70-78) 123/71  SpO2:  [91 %-98 %] 96 %  I/O last 3 completed shifts:  In: 1840 [P.O.:1840]  Out: 6200 [Urine:6200]    Constitutional: Alert and oriented x4, sitting up in bed. Appears comfortable and is pleasantly conversant   Respiratory: Lungs clear to auscultation in anterior fields. Mild tachypnea at times but normal effort.   Cardiovascular: Regular rate and rhythm, no murmur, trace-1-2+ bilateral pedal edema   GI: hypoactive bowel sounds, abdomen soft, obese, appropriately tender without rebound or guarding. Incisions c/d/i  MSK:  Moves all four extremities.  strength +5/5 and equal.   Neuro:  Speech is clear. Face symmetric. No focal deficits      Medications     - MEDICATION INSTRUCTIONS -         furosemide  20 mg Intravenous Daily     [START ON 4/28/2020] insulin aspart  3 Units Subcutaneous QAM AC     insulin aspart  3 Units Subcutaneous Daily with lunch     insulin aspart  3 Units Subcutaneous Daily with supper     insulin aspart  1-7 Units Subcutaneous TID AC     insulin aspart  1-5 Units Subcutaneous At Bedtime     insulin glargine  10 Units Subcutaneous At Bedtime     levothyroxine  125 mcg Oral Daily     piperacillin-tazobactam  3.375 g Intravenous Q6H     predniSONE  5 mg Oral Daily     sodium chloride (PF)  3 mL Intracatheter Q8H       Data   Recent Labs   Lab 04/27/20  0553 04/26/20  0642 04/25/20  0457   WBC 9.7 10.7 8.7   HGB 11.4* 12.0 11.0*   MCV 97 98 99    193 168    141 139   POTASSIUM 3.4 3.8 4.5   CHLORIDE 104 106 106   CO2 33* 29 31   BUN 23 25 31*   CR 1.07* 1.07* 1.26*   ANIONGAP 4 6 2*   CHANTELLE 8.9 9.5 9.2   * 170* 199*   ALBUMIN 2.8* 3.1* 2.9*   PROTTOTAL 6.3* 6.9 6.6*   BILITOTAL 0.8 1.0 1.0   ALKPHOS 219* 259* 270*   * 142*  181*   AST 33 46* 66*       No results found for this or any previous visit (from the past 24 hour(s)).

## 2020-04-27 NOTE — PROGRESS NOTES
Sitting having breakfast, no complaints other than thirst related to her fluid restriction.  Laboratories are appropriate, no concerns at this time.    Reviewed physical limitations to follow for 1 month (not lifting anything heavier than 20 pounds).  No additional recommendations, please call us with any questions.

## 2020-04-27 NOTE — PLAN OF CARE
VSS on 2L NC. Pt unable to wean off O2 this shift. Dyspnea on exertion. AOX4. Up with A1, GB and walker.Tele NSR. CMS intact. Lung sounds clear. Tolerating mod carb diet. Denies nausea. C/O slight pain in back and abd incisions, declines pain medication. Continent B/B. Voiding frequently, pt on furosemide. Bowel sounds active. Abd 3 lap site incisions closed with liquid bandage, WDL. Mepilex to lower back CDI. R PIV SL w/ intermittent antibiotics. Blood sugar checks 202 and 329 this shift with insulin coverage BLE edema +2. Plan to discharge home in 1-2 days. Nursing to continue monitoring.

## 2020-04-27 NOTE — PLAN OF CARE
[id number]: Vital signs stable on 2L NC, RT to put on BIPAP at night. A&Ox 4. Up with A1, GB and walker. Tele NSR. CMS intact. Lung sounds clear.  Regular diet w fluid restrict 2000 mL. Denies nausea. Continent B/B. Adequate urinary output, up to bathroom. Bowel sounds active. + flatus, + BM. C/o some low back pain, repositioned for comfort. Abd lap site incisions closed with liquid bandage, WDL. mepilex to coccyx and to low back CDI. R PIV inf phos replacement, recheck scheduled. Plan for discharge tbd. Continue to monitor.

## 2020-04-28 ENCOUNTER — APPOINTMENT (OUTPATIENT)
Dept: PHYSICAL THERAPY | Facility: CLINIC | Age: 55
DRG: 417 | End: 2020-04-28
Payer: COMMERCIAL

## 2020-04-28 LAB
ALBUMIN SERPL-MCNC: 2.9 G/DL (ref 3.4–5)
ALP SERPL-CCNC: 198 U/L (ref 40–150)
ALT SERPL W P-5'-P-CCNC: 81 U/L (ref 0–50)
ANION GAP SERPL CALCULATED.3IONS-SCNC: 3 MMOL/L (ref 3–14)
AST SERPL W P-5'-P-CCNC: 29 U/L (ref 0–45)
BILIRUB SERPL-MCNC: 0.7 MG/DL (ref 0.2–1.3)
BUN SERPL-MCNC: 19 MG/DL (ref 7–30)
CALCIUM SERPL-MCNC: 9.1 MG/DL (ref 8.5–10.1)
CHLORIDE SERPL-SCNC: 101 MMOL/L (ref 94–109)
CO2 SERPL-SCNC: 34 MMOL/L (ref 20–32)
COPATH REPORT: NORMAL
CREAT SERPL-MCNC: 1 MG/DL (ref 0.52–1.04)
ERYTHROCYTE [DISTWIDTH] IN BLOOD BY AUTOMATED COUNT: 14.2 % (ref 10–15)
GFR SERPL CREATININE-BSD FRML MDRD: 64 ML/MIN/{1.73_M2}
GLUCOSE BLDC GLUCOMTR-MCNC: 227 MG/DL (ref 70–99)
GLUCOSE BLDC GLUCOMTR-MCNC: 247 MG/DL (ref 70–99)
GLUCOSE BLDC GLUCOMTR-MCNC: 249 MG/DL (ref 70–99)
GLUCOSE BLDC GLUCOMTR-MCNC: 272 MG/DL (ref 70–99)
GLUCOSE SERPL-MCNC: 237 MG/DL (ref 70–99)
HCT VFR BLD AUTO: 35.3 % (ref 35–47)
HGB BLD-MCNC: 11.4 G/DL (ref 11.7–15.7)
MAGNESIUM SERPL-MCNC: 2 MG/DL (ref 1.6–2.3)
MCH RBC QN AUTO: 30.9 PG (ref 26.5–33)
MCHC RBC AUTO-ENTMCNC: 32.3 G/DL (ref 31.5–36.5)
MCV RBC AUTO: 96 FL (ref 78–100)
PHOSPHATE SERPL-MCNC: 3.3 MG/DL (ref 2.5–4.5)
PLATELET # BLD AUTO: 194 10E9/L (ref 150–450)
POTASSIUM SERPL-SCNC: 3.4 MMOL/L (ref 3.4–5.3)
PROT SERPL-MCNC: 6.4 G/DL (ref 6.8–8.8)
RBC # BLD AUTO: 3.69 10E12/L (ref 3.8–5.2)
SODIUM SERPL-SCNC: 138 MMOL/L (ref 133–144)
WBC # BLD AUTO: 8.4 10E9/L (ref 4–11)

## 2020-04-28 PROCEDURE — 36415 COLL VENOUS BLD VENIPUNCTURE: CPT | Performed by: PHYSICIAN ASSISTANT

## 2020-04-28 PROCEDURE — 97110 THERAPEUTIC EXERCISES: CPT | Mod: GP | Performed by: PHYSICAL THERAPIST

## 2020-04-28 PROCEDURE — 85027 COMPLETE CBC AUTOMATED: CPT | Performed by: PHYSICIAN ASSISTANT

## 2020-04-28 PROCEDURE — 99233 SBSQ HOSP IP/OBS HIGH 50: CPT | Performed by: HOSPITALIST

## 2020-04-28 PROCEDURE — 25000128 H RX IP 250 OP 636: Performed by: HOSPITALIST

## 2020-04-28 PROCEDURE — 00000146 ZZHCL STATISTIC GLUCOSE BY METER IP

## 2020-04-28 PROCEDURE — 84100 ASSAY OF PHOSPHORUS: CPT | Performed by: PHYSICIAN ASSISTANT

## 2020-04-28 PROCEDURE — 99207 ZZC CDG-MDM COMPONENT: MEETS MODERATE - UP CODED: CPT | Performed by: HOSPITALIST

## 2020-04-28 PROCEDURE — 97116 GAIT TRAINING THERAPY: CPT | Mod: GP | Performed by: PHYSICAL THERAPIST

## 2020-04-28 PROCEDURE — 80053 COMPREHEN METABOLIC PANEL: CPT | Performed by: PHYSICIAN ASSISTANT

## 2020-04-28 PROCEDURE — 12000000 ZZH R&B MED SURG/OB

## 2020-04-28 PROCEDURE — 25000132 ZZH RX MED GY IP 250 OP 250 PS 637: Performed by: HOSPITALIST

## 2020-04-28 PROCEDURE — 25000131 ZZH RX MED GY IP 250 OP 636 PS 637: Performed by: HOSPITALIST

## 2020-04-28 PROCEDURE — 83735 ASSAY OF MAGNESIUM: CPT | Performed by: PHYSICIAN ASSISTANT

## 2020-04-28 PROCEDURE — 25000132 ZZH RX MED GY IP 250 OP 250 PS 637: Performed by: PHYSICIAN ASSISTANT

## 2020-04-28 RX ADMIN — SULFASALAZINE 1000 MG: 500 TABLET ORAL at 08:06

## 2020-04-28 RX ADMIN — INSULIN GLARGINE 15 UNITS: 100 INJECTION, SOLUTION SUBCUTANEOUS at 21:17

## 2020-04-28 RX ADMIN — SULFASALAZINE 1000 MG: 500 TABLET ORAL at 21:17

## 2020-04-28 RX ADMIN — LEVOTHYROXINE SODIUM 125 MCG: 125 TABLET ORAL at 08:06

## 2020-04-28 RX ADMIN — HYDROXYCHLOROQUINE SULFATE 200 MG: 200 TABLET, FILM COATED ORAL at 08:06

## 2020-04-28 RX ADMIN — FUROSEMIDE 20 MG: 10 INJECTION, SOLUTION INTRAMUSCULAR; INTRAVENOUS at 08:06

## 2020-04-28 RX ADMIN — PREDNISONE 5 MG: 5 TABLET ORAL at 08:06

## 2020-04-28 RX ADMIN — HYDROXYCHLOROQUINE SULFATE 200 MG: 200 TABLET, FILM COATED ORAL at 21:17

## 2020-04-28 ASSESSMENT — ACTIVITIES OF DAILY LIVING (ADL)
ADLS_ACUITY_SCORE: 16

## 2020-04-28 ASSESSMENT — MIFFLIN-ST. JEOR: SCORE: 2000.38

## 2020-04-28 NOTE — PLAN OF CARE
Pt A&Ox4.   VSS, Tele NSR.   Tolerating room air. Dyspnea with exertion. LS diminished/diminished clear. Using IS. Using Bipap at night and able to tolerate into early morning with c/o dry throat.   , 258.  Mod-carb diet, good appetite. 2000mL fluid restriction, pt within limits. Strict I&Os.   BM this AM, voiding appropriately. Using bathroom.   Up with 1PA, GB, and walker. Steady on feet when up and moving. Walked in halls. Up in chair most of evening. Moves independently. +2-3 edema in BLE.   5 laparoscopic sites WDL, Mepilex on upper back for open area of skin CDI.   BED bath given this evening.   Calls as needed.

## 2020-04-28 NOTE — CONSULTS
Met with patient. She live with her mother who is able to assist her.PT has recommended HHPT. Most likely would also benefit from RN visit for recent SOB/infiltrates. Patient in agreement- she would like to use services from [facility]. Referral sent

## 2020-04-28 NOTE — PLAN OF CARE
Discharge Planner PT   Patient plan for discharge: Home  Current status: Pt transfers sit<>Stand to FWW with SBA. Pt ambulated 210' with FWW and SBA. Participated in seated LE exercises. Activity performed on RA with SpO2 89-93% with activity, HR 100s-110s.  Barriers to return to prior living situation: Decreased activity tolerance  Recommendations for discharge: Home, use of WW, Home PT  Rationale for recommendations: Pt limited by fatigue, pain, and decreased activity tolerance, however, progressing well and anticipate safe disch to home with family. She would benefit from HHPT to address balance, activity tolerance, strength, safety and IND with functional mobility. Pt appropriate for Home PT as she requires AD, assistive person, and significant taxing effort to leave the home.       Entered by: Shea Richard 04/28/2020 12:02 PM

## 2020-04-28 NOTE — PLAN OF CARE
VSS on 1L NC. Tele: NSR. A/Ox4. 5 lap sites WDL, DEVON, liquid bandage. CMS intact, Bilateral LE 2+ edema. Denies pain. Up with Ax1, GB, walker. Tolerating mod carb diet. Denies N&V. BS active. Voiding adequately. LS diminished. Will continue to monitor.

## 2020-04-28 NOTE — PLAN OF CARE
A&O x4. VSS on RA, per PT slight de-sat to around 89% after walking. Tele NSR. Up SBA/assist x1 with walker. LS diminished, IS encouraged. BS+. Flatus+, BM+. Urine output adequate. Incisions CDI. Abrasion on back with mepilex changed today. BG monitoring, plan for increased lantus tonight and monitoring BG at home while her appetite is still low.

## 2020-04-28 NOTE — PROGRESS NOTES
[facility]    Hospitalist Progress Note      Assessment & Plan   Pinky Whatley is a 54 year old female with a past medical history significant for rheumatoid arthritis, type 2 diabetes, hypertension, morbid obesity, HTN, hypothyroidism, and HLD who was admitted on 4/19/2020 after presenting with abdominal pain. She was transferred to the ICU 4/20 due to decompensation ultimately requiring pressor support. Hospitalist service requested 4/24 for transfer of care out of ICU.     Cholecystitis s/p cholecystectomy 4/24/20  Choledocholithiasis with ascending cholangitis s/p sphincterotomy and stent placement 4/20/20  Septic shock related to above, resolved  Patient presented with severe abdominal pain. Imaging showed distended gallbladder with bile duct dilatation and labs notable for LFT elevation. Shortly after admission she developed evidence of sepsis with concern for ascending cholangitis. Ultimately transferred to ICU for pressor support, weaned off 4/22.  She was started on zosyn 4/20 and was taken urgently for ERCP which confirmed acute cholangitis with impacted stone; she underwent sphincterotomy and stent placement at that time. Underwent cholecystectomy once medically stabilized 4/24. LFTs down trending. WBC improving as well. Afebrile >24 hours with stable vital signs.   --General Surgery signed off  --mod carb diet  --finished course of zosyn  --prednisone 5 mg  - PT consult rec home  --continue prn pain medications    Acute hypoxic respiratory failure, improving  Reported history obesity hypoventilation syndrome   Respiratory status worsened 4/20 in the setting of developing sepsis. She was intubated for procedure 4/20 and remained intubated overnight due to tachypnea and increased WOB. Extubated 4/21. Since then has been on BiPAP at night, prn. CT chest on 4/20 with infiltrate vs atelectasis with consolidation seen lingula and left base. Subsequent CXR with mild pulmonary edema. Received  lasix 4/22-4/23 which was stopped due to Cr elevation.   --mobilize as able  --wean oxygen as able  --encourage IS  --BiPAP at night and prn through the day  --I&O and weights   -- continue IV diuresis    ROBBIE. Cr initially 0.92, peak to 2.02 in the setting of septic shock.   Creatinine did trend down, but some increase after diuretics were restarted  Now since normalized  --follow BMP daily while diuresing  --continue to hold PTA lisinopril, metformin     Type 2 Diabetes Mellitus.   A1C is 6.7. PTA regimen includes glimepiride 4mg bid, metformin 1000mg bid, lantus 50u at bedtime. Since admission has been receiving sliding scale only.   --increase 15 units  --continue medium sliding scale as needed  -- add on 3 units premeal  --hold PTA oral mediations for now    Rheumatoid arthritis. PTA on hydroxychloroquine, methotrexate, prednisone 5mg daily, sulfasalazine, and xeljanz.   --can resume sulfasalazine and plaquinel    Left ovarian cyst. Imaging reviewed by OBGYN service 4/20, recommending follow up US in 2-3 months post discharge.     Hypertension. On lisinopril 20mg daily alone PTA.   --lisinopril on hold    HLD. Can resume statin on discharge    Hypothyroidism. Continue PTA levothyroxine       DVT Prophylaxis: PCDs  Code Status: Full Code    Disposition: Expected discharge tomorrow after 1 more day of IV diuresis. Assume she will need oral diuretic on discharge. Still edematous but oxygenation is improving. Will need home health RN and PT    Alex Mccoy DO  Hospitalist [facility]  Pager: [phone]      Interval History   Swelling continues to improve. She is just using oxygen overnight.     -Data reviewed today: I reviewed all new labs and imaging results over the last 24 hours. I personally reviewed no images or EKG's today.    Physical Exam   Temp: 97.7  F (36.5  C) Temp src: Oral BP: 113/69 Pulse: 89 Heart Rate: 59 Resp: 18 SpO2: 90 % O2 Device: None (Room air) Oxygen Delivery: 1 LPM  Vitals:    04/26/20 0638  04/27/20 0625 04/28/20 0606   Weight: 145.2 kg (320 lb) 145 kg (319 lb 10.7 oz) 145.3 kg (320 lb 5.3 oz)     Vital Signs with Ranges  Temp:  [97.3  F (36.3  C)-98.6  F (37  C)] 97.7  F (36.5  C)  Pulse:  [89-98] 89  Heart Rate:  [] 59  Resp:  [18-22] 18  BP: (112-145)/(67-80) 113/69  SpO2:  [90 %-94 %] 90 %  I/O last 3 completed shifts:  In: -   Out: 5200 [Urine:5200]    Constitutional: Alert and oriented x4, sitting up in bed. Appears comfortable and is pleasantly conversant   Respiratory: Lungs clear to auscultation in anterior fields. Mild tachypnea at times but normal effort.   Cardiovascular: Regular rate and rhythm, no murmur, 2+bilateral pedal edema   GI: hypoactive bowel sounds, abdomen soft, obese, appropriately tender without rebound or guarding. Incisions c/d/i  MSK:  Moves all four extremities.  strength +5/5 and equal.   Neuro:  Speech is clear. Face symmetric. No focal deficits      Medications     - MEDICATION INSTRUCTIONS -         furosemide  20 mg Intravenous Daily     hydroxychloroquine  200 mg Oral BID     insulin aspart  3 Units Subcutaneous QAM AC     insulin aspart  3 Units Subcutaneous Daily with lunch     insulin aspart  3 Units Subcutaneous Daily with supper     insulin aspart  1-7 Units Subcutaneous TID AC     insulin aspart  1-5 Units Subcutaneous At Bedtime     insulin glargine  15 Units Subcutaneous At Bedtime     levothyroxine  125 mcg Oral Daily     predniSONE  5 mg Oral Daily     sodium chloride (PF)  3 mL Intracatheter Q8H     sulfaSALAzine  1,000 mg Oral BID       Data   Recent Labs   Lab 04/28/20  0621 04/27/20  0553 04/26/20  0642   WBC 8.4 9.7 10.7   HGB 11.4* 11.4* 12.0   MCV 96 97 98    194 193    141 141   POTASSIUM 3.4 3.4 3.8   CHLORIDE 101 104 106   CO2 34* 33* 29   BUN 19 23 25   CR 1.00 1.07* 1.07*   ANIONGAP 3 4 6   CHANTELLE 9.1 8.9 9.5   * 193* 170*   ALBUMIN 2.9* 2.8* 3.1*   PROTTOTAL 6.4* 6.3* 6.9   BILITOTAL 0.7 0.8 1.0   ALKPHOS 198* 219*  259*   ALT 81* 103* 142*   AST 29 33 46*       No results found for this or any previous visit (from the past 24 hour(s)).

## 2020-04-29 ENCOUNTER — APPOINTMENT (OUTPATIENT)
Dept: PHYSICAL THERAPY | Facility: CLINIC | Age: 55
DRG: 417 | End: 2020-04-29
Payer: COMMERCIAL

## 2020-04-29 VITALS
SYSTOLIC BLOOD PRESSURE: 140 MMHG | BODY MASS INDEX: 55.32 KG/M2 | WEIGHT: 293 LBS | OXYGEN SATURATION: 93 % | RESPIRATION RATE: 20 BRPM | HEIGHT: 61 IN | DIASTOLIC BLOOD PRESSURE: 84 MMHG | TEMPERATURE: 98 F | HEART RATE: 93 BPM

## 2020-04-29 LAB
ALBUMIN SERPL-MCNC: 2.9 G/DL (ref 3.4–5)
ALP SERPL-CCNC: 190 U/L (ref 40–150)
ALT SERPL W P-5'-P-CCNC: 68 U/L (ref 0–50)
ANION GAP SERPL CALCULATED.3IONS-SCNC: 3 MMOL/L (ref 3–14)
AST SERPL W P-5'-P-CCNC: 25 U/L (ref 0–45)
BILIRUB SERPL-MCNC: 0.8 MG/DL (ref 0.2–1.3)
BUN SERPL-MCNC: 17 MG/DL (ref 7–30)
CALCIUM SERPL-MCNC: 9.2 MG/DL (ref 8.5–10.1)
CHLORIDE SERPL-SCNC: 101 MMOL/L (ref 94–109)
CO2 SERPL-SCNC: 34 MMOL/L (ref 20–32)
CREAT SERPL-MCNC: 0.92 MG/DL (ref 0.52–1.04)
ERYTHROCYTE [DISTWIDTH] IN BLOOD BY AUTOMATED COUNT: 14.3 % (ref 10–15)
GFR SERPL CREATININE-BSD FRML MDRD: 71 ML/MIN/{1.73_M2}
GLUCOSE BLDC GLUCOMTR-MCNC: 249 MG/DL (ref 70–99)
GLUCOSE SERPL-MCNC: 243 MG/DL (ref 70–99)
HCT VFR BLD AUTO: 36.8 % (ref 35–47)
HGB BLD-MCNC: 11.7 G/DL (ref 11.7–15.7)
MAGNESIUM SERPL-MCNC: 2 MG/DL (ref 1.6–2.3)
MCH RBC QN AUTO: 30.5 PG (ref 26.5–33)
MCHC RBC AUTO-ENTMCNC: 31.8 G/DL (ref 31.5–36.5)
MCV RBC AUTO: 96 FL (ref 78–100)
PHOSPHATE SERPL-MCNC: 3 MG/DL (ref 2.5–4.5)
PLATELET # BLD AUTO: 235 10E9/L (ref 150–450)
POTASSIUM SERPL-SCNC: 3.3 MMOL/L (ref 3.4–5.3)
PROT SERPL-MCNC: 6.6 G/DL (ref 6.8–8.8)
RBC # BLD AUTO: 3.84 10E12/L (ref 3.8–5.2)
SODIUM SERPL-SCNC: 138 MMOL/L (ref 133–144)
WBC # BLD AUTO: 6.3 10E9/L (ref 4–11)

## 2020-04-29 PROCEDURE — 25000132 ZZH RX MED GY IP 250 OP 250 PS 637: Performed by: PHYSICIAN ASSISTANT

## 2020-04-29 PROCEDURE — 85027 COMPLETE CBC AUTOMATED: CPT | Performed by: PHYSICIAN ASSISTANT

## 2020-04-29 PROCEDURE — 25000131 ZZH RX MED GY IP 250 OP 636 PS 637: Performed by: HOSPITALIST

## 2020-04-29 PROCEDURE — 99239 HOSP IP/OBS DSCHRG MGMT >30: CPT | Performed by: INTERNAL MEDICINE

## 2020-04-29 PROCEDURE — 36415 COLL VENOUS BLD VENIPUNCTURE: CPT | Performed by: PHYSICIAN ASSISTANT

## 2020-04-29 PROCEDURE — 84100 ASSAY OF PHOSPHORUS: CPT | Performed by: PHYSICIAN ASSISTANT

## 2020-04-29 PROCEDURE — 83735 ASSAY OF MAGNESIUM: CPT | Performed by: PHYSICIAN ASSISTANT

## 2020-04-29 PROCEDURE — 97116 GAIT TRAINING THERAPY: CPT | Mod: GP

## 2020-04-29 PROCEDURE — 00000146 ZZHCL STATISTIC GLUCOSE BY METER IP

## 2020-04-29 PROCEDURE — 80053 COMPREHEN METABOLIC PANEL: CPT | Performed by: PHYSICIAN ASSISTANT

## 2020-04-29 PROCEDURE — 25000132 ZZH RX MED GY IP 250 OP 250 PS 637: Performed by: HOSPITALIST

## 2020-04-29 RX ORDER — POTASSIUM CHLORIDE 750 MG/1
10 TABLET, EXTENDED RELEASE ORAL DAILY
Qty: 5 TABLET | Refills: 0 | Status: ON HOLD | OUTPATIENT
Start: 2020-04-29 | End: 2021-01-01

## 2020-04-29 RX ORDER — FUROSEMIDE 20 MG
20 TABLET ORAL DAILY
Qty: 5 TABLET | Refills: 0 | Status: ON HOLD | OUTPATIENT
Start: 2020-04-29 | End: 2021-01-01

## 2020-04-29 RX ADMIN — SULFASALAZINE 1000 MG: 500 TABLET ORAL at 09:16

## 2020-04-29 RX ADMIN — POTASSIUM CHLORIDE 40 MEQ: 1500 TABLET, EXTENDED RELEASE ORAL at 10:15

## 2020-04-29 RX ADMIN — POTASSIUM CHLORIDE 20 MEQ: 1500 TABLET, EXTENDED RELEASE ORAL at 12:05

## 2020-04-29 RX ADMIN — LEVOTHYROXINE SODIUM 125 MCG: 125 TABLET ORAL at 09:16

## 2020-04-29 RX ADMIN — PREDNISONE 5 MG: 5 TABLET ORAL at 09:16

## 2020-04-29 RX ADMIN — HYDROXYCHLOROQUINE SULFATE 200 MG: 200 TABLET, FILM COATED ORAL at 09:16

## 2020-04-29 ASSESSMENT — ACTIVITIES OF DAILY LIVING (ADL)
ADLS_ACUITY_SCORE: 16

## 2020-04-29 ASSESSMENT — MIFFLIN-ST. JEOR: SCORE: 1967.38

## 2020-04-29 NOTE — PLAN OF CARE
VSS on 1L humidified NC. Tele: NSR. A/Ox4. 5 lap sites WDL, DEVON, liquid bandage. CMS intact, Bilateral LE 2+ edema. Denies pain. Up with Ax1, GB, walker. Tolerating mod carb diet. Fluid restriction. Denies N&V. BS active. Voiding adequately. LS diminished. Will continue to monitor.

## 2020-04-29 NOTE — PLAN OF CARE
Discharge Planner PT   Patient plan for discharge: Home today with home PT  Current status: Pt seated upon arrival, agreeable to PT. SBA sit<>stand with FWW. Ambulated 1x60' with stair practice, 1x180', with FWW and SBA & seated rest between. Performed 1x3 steps with B rail support and SBA. SpO2 89-93 ambulating, briefly desat to 85 with stairs. HR mostly 120s-130s with mobility, 142 at highest. Pt seated in chair upon departure, RN in room.     Barriers to return to prior living situation: Decreased activity tolerance  Recommendations for discharge: Home, use of WW, Home PT  Rationale for recommendations: Pt limited by fatigue, pain, and decreased activity tolerance, however, progressing well and anticipate safe disch to home with family. She would benefit from HHPT to address balance, activity tolerance, strength, safety and IND with functional mobility. Pt appropriate for Home PT as she requires AD, assistive person, and significant taxing effort to leave the home.    Physical Therapy Discharge Summary    Reason for therapy discharge:    Discharged to home with home therapy.    Progress towards therapy goal(s). See goals on Care Plan in [facility] electronic health record for goal details.  Goals partially met.  Barriers to achieving goals:   discharge from facility.    Therapy recommendation(s):    Continued therapy is recommended.  Rationale/Recommendations:  Patient will benefit from continued Home PT to progress activity tolerance, strength & indepdence toward baseline..  Ambulation program with walker           Entered by: Peggy Bailey 04/29/2020 9:19 AM

## 2020-04-29 NOTE — PROGRESS NOTES
Care Coordination:    Orders where obtained for skilled nursing home care in addition to PT per request of [facility] Liaison Cely Choi.  [facility] phone number has been added to the AVS and the pt was notified of this.  I spoke with Pinky on the phone and she stated her mother will give her a ride home.    Pinky did have a sleep study in October of 2019 and it was recommened per that note that she should have a CPAP.  Pinky states no one ever contacted her nor did she receive a bill for this visit.  She stated she will call and talk to them about revisiting this issue and getting a CPAP.  Pinky did request I make her PCP appointment:    1.  Follow up with primary care provider, Laura Evans, within 7 days for hospital follow- up.  The following labs/tests are recommended: BMP (to be drawn by home care RN).   You have a Follow up Appointment with Laura Evans  on Wednesday, May 6th,  at 10:30 AM.  If you have a mask please wear one.  If you have symptoms of COVID please call the clinic to reschedule the appointment:  [phone]  Address:  [facility] [address] [facility] [address], [facility] [address]      2. Contact sleep clinic for follow-up of previous abnormal sleep study and prescription for CPAP/BiPAP treatment.  [phone]  (You stated you wanted to do this on your own.)    PCP appointment made and Handoff sent per request of the clinic.    No other needs noted at this time.    Nedra Jett RN, BSN Care Coordinator  [facility]  Mobile: [phone]

## 2020-04-29 NOTE — PROVIDER NOTIFICATION
Text page to hospitalist Dr. Malagon:    Can IV Lasix be changed to PO diuretic? This was discussed yesterday and now IV access was lost. Pt prefers not to replace IV with being told probable discharge today.    Per Dr. Malagon, no need to replace IV/give IV lasix. Will evaluate pt soon.

## 2020-04-29 NOTE — PROGRESS NOTES
[facility]  Called and spoke with patient in her hospital room to discuss plans for home care.  Pt to be discharged home today and has agreed to have [facility] follow with RN and PT services. Patient care support center processing referral.  Pt verbalized understanding that initial visit is scheduled for 4/30 or 5/1. Pt has 24 hour phone number for [facility] for any questions or concerns.

## 2020-04-29 NOTE — DISCHARGE SUMMARY
[facility]  Hospitalist Discharge Summary       Date of Admission:  4/19/2020  Date of Discharge:  4/29/2020  Discharging Provider: Dheeraj Malagon MD      Discharge Diagnoses   Cholecystitis s/p cholecystectomy 4/24/20  Choledocholithiasis with ascending cholangitis s/p sphincterotomy and stent placement 4/20/20  Septic shock secondary to ascending cholangitis and cholecystitis  Acute hypoxic respiratory failure  Severe obstructive sleep apnea  Iatrogenic volume overload  Acute kidney injury  Diabetes mellitus, type 2, on long-term insulin  Rheumatoid arthritis  Left ovarian cyst  Hypertension  Hyperlipidemia   Hypothyroidism    Follow-ups Needed After Discharge   Follow-up Appointments     Follow-up and recommended labs and tests       Follow-up with primary care provider for recheck after hospital stay.   Follow-up with gastroenterology as directed.    Dr. Nettles's surgical office will contact you in approximately 2 weeks to   check on your progress and answer any questions you may have.  If you are   doing well, you will not need to return for a follow up appointment.  If   any concerns are identified over the phone, we will help you make an   appointment to see a provider.         Follow-up and recommended labs and tests       Follow up with primary care provider, Laura Evans, within 7 days for   hospital follow- up.  The following labs/tests are recommended: BMP (to be   drawn by home care RN).               Hospital Course   Pinky Whatley is a 54 year old female with a past medical history significant for rheumatoid arthritis, type 2 diabetes, hypertension, morbid obesity, HTN, hypothyroidism, and HLD who was admitted on 4/19/2020 after presenting with abdominal pain.      Cholecystitis s/p cholecystectomy 4/24/20  Choledocholithiasis with ascending cholangitis s/p sphincterotomy and stent placement 4/20/20  Septic shock secondary to ascending cholangitis and cholecystitis  Patient  presented with severe abdominal pain. Imaging showed distended gallbladder with bile duct dilatation and labs notable for LFT elevation. Shortly after admission she developed evidence of sepsis with concern for ascending cholangitis.  Started on antibiotics.  Ultimately transferred to ICU for pressor support, weaned off 4/22.  She was taken urgently for ERCP which confirmed acute cholangitis with impacted stone; she underwent sphincterotomy and stent placement at that time. Underwent cholecystectomy once medically stabilized 4/24. LFTs down trending. WBC improving as well. Afebrile >24 hours with stable vital signs.  Finished course of antibiotics while hospitalized.  Follow-up with general surgery as directed after discharge.    Acute hypoxic respiratory failure  Severe obstructive sleep apnea  Respiratory status worsened 4/20 in the setting of developing sepsis. She was intubated for procedure 4/20 and remained intubated overnight due to tachypnea and increased WOB. Extubated 4/21. Since then has been on BiPAP at night, prn. CT chest on 4/20 with infiltrate vs atelectasis with consolidation seen lingula and left base. Subsequent CXR with mild pulmonary edema (see below). Completed antibiotics as above.  Previously diagnosed with sleep apnea per outpatient polysomnography with Auto-PAP recommended, has not yet received prescription for respiratory support. Encouraged follow-up with her sleep clinic to initiate recommended treatment.    Iatrogenic volume overload  Noted to have mild pulmonary edema on chest x-ray, also with lower extremity edema. Secondary to IV fluid resuscitation for sepsis.  EF 50%.  Diuresed with improvement in respiratory status and lower extremity edema, discharged to continue furosemide for 5 days with repeat labs and consideration for ongoing diuresis pending symptoms and results.     Acute kidney injury  Creatinine initially 0.92, peak to 2.02 in the setting of septic shock. Creatinine  trended down, with some increase after diuretics were restarted but since normalized and stable with ongoing diuresis.  Resume prior to admission lisinopril on discharge.     Diabetes mellitus, type 2, on long-term insulin  HgbA1C is 6.7%. Prior to admission regimen includes glimepiride 4 mg BID, metformin 1000 mg BID, glargine 50 units at bedtime.  Initially required sliding scale insulin only, subsequently resumed on lower dose glargine as intake improved.  Increased again on day of discharge, though still lower dose than previous as intake still decreased from baseline.  Made recommendations for close monitoring and titration of dose as outpatient.  Oral medications resumed on discharge.     Rheumatoid arthritis  Continue prior to admission regimen.      Left ovarian cyst  Imaging reviewed by OBGYN service 4/20, recommending follow up US in 2-3 months post discharge.      Hypertension  On lisinopril 20mg daily prior to admission, resumed on discharge     Hyperlipidemia   Resumed statin on discharge     Hypothyroidism  Continue PTA levothyroxine       Consultations This Hospital Stay   SURGERY GENERAL IP CONSULT  OB GYN IP CONSULT  INTENSIVIST IP CONSULT  GASTROENTEROLOGY IP CONSULT  SWALLOW EVAL SPEECH PATH AT BEDSIDE IP CONSULT  PHYSICAL THERAPY ADULT IP CONSULT  CARE TRANSITION RN/SW IP CONSULT  CARE TRANSITION RN/SW IP CONSULT    Code Status   Full Code    Time Spent on this Encounter   I, Dheeraj Malagon MD, personally saw the patient today and spent greater than 30 minutes discharging this patient.       Dheeraj Malagon MD  [facility]  ______________________________________________________________________    Physical Exam   Vital Signs: Temp: 97.4  F (36.3  C) Temp src: Oral BP: 123/72 Pulse: 94 Heart Rate: 90 Resp: 20 SpO2: 93 % O2 Device: None (Room air) Oxygen Delivery: 1 LPM  Weight: 313 lbs .85 oz    Constitutional: Well-appearing, NAD  Respiratory: Clear to auscultation bilaterally,  good air movement bilaterally  Cardiovascular: RRR, no m/r/g. 2+ bilateral lower extremity edema to just below the knee  GI: Soft, non-tender, non-distended.    Skin/Integumen: Warm, dry  Other:       Primary Care Physician   Laura Evans    Discharge Disposition   Discharged to home  Condition at discharge: Stable      Discharge Orders      Home care nursing referral      Home Care PT Referral for Hospital Discharge      Follow-up and recommended labs and tests     Follow-up with primary care provider for recheck after hospital stay. Follow-up with gastroenterology as directed.    Dr. Nettles's surgical office will contact you in approximately 2 weeks to check on your progress and answer any questions you may have.  If you are doing well, you will not need to return for a follow up appointment.  If any concerns are identified over the phone, we will help you make an appointment to see a provider.     Reason for your hospital stay    Ascending cholangitis  Cholecystitis  Acute kidney failure  Septic shock     Activity    Expect to feel tired after your surgery.  This will gradually resolve.    Take frequent, short walks and increase your activity gradually.    Avoid strenuous physical activity or heavy lifting greater than 15-20 lbs. for 2-3 weeks.  You may climb stairs.  You may drive without restrictions when you are not using any prescription pain medication and feel comfortable in a car.  You may return to work/school when you are comfortable without any prescription pain medication.     Wound care and dressings    You have skin glue on your abdominal incisions. Do not pick at the glue; it will peel up and fall off on its own gradually. You may shower normally starting 4/26/20. Do not submerge or soak your incisions under water for 2 weeks.     Follow-up and recommended labs and tests     Follow up with primary care provider, Laura Evans, within 7 days for hospital follow- up.  The following labs/tests are  recommended: BMP (to be drawn by home care RN).     Discharge Instructions    Monitor and record your blood sugar at least daily in the morning before eating, and up to four times a day before breakfast, lunch, dinner and then at bedtime. Increase your glargine by 5 units every night until morning blood sugars are consistently less than 140. Contact your primary clinic if you have any questions regarding your insulin dosing.     MD face to face encounter    Documentation of Face to Face and Certification for Home Health Services    I certify that patient: Pinky Whatley is under my care and that I, or a nurse practitioner or physician's assistant working with me, had a face-to-face encounter that meets the physician face-to-face encounter requirements with this patient on: 4/29/2020.    This encounter with the patient was in whole, or in part, for the following medical condition, which is the primary reason for home health care: Ascending cholangitis    I certify that, based on my findings, the following services are medically necessary home health services: Nursing and Physical Therapy.    My clinical findings support the need for the above services because: Nurse is needed: RN skilled nursing visit. RN to assess vital signs and weight, incision for signs/symptoms of infection, hydration, nutrition and bowel status and draw BMP on 5/4/20 and Physical Therapy Services are needed to assess and treat the following functional impairments: Physical deconditioning.    Further, I certify that my clinical findings support that this patient is homebound (i.e. absences from home require considerable and taxing effort and are for medical reasons or [employer] or infrequently or of short duration when for other reasons) because: Requires assistance of another person or specialized equipment to access medical services because patient: Requires supervision of another for safe transfer.    Based on the above findings. I  certify that this patient is confined to the home and needs intermittent skilled nursing care, physical therapy and/or speech therapy.  The patient is under my care, and I have initiated the establishment of the plan of care.  This patient will be followed by a physician who will periodically review the plan of care.  Physician/Provider to provide follow up care: Laura Evans    Attending hospital physician (the Medicare certified [facility] provider): Dheeraj Malagon MD  Physician Signature: See electronic signature associated with these discharge orders.  Date: 4/29/2020     Full Code    Per previous documentation     Diet    Start with liquids, then gradually resume your regular diet as tolerated.  Avoid heavy, spicy, and greasy meals for 2-3 days.  Drink plenty of fluids to stay hydrated.  It is not uncommon to experience some loose stools or diarrhea after surgery.  This is your body s way of adapting to the bile which will slowly drain into your intestine.  A low fat diet may help with this.  This should improve over 1-2 months.     Discharge Medications   Current Discharge Medication List      START taking these medications    Details   furosemide (LASIX) 20 MG tablet Take 1 tablet (20 mg) by mouth daily for 5 days  Qty: 5 tablet, Refills: 0    Associated Diagnoses: Hypervolemia, unspecified hypervolemia type      potassium chloride ER (KLOR-CON M) 10 MEQ CR tablet Take 1 tablet (10 mEq) by mouth daily  Qty: 5 tablet, Refills: 0    Associated Diagnoses: Hypervolemia, unspecified hypervolemia type      senna-docusate (SENOKOT-S/PERICOLACE) 8.6-50 MG tablet Take 1-2 tablets by mouth 2 times daily as needed for constipation  Qty: 20 tablet, Refills: 0    Associated Diagnoses: Abdominal pain, acute         CONTINUE these medications which have CHANGED    Details   acetaminophen (TYLENOL) 325 MG tablet Take 2 tablets (650 mg) by mouth every 4 hours as needed for pain  Qty:      Associated Diagnoses: Abdominal pain,  "acute      insulin glargine (LANTUS PEN) 100 UNIT/ML pen Take 25 units at bedtime, increase dose as directed    Comments: If Lantus is not covered by insurance, may substitute Basaglar at same dose and frequency.    Associated Diagnoses: Type 2 diabetes mellitus without complication, with long-term current use of insulin (H)         CONTINUE these medications which have NOT CHANGED    Details   aspirin 81 MG EC tablet Take 81 mg by mouth daily      atorvastatin (LIPITOR) 80 MG tablet Take 80 mg by mouth daily      diphenhydrAMINE-acetaminophen (TYLENOL PM)  MG tablet Take 2 tablets by mouth nightly as needed       glimepiride (AMARYL) 4 MG tablet Take 4 mg by mouth 2 times daily      GLUCOSAMINE-CHONDROITIN PO Take 1 tablet by mouth 2 times daily      hydroxychloroquine (PLAQUENIL) 200 MG tablet Take 200 mg by mouth 2 times daily      levothyroxine (SYNTHROID/LEVOTHROID) 125 MCG tablet Take 125 mcg by mouth daily      lisinopril (PRINIVIL/ZESTRIL) 20 MG tablet Take 20 mg by mouth daily      metFORMIN (GLUCOPHAGE) 500 MG tablet Take 1,000 mg by mouth 2 times daily (with meals)      methotrexate 2.5 MG tablet Take 22.5 mg by mouth once a week on Monday (9 x 2.5 mg tablet)      multivitamin, therapeutic (THERA-VIT) TABS tablet Take 1 tablet by mouth daily      predniSONE (DELTASONE) 5 MG tablet Take 5 mg by mouth daily      Probiotic Product (PROBIOTIC PO) Take 1 capsule by mouth At Bedtime      sulfaSALAzine (AZULFIDINE) 500 MG tablet Take 1,000 mg by mouth 2 times daily      tofacitinib (XELJANZ) 5 MG tablet Take 5 mg by mouth 2 times daily      ibuprofen (ADVIL/MOTRIN) 200 MG tablet Take 400 mg by mouth See Admin Instructions Per patient she takes APAP or ibuprofen or naproxen twice a day. She \"changes it up\" every day but takes otc medication for pain twice a day.      naproxen sodium (ANAPROX) 220 MG tablet Take 440 mg by mouth See Admin Instructions Per patient she takes APAP or ibuprofen or naproxen twice " "a day. She \"changes it up\" every day but takes otc medication for pain twice a day.         STOP taking these medications       oxyCODONE (ROXICODONE) 5 MG tablet Comments:   Reason for Stopping:               Significant Results and Procedures   Most Recent 3 CBC's:  Recent Labs   Lab Test 04/29/20  0754 04/28/20  0621 04/27/20  0553   WBC 6.3 8.4 9.7   HGB 11.7 11.4* 11.4*   MCV 96 96 97    194 194     Most Recent 3 BMP's:  Recent Labs   Lab Test 04/29/20  0754 04/28/20  0621 04/27/20  0553    138 141   POTASSIUM 3.3* 3.4 3.4   CHLORIDE 101 101 104   CO2 34* 34* 33*   BUN 17 19 23   CR 0.92 1.00 1.07*   ANIONGAP 3 3 4   CHANTELLE 9.2 9.1 8.9   * 237* 193*     Most Recent 2 LFT's:  Recent Labs   Lab Test 04/29/20  0754 04/28/20  0621   AST 25 29   ALT 68* 81*   ALKPHOS 190* 198*   BILITOTAL 0.8 0.7     Most Recent 3 INR's:  Recent Labs   Lab Test 04/20/20  0603   INR 1.09     Most Recent 3 Troponin's:  Recent Labs   Lab Test 04/20/20  0603 04/19/20  2138   TROPI <0.015 <0.015     Most Recent 3 BNP's:  Recent Labs   Lab Test 04/19/20  2138 09/08/19  0655   NTBNPI 9 76     Most Recent Cholesterol Panel:No lab results found.  Most Recent 6 Bacteria Isolates From Any Culture (See EPIC Reports for Culture Details):  Recent Labs   Lab Test 09/07/19  1450 09/07/19  1415   CULT No growth No growth     Most Recent TSH and T4:  Recent Labs   Lab Test 09/09/19  0609   TSH 5.63*   T4 1.34     Most Recent Hemoglobin A1c:  Recent Labs   Lab Test 04/20/20  0603   A1C 6.7*     Most Recent Urinalysis:  Recent Labs   Lab Test 04/19/20  2347   COLOR Yellow   APPEARANCE Clear   URINEGLC >1000*   URINEBILI Negative   URINEKETONE 80*   SG 1.025   UBLD Negative   URINEPH 5.0   PROTEIN Negative   NITRITE Negative   LEUKEST Negative   RBCU <1   WBCU <1     Most Recent ABG:  Recent Labs   Lab Test 04/21/20  1230   PH 7.29*   PO2 96   PCO2 44   HCO3 21     Most Recent ESR & CRP:No lab results found.,   Results for orders placed " or performed during the hospital encounter of 04/19/20   US Abdomen Limited    Narrative    EXAM: US ABDOMEN LIMITED  LOCATION: [facility]  DATE/TIME: 4/19/2020 10:49 PM    INDICATION: Retinal quadrant pain  COMPARISON: None.  TECHNIQUE: Limited abdominal ultrasound.    FINDINGS:    GALLBLADDER: Gallstones with gallbladder wall thickening. No pericholecystic fluid. Sonographic Donald sign reported as negative.    BILE DUCTS: Dilated common bile duct. The common duct measures 9 mm.    LIVER: Enlarged liver measuring up to 17.8 cm. No focal mass.    RIGHT KIDNEY: No hydronephrosis.    PANCREAS: The visualized portions are normal.    No ascites.      Impression    IMPRESSION:  1.  Gallstones with mild gallbladder wall thickening. No pericholecystic fluid and negative sonographic Donald sign. Findings are equivocal, correlate to exclude cholecystitis.  2.  Mildly dilated common bile duct, may reflect prior stone passage.  3.  Hepatomegaly.   Chest XR,  PA & LAT    Narrative    EXAM: XR CHEST 2 VW  LOCATION: [facility]  DATE/TIME: 4/19/2020 10:16 PM    INDICATION: Shortness of breath  COMPARISON: None.      Impression    IMPRESSION: Low lung volumes. Bilateral perihilar airspace opacity, correlate for pulmonary edema. Bibasilar atelectasis. Mildly enlarged cardiac silhouette. Atherosclerotic aorta. No acute osseous abnormality.   CT Chest (PE) Abdomen Pelvis w Contrast    Narrative    EXAM: CT CHEST PE ABDOMEN PELVIS W CONTRAST  LOCATION: [facility]  DATE/TIME: 4/19/2020 11:57 PM    INDICATION: Epigastric pain. Hypoxia with ambulation.  COMPARISON: 09/08/2019.  TECHNIQUE: CT angiogram chest and routine CT abdomen pelvis with IV contrast. Arterial phase through the chest and venous phase through the abdomen and pelvis. 2D and 3D MIP reconstructions were preformed by the CT technologist. Dose reduction techniques   were used.   CONTRAST: 135 mL Isovue-370.    FINDINGS:  ANGIOGRAM  CHEST: No visualized pulmonary embolism. Slightly limited by body habitus, suboptimal pulmonary artery opacification and motion. No large central emboli. Normal caliber thoracic aorta.    LUNGS AND PLEURA: Mild infiltrate or atelectasis with small areas of consolidation at the left base and lingula. Minimal atelectasis right base. No pleural fluid or pneumothorax.    MEDIASTINUM/AXILLAE: Normal.    HEPATOBILIARY: Distended gallbladder. Mild intra and extrahepatic bile duct dilatation.    PANCREAS: Normal.    SPLEEN: Normal.    ADRENAL GLANDS: Normal.    KIDNEYS/BLADDER: Normal.    BOWEL: No bowel obstruction. No ascites or free air. Small fat-containing umbilical hernia.    LYMPH NODES: Normal.    PELVIC ORGANS: Uterus is present. Several cystic areas in the left adnexa measuring up to 4.6 cm.    MUSCULOSKELETAL: Degenerative changes visualized spine.      Impression    IMPRESSION:  1.  No visualized pulmonary embolism. Exam slightly limited.  2.  Infiltrate or atelectasis with some consolidation lingula and left base.  3.  Distended gallbladder with mild bile duct dilatation. Correlate clinically. Consider right upper quadrant ultrasound.  4.  Left adnexa cystic areas measuring up to 4.6 cm. Consider outpatient short-term follow-up pelvic ultrasound for further evaluation.   XR Chest Port 1 View    Narrative    XR CHEST PORT 1 VW  4/20/2020 8:01 AM       INDICATION: worsening hypoxia and shortness of breath  COMPARISON: 4/19/2020       Impression    IMPRESSION: Elevated left hemidiaphragm. Infiltrates in the lingula  and left lower lobe, similar to previous. Right lung infiltrates have  resolved.    PAL MARINA MD   XR ERCP    Narrative    ERCP  4/20/2020 3:59 PM     HISTORY: Stent placement.    COMPARISON: None.      Impression    IMPRESSION: Single spot fluoroscopic image demonstrates a stent  overlying the right upper quadrant, presumably biliary, though unable  to confirm without contrast. Total  fluoroscopic time 0.7 minutes.    DEVIN VANEGAS MD   XR Chest Port 1 View    Narrative    CHEST PORTABLE ONE VIEW   2020 6:45 PM     HISTORY: Check ET tube.    COMPARISON: Chest x-ray on same day earlier.      Impression    IMPRESSION: Single portable AP view of the chest was obtained.  Interval intubation with endotracheal tube tip projects over lower  thoracic trachea approximately 1.5 cm from the harshal. Stable  enlargement of the cardiac silhouette and mild pulmonary vascular  congestion. No significant pleural effusion or pneumothorax. Left  basilar pulmonary opacities, atelectasis versus infection.    NICOLETTE NIEVES MD   Echocardiogram Limited    Narrative    [id number]  [id number]  [id number]  [id number]^A           [facility]  Echocardiography Laboratory  [address] [place] [address]        Name: RADHA JACOBO  MRN: [id number]  : 1965  Study Date: 2020 07:53 AM  Age: 54 yrs  Gender: Female  Patient Location: [facility]  Reason For Study: Shock  Ordering Physician: NATALIE CONTRERAS  Referring Physician: MALINDA MELENDREZ  Performed By: Walter Sheldon RDCS     BSA: 2.2 m2  Height: 61 in  Weight: 300 lb  HR: 89  BP: 118/70 mmHg  _____________________________________________________________________________  __        Procedure  Limited Portable Echo Adult. Optison (NDC #1310-2424) given intravenously.  _____________________________________________________________________________  __        Interpretation Summary     Technically challenging study due to patient habitus and patient positioning  on ventilator, even with the use of contrast imaging.     Left ventricular systolic function is mildly reduced.  The visual ejection fraction is estimated at 50%.  Endomyocardial borders are not optimally visualized, however no clear wall  motion abnormalities are noted. On the short-axis views, the basal inferior  wall appears mildly hypokinetic, but this may be a reflection  of off-axis  imaging.  Right ventricle is not well visualized, however global systolic function is  mildly reduced.  There is no pericardial effusion.  Left pleural effusion noted.     This study was compared to a TTE from 9/2019. Global biventricular function is  mildly worse on this present study. The pleural effusion noted is new.  _____________________________________________________________________________  __        Left Ventricle  The left ventricle is normal in size. Left ventricular systolic function is  mildly reduced. The visual ejection fraction is estimated at 50%. Regional  wall motion abnormalities cannot be excluded due to limited visualization.  Endomyocardial borders are not optimally visualized, however no clear wall  motion abnormalities are noted. On the short-axis views, the basal inferior  wall appears mildly hypokinetic, but this may be a reflection of off-axis  imaging.     Right Ventricle  The right ventricle is mildly dilated. The right ventricle is not well  visualized. Right ventricle is not well visualized, however global systolic  function is mildly reduced.     Mitral Valve  There is trace mitral regurgitation.     Tricuspid Valve  The tricuspid valve is not well visualized. There is trace to mild tricuspid  regurgitation. The right ventricular systolic pressure is approximated at 28.5  mmHg plus the right atrial pressure. Right ventricular systolic pressure is  normal.        Aortic Valve  The aortic valve is not well visualized.     Pulmonic Valve  The pulmonic valve is not well visualized.     Vessels  Dilation of the inferior vena cava is present with abnormal respiratory  variation in diameter. Unable to assess mean RA pressure given the patient is  on a ventilator.     Pericardium  There is no pericardial effusion. Left pleural effusion noted.     _____________________________________________________________________________  __        Doppler Measurements & Calculations  TR max  amauri: 266.7 cm/sec  TR max P.5 mmHg              _____________________________________________________________________________  __        Report approved by: Saud Lopez 2020 09:11 AM          Allergies   No Known Allergies

## 2020-04-30 NOTE — PLAN OF CARE
A&O, able to communicate needs. VSS on RA  Incisions CDI, open to air.    AVS reviewed, meds reviewed including plan for insulin and BG monitoring. Pt verbalized understanding. All questions answered.    Belongings with pt who was escorted to door 2 with transportation by her mother.  Security called to retrieve pt's belonging that they had secured, they told the [facility] they would meet the pt at door 2 to return the belonging.

## 2020-05-01 ENCOUNTER — PATIENT OUTREACH (OUTPATIENT)
Dept: CARE COORDINATION | Facility: CLINIC | Age: 55
End: 2020-05-01

## 2020-05-01 NOTE — LETTER
[place]ARE COORDINATION  [address]  [place] [address]  May 1, 2020    Pinky Whatley  [address]   [place] [address]      Dear Pinky,    I am a clinic care coordinator who works with Laura Evans PA-C at [facility]. I wanted to thank you for spending the time to talk with me.  Below is a description of clinic care coordination and how I can further assist you.      The clinic care coordination team is made up of a registered nurse,  and community health worker who understand the health care system. The goal of clinic care coordination is to help you manage your health and improve access to the health care system in the most efficient manner. The team can assist you in meeting your health care goals by providing education, coordinating services, strengthening the communication among your providers and supporting you with any resource needs.    Please feel free to contact me at [phone] with any questions or concerns. We are focused on providing you with the highest-quality healthcare experience possible and that all starts with you.     Sincerely,     JAXON Kamara  Clinic Care Coordinator   [facility], [facility]  [phone]

## 2020-05-01 NOTE — LETTER
[place] CARE COORDINATION  ***  May 1, 2020    Pinky Whatley  [address]   [place] [address]      Dear Pinky,    I have been unsuccessful in reaching you since our last contact. At this time the Care Coordination team will make no further attempts to reach you, however this does not change your ability to continue receiving care from your providers at your primary care clinic. If you need additional support from a care coordinator in the future please contact *** at ***.    All of us at *** Clinic are invested in your health and are here to assist you in meeting your goals.     Sincerely,    ***

## 2020-05-01 NOTE — PROGRESS NOTES
Clinic Care Coordination Contact  Care Team Conversations    SW talked with pt over the phone. Pt stated she is overall doing much better since being discharged. SW explained what care coordination was and if she was in need of anything at this time. Pt stated she did not, but would call SW in the future.     PLAN:   1) Pt will make follow up appointment with clinic.   2) SW will send pt CC intro letter.   3) SW will do no further outreaches.     Charo Gipson, [facility]  Clinic Care Coordinator   [facility],  [facility]  [phone]

## 2020-05-12 ENCOUNTER — TELEPHONE (OUTPATIENT)
Dept: SURGERY | Facility: CLINIC | Age: 55
End: 2020-05-12

## 2020-05-12 NOTE — TELEPHONE ENCOUNTER
[facility]  Post op call note  May 12, 2020       Pinky Whatley was called for an update regarding her recovery.  There was no answer and a message was left encouraging her to call us back with any questions, concerns, or to provide an update.        Damaris Anderson PA-C  [facility]  [phone]

## 2021-01-01 ENCOUNTER — HEALTH MAINTENANCE LETTER (OUTPATIENT)
Age: 56
End: 2021-01-01

## 2021-01-01 ENCOUNTER — TRANSFERRED RECORDS (OUTPATIENT)
Dept: HEALTH INFORMATION MANAGEMENT | Facility: CLINIC | Age: 56
End: 2021-01-01

## 2021-01-01 ENCOUNTER — ANESTHESIA (OUTPATIENT)
Dept: INTENSIVE CARE | Facility: CLINIC | Age: 56
DRG: 207 | End: 2021-01-01
Payer: COMMERCIAL

## 2021-01-01 ENCOUNTER — APPOINTMENT (OUTPATIENT)
Dept: GENERAL RADIOLOGY | Facility: CLINIC | Age: 56
DRG: 207 | End: 2021-01-01
Attending: EMERGENCY MEDICINE
Payer: COMMERCIAL

## 2021-01-01 ENCOUNTER — APPOINTMENT (OUTPATIENT)
Dept: GENERAL RADIOLOGY | Facility: CLINIC | Age: 56
DRG: 207 | End: 2021-01-01
Attending: NURSE PRACTITIONER
Payer: COMMERCIAL

## 2021-01-01 ENCOUNTER — APPOINTMENT (OUTPATIENT)
Dept: CT IMAGING | Facility: CLINIC | Age: 56
DRG: 207 | End: 2021-01-01
Attending: EMERGENCY MEDICINE
Payer: COMMERCIAL

## 2021-01-01 ENCOUNTER — APPOINTMENT (OUTPATIENT)
Dept: GENERAL RADIOLOGY | Facility: CLINIC | Age: 56
DRG: 207 | End: 2021-01-01
Attending: INTERNAL MEDICINE
Payer: COMMERCIAL

## 2021-01-01 ENCOUNTER — ANESTHESIA (OUTPATIENT)
Dept: SURGERY | Facility: CLINIC | Age: 56
End: 2021-01-01
Payer: COMMERCIAL

## 2021-01-01 ENCOUNTER — ANESTHESIA EVENT (OUTPATIENT)
Dept: SURGERY | Facility: CLINIC | Age: 56
End: 2021-01-01
Payer: COMMERCIAL

## 2021-01-01 ENCOUNTER — APPOINTMENT (OUTPATIENT)
Dept: GENERAL RADIOLOGY | Facility: CLINIC | Age: 56
DRG: 207 | End: 2021-01-01
Attending: STUDENT IN AN ORGANIZED HEALTH CARE EDUCATION/TRAINING PROGRAM
Payer: COMMERCIAL

## 2021-01-01 ENCOUNTER — ANESTHESIA EVENT (OUTPATIENT)
Dept: INTENSIVE CARE | Facility: CLINIC | Age: 56
DRG: 207 | End: 2021-01-01
Payer: COMMERCIAL

## 2021-01-01 ENCOUNTER — HOSPITAL ENCOUNTER (INPATIENT)
Facility: CLINIC | Age: 56
LOS: 11 days | DRG: 207 | End: 2021-09-23
Attending: EMERGENCY MEDICINE | Admitting: INTERNAL MEDICINE
Payer: COMMERCIAL

## 2021-01-01 ENCOUNTER — HOSPITAL ENCOUNTER (OUTPATIENT)
Facility: CLINIC | Age: 56
Discharge: HOME | End: 2021-04-09
Attending: OBSTETRICS & GYNECOLOGY | Admitting: OBSTETRICS & GYNECOLOGY
Payer: COMMERCIAL

## 2021-01-01 VITALS
TEMPERATURE: 99.3 F | BODY MASS INDEX: 50.53 KG/M2 | SYSTOLIC BLOOD PRESSURE: 145 MMHG | OXYGEN SATURATION: 85 % | WEIGHT: 267.42 LBS | DIASTOLIC BLOOD PRESSURE: 69 MMHG | RESPIRATION RATE: 13 BRPM | HEART RATE: 67 BPM

## 2021-01-01 VITALS
OXYGEN SATURATION: 94 % | BODY MASS INDEX: 49.69 KG/M2 | HEIGHT: 61 IN | HEART RATE: 101 BPM | DIASTOLIC BLOOD PRESSURE: 63 MMHG | RESPIRATION RATE: 21 BRPM | WEIGHT: 263.2 LBS | SYSTOLIC BLOOD PRESSURE: 105 MMHG | TEMPERATURE: 97.6 F

## 2021-01-01 DIAGNOSIS — U07.1 ACUTE HYPOXEMIC RESPIRATORY FAILURE DUE TO COVID-19 (H): ICD-10-CM

## 2021-01-01 DIAGNOSIS — Z11.59 ENCOUNTER FOR SCREENING FOR OTHER VIRAL DISEASES: ICD-10-CM

## 2021-01-01 DIAGNOSIS — J12.82 PNEUMONIA DUE TO 2019 NOVEL CORONAVIRUS: ICD-10-CM

## 2021-01-01 DIAGNOSIS — U07.1 PNEUMONIA DUE TO 2019 NOVEL CORONAVIRUS: ICD-10-CM

## 2021-01-01 DIAGNOSIS — J96.01 ACUTE HYPOXEMIC RESPIRATORY FAILURE DUE TO COVID-19 (H): ICD-10-CM

## 2021-01-01 LAB
ALBUMIN SERPL-MCNC: 2 G/DL (ref 3.4–5)
ALBUMIN SERPL-MCNC: 3.3 G/DL (ref 3.4–5)
ALBUMIN UR-MCNC: 200 MG/DL
ALP SERPL-CCNC: 71 U/L (ref 40–150)
ALP SERPL-CCNC: 89 U/L (ref 40–150)
ALT SERPL W P-5'-P-CCNC: 30 U/L (ref 0–50)
ALT SERPL W P-5'-P-CCNC: 50 U/L (ref 0–50)
ANION GAP SERPL CALCULATED.3IONS-SCNC: 1 MMOL/L (ref 3–14)
ANION GAP SERPL CALCULATED.3IONS-SCNC: 11 MMOL/L (ref 3–14)
ANION GAP SERPL CALCULATED.3IONS-SCNC: 2 MMOL/L (ref 3–14)
ANION GAP SERPL CALCULATED.3IONS-SCNC: 3 MMOL/L (ref 3–14)
ANION GAP SERPL CALCULATED.3IONS-SCNC: 3 MMOL/L (ref 3–14)
ANION GAP SERPL CALCULATED.3IONS-SCNC: 4 MMOL/L (ref 3–14)
ANION GAP SERPL CALCULATED.3IONS-SCNC: 6 MMOL/L (ref 3–14)
ANION GAP SERPL CALCULATED.3IONS-SCNC: 6 MMOL/L (ref 3–14)
ANION GAP SERPL CALCULATED.3IONS-SCNC: 7 MMOL/L (ref 3–14)
ANION GAP SERPL CALCULATED.3IONS-SCNC: 7 MMOL/L (ref 3–14)
APPEARANCE UR: ABNORMAL
AST SERPL W P-5'-P-CCNC: 33 U/L (ref 0–45)
AST SERPL W P-5'-P-CCNC: 72 U/L (ref 0–45)
ATRIAL RATE - MUSE: 137 BPM
BACTERIA BLD CULT: NO GROWTH
BACTERIA BLD CULT: NO GROWTH
BACTERIA SPT CULT: ABNORMAL
BASE EXCESS BLDA CALC-SCNC: -1.1 MMOL/L (ref -9–1.8)
BASE EXCESS BLDA CALC-SCNC: -1.6 MMOL/L (ref -9–1.8)
BASE EXCESS BLDA CALC-SCNC: -2.1 MMOL/L (ref -9–1.8)
BASE EXCESS BLDA CALC-SCNC: -2.3 MMOL/L (ref -9–1.8)
BASE EXCESS BLDA CALC-SCNC: -3.5 MMOL/L (ref -9–1.8)
BASE EXCESS BLDA CALC-SCNC: -4 MMOL/L (ref -9–1.8)
BASE EXCESS BLDA CALC-SCNC: -4.1 MMOL/L (ref -9–1.8)
BASE EXCESS BLDA CALC-SCNC: -4.5 MMOL/L (ref -9–1.8)
BASE EXCESS BLDA CALC-SCNC: -4.5 MMOL/L (ref -9–1.8)
BASE EXCESS BLDA CALC-SCNC: -5.2 MMOL/L (ref -9–1.8)
BASE EXCESS BLDA CALC-SCNC: -5.3 MMOL/L (ref -9–1.8)
BASE EXCESS BLDA CALC-SCNC: -5.4 MMOL/L (ref -9–1.8)
BASE EXCESS BLDA CALC-SCNC: 1.1 MMOL/L (ref -9–1.8)
BASE EXCESS BLDA CALC-SCNC: 1.9 MMOL/L (ref -9–1.8)
BASE EXCESS BLDA CALC-SCNC: 10.3 MMOL/L (ref -9–1.8)
BASE EXCESS BLDA CALC-SCNC: 10.8 MMOL/L (ref -9–1.8)
BASE EXCESS BLDA CALC-SCNC: 11.5 MMOL/L (ref -9–1.8)
BASE EXCESS BLDA CALC-SCNC: 11.7 MMOL/L (ref -9–1.8)
BASE EXCESS BLDA CALC-SCNC: 16.5 MMOL/L (ref -9–1.8)
BASE EXCESS BLDA CALC-SCNC: 17.1 MMOL/L (ref -9–1.8)
BASE EXCESS BLDA CALC-SCNC: 2.8 MMOL/L (ref -9–1.8)
BASE EXCESS BLDA CALC-SCNC: 9.8 MMOL/L (ref -9–1.8)
BASE EXCESS BLDV CALC-SCNC: -2.1 MMOL/L (ref -7.7–1.9)
BASE EXCESS BLDV CALC-SCNC: -3.4 MMOL/L (ref -7.7–1.9)
BASE EXCESS BLDV CALC-SCNC: -4.6 MMOL/L (ref -7.7–1.9)
BASOPHILS # BLD AUTO: 0 10E3/UL (ref 0–0.2)
BASOPHILS NFR BLD AUTO: 0 %
BILIRUB DIRECT SERPL-MCNC: 0.2 MG/DL (ref 0–0.2)
BILIRUB SERPL-MCNC: 0.6 MG/DL (ref 0.2–1.3)
BILIRUB SERPL-MCNC: 0.7 MG/DL (ref 0.2–1.3)
BILIRUB UR QL STRIP: NEGATIVE
BUN SERPL-MCNC: 20 MG/DL (ref 7–30)
BUN SERPL-MCNC: 20 MG/DL (ref 7–30)
BUN SERPL-MCNC: 23 MG/DL (ref 7–30)
BUN SERPL-MCNC: 26 MG/DL (ref 7–30)
BUN SERPL-MCNC: 27 MG/DL (ref 7–30)
BUN SERPL-MCNC: 29 MG/DL (ref 7–30)
BUN SERPL-MCNC: 32 MG/DL (ref 7–30)
BUN SERPL-MCNC: 32 MG/DL (ref 7–30)
BUN SERPL-MCNC: 33 MG/DL (ref 7–30)
BUN SERPL-MCNC: 35 MG/DL (ref 7–30)
CA-I BLD-MCNC: 4.9 MG/DL (ref 4.4–5.2)
CALCIUM SERPL-MCNC: 7.8 MG/DL (ref 8.5–10.1)
CALCIUM SERPL-MCNC: 8.2 MG/DL (ref 8.5–10.1)
CALCIUM SERPL-MCNC: 8.5 MG/DL (ref 8.5–10.1)
CALCIUM SERPL-MCNC: 8.6 MG/DL (ref 8.5–10.1)
CALCIUM SERPL-MCNC: 8.7 MG/DL (ref 8.5–10.1)
CALCIUM SERPL-MCNC: 8.7 MG/DL (ref 8.5–10.1)
CALCIUM SERPL-MCNC: 8.9 MG/DL (ref 8.5–10.1)
CALCIUM SERPL-MCNC: 8.9 MG/DL (ref 8.5–10.1)
CALCIUM SERPL-MCNC: 9.1 MG/DL (ref 8.5–10.1)
CALCIUM SERPL-MCNC: 9.6 MG/DL (ref 8.5–10.1)
CHLORIDE BLD-SCNC: 100 MMOL/L (ref 94–109)
CHLORIDE BLD-SCNC: 100 MMOL/L (ref 94–109)
CHLORIDE BLD-SCNC: 104 MMOL/L (ref 94–109)
CHLORIDE BLD-SCNC: 104 MMOL/L (ref 94–109)
CHLORIDE BLD-SCNC: 109 MMOL/L (ref 94–109)
CHLORIDE BLD-SCNC: 111 MMOL/L (ref 94–109)
CHLORIDE BLD-SCNC: 112 MMOL/L (ref 94–109)
CHLORIDE BLD-SCNC: 112 MMOL/L (ref 94–109)
CO2 SERPL-SCNC: 21 MMOL/L (ref 20–32)
CO2 SERPL-SCNC: 22 MMOL/L (ref 20–32)
CO2 SERPL-SCNC: 23 MMOL/L (ref 20–32)
CO2 SERPL-SCNC: 23 MMOL/L (ref 20–32)
CO2 SERPL-SCNC: 24 MMOL/L (ref 20–32)
CO2 SERPL-SCNC: 27 MMOL/L (ref 20–32)
CO2 SERPL-SCNC: 30 MMOL/L (ref 20–32)
CO2 SERPL-SCNC: 35 MMOL/L (ref 20–32)
CO2 SERPL-SCNC: 38 MMOL/L (ref 20–32)
CO2 SERPL-SCNC: 39 MMOL/L (ref 20–32)
COLOR UR AUTO: YELLOW
COPATH REPORT: NORMAL
CREAT SERPL-MCNC: 0.48 MG/DL (ref 0.52–1.04)
CREAT SERPL-MCNC: 0.49 MG/DL (ref 0.52–1.04)
CREAT SERPL-MCNC: 0.57 MG/DL (ref 0.52–1.04)
CREAT SERPL-MCNC: 0.61 MG/DL (ref 0.52–1.04)
CREAT SERPL-MCNC: 0.69 MG/DL (ref 0.52–1.04)
CREAT SERPL-MCNC: 0.85 MG/DL (ref 0.52–1.04)
CREAT SERPL-MCNC: 0.87 MG/DL (ref 0.52–1.04)
CREAT SERPL-MCNC: 0.89 MG/DL (ref 0.52–1.04)
CREAT SERPL-MCNC: 0.91 MG/DL (ref 0.52–1.04)
CREAT SERPL-MCNC: 1.12 MG/DL (ref 0.52–1.04)
CRP SERPL-MCNC: 118 MG/L (ref 0–8)
CRP SERPL-MCNC: 188 MG/L (ref 0–8)
CRP SERPL-MCNC: 91.8 MG/L (ref 0–8)
CRP SERPL-MCNC: 92.5 MG/L (ref 0–8)
D DIMER PPP FEU-MCNC: 1.16 UG/ML FEU (ref 0–0.5)
D DIMER PPP FEU-MCNC: 1.25 UG/ML FEU (ref 0–0.5)
D DIMER PPP FEU-MCNC: 1.49 UG/ML FEU (ref 0–0.5)
D DIMER PPP FEU-MCNC: 1.51 UG/ML FEU (ref 0–0.5)
D DIMER PPP FEU-MCNC: 1.53 UG/ML FEU (ref 0–0.5)
DIASTOLIC BLOOD PRESSURE - MUSE: NORMAL MMHG
EOSINOPHIL # BLD AUTO: 0 10E3/UL (ref 0–0.7)
EOSINOPHIL NFR BLD AUTO: 0 %
ERYTHROCYTE [DISTWIDTH] IN BLOOD BY AUTOMATED COUNT: 13.6 % (ref 10–15)
ERYTHROCYTE [DISTWIDTH] IN BLOOD BY AUTOMATED COUNT: 13.8 % (ref 10–15)
ERYTHROCYTE [DISTWIDTH] IN BLOOD BY AUTOMATED COUNT: 13.9 % (ref 10–15)
ERYTHROCYTE [DISTWIDTH] IN BLOOD BY AUTOMATED COUNT: 13.9 % (ref 10–15)
ERYTHROCYTE [DISTWIDTH] IN BLOOD BY AUTOMATED COUNT: 14 % (ref 10–15)
ERYTHROCYTE [DISTWIDTH] IN BLOOD BY AUTOMATED COUNT: 14.2 % (ref 10–15)
ERYTHROCYTE [DISTWIDTH] IN BLOOD BY AUTOMATED COUNT: 14.3 % (ref 10–15)
ERYTHROCYTE [DISTWIDTH] IN BLOOD BY AUTOMATED COUNT: 14.4 % (ref 10–15)
ERYTHROCYTE [DISTWIDTH] IN BLOOD BY AUTOMATED COUNT: 14.6 % (ref 10–15)
FIBRINOGEN PPP-MCNC: 624 MG/DL (ref 170–490)
FIBRINOGEN PPP-MCNC: 667 MG/DL (ref 170–490)
FIBRINOGEN PPP-MCNC: 668 MG/DL (ref 170–490)
FIBRINOGEN PPP-MCNC: 764 MG/DL (ref 170–490)
GFR SERPL CREATININE-BSD FRML MDRD: 56 ML/MIN/1.73M2
GFR SERPL CREATININE-BSD FRML MDRD: 72 ML/MIN/1.73M2
GFR SERPL CREATININE-BSD FRML MDRD: 74 ML/MIN/1.73M2
GFR SERPL CREATININE-BSD FRML MDRD: 76 ML/MIN/1.73M2
GFR SERPL CREATININE-BSD FRML MDRD: 78 ML/MIN/1.73M2
GFR SERPL CREATININE-BSD FRML MDRD: >90 ML/MIN/1.73M2
GLUCOSE BLD-MCNC: 119 MG/DL (ref 70–99)
GLUCOSE BLD-MCNC: 157 MG/DL (ref 70–99)
GLUCOSE BLD-MCNC: 165 MG/DL (ref 70–99)
GLUCOSE BLD-MCNC: 177 MG/DL (ref 70–99)
GLUCOSE BLD-MCNC: 181 MG/DL (ref 70–99)
GLUCOSE BLD-MCNC: 188 MG/DL (ref 70–99)
GLUCOSE BLD-MCNC: 194 MG/DL (ref 70–99)
GLUCOSE BLD-MCNC: 194 MG/DL (ref 70–99)
GLUCOSE BLD-MCNC: 210 MG/DL (ref 70–99)
GLUCOSE BLD-MCNC: 276 MG/DL (ref 70–99)
GLUCOSE BLDC GLUCOMTR-MCNC: 110 MG/DL (ref 70–99)
GLUCOSE BLDC GLUCOMTR-MCNC: 131 MG/DL (ref 70–99)
GLUCOSE BLDC GLUCOMTR-MCNC: 133 MG/DL (ref 70–99)
GLUCOSE BLDC GLUCOMTR-MCNC: 137 MG/DL (ref 70–99)
GLUCOSE BLDC GLUCOMTR-MCNC: 140 MG/DL (ref 70–99)
GLUCOSE BLDC GLUCOMTR-MCNC: 141 MG/DL (ref 70–99)
GLUCOSE BLDC GLUCOMTR-MCNC: 150 MG/DL (ref 70–99)
GLUCOSE BLDC GLUCOMTR-MCNC: 151 MG/DL (ref 70–99)
GLUCOSE BLDC GLUCOMTR-MCNC: 151 MG/DL (ref 70–99)
GLUCOSE BLDC GLUCOMTR-MCNC: 153 MG/DL (ref 70–99)
GLUCOSE BLDC GLUCOMTR-MCNC: 154 MG/DL (ref 70–99)
GLUCOSE BLDC GLUCOMTR-MCNC: 155 MG/DL (ref 70–99)
GLUCOSE BLDC GLUCOMTR-MCNC: 157 MG/DL (ref 70–99)
GLUCOSE BLDC GLUCOMTR-MCNC: 160 MG/DL (ref 70–99)
GLUCOSE BLDC GLUCOMTR-MCNC: 161 MG/DL (ref 70–99)
GLUCOSE BLDC GLUCOMTR-MCNC: 162 MG/DL (ref 70–99)
GLUCOSE BLDC GLUCOMTR-MCNC: 165 MG/DL (ref 70–99)
GLUCOSE BLDC GLUCOMTR-MCNC: 168 MG/DL (ref 70–99)
GLUCOSE BLDC GLUCOMTR-MCNC: 169 MG/DL (ref 70–99)
GLUCOSE BLDC GLUCOMTR-MCNC: 173 MG/DL (ref 70–99)
GLUCOSE BLDC GLUCOMTR-MCNC: 173 MG/DL (ref 70–99)
GLUCOSE BLDC GLUCOMTR-MCNC: 176 MG/DL (ref 70–99)
GLUCOSE BLDC GLUCOMTR-MCNC: 176 MG/DL (ref 70–99)
GLUCOSE BLDC GLUCOMTR-MCNC: 178 MG/DL (ref 70–99)
GLUCOSE BLDC GLUCOMTR-MCNC: 178 MG/DL (ref 70–99)
GLUCOSE BLDC GLUCOMTR-MCNC: 180 MG/DL (ref 70–99)
GLUCOSE BLDC GLUCOMTR-MCNC: 181 MG/DL (ref 70–99)
GLUCOSE BLDC GLUCOMTR-MCNC: 185 MG/DL (ref 70–99)
GLUCOSE BLDC GLUCOMTR-MCNC: 186 MG/DL (ref 70–99)
GLUCOSE BLDC GLUCOMTR-MCNC: 188 MG/DL (ref 70–99)
GLUCOSE BLDC GLUCOMTR-MCNC: 190 MG/DL (ref 70–99)
GLUCOSE BLDC GLUCOMTR-MCNC: 192 MG/DL (ref 70–99)
GLUCOSE BLDC GLUCOMTR-MCNC: 193 MG/DL (ref 70–99)
GLUCOSE BLDC GLUCOMTR-MCNC: 193 MG/DL (ref 70–99)
GLUCOSE BLDC GLUCOMTR-MCNC: 196 MG/DL (ref 70–99)
GLUCOSE BLDC GLUCOMTR-MCNC: 199 MG/DL (ref 70–99)
GLUCOSE BLDC GLUCOMTR-MCNC: 202 MG/DL (ref 70–99)
GLUCOSE BLDC GLUCOMTR-MCNC: 203 MG/DL (ref 70–99)
GLUCOSE BLDC GLUCOMTR-MCNC: 204 MG/DL (ref 70–99)
GLUCOSE BLDC GLUCOMTR-MCNC: 205 MG/DL (ref 70–99)
GLUCOSE BLDC GLUCOMTR-MCNC: 205 MG/DL (ref 70–99)
GLUCOSE BLDC GLUCOMTR-MCNC: 207 MG/DL (ref 70–99)
GLUCOSE BLDC GLUCOMTR-MCNC: 208 MG/DL (ref 70–99)
GLUCOSE BLDC GLUCOMTR-MCNC: 208 MG/DL (ref 70–99)
GLUCOSE BLDC GLUCOMTR-MCNC: 215 MG/DL (ref 70–99)
GLUCOSE BLDC GLUCOMTR-MCNC: 215 MG/DL (ref 70–99)
GLUCOSE BLDC GLUCOMTR-MCNC: 216 MG/DL (ref 70–99)
GLUCOSE BLDC GLUCOMTR-MCNC: 218 MG/DL (ref 70–99)
GLUCOSE BLDC GLUCOMTR-MCNC: 223 MG/DL (ref 70–99)
GLUCOSE BLDC GLUCOMTR-MCNC: 223 MG/DL (ref 70–99)
GLUCOSE BLDC GLUCOMTR-MCNC: 227 MG/DL (ref 70–99)
GLUCOSE BLDC GLUCOMTR-MCNC: 229 MG/DL (ref 70–99)
GLUCOSE BLDC GLUCOMTR-MCNC: 237 MG/DL (ref 70–99)
GLUCOSE BLDC GLUCOMTR-MCNC: 242 MG/DL (ref 70–99)
GLUCOSE BLDC GLUCOMTR-MCNC: 243 MG/DL (ref 70–99)
GLUCOSE BLDC GLUCOMTR-MCNC: 244 MG/DL (ref 70–99)
GLUCOSE BLDC GLUCOMTR-MCNC: 245 MG/DL (ref 70–99)
GLUCOSE BLDC GLUCOMTR-MCNC: 251 MG/DL (ref 70–99)
GLUCOSE BLDC GLUCOMTR-MCNC: 253 MG/DL (ref 70–99)
GLUCOSE UR STRIP-MCNC: 100 MG/DL
GRAM STAIN RESULT: ABNORMAL
GRAM STAIN RESULT: ABNORMAL
HBA1C MFR BLD: 7 % (ref 0–5.6)
HCO3 BLD-SCNC: 21 MMOL/L (ref 21–28)
HCO3 BLD-SCNC: 22 MMOL/L (ref 21–28)
HCO3 BLD-SCNC: 23 MMOL/L (ref 21–28)
HCO3 BLD-SCNC: 24 MMOL/L (ref 21–28)
HCO3 BLD-SCNC: 24 MMOL/L (ref 21–28)
HCO3 BLD-SCNC: 25 MMOL/L (ref 21–28)
HCO3 BLD-SCNC: 25 MMOL/L (ref 21–28)
HCO3 BLD-SCNC: 28 MMOL/L (ref 21–28)
HCO3 BLD-SCNC: 29 MMOL/L (ref 21–28)
HCO3 BLD-SCNC: 29 MMOL/L (ref 21–28)
HCO3 BLD-SCNC: 31 MMOL/L (ref 21–28)
HCO3 BLD-SCNC: 37 MMOL/L (ref 21–28)
HCO3 BLD-SCNC: 39 MMOL/L (ref 21–28)
HCO3 BLD-SCNC: 39 MMOL/L (ref 21–28)
HCO3 BLD-SCNC: 40 MMOL/L (ref 21–28)
HCO3 BLD-SCNC: 40 MMOL/L (ref 21–28)
HCO3 BLD-SCNC: 42 MMOL/L (ref 21–28)
HCO3 BLD-SCNC: 43 MMOL/L (ref 21–28)
HCO3 BLDV-SCNC: 21 MMOL/L (ref 21–28)
HCO3 BLDV-SCNC: 23 MMOL/L (ref 21–28)
HCO3 BLDV-SCNC: 26 MMOL/L (ref 21–28)
HCT VFR BLD AUTO: 28.3 % (ref 35–47)
HCT VFR BLD AUTO: 30.6 % (ref 35–47)
HCT VFR BLD AUTO: 30.6 % (ref 35–47)
HCT VFR BLD AUTO: 31 % (ref 35–47)
HCT VFR BLD AUTO: 31.5 % (ref 35–47)
HCT VFR BLD AUTO: 32.3 % (ref 35–47)
HCT VFR BLD AUTO: 33 % (ref 35–47)
HCT VFR BLD AUTO: 33.2 % (ref 35–47)
HCT VFR BLD AUTO: 33.2 % (ref 35–47)
HCT VFR BLD AUTO: 34.8 % (ref 35–47)
HCT VFR BLD AUTO: 38.7 % (ref 35–47)
HGB BLD-MCNC: 10 G/DL (ref 11.7–15.7)
HGB BLD-MCNC: 10.2 G/DL (ref 11.7–15.7)
HGB BLD-MCNC: 10.3 G/DL (ref 11.7–15.7)
HGB BLD-MCNC: 10.5 G/DL (ref 11.7–15.7)
HGB BLD-MCNC: 10.7 G/DL (ref 11.7–15.7)
HGB BLD-MCNC: 10.9 G/DL (ref 11.7–15.7)
HGB BLD-MCNC: 11.6 G/DL (ref 11.7–15.7)
HGB BLD-MCNC: 12.6 G/DL (ref 11.7–15.7)
HGB BLD-MCNC: 8.9 G/DL (ref 11.7–15.7)
HGB BLD-MCNC: 9.8 G/DL (ref 11.7–15.7)
HGB BLD-MCNC: 9.9 G/DL (ref 11.7–15.7)
HGB UR QL STRIP: ABNORMAL
HOLD SPECIMEN: NORMAL
HYALINE CASTS: 11 /LPF
IMM GRANULOCYTES # BLD: 0.1 10E3/UL
IMM GRANULOCYTES NFR BLD: 1 %
INR PPP: 1.42 (ref 0.85–1.15)
INTERPRETATION ECG - MUSE: NORMAL
KETONES UR STRIP-MCNC: 40 MG/DL
LABORATORY COMMENT REPORT: NORMAL
LACTATE SERPL-SCNC: 1 MMOL/L (ref 0.7–2)
LACTATE SERPL-SCNC: 2.9 MMOL/L (ref 0.7–2)
LEUKOCYTE ESTERASE UR QL STRIP: NEGATIVE
LYMPHOCYTES # BLD AUTO: 0.8 10E3/UL (ref 0.8–5.3)
LYMPHOCYTES NFR BLD AUTO: 8 %
MAGNESIUM SERPL-MCNC: 1.8 MG/DL (ref 1.6–2.3)
MAGNESIUM SERPL-MCNC: 2.1 MG/DL (ref 1.6–2.3)
MAGNESIUM SERPL-MCNC: 2.1 MG/DL (ref 1.6–2.3)
MAGNESIUM SERPL-MCNC: 2.2 MG/DL (ref 1.6–2.3)
MAGNESIUM SERPL-MCNC: 2.4 MG/DL (ref 1.6–2.3)
MCH RBC QN AUTO: 29.7 PG (ref 26.5–33)
MCH RBC QN AUTO: 30.1 PG (ref 26.5–33)
MCH RBC QN AUTO: 30.2 PG (ref 26.5–33)
MCH RBC QN AUTO: 30.3 PG (ref 26.5–33)
MCH RBC QN AUTO: 30.4 PG (ref 26.5–33)
MCH RBC QN AUTO: 30.4 PG (ref 26.5–33)
MCH RBC QN AUTO: 30.6 PG (ref 26.5–33)
MCH RBC QN AUTO: 30.7 PG (ref 26.5–33)
MCH RBC QN AUTO: 30.7 PG (ref 26.5–33)
MCH RBC QN AUTO: 31 PG (ref 26.5–33)
MCH RBC QN AUTO: 31 PG (ref 26.5–33)
MCHC RBC AUTO-ENTMCNC: 31.4 G/DL (ref 31.5–36.5)
MCHC RBC AUTO-ENTMCNC: 31.4 G/DL (ref 31.5–36.5)
MCHC RBC AUTO-ENTMCNC: 31.8 G/DL (ref 31.5–36.5)
MCHC RBC AUTO-ENTMCNC: 31.9 G/DL (ref 31.5–36.5)
MCHC RBC AUTO-ENTMCNC: 32 G/DL (ref 31.5–36.5)
MCHC RBC AUTO-ENTMCNC: 32.2 G/DL (ref 31.5–36.5)
MCHC RBC AUTO-ENTMCNC: 32.6 G/DL (ref 31.5–36.5)
MCHC RBC AUTO-ENTMCNC: 32.7 G/DL (ref 31.5–36.5)
MCHC RBC AUTO-ENTMCNC: 32.8 G/DL (ref 31.5–36.5)
MCHC RBC AUTO-ENTMCNC: 32.9 G/DL (ref 31.5–36.5)
MCHC RBC AUTO-ENTMCNC: 33.3 G/DL (ref 31.5–36.5)
MCV RBC AUTO: 92 FL (ref 78–100)
MCV RBC AUTO: 93 FL (ref 78–100)
MCV RBC AUTO: 94 FL (ref 78–100)
MCV RBC AUTO: 95 FL (ref 78–100)
MCV RBC AUTO: 95 FL (ref 78–100)
MCV RBC AUTO: 96 FL (ref 78–100)
MCV RBC AUTO: 99 FL (ref 78–100)
MONOCYTES # BLD AUTO: 0.9 10E3/UL (ref 0–1.3)
MONOCYTES NFR BLD AUTO: 10 %
MRSA DNA SPEC QL NAA+PROBE: NEGATIVE
MUCOUS THREADS #/AREA URNS LPF: PRESENT /LPF
NEUTROPHILS # BLD AUTO: 7.6 10E3/UL (ref 1.6–8.3)
NEUTROPHILS NFR BLD AUTO: 81 %
NITRATE UR QL: NEGATIVE
NRBC # BLD AUTO: 0 10E3/UL
NRBC BLD AUTO-RTO: 0 /100
NT-PROBNP SERPL-MCNC: 45 PG/ML (ref 0–900)
O2/TOTAL GAS SETTING VFR VENT: 100 %
O2/TOTAL GAS SETTING VFR VENT: 2 %
O2/TOTAL GAS SETTING VFR VENT: 70 %
O2/TOTAL GAS SETTING VFR VENT: 80 %
OXYHGB MFR BLD: 84 % (ref 92–100)
OXYHGB MFR BLD: 87 % (ref 92–100)
OXYHGB MFR BLD: 87 % (ref 92–100)
OXYHGB MFR BLD: 88 % (ref 92–100)
OXYHGB MFR BLD: 91 % (ref 92–100)
OXYHGB MFR BLD: 93 % (ref 92–100)
OXYHGB MFR BLD: 96 % (ref 92–100)
OXYHGB MFR BLD: 97 % (ref 92–100)
OXYHGB MFR BLD: 98 % (ref 92–100)
OXYHGB MFR BLD: 98 % (ref 92–100)
OXYHGB MFR BLDV: 46 % (ref 70–75)
OXYHGB MFR BLDV: 49 % (ref 70–75)
P AXIS - MUSE: 34 DEGREES
PCO2 BLD: 39 MM HG (ref 35–45)
PCO2 BLD: 46 MM HG (ref 35–45)
PCO2 BLD: 46 MM HG (ref 35–45)
PCO2 BLD: 47 MM HG (ref 35–45)
PCO2 BLD: 48 MM HG (ref 35–45)
PCO2 BLD: 51 MM HG (ref 35–45)
PCO2 BLD: 53 MM HG (ref 35–45)
PCO2 BLD: 53 MM HG (ref 35–45)
PCO2 BLD: 54 MM HG (ref 35–45)
PCO2 BLD: 55 MM HG (ref 35–45)
PCO2 BLD: 57 MM HG (ref 35–45)
PCO2 BLD: 57 MM HG (ref 35–45)
PCO2 BLD: 60 MM HG (ref 35–45)
PCO2 BLD: 61 MM HG (ref 35–45)
PCO2 BLD: 66 MM HG (ref 35–45)
PCO2 BLD: 66 MM HG (ref 35–45)
PCO2 BLD: 68 MM HG (ref 35–45)
PCO2 BLD: 76 MM HG (ref 35–45)
PCO2 BLD: 78 MM HG (ref 35–45)
PCO2 BLD: 81 MM HG (ref 35–45)
PCO2 BLDV: 40 MM HG (ref 40–50)
PCO2 BLDV: 44 MM HG (ref 40–50)
PCO2 BLDV: 60 MM HG (ref 40–50)
PH BLD: 7.17 [PH] (ref 7.35–7.45)
PH BLD: 7.2 [PH] (ref 7.35–7.45)
PH BLD: 7.21 [PH] (ref 7.35–7.45)
PH BLD: 7.22 [PH] (ref 7.35–7.45)
PH BLD: 7.25 [PH] (ref 7.35–7.45)
PH BLD: 7.25 [PH] (ref 7.35–7.45)
PH BLD: 7.27 [PH] (ref 7.35–7.45)
PH BLD: 7.28 [PH] (ref 7.35–7.45)
PH BLD: 7.28 [PH] (ref 7.35–7.45)
PH BLD: 7.29 [PH] (ref 7.35–7.45)
PH BLD: 7.3 [PH] (ref 7.35–7.45)
PH BLD: 7.31 [PH] (ref 7.35–7.45)
PH BLD: 7.32 [PH] (ref 7.35–7.45)
PH BLD: 7.32 [PH] (ref 7.35–7.45)
PH BLD: 7.33 [PH] (ref 7.35–7.45)
PH BLD: 7.35 [PH] (ref 7.35–7.45)
PH BLD: 7.37 [PH] (ref 7.35–7.45)
PH BLD: 7.45 [PH] (ref 7.35–7.45)
PH BLD: 7.48 [PH] (ref 7.35–7.45)
PH BLD: 7.5 [PH] (ref 7.35–7.45)
PH BLDV: 7.25 [PH] (ref 7.32–7.43)
PH BLDV: 7.32 [PH] (ref 7.32–7.43)
PH BLDV: 7.33 [PH] (ref 7.32–7.43)
PH UR STRIP: 5.5 [PH] (ref 5–7)
PHOSPHATE SERPL-MCNC: 1.4 MG/DL (ref 2.5–4.5)
PHOSPHATE SERPL-MCNC: 1.5 MG/DL (ref 2.5–4.5)
PHOSPHATE SERPL-MCNC: 2.8 MG/DL (ref 2.5–4.5)
PHOSPHATE SERPL-MCNC: 3.2 MG/DL (ref 2.5–4.5)
PHOSPHATE SERPL-MCNC: 4.1 MG/DL (ref 2.5–4.5)
PLATELET # BLD AUTO: 185 10E3/UL (ref 150–450)
PLATELET # BLD AUTO: 188 10E3/UL (ref 150–450)
PLATELET # BLD AUTO: 207 10E3/UL (ref 150–450)
PLATELET # BLD AUTO: 235 10E3/UL (ref 150–450)
PLATELET # BLD AUTO: 246 10E3/UL (ref 150–450)
PLATELET # BLD AUTO: 281 10E3/UL (ref 150–450)
PLATELET # BLD AUTO: 296 10E3/UL (ref 150–450)
PLATELET # BLD AUTO: 324 10E3/UL (ref 150–450)
PLATELET # BLD AUTO: 332 10E3/UL (ref 150–450)
PLATELET # BLD AUTO: 337 10E3/UL (ref 150–450)
PLATELET # BLD AUTO: 367 10E3/UL (ref 150–450)
PO2 BLD: 122 MM HG (ref 80–105)
PO2 BLD: 122 MM HG (ref 80–105)
PO2 BLD: 129 MM HG (ref 80–105)
PO2 BLD: 132 MM HG (ref 80–105)
PO2 BLD: 133 MM HG (ref 80–105)
PO2 BLD: 135 MM HG (ref 80–105)
PO2 BLD: 211 MM HG (ref 80–105)
PO2 BLD: 22 MM HG (ref 80–105)
PO2 BLD: 51 MM HG (ref 80–105)
PO2 BLD: 54 MM HG (ref 80–105)
PO2 BLD: 56 MM HG (ref 80–105)
PO2 BLD: 60 MM HG (ref 80–105)
PO2 BLD: 63 MM HG (ref 80–105)
PO2 BLD: 65 MM HG (ref 80–105)
PO2 BLD: 67 MM HG (ref 80–105)
PO2 BLD: 67 MM HG (ref 80–105)
PO2 BLD: 68 MM HG (ref 80–105)
PO2 BLD: 70 MM HG (ref 80–105)
PO2 BLD: 78 MM HG (ref 80–105)
PO2 BLD: 88 MM HG (ref 80–105)
PO2 BLD: 89 MM HG (ref 80–105)
PO2 BLD: 92 MM HG (ref 80–105)
PO2 BLDV: 24 MM HG (ref 25–47)
PO2 BLDV: 29 MM HG (ref 25–47)
PO2 BLDV: 31 MM HG (ref 25–47)
POTASSIUM BLD-SCNC: 3.3 MMOL/L (ref 3.4–5.3)
POTASSIUM BLD-SCNC: 3.4 MMOL/L (ref 3.4–5.3)
POTASSIUM BLD-SCNC: 3.6 MMOL/L (ref 3.4–5.3)
POTASSIUM BLD-SCNC: 3.7 MMOL/L (ref 3.4–5.3)
POTASSIUM BLD-SCNC: 3.9 MMOL/L (ref 3.4–5.3)
POTASSIUM BLD-SCNC: 4 MMOL/L (ref 3.4–5.3)
POTASSIUM BLD-SCNC: 4.1 MMOL/L (ref 3.4–5.3)
POTASSIUM BLD-SCNC: 4.3 MMOL/L (ref 3.4–5.3)
POTASSIUM BLD-SCNC: 4.5 MMOL/L (ref 3.4–5.3)
POTASSIUM BLD-SCNC: 4.7 MMOL/L (ref 3.4–5.3)
PR INTERVAL - MUSE: 128 MS
PROCALCITONIN SERPL-MCNC: 0.19 NG/ML
PROCALCITONIN SERPL-MCNC: 0.98 NG/ML
PROCALCITONIN SERPL-MCNC: 3.58 NG/ML
PROT SERPL-MCNC: 6.1 G/DL (ref 6.8–8.8)
PROT SERPL-MCNC: 8 G/DL (ref 6.8–8.8)
QRS DURATION - MUSE: 134 MS
QT - MUSE: 302 MS
QTC - MUSE: 456 MS
R AXIS - MUSE: -43 DEGREES
RBC # BLD AUTO: 2.95 10E6/UL (ref 3.8–5.2)
RBC # BLD AUTO: 3.19 10E6/UL (ref 3.8–5.2)
RBC # BLD AUTO: 3.22 10E6/UL (ref 3.8–5.2)
RBC # BLD AUTO: 3.27 10E6/UL (ref 3.8–5.2)
RBC # BLD AUTO: 3.37 10E6/UL (ref 3.8–5.2)
RBC # BLD AUTO: 3.42 10E6/UL (ref 3.8–5.2)
RBC # BLD AUTO: 3.49 10E6/UL (ref 3.8–5.2)
RBC # BLD AUTO: 3.53 10E6/UL (ref 3.8–5.2)
RBC # BLD AUTO: 3.55 10E6/UL (ref 3.8–5.2)
RBC # BLD AUTO: 3.74 10E6/UL (ref 3.8–5.2)
RBC # BLD AUTO: 4.14 10E6/UL (ref 3.8–5.2)
RBC URINE: 3 /HPF
SA TARGET DNA: NEGATIVE
SARS-COV-2 RNA RESP QL NAA+PROBE: NEGATIVE
SARS-COV-2 RNA RESP QL NAA+PROBE: NORMAL
SARS-COV-2 RNA RESP QL NAA+PROBE: POSITIVE
SODIUM SERPL-SCNC: 133 MMOL/L (ref 133–144)
SODIUM SERPL-SCNC: 136 MMOL/L (ref 133–144)
SODIUM SERPL-SCNC: 141 MMOL/L (ref 133–144)
SODIUM SERPL-SCNC: 141 MMOL/L (ref 133–144)
SODIUM SERPL-SCNC: 142 MMOL/L (ref 133–144)
SODIUM SERPL-SCNC: 143 MMOL/L (ref 133–144)
SP GR UR STRIP: 1.03 (ref 1–1.03)
SPECIMEN SOURCE: NORMAL
SPECIMEN SOURCE: NORMAL
SQUAMOUS EPITHELIAL: 1 /HPF
SYSTOLIC BLOOD PRESSURE - MUSE: NORMAL MMHG
T AXIS - MUSE: 108 DEGREES
TROPONIN I SERPL-MCNC: <0.015 UG/L (ref 0–0.04)
UROBILINOGEN UR STRIP-MCNC: NORMAL MG/DL
VENTRICULAR RATE- MUSE: 137 BPM
WBC # BLD AUTO: 13.1 10E3/UL (ref 4–11)
WBC # BLD AUTO: 4.8 10E3/UL (ref 4–11)
WBC # BLD AUTO: 5.7 10E3/UL (ref 4–11)
WBC # BLD AUTO: 7.7 10E3/UL (ref 4–11)
WBC # BLD AUTO: 7.8 10E3/UL (ref 4–11)
WBC # BLD AUTO: 8.3 10E3/UL (ref 4–11)
WBC # BLD AUTO: 8.3 10E3/UL (ref 4–11)
WBC # BLD AUTO: 8.8 10E3/UL (ref 4–11)
WBC # BLD AUTO: 8.8 10E3/UL (ref 4–11)
WBC # BLD AUTO: 9.4 10E3/UL (ref 4–11)
WBC # BLD AUTO: 9.5 10E3/UL (ref 4–11)
WBC URINE: 3 /HPF

## 2021-01-01 PROCEDURE — 999N000009 HC STATISTIC AIRWAY CARE

## 2021-01-01 PROCEDURE — 250N000011 HC RX IP 250 OP 636: Performed by: INTERNAL MEDICINE

## 2021-01-01 PROCEDURE — 272N000272 HC CONTINUOUS NEBULIZER MICRO PUMP

## 2021-01-01 PROCEDURE — 85384 FIBRINOGEN ACTIVITY: CPT | Performed by: INTERNAL MEDICINE

## 2021-01-01 PROCEDURE — 258N000003 HC RX IP 258 OP 636: Performed by: NURSE ANESTHETIST, CERTIFIED REGISTERED

## 2021-01-01 PROCEDURE — 85379 FIBRIN DEGRADATION QUANT: CPT | Performed by: INTERNAL MEDICINE

## 2021-01-01 PROCEDURE — 250N000011 HC RX IP 250 OP 636: Performed by: STUDENT IN AN ORGANIZED HEALTH CARE EDUCATION/TRAINING PROGRAM

## 2021-01-01 PROCEDURE — 86140 C-REACTIVE PROTEIN: CPT | Performed by: INTERNAL MEDICINE

## 2021-01-01 PROCEDURE — 250N000011 HC RX IP 250 OP 636: Performed by: NURSE PRACTITIONER

## 2021-01-01 PROCEDURE — 94660 CPAP INITIATION&MGMT: CPT

## 2021-01-01 PROCEDURE — 80048 BASIC METABOLIC PNL TOTAL CA: CPT | Performed by: INTERNAL MEDICINE

## 2021-01-01 PROCEDURE — 94002 VENT MGMT INPAT INIT DAY: CPT

## 2021-01-01 PROCEDURE — 94645 CONT INHLJ TX EACH ADDL HOUR: CPT

## 2021-01-01 PROCEDURE — 250N000009 HC RX 250: Performed by: EMERGENCY MEDICINE

## 2021-01-01 PROCEDURE — 710N000009 HC RECOVERY PHASE 1, LEVEL 1, PER MIN: Performed by: OBSTETRICS & GYNECOLOGY

## 2021-01-01 PROCEDURE — 94003 VENT MGMT INPAT SUBQ DAY: CPT

## 2021-01-01 PROCEDURE — 99291 CRITICAL CARE FIRST HOUR: CPT | Mod: 25 | Performed by: INTERNAL MEDICINE

## 2021-01-01 PROCEDURE — 250N000011 HC RX IP 250 OP 636: Performed by: EMERGENCY MEDICINE

## 2021-01-01 PROCEDURE — 999N000157 HC STATISTIC RCP TIME EA 10 MIN

## 2021-01-01 PROCEDURE — 36415 COLL VENOUS BLD VENIPUNCTURE: CPT | Performed by: INTERNAL MEDICINE

## 2021-01-01 PROCEDURE — 250N000009 HC RX 250: Performed by: NURSE ANESTHETIST, CERTIFIED REGISTERED

## 2021-01-01 PROCEDURE — 258N000003 HC RX IP 258 OP 636: Performed by: INTERNAL MEDICINE

## 2021-01-01 PROCEDURE — 999N000040 HC STATISTIC CONSULT NO CHARGE VASC ACCESS

## 2021-01-01 PROCEDURE — 82962 GLUCOSE BLOOD TEST: CPT | Mod: 91

## 2021-01-01 PROCEDURE — 99291 CRITICAL CARE FIRST HOUR: CPT | Mod: GC | Performed by: STUDENT IN AN ORGANIZED HEALTH CARE EDUCATION/TRAINING PROGRAM

## 2021-01-01 PROCEDURE — 999N000065 XR ABDOMEN PORT 1 VIEWS

## 2021-01-01 PROCEDURE — 83605 ASSAY OF LACTIC ACID: CPT | Performed by: STUDENT IN AN ORGANIZED HEALTH CARE EDUCATION/TRAINING PROGRAM

## 2021-01-01 PROCEDURE — 82805 BLOOD GASES W/O2 SATURATION: CPT | Performed by: INTERNAL MEDICINE

## 2021-01-01 PROCEDURE — 250N000009 HC RX 250

## 2021-01-01 PROCEDURE — 250N000011 HC RX IP 250 OP 636

## 2021-01-01 PROCEDURE — 71045 X-RAY EXAM CHEST 1 VIEW: CPT

## 2021-01-01 PROCEDURE — 250N000013 HC RX MED GY IP 250 OP 250 PS 637: Performed by: INTERNAL MEDICINE

## 2021-01-01 PROCEDURE — 82803 BLOOD GASES ANY COMBINATION: CPT | Performed by: NURSE PRACTITIONER

## 2021-01-01 PROCEDURE — 99291 CRITICAL CARE FIRST HOUR: CPT | Performed by: INTERNAL MEDICINE

## 2021-01-01 PROCEDURE — 31500 INSERT EMERGENCY AIRWAY: CPT | Performed by: NURSE ANESTHETIST, CERTIFIED REGISTERED

## 2021-01-01 PROCEDURE — 87040 BLOOD CULTURE FOR BACTERIA: CPT | Performed by: EMERGENCY MEDICINE

## 2021-01-01 PROCEDURE — 5A09457 ASSISTANCE WITH RESPIRATORY VENTILATION, 24-96 CONSECUTIVE HOURS, CONTINUOUS POSITIVE AIRWAY PRESSURE: ICD-10-PCS | Performed by: EMERGENCY MEDICINE

## 2021-01-01 PROCEDURE — 99285 EMERGENCY DEPT VISIT HI MDM: CPT | Mod: 25

## 2021-01-01 PROCEDURE — 250N000009 HC RX 250: Performed by: STUDENT IN AN ORGANIZED HEALTH CARE EDUCATION/TRAINING PROGRAM

## 2021-01-01 PROCEDURE — 272N000001 HC OR GENERAL SUPPLY STERILE: Performed by: OBSTETRICS & GYNECOLOGY

## 2021-01-01 PROCEDURE — 370N000017 HC ANESTHESIA TECHNICAL FEE, PER MIN: Performed by: OBSTETRICS & GYNECOLOGY

## 2021-01-01 PROCEDURE — 82803 BLOOD GASES ANY COMBINATION: CPT | Performed by: STUDENT IN AN ORGANIZED HEALTH CARE EDUCATION/TRAINING PROGRAM

## 2021-01-01 PROCEDURE — 258N000003 HC RX IP 258 OP 636: Performed by: STUDENT IN AN ORGANIZED HEALTH CARE EDUCATION/TRAINING PROGRAM

## 2021-01-01 PROCEDURE — 250N000009 HC RX 250: Performed by: INTERNAL MEDICINE

## 2021-01-01 PROCEDURE — 82805 BLOOD GASES W/O2 SATURATION: CPT | Performed by: EMERGENCY MEDICINE

## 2021-01-01 PROCEDURE — 81001 URINALYSIS AUTO W/SCOPE: CPT | Performed by: EMERGENCY MEDICINE

## 2021-01-01 PROCEDURE — 83735 ASSAY OF MAGNESIUM: CPT | Performed by: STUDENT IN AN ORGANIZED HEALTH CARE EDUCATION/TRAINING PROGRAM

## 2021-01-01 PROCEDURE — 82805 BLOOD GASES W/O2 SATURATION: CPT | Performed by: STUDENT IN AN ORGANIZED HEALTH CARE EDUCATION/TRAINING PROGRAM

## 2021-01-01 PROCEDURE — 250N000013 HC RX MED GY IP 250 OP 250 PS 637: Performed by: STUDENT IN AN ORGANIZED HEALTH CARE EDUCATION/TRAINING PROGRAM

## 2021-01-01 PROCEDURE — 250N000012 HC RX MED GY IP 250 OP 636 PS 637: Performed by: INTERNAL MEDICINE

## 2021-01-01 PROCEDURE — 71275 CT ANGIOGRAPHY CHEST: CPT

## 2021-01-01 PROCEDURE — 200N000001 HC R&B ICU

## 2021-01-01 PROCEDURE — 83735 ASSAY OF MAGNESIUM: CPT | Performed by: NURSE PRACTITIONER

## 2021-01-01 PROCEDURE — 5A1955Z RESPIRATORY VENTILATION, GREATER THAN 96 CONSECUTIVE HOURS: ICD-10-PCS | Performed by: STUDENT IN AN ORGANIZED HEALTH CARE EDUCATION/TRAINING PROGRAM

## 2021-01-01 PROCEDURE — 84484 ASSAY OF TROPONIN QUANT: CPT | Performed by: EMERGENCY MEDICINE

## 2021-01-01 PROCEDURE — 210N000002 HC R&B HEART CARE

## 2021-01-01 PROCEDURE — 250N000009 HC RX 250: Performed by: OBSTETRICS & GYNECOLOGY

## 2021-01-01 PROCEDURE — 84100 ASSAY OF PHOSPHORUS: CPT | Performed by: NURSE PRACTITIONER

## 2021-01-01 PROCEDURE — 88305 TISSUE EXAM BY PATHOLOGIST: CPT | Mod: TC | Performed by: OBSTETRICS & GYNECOLOGY

## 2021-01-01 PROCEDURE — 82040 ASSAY OF SERUM ALBUMIN: CPT | Performed by: STUDENT IN AN ORGANIZED HEALTH CARE EDUCATION/TRAINING PROGRAM

## 2021-01-01 PROCEDURE — C9803 HOPD COVID-19 SPEC COLLECT: HCPCS

## 2021-01-01 PROCEDURE — 258N000001 HC RX 258: Performed by: OBSTETRICS & GYNECOLOGY

## 2021-01-01 PROCEDURE — 85027 COMPLETE CBC AUTOMATED: CPT | Performed by: STUDENT IN AN ORGANIZED HEALTH CARE EDUCATION/TRAINING PROGRAM

## 2021-01-01 PROCEDURE — 250N000012 HC RX MED GY IP 250 OP 636 PS 637: Performed by: STUDENT IN AN ORGANIZED HEALTH CARE EDUCATION/TRAINING PROGRAM

## 2021-01-01 PROCEDURE — 36415 COLL VENOUS BLD VENIPUNCTURE: CPT | Performed by: NURSE PRACTITIONER

## 2021-01-01 PROCEDURE — 360N000076 HC SURGERY LEVEL 3, PER MIN: Performed by: OBSTETRICS & GYNECOLOGY

## 2021-01-01 PROCEDURE — 84132 ASSAY OF SERUM POTASSIUM: CPT | Performed by: STUDENT IN AN ORGANIZED HEALTH CARE EDUCATION/TRAINING PROGRAM

## 2021-01-01 PROCEDURE — XW033E5 INTRODUCTION OF REMDESIVIR ANTI-INFECTIVE INTO PERIPHERAL VEIN, PERCUTANEOUS APPROACH, NEW TECHNOLOGY GROUP 5: ICD-10-PCS | Performed by: STUDENT IN AN ORGANIZED HEALTH CARE EDUCATION/TRAINING PROGRAM

## 2021-01-01 PROCEDURE — 85379 FIBRIN DEGRADATION QUANT: CPT | Performed by: EMERGENCY MEDICINE

## 2021-01-01 PROCEDURE — 84100 ASSAY OF PHOSPHORUS: CPT | Performed by: STUDENT IN AN ORGANIZED HEALTH CARE EDUCATION/TRAINING PROGRAM

## 2021-01-01 PROCEDURE — 88305 TISSUE EXAM BY PATHOLOGIST: CPT | Mod: 26 | Performed by: PATHOLOGY

## 2021-01-01 PROCEDURE — 96368 THER/DIAG CONCURRENT INF: CPT

## 2021-01-01 PROCEDURE — 83880 ASSAY OF NATRIURETIC PEPTIDE: CPT | Performed by: EMERGENCY MEDICINE

## 2021-01-01 PROCEDURE — 99291 CRITICAL CARE FIRST HOUR: CPT | Performed by: STUDENT IN AN ORGANIZED HEALTH CARE EDUCATION/TRAINING PROGRAM

## 2021-01-01 PROCEDURE — 85025 COMPLETE CBC W/AUTO DIFF WBC: CPT | Performed by: EMERGENCY MEDICINE

## 2021-01-01 PROCEDURE — 250N000012 HC RX MED GY IP 250 OP 636 PS 637: Performed by: NURSE PRACTITIONER

## 2021-01-01 PROCEDURE — 82803 BLOOD GASES ANY COMBINATION: CPT | Performed by: INTERNAL MEDICINE

## 2021-01-01 PROCEDURE — 80048 BASIC METABOLIC PNL TOTAL CA: CPT | Performed by: STUDENT IN AN ORGANIZED HEALTH CARE EDUCATION/TRAINING PROGRAM

## 2021-01-01 PROCEDURE — 84100 ASSAY OF PHOSPHORUS: CPT | Performed by: INTERNAL MEDICINE

## 2021-01-01 PROCEDURE — 99291 CRITICAL CARE FIRST HOUR: CPT | Mod: 25 | Performed by: STUDENT IN AN ORGANIZED HEALTH CARE EDUCATION/TRAINING PROGRAM

## 2021-01-01 PROCEDURE — 85027 COMPLETE CBC AUTOMATED: CPT | Performed by: INTERNAL MEDICINE

## 2021-01-01 PROCEDURE — 99291 CRITICAL CARE FIRST HOUR: CPT | Performed by: NURSE PRACTITIONER

## 2021-01-01 PROCEDURE — 36600 WITHDRAWAL OF ARTERIAL BLOOD: CPT

## 2021-01-01 PROCEDURE — 258N000003 HC RX IP 258 OP 636: Performed by: EMERGENCY MEDICINE

## 2021-01-01 PROCEDURE — 83605 ASSAY OF LACTIC ACID: CPT | Performed by: EMERGENCY MEDICINE

## 2021-01-01 PROCEDURE — 36592 COLLECT BLOOD FROM PICC: CPT | Performed by: INTERNAL MEDICINE

## 2021-01-01 PROCEDURE — 999N000141 HC STATISTIC PRE-PROCEDURE NURSING ASSESSMENT: Performed by: OBSTETRICS & GYNECOLOGY

## 2021-01-01 PROCEDURE — U0005 INFEC AGEN DETEC AMPLI PROBE: HCPCS | Performed by: OBSTETRICS & GYNECOLOGY

## 2021-01-01 PROCEDURE — 82330 ASSAY OF CALCIUM: CPT | Performed by: STUDENT IN AN ORGANIZED HEALTH CARE EDUCATION/TRAINING PROGRAM

## 2021-01-01 PROCEDURE — 710N000012 HC RECOVERY PHASE 2, PER MINUTE: Performed by: OBSTETRICS & GYNECOLOGY

## 2021-01-01 PROCEDURE — 83036 HEMOGLOBIN GLYCOSYLATED A1C: CPT | Performed by: INTERNAL MEDICINE

## 2021-01-01 PROCEDURE — 94644 CONT INHLJ TX 1ST HOUR: CPT

## 2021-01-01 PROCEDURE — 87635 SARS-COV-2 COVID-19 AMP PRB: CPT | Performed by: EMERGENCY MEDICINE

## 2021-01-01 PROCEDURE — 93005 ELECTROCARDIOGRAM TRACING: CPT

## 2021-01-01 PROCEDURE — 36556 INSERT NON-TUNNEL CV CATH: CPT | Mod: GC | Performed by: STUDENT IN AN ORGANIZED HEALTH CARE EDUCATION/TRAINING PROGRAM

## 2021-01-01 PROCEDURE — 84145 PROCALCITONIN (PCT): CPT | Performed by: EMERGENCY MEDICINE

## 2021-01-01 PROCEDURE — 36415 COLL VENOUS BLD VENIPUNCTURE: CPT | Performed by: EMERGENCY MEDICINE

## 2021-01-01 PROCEDURE — U0003 INFECTIOUS AGENT DETECTION BY NUCLEIC ACID (DNA OR RNA); SEVERE ACUTE RESPIRATORY SYNDROME CORONAVIRUS 2 (SARS-COV-2) (CORONAVIRUS DISEASE [COVID-19]), AMPLIFIED PROBE TECHNIQUE, MAKING USE OF HIGH THROUGHPUT TECHNOLOGIES AS DESCRIBED BY CMS-2020-01-R: HCPCS | Performed by: OBSTETRICS & GYNECOLOGY

## 2021-01-01 PROCEDURE — 96367 TX/PROPH/DG ADDL SEQ IV INF: CPT

## 2021-01-01 PROCEDURE — 87641 MR-STAPH DNA AMP PROBE: CPT | Performed by: INTERNAL MEDICINE

## 2021-01-01 PROCEDURE — 36620 INSERTION CATHETER ARTERY: CPT | Mod: GC | Performed by: STUDENT IN AN ORGANIZED HEALTH CARE EDUCATION/TRAINING PROGRAM

## 2021-01-01 PROCEDURE — 80053 COMPREHEN METABOLIC PANEL: CPT | Performed by: EMERGENCY MEDICINE

## 2021-01-01 PROCEDURE — 250N000011 HC RX IP 250 OP 636: Performed by: NURSE ANESTHETIST, CERTIFIED REGISTERED

## 2021-01-01 PROCEDURE — 85014 HEMATOCRIT: CPT | Performed by: INTERNAL MEDICINE

## 2021-01-01 PROCEDURE — 94640 AIRWAY INHALATION TREATMENT: CPT

## 2021-01-01 PROCEDURE — 370N000003 HC ANESTHESIA WARD SERVICE

## 2021-01-01 PROCEDURE — C9113 INJ PANTOPRAZOLE SODIUM, VIA: HCPCS | Performed by: INTERNAL MEDICINE

## 2021-01-01 PROCEDURE — 999N000065 XR CHEST PORT 1 VIEW

## 2021-01-01 PROCEDURE — 250N000025 HC SEVOFLURANE, PER MIN: Performed by: OBSTETRICS & GYNECOLOGY

## 2021-01-01 PROCEDURE — 87205 SMEAR GRAM STAIN: CPT | Performed by: STUDENT IN AN ORGANIZED HEALTH CARE EDUCATION/TRAINING PROGRAM

## 2021-01-01 PROCEDURE — 36592 COLLECT BLOOD FROM PICC: CPT | Performed by: STUDENT IN AN ORGANIZED HEALTH CARE EDUCATION/TRAINING PROGRAM

## 2021-01-01 PROCEDURE — 250N000013 HC RX MED GY IP 250 OP 250 PS 637: Performed by: OBSTETRICS & GYNECOLOGY

## 2021-01-01 PROCEDURE — 99238 HOSP IP/OBS DSCHRG MGMT 30/<: CPT | Mod: GC | Performed by: STUDENT IN AN ORGANIZED HEALTH CARE EDUCATION/TRAINING PROGRAM

## 2021-01-01 PROCEDURE — 85610 PROTHROMBIN TIME: CPT | Performed by: STUDENT IN AN ORGANIZED HEALTH CARE EDUCATION/TRAINING PROGRAM

## 2021-01-01 PROCEDURE — 84145 PROCALCITONIN (PCT): CPT | Performed by: INTERNAL MEDICINE

## 2021-01-01 PROCEDURE — 84145 PROCALCITONIN (PCT): CPT | Performed by: STUDENT IN AN ORGANIZED HEALTH CARE EDUCATION/TRAINING PROGRAM

## 2021-01-01 PROCEDURE — 36620 INSERTION CATHETER ARTERY: CPT | Performed by: INTERNAL MEDICINE

## 2021-01-01 PROCEDURE — 96366 THER/PROPH/DIAG IV INF ADDON: CPT

## 2021-01-01 PROCEDURE — 96365 THER/PROPH/DIAG IV INF INIT: CPT | Mod: 59

## 2021-01-01 PROCEDURE — 999N000127 HC STATISTIC PERIPHERAL IV START W US GUIDANCE

## 2021-01-01 RX ORDER — AMINO AC/PROTEIN HYDR/WHEY PRO 10G-100/30
2 LIQUID (ML) ORAL 2 TIMES DAILY
Status: DISCONTINUED | OUTPATIENT
Start: 2021-01-01 | End: 2021-01-01 | Stop reason: HOSPADM

## 2021-01-01 RX ORDER — ONDANSETRON 4 MG/1
4 TABLET, ORALLY DISINTEGRATING ORAL EVERY 6 HOURS PRN
Status: DISCONTINUED | OUTPATIENT
Start: 2021-01-01 | End: 2021-01-01 | Stop reason: HOSPADM

## 2021-01-01 RX ORDER — PROPOFOL 10 MG/ML
INJECTION, EMULSION INTRAVENOUS PRN
Status: DISCONTINUED | OUTPATIENT
Start: 2021-01-01 | End: 2021-01-01

## 2021-01-01 RX ORDER — VECURONIUM BROMIDE 1 MG/ML
10 INJECTION, POWDER, LYOPHILIZED, FOR SOLUTION INTRAVENOUS ONCE
Status: COMPLETED | OUTPATIENT
Start: 2021-01-01 | End: 2021-01-01

## 2021-01-01 RX ORDER — ROPIVACAINE IN 0.9% SOD CHL/PF 0.1 %
.03-.125 PLASTIC BAG, INJECTION (ML) EPIDURAL CONTINUOUS
Status: DISCONTINUED | OUTPATIENT
Start: 2021-01-01 | End: 2021-01-01

## 2021-01-01 RX ORDER — NITROGLYCERIN 0.4 MG/1
0.4 TABLET SUBLINGUAL EVERY 5 MIN PRN
Status: DISCONTINUED | OUTPATIENT
Start: 2021-01-01 | End: 2021-01-01 | Stop reason: HOSPADM

## 2021-01-01 RX ORDER — FUROSEMIDE 10 MG/ML
40 INJECTION INTRAMUSCULAR; INTRAVENOUS ONCE
Status: COMPLETED | OUTPATIENT
Start: 2021-01-01 | End: 2021-01-01

## 2021-01-01 RX ORDER — PROPOFOL 10 MG/ML
INJECTION, EMULSION INTRAVENOUS
Status: DISCONTINUED
Start: 2021-01-01 | End: 2021-01-01 | Stop reason: HOSPADM

## 2021-01-01 RX ORDER — DEXTROSE MONOHYDRATE 25 G/50ML
25-50 INJECTION, SOLUTION INTRAVENOUS
Status: DISCONTINUED | OUTPATIENT
Start: 2021-01-01 | End: 2021-01-01

## 2021-01-01 RX ORDER — ACETAZOLAMIDE 500 MG/5ML
500 INJECTION, POWDER, LYOPHILIZED, FOR SOLUTION INTRAVENOUS ONCE
Status: COMPLETED | OUTPATIENT
Start: 2021-01-01 | End: 2021-01-01

## 2021-01-01 RX ORDER — AMINO AC/PROTEIN HYDR/WHEY PRO 10G-100/30
2 LIQUID (ML) ORAL EVERY 8 HOURS
Status: DISCONTINUED | OUTPATIENT
Start: 2021-01-01 | End: 2021-01-01 | Stop reason: DRUGHIGH

## 2021-01-01 RX ORDER — PROPOFOL 10 MG/ML
10-20 INJECTION, EMULSION INTRAVENOUS EVERY 30 MIN PRN
Status: DISCONTINUED | OUTPATIENT
Start: 2021-01-01 | End: 2021-01-01 | Stop reason: HOSPADM

## 2021-01-01 RX ORDER — OXYCODONE HYDROCHLORIDE 5 MG/1
5 TABLET ORAL
Status: COMPLETED | OUTPATIENT
Start: 2021-01-01 | End: 2021-01-01

## 2021-01-01 RX ORDER — LEVALBUTEROL 1.25 MG/.5ML
1.25 SOLUTION, CONCENTRATE RESPIRATORY (INHALATION) ONCE
Status: COMPLETED | OUTPATIENT
Start: 2021-01-01 | End: 2021-01-01

## 2021-01-01 RX ORDER — NALOXONE HYDROCHLORIDE 0.4 MG/ML
0.2 INJECTION, SOLUTION INTRAMUSCULAR; INTRAVENOUS; SUBCUTANEOUS
Status: DISCONTINUED | OUTPATIENT
Start: 2021-01-01 | End: 2021-01-01 | Stop reason: HOSPADM

## 2021-01-01 RX ORDER — GUAIFENESIN 600 MG/1
15 TABLET, EXTENDED RELEASE ORAL DAILY
Status: DISCONTINUED | OUTPATIENT
Start: 2021-01-01 | End: 2021-01-01 | Stop reason: HOSPADM

## 2021-01-01 RX ORDER — NALOXONE HYDROCHLORIDE 0.4 MG/ML
0.4 INJECTION, SOLUTION INTRAMUSCULAR; INTRAVENOUS; SUBCUTANEOUS
Status: DISCONTINUED | OUTPATIENT
Start: 2021-01-01 | End: 2021-01-01 | Stop reason: HOSPADM

## 2021-01-01 RX ORDER — IOPAMIDOL 755 MG/ML
70 INJECTION, SOLUTION INTRAVASCULAR ONCE
Status: COMPLETED | OUTPATIENT
Start: 2021-01-01 | End: 2021-01-01

## 2021-01-01 RX ORDER — ONDANSETRON 4 MG/1
4 TABLET, ORALLY DISINTEGRATING ORAL EVERY 30 MIN PRN
Status: DISCONTINUED | OUTPATIENT
Start: 2021-01-01 | End: 2021-01-01 | Stop reason: HOSPADM

## 2021-01-01 RX ORDER — DEXMEDETOMIDINE HYDROCHLORIDE 4 UG/ML
0.2-0.7 INJECTION, SOLUTION INTRAVENOUS CONTINUOUS
Status: DISCONTINUED | OUTPATIENT
Start: 2021-01-01 | End: 2021-01-01

## 2021-01-01 RX ORDER — FENTANYL CITRATE 50 UG/ML
INJECTION, SOLUTION INTRAMUSCULAR; INTRAVENOUS PRN
Status: DISCONTINUED | OUTPATIENT
Start: 2021-01-01 | End: 2021-01-01

## 2021-01-01 RX ORDER — HYDROMORPHONE HYDROCHLORIDE 1 MG/ML
0.5 INJECTION, SOLUTION INTRAMUSCULAR; INTRAVENOUS; SUBCUTANEOUS ONCE
Status: COMPLETED | OUTPATIENT
Start: 2021-01-01 | End: 2021-01-01

## 2021-01-01 RX ORDER — GLYCOPYRROLATE 0.2 MG/ML
INJECTION, SOLUTION INTRAMUSCULAR; INTRAVENOUS PRN
Status: DISCONTINUED | OUTPATIENT
Start: 2021-01-01 | End: 2021-01-01

## 2021-01-01 RX ORDER — MIDODRINE HYDROCHLORIDE 2.5 MG/1
2.5 TABLET ORAL
Status: DISCONTINUED | OUTPATIENT
Start: 2021-01-01 | End: 2021-01-01 | Stop reason: HOSPADM

## 2021-01-01 RX ORDER — LIDOCAINE 40 MG/G
CREAM TOPICAL
Status: DISCONTINUED | OUTPATIENT
Start: 2021-01-01 | End: 2021-01-01 | Stop reason: HOSPADM

## 2021-01-01 RX ORDER — HYDROMORPHONE HYDROCHLORIDE 1 MG/ML
.3-.5 INJECTION, SOLUTION INTRAMUSCULAR; INTRAVENOUS; SUBCUTANEOUS EVERY 5 MIN PRN
Status: DISCONTINUED | OUTPATIENT
Start: 2021-01-01 | End: 2021-01-01 | Stop reason: HOSPADM

## 2021-01-01 RX ORDER — LABETALOL HYDROCHLORIDE 5 MG/ML
10 INJECTION, SOLUTION INTRAVENOUS
Status: DISCONTINUED | OUTPATIENT
Start: 2021-01-01 | End: 2021-01-01 | Stop reason: HOSPADM

## 2021-01-01 RX ORDER — LEVOTHYROXINE SODIUM 137 UG/1
137 TABLET ORAL DAILY
Status: DISCONTINUED | OUTPATIENT
Start: 2021-01-01 | End: 2021-01-01 | Stop reason: HOSPADM

## 2021-01-01 RX ORDER — ONDANSETRON 2 MG/ML
INJECTION INTRAMUSCULAR; INTRAVENOUS PRN
Status: DISCONTINUED | OUTPATIENT
Start: 2021-01-01 | End: 2021-01-01

## 2021-01-01 RX ORDER — CHLORHEXIDINE GLUCONATE ORAL RINSE 1.2 MG/ML
15 SOLUTION DENTAL EVERY 12 HOURS
Status: DISCONTINUED | OUTPATIENT
Start: 2021-01-01 | End: 2021-01-01 | Stop reason: HOSPADM

## 2021-01-01 RX ORDER — AMOXICILLIN 250 MG
2 CAPSULE ORAL 2 TIMES DAILY PRN
Status: DISCONTINUED | OUTPATIENT
Start: 2021-01-01 | End: 2021-01-01 | Stop reason: HOSPADM

## 2021-01-01 RX ORDER — PROPOFOL 10 MG/ML
10-20 INJECTION, EMULSION INTRAVENOUS EVERY 30 MIN PRN
Status: DISCONTINUED | OUTPATIENT
Start: 2021-01-01 | End: 2021-01-01

## 2021-01-01 RX ORDER — DOXYCYCLINE 100 MG/10ML
100 INJECTION, POWDER, LYOPHILIZED, FOR SOLUTION INTRAVENOUS EVERY 12 HOURS
Status: COMPLETED | OUTPATIENT
Start: 2021-01-01 | End: 2021-01-01

## 2021-01-01 RX ORDER — NALOXONE HYDROCHLORIDE 0.4 MG/ML
0.2 INJECTION, SOLUTION INTRAMUSCULAR; INTRAVENOUS; SUBCUTANEOUS
Status: DISCONTINUED | OUTPATIENT
Start: 2021-01-01 | End: 2021-01-01 | Stop reason: DRUGHIGH

## 2021-01-01 RX ORDER — PROCHLORPERAZINE 25 MG
25 SUPPOSITORY, RECTAL RECTAL EVERY 12 HOURS PRN
Status: DISCONTINUED | OUTPATIENT
Start: 2021-01-01 | End: 2021-01-01 | Stop reason: HOSPADM

## 2021-01-01 RX ORDER — DEXAMETHASONE SODIUM PHOSPHATE 4 MG/ML
6 INJECTION, SOLUTION INTRA-ARTICULAR; INTRALESIONAL; INTRAMUSCULAR; INTRAVENOUS; SOFT TISSUE DAILY
Status: DISCONTINUED | OUTPATIENT
Start: 2021-01-01 | End: 2021-01-01 | Stop reason: HOSPADM

## 2021-01-01 RX ORDER — SODIUM CHLORIDE, SODIUM LACTATE, POTASSIUM CHLORIDE, CALCIUM CHLORIDE 600; 310; 30; 20 MG/100ML; MG/100ML; MG/100ML; MG/100ML
INJECTION, SOLUTION INTRAVENOUS CONTINUOUS PRN
Status: DISCONTINUED | OUTPATIENT
Start: 2021-01-01 | End: 2021-01-01

## 2021-01-01 RX ORDER — PIPERACILLIN SODIUM, TAZOBACTAM SODIUM 3; .375 G/15ML; G/15ML
3.38 INJECTION, POWDER, LYOPHILIZED, FOR SOLUTION INTRAVENOUS EVERY 6 HOURS
Status: DISCONTINUED | OUTPATIENT
Start: 2021-01-01 | End: 2021-01-01

## 2021-01-01 RX ORDER — ROPIVACAINE IN 0.9% SOD CHL/PF 0.1 %
.03-.125 PLASTIC BAG, INJECTION (ML) EPIDURAL CONTINUOUS
Status: DISCONTINUED | OUTPATIENT
Start: 2021-01-01 | End: 2021-01-01 | Stop reason: CLARIF

## 2021-01-01 RX ORDER — PROPOFOL 10 MG/ML
5-75 INJECTION, EMULSION INTRAVENOUS CONTINUOUS
Status: DISCONTINUED | OUTPATIENT
Start: 2021-01-01 | End: 2021-01-01 | Stop reason: HOSPADM

## 2021-01-01 RX ORDER — PROPOFOL 10 MG/ML
INJECTION, EMULSION INTRAVENOUS CONTINUOUS PRN
Status: DISCONTINUED | OUTPATIENT
Start: 2021-01-01 | End: 2021-01-01

## 2021-01-01 RX ORDER — FUROSEMIDE 10 MG/ML
40 INJECTION INTRAMUSCULAR; INTRAVENOUS EVERY 8 HOURS
Status: DISCONTINUED | OUTPATIENT
Start: 2021-01-01 | End: 2021-01-01

## 2021-01-01 RX ORDER — SODIUM CHLORIDE, SODIUM LACTATE, POTASSIUM CHLORIDE, CALCIUM CHLORIDE 600; 310; 30; 20 MG/100ML; MG/100ML; MG/100ML; MG/100ML
INJECTION, SOLUTION INTRAVENOUS CONTINUOUS
Status: DISCONTINUED | OUTPATIENT
Start: 2021-01-01 | End: 2021-01-01 | Stop reason: HOSPADM

## 2021-01-01 RX ORDER — DEXTROSE MONOHYDRATE 100 MG/ML
INJECTION, SOLUTION INTRAVENOUS CONTINUOUS PRN
Status: DISCONTINUED | OUTPATIENT
Start: 2021-01-01 | End: 2021-01-01 | Stop reason: HOSPADM

## 2021-01-01 RX ORDER — PROPOFOL 10 MG/ML
5-75 INJECTION, EMULSION INTRAVENOUS CONTINUOUS
Status: DISCONTINUED | OUTPATIENT
Start: 2021-01-01 | End: 2021-01-01

## 2021-01-01 RX ORDER — NALOXONE HYDROCHLORIDE 0.4 MG/ML
0.4 INJECTION, SOLUTION INTRAMUSCULAR; INTRAVENOUS; SUBCUTANEOUS
Status: DISCONTINUED | OUTPATIENT
Start: 2021-01-01 | End: 2021-01-01 | Stop reason: DRUGHIGH

## 2021-01-01 RX ORDER — HYDROMORPHONE HYDROCHLORIDE 1 MG/ML
INJECTION, SOLUTION INTRAMUSCULAR; INTRAVENOUS; SUBCUTANEOUS
Status: COMPLETED
Start: 2021-01-01 | End: 2021-01-01

## 2021-01-01 RX ORDER — FENTANYL CITRATE 50 UG/ML
25-50 INJECTION, SOLUTION INTRAMUSCULAR; INTRAVENOUS
Status: DISCONTINUED | OUTPATIENT
Start: 2021-01-01 | End: 2021-01-01 | Stop reason: HOSPADM

## 2021-01-01 RX ORDER — ETOMIDATE 2 MG/ML
20 INJECTION INTRAVENOUS ONCE
Status: DISCONTINUED | OUTPATIENT
Start: 2021-01-01 | End: 2021-01-01 | Stop reason: CLARIF

## 2021-01-01 RX ORDER — ACETAZOLAMIDE 500 MG/5ML
500 INJECTION, POWDER, LYOPHILIZED, FOR SOLUTION INTRAVENOUS EVERY 12 HOURS
Status: COMPLETED | OUTPATIENT
Start: 2021-01-01 | End: 2021-01-01

## 2021-01-01 RX ORDER — ALBUTEROL SULFATE 0.83 MG/ML
2.5 SOLUTION RESPIRATORY (INHALATION)
Status: DISCONTINUED | OUTPATIENT
Start: 2021-01-01 | End: 2021-01-01 | Stop reason: HOSPADM

## 2021-01-01 RX ORDER — MAGNESIUM HYDROXIDE 1200 MG/15ML
LIQUID ORAL PRN
Status: DISCONTINUED | OUTPATIENT
Start: 2021-01-01 | End: 2021-01-01 | Stop reason: HOSPADM

## 2021-01-01 RX ORDER — DEXAMETHASONE SODIUM PHOSPHATE 10 MG/ML
6 INJECTION, SOLUTION INTRAMUSCULAR; INTRAVENOUS ONCE
Status: COMPLETED | OUTPATIENT
Start: 2021-01-01 | End: 2021-01-01

## 2021-01-01 RX ORDER — AZITHROMYCIN 500 MG/1
500 INJECTION, POWDER, LYOPHILIZED, FOR SOLUTION INTRAVENOUS ONCE
Status: COMPLETED | OUTPATIENT
Start: 2021-01-01 | End: 2021-01-01

## 2021-01-01 RX ORDER — LIDOCAINE HYDROCHLORIDE 10 MG/ML
INJECTION, SOLUTION EPIDURAL; INFILTRATION; INTRACAUDAL; PERINEURAL
Status: DISCONTINUED
Start: 2021-01-01 | End: 2021-01-01 | Stop reason: HOSPADM

## 2021-01-01 RX ORDER — NICOTINE POLACRILEX 4 MG
15-30 LOZENGE BUCCAL
Status: DISCONTINUED | OUTPATIENT
Start: 2021-01-01 | End: 2021-01-01 | Stop reason: HOSPADM

## 2021-01-01 RX ORDER — PIPERACILLIN SODIUM, TAZOBACTAM SODIUM 4; .5 G/20ML; G/20ML
4.5 INJECTION, POWDER, LYOPHILIZED, FOR SOLUTION INTRAVENOUS ONCE
Status: COMPLETED | OUTPATIENT
Start: 2021-01-01 | End: 2021-01-01

## 2021-01-01 RX ORDER — LIDOCAINE HYDROCHLORIDE 20 MG/ML
INJECTION, SOLUTION INFILTRATION; PERINEURAL PRN
Status: DISCONTINUED | OUTPATIENT
Start: 2021-01-01 | End: 2021-01-01

## 2021-01-01 RX ORDER — FUROSEMIDE 10 MG/ML
INJECTION INTRAMUSCULAR; INTRAVENOUS
Status: COMPLETED
Start: 2021-01-01 | End: 2021-01-01

## 2021-01-01 RX ORDER — MAGNESIUM SULFATE HEPTAHYDRATE 40 MG/ML
2 INJECTION, SOLUTION INTRAVENOUS ONCE
Status: COMPLETED | OUTPATIENT
Start: 2021-01-01 | End: 2021-01-01

## 2021-01-01 RX ORDER — HYDROMORPHONE HYDROCHLORIDE 1 MG/ML
.5-1 INJECTION, SOLUTION INTRAMUSCULAR; INTRAVENOUS; SUBCUTANEOUS
Status: DISCONTINUED | OUTPATIENT
Start: 2021-01-01 | End: 2021-01-01 | Stop reason: HOSPADM

## 2021-01-01 RX ORDER — HYDROMORPHONE HYDROCHLORIDE 1 MG/ML
INJECTION, SOLUTION INTRAMUSCULAR; INTRAVENOUS; SUBCUTANEOUS
Status: DISCONTINUED
Start: 2021-01-01 | End: 2021-01-01 | Stop reason: HOSPADM

## 2021-01-01 RX ORDER — POTASSIUM CHLORIDE 1.5 G/1.58G
40 POWDER, FOR SOLUTION ORAL ONCE
Status: COMPLETED | OUTPATIENT
Start: 2021-01-01 | End: 2021-01-01

## 2021-01-01 RX ORDER — FUROSEMIDE 10 MG/ML
60 INJECTION INTRAMUSCULAR; INTRAVENOUS DAILY
Status: DISCONTINUED | OUTPATIENT
Start: 2021-01-01 | End: 2021-01-01 | Stop reason: HOSPADM

## 2021-01-01 RX ORDER — LIDOCAINE 40 MG/G
CREAM TOPICAL
Status: DISCONTINUED | OUTPATIENT
Start: 2021-01-01 | End: 2021-01-01

## 2021-01-01 RX ORDER — CARBOXYMETHYLCELLULOSE SODIUM 5 MG/ML
1 SOLUTION/ DROPS OPHTHALMIC
Status: DISCONTINUED | OUTPATIENT
Start: 2021-01-01 | End: 2021-01-01 | Stop reason: HOSPADM

## 2021-01-01 RX ORDER — ONDANSETRON 2 MG/ML
4 INJECTION INTRAMUSCULAR; INTRAVENOUS EVERY 6 HOURS PRN
Status: DISCONTINUED | OUTPATIENT
Start: 2021-01-01 | End: 2021-01-01 | Stop reason: HOSPADM

## 2021-01-01 RX ORDER — NALOXONE HYDROCHLORIDE 0.4 MG/ML
0.1 INJECTION, SOLUTION INTRAMUSCULAR; INTRAVENOUS; SUBCUTANEOUS
Status: DISCONTINUED | OUTPATIENT
Start: 2021-01-01 | End: 2021-01-01 | Stop reason: HOSPADM

## 2021-01-01 RX ORDER — PROCHLORPERAZINE MALEATE 10 MG
10 TABLET ORAL EVERY 6 HOURS PRN
Status: DISCONTINUED | OUTPATIENT
Start: 2021-01-01 | End: 2021-01-01 | Stop reason: HOSPADM

## 2021-01-01 RX ORDER — PHENYLEPHRINE HCL IN 0.9% NACL 50MG/250ML
.1-6 PLASTIC BAG, INJECTION (ML) INTRAVENOUS CONTINUOUS
Status: DISCONTINUED | OUTPATIENT
Start: 2021-01-01 | End: 2021-01-01 | Stop reason: HOSPADM

## 2021-01-01 RX ORDER — SODIUM CHLORIDE 9 MG/ML
INJECTION, SOLUTION INTRAVENOUS CONTINUOUS
Status: DISCONTINUED | OUTPATIENT
Start: 2021-01-01 | End: 2021-01-01 | Stop reason: HOSPADM

## 2021-01-01 RX ORDER — FUROSEMIDE 10 MG/ML
60 INJECTION INTRAMUSCULAR; INTRAVENOUS EVERY 8 HOURS
Status: DISCONTINUED | OUTPATIENT
Start: 2021-01-01 | End: 2021-01-01

## 2021-01-01 RX ORDER — NICOTINE POLACRILEX 4 MG
15-30 LOZENGE BUCCAL
Status: DISCONTINUED | OUTPATIENT
Start: 2021-01-01 | End: 2021-01-01

## 2021-01-01 RX ORDER — ONDANSETRON 2 MG/ML
4 INJECTION INTRAMUSCULAR; INTRAVENOUS EVERY 30 MIN PRN
Status: DISCONTINUED | OUTPATIENT
Start: 2021-01-01 | End: 2021-01-01 | Stop reason: HOSPADM

## 2021-01-01 RX ORDER — POLYETHYLENE GLYCOL 3350 17 G/17G
17 POWDER, FOR SOLUTION ORAL DAILY PRN
Status: DISCONTINUED | OUTPATIENT
Start: 2021-01-01 | End: 2021-01-01 | Stop reason: HOSPADM

## 2021-01-01 RX ORDER — NOREPINEPHRINE BITARTRATE 0.02 MG/ML
.01-.6 INJECTION, SOLUTION INTRAVENOUS CONTINUOUS
Status: DISCONTINUED | OUTPATIENT
Start: 2021-01-01 | End: 2021-01-01

## 2021-01-01 RX ORDER — EPHEDRINE SULFATE 50 MG/ML
INJECTION, SOLUTION INTRAMUSCULAR; INTRAVENOUS; SUBCUTANEOUS PRN
Status: DISCONTINUED | OUTPATIENT
Start: 2021-01-01 | End: 2021-01-01

## 2021-01-01 RX ORDER — FUROSEMIDE 10 MG/ML
20 INJECTION INTRAMUSCULAR; INTRAVENOUS ONCE
Status: COMPLETED | OUTPATIENT
Start: 2021-01-01 | End: 2021-01-01

## 2021-01-01 RX ORDER — POTASSIUM CHLORIDE 1.5 G/1.58G
20 POWDER, FOR SOLUTION ORAL ONCE
Status: COMPLETED | OUTPATIENT
Start: 2021-01-01 | End: 2021-01-01

## 2021-01-01 RX ORDER — DEXTROSE MONOHYDRATE 25 G/50ML
25-50 INJECTION, SOLUTION INTRAVENOUS
Status: DISCONTINUED | OUTPATIENT
Start: 2021-01-01 | End: 2021-01-01 | Stop reason: HOSPADM

## 2021-01-01 RX ORDER — AMOXICILLIN 250 MG
1 CAPSULE ORAL 2 TIMES DAILY PRN
Status: DISCONTINUED | OUTPATIENT
Start: 2021-01-01 | End: 2021-01-01 | Stop reason: HOSPADM

## 2021-01-01 RX ORDER — IBUPROFEN 400 MG/1
800 TABLET, FILM COATED ORAL ONCE
Status: DISCONTINUED | OUTPATIENT
Start: 2021-01-01 | End: 2021-01-01 | Stop reason: HOSPADM

## 2021-01-01 RX ORDER — ACETAMINOPHEN 325 MG/1
975 TABLET ORAL ONCE
Status: DISCONTINUED | OUTPATIENT
Start: 2021-01-01 | End: 2021-01-01 | Stop reason: HOSPADM

## 2021-01-01 RX ADMIN — PIPERACILLIN SODIUM AND TAZOBACTAM SODIUM 3.38 G: 3; .375 INJECTION, POWDER, LYOPHILIZED, FOR SOLUTION INTRAVENOUS at 03:34

## 2021-01-01 RX ADMIN — SODIUM CHLORIDE 250 ML: 9 INJECTION, SOLUTION INTRAVENOUS at 15:36

## 2021-01-01 RX ADMIN — INSULIN ASPART 3 UNITS: 100 INJECTION, SOLUTION INTRAVENOUS; SUBCUTANEOUS at 00:12

## 2021-01-01 RX ADMIN — INSULIN GLARGINE 20 UNITS: 100 INJECTION, SOLUTION SUBCUTANEOUS at 20:43

## 2021-01-01 RX ADMIN — LEVALBUTEROL HYDROCHLORIDE 1.25 MG: 1.25 SOLUTION, CONCENTRATE RESPIRATORY (INHALATION) at 21:55

## 2021-01-01 RX ADMIN — LEVOTHYROXINE SODIUM 137 MCG: 137 TABLET ORAL at 08:07

## 2021-01-01 RX ADMIN — ENOXAPARIN SODIUM 40 MG: 40 INJECTION SUBCUTANEOUS at 11:15

## 2021-01-01 RX ADMIN — EPOPROSTENOL 20 NG/KG/MIN: 1.5 INJECTION, POWDER, LYOPHILIZED, FOR SOLUTION INTRAVENOUS at 01:27

## 2021-01-01 RX ADMIN — CHLORHEXIDINE GLUCONATE 15 ML: 1.2 SOLUTION ORAL at 07:34

## 2021-01-01 RX ADMIN — PIPERACILLIN SODIUM AND TAZOBACTAM SODIUM 3.38 G: 3; .375 INJECTION, POWDER, LYOPHILIZED, FOR SOLUTION INTRAVENOUS at 05:49

## 2021-01-01 RX ADMIN — MIDAZOLAM HYDROCHLORIDE 4 MG: 1 INJECTION, SOLUTION INTRAMUSCULAR; INTRAVENOUS at 09:31

## 2021-01-01 RX ADMIN — DOXYCYCLINE 100 MG: 100 INJECTION, POWDER, LYOPHILIZED, FOR SOLUTION INTRAVENOUS at 20:11

## 2021-01-01 RX ADMIN — PIPERACILLIN SODIUM AND TAZOBACTAM SODIUM 3.38 G: 3; .375 INJECTION, POWDER, LYOPHILIZED, FOR SOLUTION INTRAVENOUS at 16:13

## 2021-01-01 RX ADMIN — MIDAZOLAM HYDROCHLORIDE 2 MG: 1 INJECTION, SOLUTION INTRAMUSCULAR; INTRAVENOUS at 13:07

## 2021-01-01 RX ADMIN — INSULIN ASPART 4 UNITS: 100 INJECTION, SOLUTION INTRAVENOUS; SUBCUTANEOUS at 12:33

## 2021-01-01 RX ADMIN — Medication 2 PACKET: at 08:12

## 2021-01-01 RX ADMIN — ENOXAPARIN SODIUM 40 MG: 40 INJECTION SUBCUTANEOUS at 08:02

## 2021-01-01 RX ADMIN — INSULIN ASPART 3 UNITS: 100 INJECTION, SOLUTION INTRAVENOUS; SUBCUTANEOUS at 16:26

## 2021-01-01 RX ADMIN — CHLORHEXIDINE GLUCONATE 15 ML: 1.2 SOLUTION ORAL at 07:59

## 2021-01-01 RX ADMIN — PROPOFOL 35 MCG/KG/MIN: 10 INJECTION, EMULSION INTRAVENOUS at 14:19

## 2021-01-01 RX ADMIN — ENOXAPARIN SODIUM 60 MG: 60 INJECTION SUBCUTANEOUS at 20:34

## 2021-01-01 RX ADMIN — PROPOFOL 45 MCG/KG/MIN: 10 INJECTION, EMULSION INTRAVENOUS at 14:53

## 2021-01-01 RX ADMIN — INSULIN GLARGINE 20 UNITS: 100 INJECTION, SOLUTION SUBCUTANEOUS at 20:06

## 2021-01-01 RX ADMIN — MINERAL OIL AND PETROLATUM: 150; 830 OINTMENT OPHTHALMIC at 11:10

## 2021-01-01 RX ADMIN — PANTOPRAZOLE SODIUM 40 MG: 40 INJECTION, POWDER, FOR SOLUTION INTRAVENOUS at 07:29

## 2021-01-01 RX ADMIN — Medication 50 MCG: at 10:18

## 2021-01-01 RX ADMIN — INSULIN ASPART 3 UNITS: 100 INJECTION, SOLUTION INTRAVENOUS; SUBCUTANEOUS at 12:34

## 2021-01-01 RX ADMIN — INSULIN GLARGINE 30 UNITS: 100 INJECTION, SOLUTION SUBCUTANEOUS at 22:19

## 2021-01-01 RX ADMIN — EPOPROSTENOL 20 NG/KG/MIN: 1.5 INJECTION, POWDER, LYOPHILIZED, FOR SOLUTION INTRAVENOUS at 09:03

## 2021-01-01 RX ADMIN — MINERAL OIL AND PETROLATUM: 150; 830 OINTMENT OPHTHALMIC at 12:02

## 2021-01-01 RX ADMIN — MINERAL OIL AND PETROLATUM: 150; 830 OINTMENT OPHTHALMIC at 02:54

## 2021-01-01 RX ADMIN — VECURONIUM BROMIDE 0.4 MCG/KG/MIN: 1 INJECTION, POWDER, LYOPHILIZED, FOR SOLUTION INTRAVENOUS at 08:01

## 2021-01-01 RX ADMIN — Medication 0.1 MCG/KG/MIN: at 02:26

## 2021-01-01 RX ADMIN — DEXAMETHASONE SODIUM PHOSPHATE 6 MG: 4 INJECTION, SOLUTION INTRAMUSCULAR; INTRAVENOUS at 12:22

## 2021-01-01 RX ADMIN — SENNOSIDES AND DOCUSATE SODIUM 2 TABLET: 8.6; 5 TABLET ORAL at 05:57

## 2021-01-01 RX ADMIN — FUROSEMIDE 40 MG: 10 INJECTION, SOLUTION INTRAVENOUS at 10:25

## 2021-01-01 RX ADMIN — Medication 2 PACKET: at 08:20

## 2021-01-01 RX ADMIN — SODIUM CHLORIDE 50 ML: 900 INJECTION INTRAVENOUS at 19:09

## 2021-01-01 RX ADMIN — ENOXAPARIN SODIUM 60 MG: 60 INJECTION SUBCUTANEOUS at 08:09

## 2021-01-01 RX ADMIN — MINERAL OIL AND PETROLATUM 3.5 G: 150; 830 OINTMENT OPHTHALMIC at 02:47

## 2021-01-01 RX ADMIN — MIDAZOLAM HYDROCHLORIDE 4 MG: 1 INJECTION, SOLUTION INTRAMUSCULAR; INTRAVENOUS at 14:04

## 2021-01-01 RX ADMIN — DEXAMETHASONE SODIUM PHOSPHATE 6 MG: 4 INJECTION, SOLUTION INTRAMUSCULAR; INTRAVENOUS at 13:05

## 2021-01-01 RX ADMIN — CARBIDOPA AND LEVODOPA 2.5 MG: 50; 200 TABLET, EXTENDED RELEASE ORAL at 17:46

## 2021-01-01 RX ADMIN — Medication 40 MG: at 08:16

## 2021-01-01 RX ADMIN — PIPERACILLIN SODIUM AND TAZOBACTAM SODIUM 3.38 G: 3; .375 INJECTION, POWDER, LYOPHILIZED, FOR SOLUTION INTRAVENOUS at 03:55

## 2021-01-01 RX ADMIN — INSULIN ASPART 4 UNITS: 100 INJECTION, SOLUTION INTRAVENOUS; SUBCUTANEOUS at 08:28

## 2021-01-01 RX ADMIN — VECURONIUM BROMIDE 0.5 MCG/KG/MIN: 1 INJECTION, POWDER, LYOPHILIZED, FOR SOLUTION INTRAVENOUS at 08:16

## 2021-01-01 RX ADMIN — PROPOFOL 65 MCG/KG/MIN: 10 INJECTION, EMULSION INTRAVENOUS at 13:27

## 2021-01-01 RX ADMIN — PROPOFOL 35 MCG/KG/MIN: 10 INJECTION, EMULSION INTRAVENOUS at 12:45

## 2021-01-01 RX ADMIN — ACETAZOLAMIDE 500 MG: 500 INJECTION, POWDER, LYOPHILIZED, FOR SOLUTION INTRAVENOUS at 12:15

## 2021-01-01 RX ADMIN — MINERAL OIL AND PETROLATUM: 150; 830 OINTMENT OPHTHALMIC at 19:57

## 2021-01-01 RX ADMIN — PIPERACILLIN SODIUM AND TAZOBACTAM SODIUM 3.38 G: 3; .375 INJECTION, POWDER, LYOPHILIZED, FOR SOLUTION INTRAVENOUS at 21:52

## 2021-01-01 RX ADMIN — Medication 0.03 MCG/KG/MIN: at 10:19

## 2021-01-01 RX ADMIN — PROPOFOL 50 MCG/KG/MIN: 10 INJECTION, EMULSION INTRAVENOUS at 06:42

## 2021-01-01 RX ADMIN — POTASSIUM & SODIUM PHOSPHATES POWDER PACK 280-160-250 MG 2 PACKET: 280-160-250 PACK at 08:01

## 2021-01-01 RX ADMIN — EPOPROSTENOL 20 NG/KG/MIN: 1.5 INJECTION, POWDER, LYOPHILIZED, FOR SOLUTION INTRAVENOUS at 16:15

## 2021-01-01 RX ADMIN — PROPOFOL 45 MCG/KG/MIN: 10 INJECTION, EMULSION INTRAVENOUS at 12:19

## 2021-01-01 RX ADMIN — EPOPROSTENOL 20 NG/KG/MIN: 1.5 INJECTION, POWDER, LYOPHILIZED, FOR SOLUTION INTRAVENOUS at 10:58

## 2021-01-01 RX ADMIN — Medication 40 MG: at 06:45

## 2021-01-01 RX ADMIN — MINERAL OIL AND PETROLATUM: 150; 830 OINTMENT OPHTHALMIC at 02:31

## 2021-01-01 RX ADMIN — EPOPROSTENOL 20 NG/KG/MIN: 1.5 INJECTION, POWDER, LYOPHILIZED, FOR SOLUTION INTRAVENOUS at 16:40

## 2021-01-01 RX ADMIN — PIPERACILLIN SODIUM AND TAZOBACTAM SODIUM 4.5 G: 4; .5 INJECTION, POWDER, LYOPHILIZED, FOR SOLUTION INTRAVENOUS at 22:13

## 2021-01-01 RX ADMIN — CHLORHEXIDINE GLUCONATE 15 ML: 1.2 SOLUTION ORAL at 07:48

## 2021-01-01 RX ADMIN — Medication 50 MCG/HR: at 09:46

## 2021-01-01 RX ADMIN — SODIUM CHLORIDE: 9 INJECTION, SOLUTION INTRAVENOUS at 09:04

## 2021-01-01 RX ADMIN — PROPOFOL 70 MCG/KG/MIN: 10 INJECTION, EMULSION INTRAVENOUS at 17:58

## 2021-01-01 RX ADMIN — VECURONIUM BROMIDE 0.3 MCG/KG/MIN: 1 INJECTION, POWDER, LYOPHILIZED, FOR SOLUTION INTRAVENOUS at 14:20

## 2021-01-01 RX ADMIN — EPOPROSTENOL 20 NG/KG/MIN: 1.5 INJECTION, POWDER, LYOPHILIZED, FOR SOLUTION INTRAVENOUS at 06:37

## 2021-01-01 RX ADMIN — PROPOFOL 45 MCG/KG/MIN: 10 INJECTION, EMULSION INTRAVENOUS at 06:45

## 2021-01-01 RX ADMIN — INSULIN ASPART 2 UNITS: 100 INJECTION, SOLUTION INTRAVENOUS; SUBCUTANEOUS at 15:42

## 2021-01-01 RX ADMIN — MIDAZOLAM HYDROCHLORIDE 2 MG: 1 INJECTION, SOLUTION INTRAMUSCULAR; INTRAVENOUS at 17:06

## 2021-01-01 RX ADMIN — PROPOFOL 45 MCG/KG/MIN: 10 INJECTION, EMULSION INTRAVENOUS at 11:03

## 2021-01-01 RX ADMIN — ACETAZOLAMIDE SODIUM 500 MG: 500 INJECTION, POWDER, LYOPHILIZED, FOR SOLUTION INTRAVENOUS at 12:45

## 2021-01-01 RX ADMIN — PROPOFOL 30 MCG/KG/MIN: 10 INJECTION, EMULSION INTRAVENOUS at 21:08

## 2021-01-01 RX ADMIN — SODIUM CHLORIDE: 9 INJECTION, SOLUTION INTRAVENOUS at 04:10

## 2021-01-01 RX ADMIN — ENOXAPARIN SODIUM 40 MG: 40 INJECTION SUBCUTANEOUS at 08:31

## 2021-01-01 RX ADMIN — SODIUM CHLORIDE 1000 ML: 9 INJECTION, SOLUTION INTRAVENOUS at 00:09

## 2021-01-01 RX ADMIN — LIDOCAINE HYDROCHLORIDE 100 MG: 20 INJECTION, SOLUTION INFILTRATION; PERINEURAL at 10:59

## 2021-01-01 RX ADMIN — INSULIN ASPART 3 UNITS: 100 INJECTION, SOLUTION INTRAVENOUS; SUBCUTANEOUS at 16:42

## 2021-01-01 RX ADMIN — SODIUM CHLORIDE: 9 INJECTION, SOLUTION INTRAVENOUS at 16:02

## 2021-01-01 RX ADMIN — PROPOFOL 75 MCG/KG/MIN: 10 INJECTION, EMULSION INTRAVENOUS at 11:15

## 2021-01-01 RX ADMIN — DEXAMETHASONE SODIUM PHOSPHATE 6 MG: 4 INJECTION, SOLUTION INTRAMUSCULAR; INTRAVENOUS at 12:20

## 2021-01-01 RX ADMIN — PROPOFOL 50 MCG/KG/MIN: 10 INJECTION, EMULSION INTRAVENOUS at 13:09

## 2021-01-01 RX ADMIN — PROPOFOL 30 MCG/KG/MIN: 10 INJECTION, EMULSION INTRAVENOUS at 23:53

## 2021-01-01 RX ADMIN — INSULIN GLARGINE 20 UNITS: 100 INJECTION, SOLUTION SUBCUTANEOUS at 17:03

## 2021-01-01 RX ADMIN — Medication 50 MCG: at 13:56

## 2021-01-01 RX ADMIN — LEVOTHYROXINE SODIUM 137 MCG: 137 TABLET ORAL at 08:16

## 2021-01-01 RX ADMIN — Medication 50 MCG: at 13:16

## 2021-01-01 RX ADMIN — Medication 200 MCG/HR: at 00:14

## 2021-01-01 RX ADMIN — LEVOTHYROXINE SODIUM 137 MCG: 137 TABLET ORAL at 11:11

## 2021-01-01 RX ADMIN — VECURONIUM BROMIDE 0.4 MCG/KG/MIN: 1 INJECTION, POWDER, LYOPHILIZED, FOR SOLUTION INTRAVENOUS at 00:45

## 2021-01-01 RX ADMIN — ENOXAPARIN SODIUM 60 MG: 60 INJECTION SUBCUTANEOUS at 20:00

## 2021-01-01 RX ADMIN — DOXYCYCLINE 100 MG: 100 INJECTION, POWDER, LYOPHILIZED, FOR SOLUTION INTRAVENOUS at 20:17

## 2021-01-01 RX ADMIN — SENNOSIDES AND DOCUSATE SODIUM 2 TABLET: 8.6; 5 TABLET ORAL at 21:15

## 2021-01-01 RX ADMIN — Medication 0.03 MCG/KG/MIN: at 01:48

## 2021-01-01 RX ADMIN — Medication 0.08 MCG/KG/MIN: at 21:09

## 2021-01-01 RX ADMIN — PIPERACILLIN SODIUM AND TAZOBACTAM SODIUM 3.38 G: 3; .375 INJECTION, POWDER, LYOPHILIZED, FOR SOLUTION INTRAVENOUS at 22:00

## 2021-01-01 RX ADMIN — PIPERACILLIN SODIUM AND TAZOBACTAM SODIUM 3.38 G: 3; .375 INJECTION, POWDER, LYOPHILIZED, FOR SOLUTION INTRAVENOUS at 10:26

## 2021-01-01 RX ADMIN — DOXYCYCLINE 100 MG: 100 INJECTION, POWDER, LYOPHILIZED, FOR SOLUTION INTRAVENOUS at 20:43

## 2021-01-01 RX ADMIN — ENOXAPARIN SODIUM 60 MG: 60 INJECTION SUBCUTANEOUS at 08:07

## 2021-01-01 RX ADMIN — PROPOFOL 45 MCG/KG/MIN: 10 INJECTION, EMULSION INTRAVENOUS at 10:26

## 2021-01-01 RX ADMIN — DOXYCYCLINE 100 MG: 100 INJECTION, POWDER, LYOPHILIZED, FOR SOLUTION INTRAVENOUS at 07:28

## 2021-01-01 RX ADMIN — FAMOTIDINE 20 MG: 10 INJECTION, SOLUTION INTRAVENOUS at 17:24

## 2021-01-01 RX ADMIN — INSULIN ASPART 1 UNITS: 100 INJECTION, SOLUTION INTRAVENOUS; SUBCUTANEOUS at 08:14

## 2021-01-01 RX ADMIN — SODIUM CHLORIDE 50 ML: 900 INJECTION INTRAVENOUS at 17:33

## 2021-01-01 RX ADMIN — INSULIN ASPART 4 UNITS: 100 INJECTION, SOLUTION INTRAVENOUS; SUBCUTANEOUS at 16:16

## 2021-01-01 RX ADMIN — INSULIN ASPART 1 UNITS: 100 INJECTION, SOLUTION INTRAVENOUS; SUBCUTANEOUS at 07:56

## 2021-01-01 RX ADMIN — MAGNESIUM SULFATE HEPTAHYDRATE 2 G: 40 INJECTION, SOLUTION INTRAVENOUS at 09:54

## 2021-01-01 RX ADMIN — Medication 50 MCG: at 12:56

## 2021-01-01 RX ADMIN — Medication 0.03 MCG/KG/MIN: at 20:43

## 2021-01-01 RX ADMIN — PIPERACILLIN SODIUM AND TAZOBACTAM SODIUM 3.38 G: 3; .375 INJECTION, POWDER, LYOPHILIZED, FOR SOLUTION INTRAVENOUS at 03:54

## 2021-01-01 RX ADMIN — FUROSEMIDE 40 MG: 10 INJECTION, SOLUTION INTRAVENOUS at 12:44

## 2021-01-01 RX ADMIN — SENNOSIDES AND DOCUSATE SODIUM 1 TABLET: 8.6; 5 TABLET ORAL at 08:00

## 2021-01-01 RX ADMIN — CARBIDOPA AND LEVODOPA 2.5 MG: 50; 200 TABLET, EXTENDED RELEASE ORAL at 18:42

## 2021-01-01 RX ADMIN — INSULIN ASPART 1 UNITS: 100 INJECTION, SOLUTION INTRAVENOUS; SUBCUTANEOUS at 19:30

## 2021-01-01 RX ADMIN — INSULIN ASPART 4 UNITS: 100 INJECTION, SOLUTION INTRAVENOUS; SUBCUTANEOUS at 16:37

## 2021-01-01 RX ADMIN — EPOPROSTENOL 20 NG/KG/MIN: 1.5 INJECTION, POWDER, LYOPHILIZED, FOR SOLUTION INTRAVENOUS at 16:09

## 2021-01-01 RX ADMIN — CARBIDOPA AND LEVODOPA 2.5 MG: 50; 200 TABLET, EXTENDED RELEASE ORAL at 12:42

## 2021-01-01 RX ADMIN — INSULIN ASPART 5 UNITS: 100 INJECTION, SOLUTION INTRAVENOUS; SUBCUTANEOUS at 19:59

## 2021-01-01 RX ADMIN — CARBIDOPA AND LEVODOPA 2.5 MG: 50; 200 TABLET, EXTENDED RELEASE ORAL at 08:01

## 2021-01-01 RX ADMIN — CHLORHEXIDINE GLUCONATE 15 ML: 1.2 SOLUTION ORAL at 19:58

## 2021-01-01 RX ADMIN — MINERAL OIL AND PETROLATUM 3.5 G: 150; 830 OINTMENT OPHTHALMIC at 19:57

## 2021-01-01 RX ADMIN — INSULIN ASPART 1 UNITS: 100 INJECTION, SOLUTION INTRAVENOUS; SUBCUTANEOUS at 00:39

## 2021-01-01 RX ADMIN — MIDAZOLAM 2 MG: 1 INJECTION INTRAMUSCULAR; INTRAVENOUS at 10:52

## 2021-01-01 RX ADMIN — INSULIN GLARGINE 20 UNITS: 100 INJECTION, SOLUTION SUBCUTANEOUS at 20:39

## 2021-01-01 RX ADMIN — CARBIDOPA AND LEVODOPA 2.5 MG: 50; 200 TABLET, EXTENDED RELEASE ORAL at 12:48

## 2021-01-01 RX ADMIN — INSULIN GLARGINE 25 UNITS: 100 INJECTION, SOLUTION SUBCUTANEOUS at 07:55

## 2021-01-01 RX ADMIN — EPOPROSTENOL 20 NG/KG/MIN: 1.5 INJECTION, POWDER, LYOPHILIZED, FOR SOLUTION INTRAVENOUS at 07:18

## 2021-01-01 RX ADMIN — DOXYCYCLINE 100 MG: 100 INJECTION, POWDER, LYOPHILIZED, FOR SOLUTION INTRAVENOUS at 08:31

## 2021-01-01 RX ADMIN — PIPERACILLIN SODIUM AND TAZOBACTAM SODIUM 3.38 G: 3; .375 INJECTION, POWDER, LYOPHILIZED, FOR SOLUTION INTRAVENOUS at 04:15

## 2021-01-01 RX ADMIN — CHLORHEXIDINE GLUCONATE 15 ML: 1.2 SOLUTION ORAL at 10:31

## 2021-01-01 RX ADMIN — HYDROMORPHONE HYDROCHLORIDE 0.5 MG: 1 INJECTION, SOLUTION INTRAMUSCULAR; INTRAVENOUS; SUBCUTANEOUS at 07:55

## 2021-01-01 RX ADMIN — ENOXAPARIN SODIUM 60 MG: 60 INJECTION SUBCUTANEOUS at 20:05

## 2021-01-01 RX ADMIN — HYDROMORPHONE HYDROCHLORIDE 1 MG: 1 INJECTION, SOLUTION INTRAMUSCULAR; INTRAVENOUS; SUBCUTANEOUS at 09:53

## 2021-01-01 RX ADMIN — PROPOFOL 35 MCG/KG/MIN: 10 INJECTION, EMULSION INTRAVENOUS at 22:00

## 2021-01-01 RX ADMIN — PROPOFOL 45 MCG/KG/MIN: 10 INJECTION, EMULSION INTRAVENOUS at 17:44

## 2021-01-01 RX ADMIN — OXYCODONE HYDROCHLORIDE 5 MG: 5 TABLET ORAL at 12:30

## 2021-01-01 RX ADMIN — LEVOTHYROXINE SODIUM 137 MCG: 137 TABLET ORAL at 08:18

## 2021-01-01 RX ADMIN — FUROSEMIDE 20 MG: 10 INJECTION INTRAMUSCULAR; INTRAVENOUS at 08:21

## 2021-01-01 RX ADMIN — SODIUM CHLORIDE 1000 ML: 9 INJECTION, SOLUTION INTRAVENOUS at 22:15

## 2021-01-01 RX ADMIN — INSULIN ASPART 2 UNITS: 100 INJECTION, SOLUTION INTRAVENOUS; SUBCUTANEOUS at 04:05

## 2021-01-01 RX ADMIN — POTASSIUM CHLORIDE 40 MEQ: 1.5 POWDER, FOR SOLUTION ORAL at 05:46

## 2021-01-01 RX ADMIN — INSULIN ASPART 4 UNITS: 100 INJECTION, SOLUTION INTRAVENOUS; SUBCUTANEOUS at 23:31

## 2021-01-01 RX ADMIN — INSULIN ASPART 2 UNITS: 100 INJECTION, SOLUTION INTRAVENOUS; SUBCUTANEOUS at 07:54

## 2021-01-01 RX ADMIN — Medication 40 MG: at 07:55

## 2021-01-01 RX ADMIN — ENOXAPARIN SODIUM 60 MG: 60 INJECTION SUBCUTANEOUS at 08:01

## 2021-01-01 RX ADMIN — Medication 0.04 MCG/KG/MIN: at 17:46

## 2021-01-01 RX ADMIN — ENOXAPARIN SODIUM 60 MG: 60 INJECTION SUBCUTANEOUS at 08:16

## 2021-01-01 RX ADMIN — Medication 40 MG: at 07:59

## 2021-01-01 RX ADMIN — PIPERACILLIN SODIUM AND TAZOBACTAM SODIUM 3.38 G: 3; .375 INJECTION, POWDER, LYOPHILIZED, FOR SOLUTION INTRAVENOUS at 10:21

## 2021-01-01 RX ADMIN — PROPOFOL 35 MCG/KG/MIN: 10 INJECTION, EMULSION INTRAVENOUS at 01:14

## 2021-01-01 RX ADMIN — Medication 200 MCG/HR: at 03:53

## 2021-01-01 RX ADMIN — Medication 0.03 MCG/KG/MIN: at 22:30

## 2021-01-01 RX ADMIN — POTASSIUM & SODIUM PHOSPHATES POWDER PACK 280-160-250 MG 2 PACKET: 280-160-250 PACK at 16:37

## 2021-01-01 RX ADMIN — PROPOFOL 45 MCG/KG/MIN: 10 INJECTION, EMULSION INTRAVENOUS at 22:22

## 2021-01-01 RX ADMIN — MINERAL OIL AND PETROLATUM: 150; 830 OINTMENT OPHTHALMIC at 04:51

## 2021-01-01 RX ADMIN — INSULIN ASPART 2 UNITS: 100 INJECTION, SOLUTION INTRAVENOUS; SUBCUTANEOUS at 04:42

## 2021-01-01 RX ADMIN — Medication 200 MCG/HR: at 18:16

## 2021-01-01 RX ADMIN — Medication 0.06 MCG/KG/MIN: at 17:58

## 2021-01-01 RX ADMIN — FUROSEMIDE 40 MG: 10 INJECTION, SOLUTION INTRAVENOUS at 12:21

## 2021-01-01 RX ADMIN — SENNOSIDES AND DOCUSATE SODIUM 2 TABLET: 8.6; 5 TABLET ORAL at 08:06

## 2021-01-01 RX ADMIN — PROPOFOL 25 MCG/KG/MIN: 10 INJECTION, EMULSION INTRAVENOUS at 18:09

## 2021-01-01 RX ADMIN — SODIUM CHLORIDE, POTASSIUM CHLORIDE, SODIUM LACTATE AND CALCIUM CHLORIDE: 600; 310; 30; 20 INJECTION, SOLUTION INTRAVENOUS at 10:52

## 2021-01-01 RX ADMIN — ENOXAPARIN SODIUM 60 MG: 60 INJECTION SUBCUTANEOUS at 20:43

## 2021-01-01 RX ADMIN — DOXYCYCLINE 100 MG: 100 INJECTION, POWDER, LYOPHILIZED, FOR SOLUTION INTRAVENOUS at 07:51

## 2021-01-01 RX ADMIN — REMDESIVIR 200 MG: 100 INJECTION, POWDER, LYOPHILIZED, FOR SOLUTION INTRAVENOUS at 23:41

## 2021-01-01 RX ADMIN — INSULIN ASPART 3 UNITS: 100 INJECTION, SOLUTION INTRAVENOUS; SUBCUTANEOUS at 04:06

## 2021-01-01 RX ADMIN — CARBIDOPA AND LEVODOPA 2.5 MG: 50; 200 TABLET, EXTENDED RELEASE ORAL at 17:24

## 2021-01-01 RX ADMIN — POTASSIUM CHLORIDE 40 MEQ: 1.5 POWDER, FOR SOLUTION ORAL at 21:41

## 2021-01-01 RX ADMIN — SODIUM CHLORIDE: 9 INJECTION, SOLUTION INTRAVENOUS at 08:44

## 2021-01-01 RX ADMIN — PROPOFOL 35 MCG/KG/MIN: 10 INJECTION, EMULSION INTRAVENOUS at 01:48

## 2021-01-01 RX ADMIN — PANTOPRAZOLE SODIUM 40 MG: 40 INJECTION, POWDER, FOR SOLUTION INTRAVENOUS at 10:24

## 2021-01-01 RX ADMIN — PIPERACILLIN SODIUM AND TAZOBACTAM SODIUM 3.38 G: 3; .375 INJECTION, POWDER, LYOPHILIZED, FOR SOLUTION INTRAVENOUS at 21:55

## 2021-01-01 RX ADMIN — Medication 200 MCG/HR: at 06:51

## 2021-01-01 RX ADMIN — VECURONIUM BROMIDE 0.7 MCG/KG/MIN: 1 INJECTION, POWDER, LYOPHILIZED, FOR SOLUTION INTRAVENOUS at 07:30

## 2021-01-01 RX ADMIN — PROPOFOL 60 MCG/KG/MIN: 10 INJECTION, EMULSION INTRAVENOUS at 20:04

## 2021-01-01 RX ADMIN — PIPERACILLIN SODIUM AND TAZOBACTAM SODIUM 3.38 G: 3; .375 INJECTION, POWDER, LYOPHILIZED, FOR SOLUTION INTRAVENOUS at 13:28

## 2021-01-01 RX ADMIN — MINERAL OIL AND PETROLATUM: 150; 830 OINTMENT OPHTHALMIC at 20:22

## 2021-01-01 RX ADMIN — VECURONIUM BROMIDE 0.8 MCG/KG/MIN: 1 INJECTION, POWDER, LYOPHILIZED, FOR SOLUTION INTRAVENOUS at 13:02

## 2021-01-01 RX ADMIN — Medication 5 MG: at 11:29

## 2021-01-01 RX ADMIN — INSULIN ASPART 3 UNITS: 100 INJECTION, SOLUTION INTRAVENOUS; SUBCUTANEOUS at 11:55

## 2021-01-01 RX ADMIN — MINERAL OIL AND PETROLATUM: 150; 830 OINTMENT OPHTHALMIC at 03:34

## 2021-01-01 RX ADMIN — CHLORHEXIDINE GLUCONATE 15 ML: 1.2 SOLUTION ORAL at 20:43

## 2021-01-01 RX ADMIN — VECURONIUM BROMIDE 0.4 MCG/KG/MIN: 1 INJECTION, POWDER, LYOPHILIZED, FOR SOLUTION INTRAVENOUS at 14:53

## 2021-01-01 RX ADMIN — MINERAL OIL AND PETROLATUM: 150; 830 OINTMENT OPHTHALMIC at 02:59

## 2021-01-01 RX ADMIN — INSULIN ASPART 4 UNITS: 100 INJECTION, SOLUTION INTRAVENOUS; SUBCUTANEOUS at 00:29

## 2021-01-01 RX ADMIN — MINERAL OIL AND PETROLATUM: 150; 830 OINTMENT OPHTHALMIC at 04:36

## 2021-01-01 RX ADMIN — EPOPROSTENOL 20 NG/KG/MIN: 1.5 INJECTION, POWDER, LYOPHILIZED, FOR SOLUTION INTRAVENOUS at 14:52

## 2021-01-01 RX ADMIN — EPOPROSTENOL 20 NG/KG/MIN: 1.5 INJECTION, POWDER, LYOPHILIZED, FOR SOLUTION INTRAVENOUS at 00:43

## 2021-01-01 RX ADMIN — Medication 50 MCG: at 08:54

## 2021-01-01 RX ADMIN — MINERAL OIL AND PETROLATUM: 150; 830 OINTMENT OPHTHALMIC at 02:27

## 2021-01-01 RX ADMIN — PHENYLEPHRINE HYDROCHLORIDE 1 MCG/KG/MIN: 10 INJECTION INTRAVENOUS at 18:15

## 2021-01-01 RX ADMIN — PROPOFOL 45 MCG/KG/MIN: 10 INJECTION, EMULSION INTRAVENOUS at 19:04

## 2021-01-01 RX ADMIN — LEVOTHYROXINE SODIUM 137 MCG: 137 TABLET ORAL at 08:00

## 2021-01-01 RX ADMIN — CHLORHEXIDINE GLUCONATE 15 ML: 1.2 SOLUTION ORAL at 07:54

## 2021-01-01 RX ADMIN — INSULIN ASPART 3 UNITS: 100 INJECTION, SOLUTION INTRAVENOUS; SUBCUTANEOUS at 00:16

## 2021-01-01 RX ADMIN — MINERAL OIL AND PETROLATUM: 150; 830 OINTMENT OPHTHALMIC at 19:09

## 2021-01-01 RX ADMIN — CHLORHEXIDINE GLUCONATE 15 ML: 1.2 SOLUTION ORAL at 20:22

## 2021-01-01 RX ADMIN — PROPOFOL 75 MCG/KG/MIN: 10 INJECTION, EMULSION INTRAVENOUS at 13:16

## 2021-01-01 RX ADMIN — FUROSEMIDE 20 MG: 10 INJECTION, SOLUTION INTRAVENOUS at 08:21

## 2021-01-01 RX ADMIN — POLYETHYLENE GLYCOL 3350 17 G: 17 POWDER, FOR SOLUTION ORAL at 05:57

## 2021-01-01 RX ADMIN — Medication 200 MCG/HR: at 05:46

## 2021-01-01 RX ADMIN — EPOPROSTENOL 20 NG/KG/MIN: 1.5 INJECTION, POWDER, LYOPHILIZED, FOR SOLUTION INTRAVENOUS at 08:04

## 2021-01-01 RX ADMIN — PHENYLEPHRINE HYDROCHLORIDE 100 MCG: 10 INJECTION INTRAVENOUS at 11:29

## 2021-01-01 RX ADMIN — REMDESIVIR 100 MG: 100 INJECTION, POWDER, LYOPHILIZED, FOR SOLUTION INTRAVENOUS at 17:22

## 2021-01-01 RX ADMIN — PROPOFOL 50 MG: 10 INJECTION, EMULSION INTRAVENOUS at 09:09

## 2021-01-01 RX ADMIN — INSULIN ASPART 2 UNITS: 100 INJECTION, SOLUTION INTRAVENOUS; SUBCUTANEOUS at 08:00

## 2021-01-01 RX ADMIN — INSULIN ASPART 4 UNITS: 100 INJECTION, SOLUTION INTRAVENOUS; SUBCUTANEOUS at 20:21

## 2021-01-01 RX ADMIN — PROPOFOL 45 MCG/KG/MIN: 10 INJECTION, EMULSION INTRAVENOUS at 04:07

## 2021-01-01 RX ADMIN — MULTIVIT AND MINERALS-FERROUS GLUCONATE 9 MG IRON/15 ML ORAL LIQUID 15 ML: at 08:15

## 2021-01-01 RX ADMIN — FUROSEMIDE 60 MG: 10 INJECTION, SOLUTION INTRAVENOUS at 08:15

## 2021-01-01 RX ADMIN — Medication 200 MCG/HR: at 02:49

## 2021-01-01 RX ADMIN — PROPOFOL 35 MCG/KG/MIN: 10 INJECTION, EMULSION INTRAVENOUS at 16:05

## 2021-01-01 RX ADMIN — Medication 0.07 MCG/KG/MIN: at 07:59

## 2021-01-01 RX ADMIN — EPOPROSTENOL 20 NG/KG/MIN: 1.5 INJECTION, POWDER, LYOPHILIZED, FOR SOLUTION INTRAVENOUS at 23:01

## 2021-01-01 RX ADMIN — CARBIDOPA AND LEVODOPA 2.5 MG: 50; 200 TABLET, EXTENDED RELEASE ORAL at 12:25

## 2021-01-01 RX ADMIN — GLYCOPYRROLATE 0.2 MG: 0.2 INJECTION, SOLUTION INTRAMUSCULAR; INTRAVENOUS at 09:50

## 2021-01-01 RX ADMIN — PIPERACILLIN SODIUM AND TAZOBACTAM SODIUM 3.38 G: 3; .375 INJECTION, POWDER, LYOPHILIZED, FOR SOLUTION INTRAVENOUS at 10:59

## 2021-01-01 RX ADMIN — PROPOFOL 45 MCG/KG/MIN: 10 INJECTION, EMULSION INTRAVENOUS at 20:21

## 2021-01-01 RX ADMIN — VANCOMYCIN HYDROCHLORIDE 2500 MG: 5 INJECTION, POWDER, LYOPHILIZED, FOR SOLUTION INTRAVENOUS at 22:49

## 2021-01-01 RX ADMIN — INSULIN ASPART 3 UNITS: 100 INJECTION, SOLUTION INTRAVENOUS; SUBCUTANEOUS at 12:30

## 2021-01-01 RX ADMIN — MULTIVIT AND MINERALS-FERROUS GLUCONATE 9 MG IRON/15 ML ORAL LIQUID 15 ML: at 09:33

## 2021-01-01 RX ADMIN — INSULIN ASPART 4 UNITS: 100 INJECTION, SOLUTION INTRAVENOUS; SUBCUTANEOUS at 01:09

## 2021-01-01 RX ADMIN — ENOXAPARIN SODIUM 60 MG: 60 INJECTION SUBCUTANEOUS at 08:25

## 2021-01-01 RX ADMIN — EPOPROSTENOL 20 NG/KG/MIN: 1.5 INJECTION, POWDER, LYOPHILIZED, FOR SOLUTION INTRAVENOUS at 08:10

## 2021-01-01 RX ADMIN — Medication 0.02 MCG/KG/MIN: at 02:49

## 2021-01-01 RX ADMIN — PIPERACILLIN SODIUM AND TAZOBACTAM SODIUM 3.38 G: 3; .375 INJECTION, POWDER, LYOPHILIZED, FOR SOLUTION INTRAVENOUS at 17:24

## 2021-01-01 RX ADMIN — DOXYCYCLINE 100 MG: 100 INJECTION, POWDER, LYOPHILIZED, FOR SOLUTION INTRAVENOUS at 11:14

## 2021-01-01 RX ADMIN — PROPOFOL 40 MCG/KG/MIN: 10 INJECTION, EMULSION INTRAVENOUS at 21:55

## 2021-01-01 RX ADMIN — Medication 2 PACKET: at 23:38

## 2021-01-01 RX ADMIN — INSULIN ASPART 1 UNITS: 100 INJECTION, SOLUTION INTRAVENOUS; SUBCUTANEOUS at 08:29

## 2021-01-01 RX ADMIN — EPOPROSTENOL 20 NG/KG/MIN: 1.5 INJECTION, POWDER, LYOPHILIZED, FOR SOLUTION INTRAVENOUS at 15:15

## 2021-01-01 RX ADMIN — FAMOTIDINE 20 MG: 10 INJECTION, SOLUTION INTRAVENOUS at 06:19

## 2021-01-01 RX ADMIN — INSULIN ASPART 2 UNITS: 100 INJECTION, SOLUTION INTRAVENOUS; SUBCUTANEOUS at 20:11

## 2021-01-01 RX ADMIN — CARBIDOPA AND LEVODOPA 2.5 MG: 50; 200 TABLET, EXTENDED RELEASE ORAL at 18:15

## 2021-01-01 RX ADMIN — PROPOFOL 50 MCG/KG/MIN: 10 INJECTION, EMULSION INTRAVENOUS at 03:44

## 2021-01-01 RX ADMIN — PROPOFOL 40 MCG/KG/MIN: 10 INJECTION, EMULSION INTRAVENOUS at 15:28

## 2021-01-01 RX ADMIN — VECURONIUM BROMIDE 0.8 MCG/KG/MIN: 1 INJECTION, POWDER, LYOPHILIZED, FOR SOLUTION INTRAVENOUS at 02:25

## 2021-01-01 RX ADMIN — ONDANSETRON 4 MG: 2 INJECTION INTRAMUSCULAR; INTRAVENOUS at 11:35

## 2021-01-01 RX ADMIN — Medication 0.07 MCG/KG/MIN: at 19:02

## 2021-01-01 RX ADMIN — Medication 200 MCG/HR: at 06:24

## 2021-01-01 RX ADMIN — PROPOFOL 30 MCG/KG/MIN: 10 INJECTION, EMULSION INTRAVENOUS at 10:48

## 2021-01-01 RX ADMIN — PHENYLEPHRINE HYDROCHLORIDE 100 MCG: 10 INJECTION INTRAVENOUS at 11:38

## 2021-01-01 RX ADMIN — PROPOFOL 45 MCG/KG/MIN: 10 INJECTION, EMULSION INTRAVENOUS at 12:26

## 2021-01-01 RX ADMIN — CARBIDOPA AND LEVODOPA 2.5 MG: 50; 200 TABLET, EXTENDED RELEASE ORAL at 18:10

## 2021-01-01 RX ADMIN — PROPOFOL 60 MCG/KG/MIN: 10 INJECTION, EMULSION INTRAVENOUS at 00:50

## 2021-01-01 RX ADMIN — PIPERACILLIN SODIUM AND TAZOBACTAM SODIUM 3.38 G: 3; .375 INJECTION, POWDER, LYOPHILIZED, FOR SOLUTION INTRAVENOUS at 06:11

## 2021-01-01 RX ADMIN — PROPOFOL 45 MCG/KG/MIN: 10 INJECTION, EMULSION INTRAVENOUS at 09:12

## 2021-01-01 RX ADMIN — PIPERACILLIN SODIUM AND TAZOBACTAM SODIUM 3.38 G: 3; .375 INJECTION, POWDER, LYOPHILIZED, FOR SOLUTION INTRAVENOUS at 11:47

## 2021-01-01 RX ADMIN — EPOPROSTENOL 20 NG/KG/MIN: 1.5 INJECTION, POWDER, LYOPHILIZED, FOR SOLUTION INTRAVENOUS at 08:14

## 2021-01-01 RX ADMIN — PROPOFOL 45 MCG/KG/MIN: 10 INJECTION, EMULSION INTRAVENOUS at 17:45

## 2021-01-01 RX ADMIN — CHLORHEXIDINE GLUCONATE 15 ML: 1.2 SOLUTION ORAL at 07:33

## 2021-01-01 RX ADMIN — ENOXAPARIN SODIUM 60 MG: 60 INJECTION SUBCUTANEOUS at 20:22

## 2021-01-01 RX ADMIN — INSULIN ASPART 1 UNITS: 100 INJECTION, SOLUTION INTRAVENOUS; SUBCUTANEOUS at 13:27

## 2021-01-01 RX ADMIN — Medication 200 MCG/HR: at 12:11

## 2021-01-01 RX ADMIN — Medication 200 MCG/HR: at 17:45

## 2021-01-01 RX ADMIN — PROPOFOL 35 MCG/KG/MIN: 10 INJECTION, EMULSION INTRAVENOUS at 10:21

## 2021-01-01 RX ADMIN — INSULIN ASPART 2 UNITS: 100 INJECTION, SOLUTION INTRAVENOUS; SUBCUTANEOUS at 04:54

## 2021-01-01 RX ADMIN — PIPERACILLIN SODIUM AND TAZOBACTAM SODIUM 3.38 G: 3; .375 INJECTION, POWDER, LYOPHILIZED, FOR SOLUTION INTRAVENOUS at 09:27

## 2021-01-01 RX ADMIN — PIPERACILLIN SODIUM AND TAZOBACTAM SODIUM 3.38 G: 3; .375 INJECTION, POWDER, LYOPHILIZED, FOR SOLUTION INTRAVENOUS at 04:12

## 2021-01-01 RX ADMIN — INSULIN ASPART 1 UNITS: 100 INJECTION, SOLUTION INTRAVENOUS; SUBCUTANEOUS at 17:24

## 2021-01-01 RX ADMIN — FUROSEMIDE 60 MG: 10 INJECTION, SOLUTION INTRAVENOUS at 19:55

## 2021-01-01 RX ADMIN — INSULIN ASPART 3 UNITS: 100 INJECTION, SOLUTION INTRAVENOUS; SUBCUTANEOUS at 23:48

## 2021-01-01 RX ADMIN — PROPOFOL 65 MCG/KG/MIN: 10 INJECTION, EMULSION INTRAVENOUS at 11:17

## 2021-01-01 RX ADMIN — EPOPROSTENOL 20 NG/KG/MIN: 1.5 INJECTION, POWDER, LYOPHILIZED, FOR SOLUTION INTRAVENOUS at 16:37

## 2021-01-01 RX ADMIN — MINERAL OIL AND PETROLATUM: 150; 830 OINTMENT OPHTHALMIC at 19:55

## 2021-01-01 RX ADMIN — PIPERACILLIN SODIUM AND TAZOBACTAM SODIUM 3.38 G: 3; .375 INJECTION, POWDER, LYOPHILIZED, FOR SOLUTION INTRAVENOUS at 16:02

## 2021-01-01 RX ADMIN — FAMOTIDINE 20 MG: 10 INJECTION, SOLUTION INTRAVENOUS at 06:04

## 2021-01-01 RX ADMIN — VECURONIUM BROMIDE 10 MG: 1 INJECTION, POWDER, LYOPHILIZED, FOR SOLUTION INTRAVENOUS at 18:43

## 2021-01-01 RX ADMIN — DEXMEDETOMIDINE HYDROCHLORIDE 0.2 MCG/KG/HR: 400 INJECTION INTRAVENOUS at 15:11

## 2021-01-01 RX ADMIN — INSULIN GLARGINE 10 UNITS: 100 INJECTION, SOLUTION SUBCUTANEOUS at 12:00

## 2021-01-01 RX ADMIN — MINERAL OIL AND PETROLATUM: 150; 830 OINTMENT OPHTHALMIC at 18:42

## 2021-01-01 RX ADMIN — INSULIN ASPART 2 UNITS: 100 INJECTION, SOLUTION INTRAVENOUS; SUBCUTANEOUS at 04:15

## 2021-01-01 RX ADMIN — FENTANYL CITRATE 50 MCG: 50 INJECTION, SOLUTION INTRAMUSCULAR; INTRAVENOUS at 10:59

## 2021-01-01 RX ADMIN — CHLORHEXIDINE GLUCONATE 15 ML: 1.2 SOLUTION ORAL at 20:42

## 2021-01-01 RX ADMIN — ENOXAPARIN SODIUM 40 MG: 40 INJECTION SUBCUTANEOUS at 20:12

## 2021-01-01 RX ADMIN — INSULIN ASPART 2 UNITS: 100 INJECTION, SOLUTION INTRAVENOUS; SUBCUTANEOUS at 16:49

## 2021-01-01 RX ADMIN — PROPOFOL 45 MCG/KG/MIN: 10 INJECTION, EMULSION INTRAVENOUS at 20:43

## 2021-01-01 RX ADMIN — FUROSEMIDE 60 MG: 10 INJECTION, SOLUTION INTRAVENOUS at 08:08

## 2021-01-01 RX ADMIN — ENOXAPARIN SODIUM 60 MG: 60 INJECTION SUBCUTANEOUS at 20:02

## 2021-01-01 RX ADMIN — FUROSEMIDE 60 MG: 10 INJECTION, SOLUTION INTRAVENOUS at 02:53

## 2021-01-01 RX ADMIN — INSULIN ASPART 2 UNITS: 100 INJECTION, SOLUTION INTRAVENOUS; SUBCUTANEOUS at 12:48

## 2021-01-01 RX ADMIN — PROPOFOL 35 MCG/KG/MIN: 10 INJECTION, EMULSION INTRAVENOUS at 19:56

## 2021-01-01 RX ADMIN — DEXAMETHASONE SODIUM PHOSPHATE 6 MG: 4 INJECTION, SOLUTION INTRAMUSCULAR; INTRAVENOUS at 12:40

## 2021-01-01 RX ADMIN — INSULIN ASPART 3 UNITS: 100 INJECTION, SOLUTION INTRAVENOUS; SUBCUTANEOUS at 23:37

## 2021-01-01 RX ADMIN — MINERAL OIL AND PETROLATUM: 150; 830 OINTMENT OPHTHALMIC at 18:08

## 2021-01-01 RX ADMIN — EPOPROSTENOL 20 NG/KG/MIN: 1.5 INJECTION, POWDER, LYOPHILIZED, FOR SOLUTION INTRAVENOUS at 15:18

## 2021-01-01 RX ADMIN — SODIUM CHLORIDE: 9 INJECTION, SOLUTION INTRAVENOUS at 11:06

## 2021-01-01 RX ADMIN — MINERAL OIL AND PETROLATUM: 150; 830 OINTMENT OPHTHALMIC at 10:05

## 2021-01-01 RX ADMIN — INSULIN ASPART 1 UNITS: 100 INJECTION, SOLUTION INTRAVENOUS; SUBCUTANEOUS at 16:02

## 2021-01-01 RX ADMIN — ENOXAPARIN SODIUM 60 MG: 60 INJECTION SUBCUTANEOUS at 20:01

## 2021-01-01 RX ADMIN — Medication 40 MG: at 08:05

## 2021-01-01 RX ADMIN — PIPERACILLIN SODIUM AND TAZOBACTAM SODIUM 3.38 G: 3; .375 INJECTION, POWDER, LYOPHILIZED, FOR SOLUTION INTRAVENOUS at 00:27

## 2021-01-01 RX ADMIN — Medication 0.04 MCG/KG/MIN: at 06:46

## 2021-01-01 RX ADMIN — INSULIN ASPART 1 UNITS: 100 INJECTION, SOLUTION INTRAVENOUS; SUBCUTANEOUS at 07:55

## 2021-01-01 RX ADMIN — EPOPROSTENOL 20 NG/KG/MIN: 1.5 INJECTION, POWDER, LYOPHILIZED, FOR SOLUTION INTRAVENOUS at 01:52

## 2021-01-01 RX ADMIN — PIPERACILLIN SODIUM AND TAZOBACTAM SODIUM 3.38 G: 3; .375 INJECTION, POWDER, LYOPHILIZED, FOR SOLUTION INTRAVENOUS at 15:29

## 2021-01-01 RX ADMIN — INSULIN ASPART 2 UNITS: 100 INJECTION, SOLUTION INTRAVENOUS; SUBCUTANEOUS at 12:51

## 2021-01-01 RX ADMIN — PROPOFOL 45 MCG/KG/MIN: 10 INJECTION, EMULSION INTRAVENOUS at 15:23

## 2021-01-01 RX ADMIN — MINERAL OIL AND PETROLATUM 3.5 G: 150; 830 OINTMENT OPHTHALMIC at 11:04

## 2021-01-01 RX ADMIN — INSULIN ASPART 5 UNITS: 100 INJECTION, SOLUTION INTRAVENOUS; SUBCUTANEOUS at 20:08

## 2021-01-01 RX ADMIN — CHLORHEXIDINE GLUCONATE 15 ML: 1.2 SOLUTION ORAL at 19:57

## 2021-01-01 RX ADMIN — MULTIVIT AND MINERALS-FERROUS GLUCONATE 9 MG IRON/15 ML ORAL LIQUID 15 ML: at 09:00

## 2021-01-01 RX ADMIN — PROPOFOL 45 MCG/KG/MIN: 10 INJECTION, EMULSION INTRAVENOUS at 00:14

## 2021-01-01 RX ADMIN — LEVOTHYROXINE SODIUM 137 MCG: 137 TABLET ORAL at 08:20

## 2021-01-01 RX ADMIN — SODIUM CHLORIDE: 9 INJECTION, SOLUTION INTRAVENOUS at 02:49

## 2021-01-01 RX ADMIN — INSULIN ASPART 3 UNITS: 100 INJECTION, SOLUTION INTRAVENOUS; SUBCUTANEOUS at 06:08

## 2021-01-01 RX ADMIN — SODIUM CHLORIDE: 9 INJECTION, SOLUTION INTRAVENOUS at 13:00

## 2021-01-01 RX ADMIN — EPOPROSTENOL 20 NG/KG/MIN: 1.5 INJECTION, POWDER, LYOPHILIZED, FOR SOLUTION INTRAVENOUS at 23:53

## 2021-01-01 RX ADMIN — PROPOFOL 35 MCG/KG/MIN: 10 INJECTION, EMULSION INTRAVENOUS at 12:02

## 2021-01-01 RX ADMIN — DEXAMETHASONE SODIUM PHOSPHATE 6 MG: 4 INJECTION, SOLUTION INTRAMUSCULAR; INTRAVENOUS at 12:25

## 2021-01-01 RX ADMIN — Medication 200 MCG/HR: at 17:24

## 2021-01-01 RX ADMIN — FENTANYL CITRATE 50 MCG: 50 INJECTION, SOLUTION INTRAMUSCULAR; INTRAVENOUS at 11:11

## 2021-01-01 RX ADMIN — ENOXAPARIN SODIUM 60 MG: 60 INJECTION SUBCUTANEOUS at 20:42

## 2021-01-01 RX ADMIN — PIPERACILLIN SODIUM AND TAZOBACTAM SODIUM 3.38 G: 3; .375 INJECTION, POWDER, LYOPHILIZED, FOR SOLUTION INTRAVENOUS at 16:42

## 2021-01-01 RX ADMIN — CARBIDOPA AND LEVODOPA 2.5 MG: 50; 200 TABLET, EXTENDED RELEASE ORAL at 18:30

## 2021-01-01 RX ADMIN — MULTIVIT AND MINERALS-FERROUS GLUCONATE 9 MG IRON/15 ML ORAL LIQUID 15 ML: at 08:01

## 2021-01-01 RX ADMIN — CHLORHEXIDINE GLUCONATE 15 ML: 1.2 SOLUTION ORAL at 19:53

## 2021-01-01 RX ADMIN — MINERAL OIL AND PETROLATUM: 150; 830 OINTMENT OPHTHALMIC at 10:29

## 2021-01-01 RX ADMIN — CHLORHEXIDINE GLUCONATE 15 ML: 1.2 SOLUTION ORAL at 08:14

## 2021-01-01 RX ADMIN — MIDAZOLAM HYDROCHLORIDE 2 MG: 1 INJECTION, SOLUTION INTRAMUSCULAR; INTRAVENOUS at 09:11

## 2021-01-01 RX ADMIN — MINERAL OIL AND PETROLATUM: 150; 830 OINTMENT OPHTHALMIC at 10:10

## 2021-01-01 RX ADMIN — PROPOFOL 45 MCG/KG/MIN: 10 INJECTION, EMULSION INTRAVENOUS at 03:33

## 2021-01-01 RX ADMIN — DOXYCYCLINE 100 MG: 100 INJECTION, POWDER, LYOPHILIZED, FOR SOLUTION INTRAVENOUS at 07:36

## 2021-01-01 RX ADMIN — INSULIN ASPART 3 UNITS: 100 INJECTION, SOLUTION INTRAVENOUS; SUBCUTANEOUS at 12:40

## 2021-01-01 RX ADMIN — ENOXAPARIN SODIUM 60 MG: 60 INJECTION SUBCUTANEOUS at 09:26

## 2021-01-01 RX ADMIN — SODIUM CHLORIDE 1000 ML: 9 INJECTION, SOLUTION INTRAVENOUS at 09:12

## 2021-01-01 RX ADMIN — VECURONIUM BROMIDE 0.4 MCG/KG/MIN: 1 INJECTION, POWDER, LYOPHILIZED, FOR SOLUTION INTRAVENOUS at 21:42

## 2021-01-01 RX ADMIN — PROPOFOL 200 MG: 10 INJECTION, EMULSION INTRAVENOUS at 10:59

## 2021-01-01 RX ADMIN — PHENYLEPHRINE HYDROCHLORIDE 150 MCG: 10 INJECTION INTRAVENOUS at 11:22

## 2021-01-01 RX ADMIN — VECURONIUM BROMIDE 0.7 MCG/KG/MIN: 1 INJECTION, POWDER, LYOPHILIZED, FOR SOLUTION INTRAVENOUS at 00:17

## 2021-01-01 RX ADMIN — DOXYCYCLINE 100 MG: 100 INJECTION, POWDER, LYOPHILIZED, FOR SOLUTION INTRAVENOUS at 20:42

## 2021-01-01 RX ADMIN — EPOPROSTENOL 20 NG/KG/MIN: 1.5 INJECTION, POWDER, LYOPHILIZED, FOR SOLUTION INTRAVENOUS at 18:01

## 2021-01-01 RX ADMIN — ACETAZOLAMIDE SODIUM 500 MG: 500 INJECTION, POWDER, LYOPHILIZED, FOR SOLUTION INTRAVENOUS at 23:07

## 2021-01-01 RX ADMIN — Medication 0.08 MCG/KG/MIN: at 08:42

## 2021-01-01 RX ADMIN — SODIUM CHLORIDE: 9 INJECTION, SOLUTION INTRAVENOUS at 14:24

## 2021-01-01 RX ADMIN — PROPOFOL 60 MCG/KG/MIN: 10 INJECTION, EMULSION INTRAVENOUS at 22:19

## 2021-01-01 RX ADMIN — MULTIVIT AND MINERALS-FERROUS GLUCONATE 9 MG IRON/15 ML ORAL LIQUID 15 ML: at 08:16

## 2021-01-01 RX ADMIN — PROPOFOL 45 MCG/KG/MIN: 10 INJECTION, EMULSION INTRAVENOUS at 01:21

## 2021-01-01 RX ADMIN — INSULIN ASPART 2 UNITS: 100 INJECTION, SOLUTION INTRAVENOUS; SUBCUTANEOUS at 08:08

## 2021-01-01 RX ADMIN — SODIUM CHLORIDE: 9 INJECTION, SOLUTION INTRAVENOUS at 13:48

## 2021-01-01 RX ADMIN — HYDROMORPHONE HYDROCHLORIDE 0.5 MG: 1 INJECTION, SOLUTION INTRAMUSCULAR; INTRAVENOUS; SUBCUTANEOUS at 08:43

## 2021-01-01 RX ADMIN — CARBIDOPA AND LEVODOPA 2.5 MG: 50; 200 TABLET, EXTENDED RELEASE ORAL at 12:20

## 2021-01-01 RX ADMIN — INSULIN GLARGINE 25 UNITS: 100 INJECTION, SOLUTION SUBCUTANEOUS at 08:31

## 2021-01-01 RX ADMIN — Medication 2 PACKET: at 16:37

## 2021-01-01 RX ADMIN — POLYETHYLENE GLYCOL 3350 17 G: 17 POWDER, FOR SOLUTION ORAL at 17:24

## 2021-01-01 RX ADMIN — INSULIN GLARGINE 20 UNITS: 100 INJECTION, SOLUTION SUBCUTANEOUS at 20:15

## 2021-01-01 RX ADMIN — PHENYLEPHRINE HYDROCHLORIDE 1 MCG/KG/MIN: 10 INJECTION INTRAVENOUS at 00:44

## 2021-01-01 RX ADMIN — AMIODARONE HYDROCHLORIDE 150 MG: 1.5 INJECTION, SOLUTION INTRAVENOUS at 09:52

## 2021-01-01 RX ADMIN — PROPOFOL 30 MCG/KG/MIN: 10 INJECTION, EMULSION INTRAVENOUS at 23:32

## 2021-01-01 RX ADMIN — INSULIN ASPART 3 UNITS: 100 INJECTION, SOLUTION INTRAVENOUS; SUBCUTANEOUS at 23:56

## 2021-01-01 RX ADMIN — Medication 2 PACKET: at 16:17

## 2021-01-01 RX ADMIN — EPOPROSTENOL 20 NG/KG/MIN: 1.5 INJECTION, POWDER, LYOPHILIZED, FOR SOLUTION INTRAVENOUS at 00:49

## 2021-01-01 RX ADMIN — PROPOFOL 35 MCG/KG/MIN: 10 INJECTION, EMULSION INTRAVENOUS at 05:48

## 2021-01-01 RX ADMIN — PROPOFOL 35 MCG/KG/MIN: 10 INJECTION, EMULSION INTRAVENOUS at 08:00

## 2021-01-01 RX ADMIN — EPOPROSTENOL 20 NG/KG/MIN: 1.5 INJECTION, POWDER, LYOPHILIZED, FOR SOLUTION INTRAVENOUS at 10:34

## 2021-01-01 RX ADMIN — REMDESIVIR 100 MG: 100 INJECTION, POWDER, LYOPHILIZED, FOR SOLUTION INTRAVENOUS at 17:23

## 2021-01-01 RX ADMIN — INSULIN ASPART 3 UNITS: 100 INJECTION, SOLUTION INTRAVENOUS; SUBCUTANEOUS at 04:35

## 2021-01-01 RX ADMIN — INSULIN ASPART 5 UNITS: 100 INJECTION, SOLUTION INTRAVENOUS; SUBCUTANEOUS at 20:37

## 2021-01-01 RX ADMIN — INSULIN ASPART 2 UNITS: 100 INJECTION, SOLUTION INTRAVENOUS; SUBCUTANEOUS at 04:02

## 2021-01-01 RX ADMIN — PROPOFOL 30 MCG/KG/MIN: 10 INJECTION, EMULSION INTRAVENOUS at 11:01

## 2021-01-01 RX ADMIN — MINERAL OIL AND PETROLATUM: 150; 830 OINTMENT OPHTHALMIC at 10:38

## 2021-01-01 RX ADMIN — ENOXAPARIN SODIUM 40 MG: 40 INJECTION SUBCUTANEOUS at 20:10

## 2021-01-01 RX ADMIN — REMDESIVIR 100 MG: 100 INJECTION, POWDER, LYOPHILIZED, FOR SOLUTION INTRAVENOUS at 16:00

## 2021-01-01 RX ADMIN — AMIODARONE HYDROCHLORIDE 1 MG/MIN: 50 INJECTION, SOLUTION INTRAVENOUS at 10:09

## 2021-01-01 RX ADMIN — PIPERACILLIN SODIUM AND TAZOBACTAM SODIUM 3.38 G: 3; .375 INJECTION, POWDER, LYOPHILIZED, FOR SOLUTION INTRAVENOUS at 06:04

## 2021-01-01 RX ADMIN — PIPERACILLIN SODIUM AND TAZOBACTAM SODIUM 3.38 G: 3; .375 INJECTION, POWDER, LYOPHILIZED, FOR SOLUTION INTRAVENOUS at 17:45

## 2021-01-01 RX ADMIN — PIPERACILLIN SODIUM AND TAZOBACTAM SODIUM 3.38 G: 3; .375 INJECTION, POWDER, LYOPHILIZED, FOR SOLUTION INTRAVENOUS at 21:16

## 2021-01-01 RX ADMIN — IOPAMIDOL 70 ML: 755 INJECTION, SOLUTION INTRAVENOUS at 23:31

## 2021-01-01 RX ADMIN — PIPERACILLIN SODIUM AND TAZOBACTAM SODIUM 3.38 G: 3; .375 INJECTION, POWDER, LYOPHILIZED, FOR SOLUTION INTRAVENOUS at 00:31

## 2021-01-01 RX ADMIN — DEXAMETHASONE SODIUM PHOSPHATE 6 MG: 4 INJECTION, SOLUTION INTRAMUSCULAR; INTRAVENOUS at 12:19

## 2021-01-01 RX ADMIN — Medication 2 PACKET: at 00:29

## 2021-01-01 RX ADMIN — INSULIN ASPART 2 UNITS: 100 INJECTION, SOLUTION INTRAVENOUS; SUBCUTANEOUS at 08:31

## 2021-01-01 RX ADMIN — CARBIDOPA AND LEVODOPA 2.5 MG: 50; 200 TABLET, EXTENDED RELEASE ORAL at 08:17

## 2021-01-01 RX ADMIN — INSULIN ASPART 3 UNITS: 100 INJECTION, SOLUTION INTRAVENOUS; SUBCUTANEOUS at 19:59

## 2021-01-01 RX ADMIN — MINERAL OIL AND PETROLATUM: 150; 830 OINTMENT OPHTHALMIC at 11:25

## 2021-01-01 RX ADMIN — PHENYLEPHRINE HYDROCHLORIDE 100 MCG: 10 INJECTION INTRAVENOUS at 11:07

## 2021-01-01 RX ADMIN — DEXAMETHASONE SODIUM PHOSPHATE 6 MG: 4 INJECTION, SOLUTION INTRAMUSCULAR; INTRAVENOUS at 12:17

## 2021-01-01 RX ADMIN — CHLORHEXIDINE GLUCONATE 15 ML: 1.2 SOLUTION ORAL at 08:07

## 2021-01-01 RX ADMIN — INSULIN ASPART 3 UNITS: 100 INJECTION, SOLUTION INTRAVENOUS; SUBCUTANEOUS at 03:48

## 2021-01-01 RX ADMIN — CARBIDOPA AND LEVODOPA 2.5 MG: 50; 200 TABLET, EXTENDED RELEASE ORAL at 08:07

## 2021-01-01 RX ADMIN — INSULIN GLARGINE 25 UNITS: 100 INJECTION, SOLUTION SUBCUTANEOUS at 21:47

## 2021-01-01 RX ADMIN — INSULIN ASPART 2 UNITS: 100 INJECTION, SOLUTION INTRAVENOUS; SUBCUTANEOUS at 08:05

## 2021-01-01 RX ADMIN — VECURONIUM BROMIDE 0.4 MCG/KG/MIN: 1 INJECTION, POWDER, LYOPHILIZED, FOR SOLUTION INTRAVENOUS at 23:33

## 2021-01-01 RX ADMIN — EPOPROSTENOL 20 NG/KG/MIN: 1.5 INJECTION, POWDER, LYOPHILIZED, FOR SOLUTION INTRAVENOUS at 08:43

## 2021-01-01 RX ADMIN — Medication 40 MG: at 07:48

## 2021-01-01 RX ADMIN — MINERAL OIL AND PETROLATUM: 150; 830 OINTMENT OPHTHALMIC at 20:15

## 2021-01-01 RX ADMIN — MULTIVIT AND MINERALS-FERROUS GLUCONATE 9 MG IRON/15 ML ORAL LIQUID 15 ML: at 08:20

## 2021-01-01 RX ADMIN — DEXMEDETOMIDINE HYDROCHLORIDE 0.5 MCG/KG/HR: 400 INJECTION INTRAVENOUS at 02:43

## 2021-01-01 RX ADMIN — INSULIN ASPART 1 UNITS: 100 INJECTION, SOLUTION INTRAVENOUS; SUBCUTANEOUS at 04:18

## 2021-01-01 RX ADMIN — PROPOFOL 30 MCG/KG/MIN: 10 INJECTION, EMULSION INTRAVENOUS at 01:56

## 2021-01-01 RX ADMIN — FUROSEMIDE 40 MG: 10 INJECTION, SOLUTION INTRAVENOUS at 19:53

## 2021-01-01 RX ADMIN — POTASSIUM & SODIUM PHOSPHATES POWDER PACK 280-160-250 MG 2 PACKET: 280-160-250 PACK at 21:41

## 2021-01-01 RX ADMIN — PROPOFOL 30 MCG/KG/MIN: 10 INJECTION, EMULSION INTRAVENOUS at 05:43

## 2021-01-01 RX ADMIN — MULTIVIT AND MINERALS-FERROUS GLUCONATE 9 MG IRON/15 ML ORAL LIQUID 15 ML: at 08:07

## 2021-01-01 RX ADMIN — DEXAMETHASONE SODIUM PHOSPHATE 6 MG: 10 INJECTION, SOLUTION INTRAMUSCULAR; INTRAVENOUS at 22:29

## 2021-01-01 RX ADMIN — EPOPROSTENOL 20 NG/KG/MIN: 1.5 INJECTION, POWDER, LYOPHILIZED, FOR SOLUTION INTRAVENOUS at 00:02

## 2021-01-01 RX ADMIN — PROPOFOL 45 MCG/KG/MIN: 10 INJECTION, EMULSION INTRAVENOUS at 09:25

## 2021-01-01 RX ADMIN — SODIUM CHLORIDE: 9 INJECTION, SOLUTION INTRAVENOUS at 14:18

## 2021-01-01 RX ADMIN — SODIUM CHLORIDE 50 ML: 900 INJECTION INTRAVENOUS at 18:04

## 2021-01-01 RX ADMIN — Medication 5 MG: at 11:26

## 2021-01-01 RX ADMIN — INSULIN ASPART 5 UNITS: 100 INJECTION, SOLUTION INTRAVENOUS; SUBCUTANEOUS at 19:54

## 2021-01-01 RX ADMIN — VECURONIUM BROMIDE 0.4 MCG/KG/MIN: 1 INJECTION, POWDER, LYOPHILIZED, FOR SOLUTION INTRAVENOUS at 09:00

## 2021-01-01 RX ADMIN — FUROSEMIDE 40 MG: 10 INJECTION, SOLUTION INTRAVENOUS at 02:35

## 2021-01-01 RX ADMIN — DEXAMETHASONE SODIUM PHOSPHATE 6 MG: 4 INJECTION, SOLUTION INTRAMUSCULAR; INTRAVENOUS at 13:27

## 2021-01-01 RX ADMIN — INSULIN ASPART 1 UNITS: 100 INJECTION, SOLUTION INTRAVENOUS; SUBCUTANEOUS at 11:25

## 2021-01-01 RX ADMIN — CHLORHEXIDINE GLUCONATE 15 ML: 1.2 SOLUTION ORAL at 19:55

## 2021-01-01 RX ADMIN — Medication 200 MCG/HR: at 16:15

## 2021-01-01 RX ADMIN — DEXAMETHASONE SODIUM PHOSPHATE 6 MG: 4 INJECTION, SOLUTION INTRAMUSCULAR; INTRAVENOUS at 12:44

## 2021-01-01 RX ADMIN — INSULIN ASPART 3 UNITS: 100 INJECTION, SOLUTION INTRAVENOUS; SUBCUTANEOUS at 20:29

## 2021-01-01 RX ADMIN — CHLORHEXIDINE GLUCONATE 15 ML: 1.2 SOLUTION ORAL at 08:15

## 2021-01-01 RX ADMIN — SODIUM CHLORIDE: 9 INJECTION, SOLUTION INTRAVENOUS at 14:54

## 2021-01-01 RX ADMIN — PHENYLEPHRINE HYDROCHLORIDE 100 MCG: 10 INJECTION INTRAVENOUS at 11:17

## 2021-01-01 RX ADMIN — PHENYLEPHRINE HYDROCHLORIDE 100 MCG: 10 INJECTION INTRAVENOUS at 11:02

## 2021-01-01 RX ADMIN — INSULIN GLARGINE 30 UNITS: 100 INJECTION, SOLUTION SUBCUTANEOUS at 21:44

## 2021-01-01 RX ADMIN — EPOPROSTENOL 20 NG/KG/MIN: 1.5 INJECTION, POWDER, LYOPHILIZED, FOR SOLUTION INTRAVENOUS at 05:46

## 2021-01-01 RX ADMIN — PROPOFOL 30 MCG/KG/MIN: 10 INJECTION, EMULSION INTRAVENOUS at 04:07

## 2021-01-01 RX ADMIN — PROPOFOL 30 MCG/KG/MIN: 10 INJECTION, EMULSION INTRAVENOUS at 19:06

## 2021-01-01 RX ADMIN — LEVOTHYROXINE SODIUM 137 MCG: 137 TABLET ORAL at 08:01

## 2021-01-01 RX ADMIN — Medication 10 MG: at 11:38

## 2021-01-01 RX ADMIN — CARBIDOPA AND LEVODOPA 2.5 MG: 50; 200 TABLET, EXTENDED RELEASE ORAL at 12:22

## 2021-01-01 RX ADMIN — Medication 200 MCG/HR: at 05:51

## 2021-01-01 RX ADMIN — REMDESIVIR 100 MG: 100 INJECTION, POWDER, LYOPHILIZED, FOR SOLUTION INTRAVENOUS at 17:06

## 2021-01-01 RX ADMIN — AMIODARONE HYDROCHLORIDE 150 MG: 1.5 INJECTION, SOLUTION INTRAVENOUS at 12:02

## 2021-01-01 RX ADMIN — CARBIDOPA AND LEVODOPA 2.5 MG: 50; 200 TABLET, EXTENDED RELEASE ORAL at 08:16

## 2021-01-01 RX ADMIN — SODIUM CHLORIDE 50 ML: 900 INJECTION INTRAVENOUS at 16:33

## 2021-01-01 RX ADMIN — AZITHROMYCIN MONOHYDRATE 500 MG: 500 INJECTION, POWDER, LYOPHILIZED, FOR SOLUTION INTRAVENOUS at 22:46

## 2021-01-01 RX ADMIN — PIPERACILLIN SODIUM AND TAZOBACTAM SODIUM 3.38 G: 3; .375 INJECTION, POWDER, LYOPHILIZED, FOR SOLUTION INTRAVENOUS at 13:27

## 2021-01-01 RX ADMIN — PROPOFOL 30 MCG/KG/MIN: 10 INJECTION, EMULSION INTRAVENOUS at 14:25

## 2021-01-01 RX ADMIN — SODIUM CHLORIDE 1000 ML: 9 INJECTION, SOLUTION INTRAVENOUS at 22:02

## 2021-01-01 RX ADMIN — Medication 50 MCG: at 12:13

## 2021-01-01 RX ADMIN — Medication 0.07 MCG/KG/MIN: at 01:58

## 2021-01-01 RX ADMIN — POTASSIUM CHLORIDE 20 MEQ: 1.5 POWDER, FOR SOLUTION ORAL at 12:42

## 2021-01-01 RX ADMIN — Medication 40 MG: at 08:14

## 2021-01-01 RX ADMIN — FAMOTIDINE 20 MG: 10 INJECTION, SOLUTION INTRAVENOUS at 05:50

## 2021-01-01 RX ADMIN — SUCCINYLCHOLINE CHLORIDE 80 MG: 20 INJECTION, SOLUTION INTRAMUSCULAR; INTRAVENOUS; PARENTERAL at 09:09

## 2021-01-01 RX ADMIN — DOXYCYCLINE 100 MG: 100 INJECTION, POWDER, LYOPHILIZED, FOR SOLUTION INTRAVENOUS at 19:18

## 2021-01-01 RX ADMIN — INSULIN ASPART 2 UNITS: 100 INJECTION, SOLUTION INTRAVENOUS; SUBCUTANEOUS at 16:24

## 2021-01-01 RX ADMIN — PROPOFOL 35 MCG/KG/MIN: 10 INJECTION, EMULSION INTRAVENOUS at 04:46

## 2021-01-01 RX ADMIN — SODIUM CHLORIDE: 9 INJECTION, SOLUTION INTRAVENOUS at 17:50

## 2021-01-01 RX ADMIN — Medication 200 MCG/HR: at 01:29

## 2021-01-01 RX ADMIN — PROPOFOL 30 MCG/KG/MIN: 10 INJECTION, EMULSION INTRAVENOUS at 16:01

## 2021-01-01 RX ADMIN — DEXMEDETOMIDINE HYDROCHLORIDE 0.5 MCG/KG/HR: 400 INJECTION INTRAVENOUS at 09:46

## 2021-01-01 RX ADMIN — VECURONIUM BROMIDE 0.1 MCG/KG/MIN: 1 INJECTION, POWDER, LYOPHILIZED, FOR SOLUTION INTRAVENOUS at 18:46

## 2021-01-01 RX ADMIN — PROPOFOL 70 MCG/KG/MIN: 10 INJECTION, EMULSION INTRAVENOUS at 15:42

## 2021-01-01 RX ADMIN — PROPOFOL 50 MCG/KG/MIN: 10 INJECTION, EMULSION INTRAVENOUS at 09:37

## 2021-01-01 RX ADMIN — FAMOTIDINE 20 MG: 10 INJECTION, SOLUTION INTRAVENOUS at 17:49

## 2021-01-01 RX ADMIN — PROPOFOL 30 MCG/KG/MIN: 10 INJECTION, EMULSION INTRAVENOUS at 08:16

## 2021-01-01 RX ADMIN — Medication 200 MCG/HR: at 18:43

## 2021-01-01 RX ADMIN — PHENYLEPHRINE HYDROCHLORIDE 0.1 MCG/KG/MIN: 10 INJECTION INTRAVENOUS at 12:07

## 2021-01-01 RX ADMIN — Medication 200 MCG/HR: at 18:42

## 2021-01-01 RX ADMIN — SODIUM CHLORIDE: 9 INJECTION, SOLUTION INTRAVENOUS at 04:46

## 2021-01-01 RX ADMIN — PROPOFOL 30 MCG/KG/MIN: 10 INJECTION, EMULSION INTRAVENOUS at 03:59

## 2021-01-01 RX ADMIN — DEXAMETHASONE SODIUM PHOSPHATE 6 MG: 4 INJECTION, SOLUTION INTRAMUSCULAR; INTRAVENOUS at 13:30

## 2021-01-01 ASSESSMENT — ACTIVITIES OF DAILY LIVING (ADL)
ADLS_ACUITY_SCORE: 17
ADLS_ACUITY_SCORE: 17
ADLS_ACUITY_SCORE: 13
ADLS_ACUITY_SCORE: 15
ADLS_ACUITY_SCORE: 17
ADLS_ACUITY_SCORE: 15
ADLS_ACUITY_SCORE: 13
ADLS_ACUITY_SCORE: 15
ADLS_ACUITY_SCORE: 15
ADLS_ACUITY_SCORE: 13
ADLS_ACUITY_SCORE: 15
ADLS_ACUITY_SCORE: 15
ADLS_ACUITY_SCORE: 13
ADLS_ACUITY_SCORE: 13
ADLS_ACUITY_SCORE: 15
ADLS_ACUITY_SCORE: 15
ADLS_ACUITY_SCORE: 17
ADLS_ACUITY_SCORE: 13
ADLS_ACUITY_SCORE: 15
ADLS_ACUITY_SCORE: 13
ADLS_ACUITY_SCORE: 15
ADLS_ACUITY_SCORE: 13
ADLS_ACUITY_SCORE: 17
ADLS_ACUITY_SCORE: 15
ADLS_ACUITY_SCORE: 15
ADLS_ACUITY_SCORE: 17
ADLS_ACUITY_SCORE: 15
ADLS_ACUITY_SCORE: 13
ADLS_ACUITY_SCORE: 15
ADLS_ACUITY_SCORE: 13
ADLS_ACUITY_SCORE: 18
ADLS_ACUITY_SCORE: 15

## 2021-01-01 ASSESSMENT — ENCOUNTER SYMPTOMS
ABDOMINAL PAIN: 0
DIARRHEA: 0
CHEST TIGHTNESS: 0
SORE THROAT: 0
VOMITING: 0
COUGH: 1
CHILLS: 0
FEVER: 0
SHORTNESS OF BREATH: 1
BLOOD IN STOOL: 0

## 2021-01-01 ASSESSMENT — LIFESTYLE VARIABLES: TOBACCO_USE: 0

## 2021-01-01 ASSESSMENT — MIFFLIN-ST. JEOR: SCORE: 1736.25

## 2021-04-09 NOTE — ANESTHESIA POSTPROCEDURE EVALUATION
Patient: Pinky Whatley    Procedure(s):  HYSTEROSCOPY WITH ENDOMETRIAL SAMPLING ; POLYPECTOMY WITH MYOSURE; DILATION AND CURETTAGE    Diagnosis:Post-menopausal bleeding [N95.0]  Diagnosis Additional Information: No value filed.    Anesthesia Type:  General    Note:  Disposition: Admission   Postop Pain Control: Uneventful            Sign Out: Well controlled pain   PONV: No   Neuro/Psych: Uneventful            Sign Out: Acceptable/Baseline neuro status   Airway/Respiratory: Uneventful            Sign Out: Acceptable/Baseline resp. status   CV/Hemodynamics: Uneventful            Sign Out: Acceptable CV status   Other NRE: NONE   DID A NON-ROUTINE EVENT OCCUR? No         Last vitals:  Vitals:    04/09/21 1210 04/09/21 1215 04/09/21 1230   BP:  104/68 105/63   Pulse: 98 97 101   Resp: 18 16 21   Temp:   36.4  C (97.6  F)   SpO2: 100% 96% 94%       Last vitals prior to Anesthesia Care Transfer:  CRNA VITALS  4/9/2021 1111 - 4/9/2021 1211      4/9/2021             Resp Rate (set):  10          Electronically Signed By: Ramakrishna Kowalski MD  April 9, 2021  1:08 PM

## 2021-04-09 NOTE — ANESTHESIA PREPROCEDURE EVALUATION
Anesthesia Pre-Procedure Evaluation    Patient: Pinky Whatley   MRN: [id number] : 1965        Preoperative Diagnosis: Post-menopausal bleeding [N95.0]   Procedure : Procedure(s):  HYSTEROSCOPY WITH ENDOMETRIAL SAMPLING ; POSSIBLE POLYPECTOMY WITH MYOSURE; DILATION AND CURETTAGE     Past Medical History:   Diagnosis Date     Anemia      Arthritis     rheumatoid     Diabetes (H)      Eczema      Fatty liver      Gastroesophageal reflux disease      Hyperlipidemia      Hypertension      Thyroid disease     hypothyroidism      Past Surgical History:   Procedure Laterality Date     CARPAL TUNNEL RELEASE RT/LT       ENDOSCOPIC RETROGRADE CHOLANGIOPANCREATOGRAM N/A 2020    Procedure: ENDOSCOPIC RETROGRADE CHOLANGIOPANCREATOGRAPHY;  Surgeon: Valentin Hernandez MD;  Location:  OR     ENDOSCOPIC RETROGRADE CHOLANGIOPANCREATOGRAM N/A 2020    Procedure: Endoscopic Retrograde Cholangiopancreatography, With Sphincterotomy;  Surgeon: Valentin Hernandez MD;  Location:  OR     ENDOSCOPIC RETROGRADE CHOLANGIOPANCREATOGRAM N/A 2020    Procedure: Endoscopic Retrograde Cholangiopancreatography, With Tube Or Stent Insertion;  Surgeon: Valentin Hernandez MD;  Location:  OR     ENDOSCOPIC RETROGRADE CHOLANGIOPANCREATOGRAM N/A 2020    Procedure: Endoscopic Retrograde Cholangiopancreatography, With Calculus Removal Using Balloon Catheter;  Surgeon: Valentin Hernandez MD;  Location:  OR     ENT SURGERY      tonsillectomy     GYN SURGERY      laparoscopy, excised fallopian tube on left     LAPAROSCOPIC CHOLECYSTECTOMY N/A 2020    Procedure: LAPAROSCOPIC CHOLECYSTECTOMY;  Surgeon: Agustina Nettles MD;  Location:  OR     ORTHOPEDIC SURGERY      bunion      No Known Allergies   Social History     Tobacco Use     Smoking status: Never Smoker     Smokeless tobacco: Never Used   Substance Use Topics     Alcohol use: Yes     Comment: 2-3 per week      Wt Readings from Last 1 Encounters:    04/29/20 142 kg (313 lb 0.9 oz)        Anesthesia Evaluation            ROS/MED HX  ENT/Pulmonary:     (+) sleep apnea, allergic rhinitis,  (-) tobacco use   Neurologic:       Cardiovascular:     (+) Dyslipidemia hypertension-----    METS/Exercise Tolerance:     Hematologic:     (+) anemia,     Musculoskeletal:   (+) arthritis,     GI/Hepatic:     (+) GERD, liver disease,     Renal/Genitourinary: Comment: Post-menopausal bleeding      Endo:     (+) type II DM, thyroid problem, hypothyroidism, Obesity,     Psychiatric/Substance Use:       Infectious Disease:       Malignancy:       Other:            Physical Exam    Airway        Mallampati: II   TM distance: > 3 FB   Neck ROM: full   Mouth opening: > 3 cm    Respiratory Devices and Support         Dental  no notable dental history         Cardiovascular   cardiovascular exam normal          Pulmonary   pulmonary exam normal                OUTSIDE LABS:  CBC:   Lab Results   Component Value Date    WBC 6.3 04/29/2020    WBC 8.4 04/28/2020    HGB 11.7 04/29/2020    HGB 11.4 (L) 04/28/2020    HCT 36.8 04/29/2020    HCT 35.3 04/28/2020     04/29/2020     04/28/2020     BMP:   Lab Results   Component Value Date     04/29/2020     04/28/2020    POTASSIUM 3.3 (L) 04/29/2020    POTASSIUM 3.4 04/28/2020    CHLORIDE 101 04/29/2020    CHLORIDE 101 04/28/2020    CO2 34 (H) 04/29/2020    CO2 34 (H) 04/28/2020    BUN 17 04/29/2020    BUN 19 04/28/2020    CR 0.92 04/29/2020    CR 1.00 04/28/2020     (H) 04/29/2020     (H) 04/28/2020     COAGS:   Lab Results   Component Value Date    INR 1.09 04/20/2020     POC:   Lab Results   Component Value Date     (H) 04/29/2020    HCG Negative 04/24/2020    HCGS Negative 04/19/2020     HEPATIC:   Lab Results   Component Value Date    ALBUMIN 2.9 (L) 04/29/2020    PROTTOTAL 6.6 (L) 04/29/2020    ALT 68 (H) 04/29/2020    AST 25 04/29/2020    ALKPHOS 190 (H) 04/29/2020    BILITOTAL 0.8 04/29/2020      OTHER:   Lab Results   Component Value Date    PH 7.29 (L) 04/21/2020    LACT 1.6 04/21/2020    A1C 6.7 (H) 04/20/2020    CHANTELLE 9.2 04/29/2020    PHOS 3.0 04/29/2020    MAG 2.0 04/29/2020    LIPASE 167 04/19/2020    TSH 5.63 (H) 09/09/2019    T4 1.34 09/09/2019       Anesthesia Plan    ASA Status:  3   NPO Status:  NPO Appropriate    Anesthesia Type: General.     - Airway: LMA   Induction: Intravenous.   Maintenance: Inhalation.        Consents    Anesthesia Plan(s) and associated risks, benefits, and realistic alternatives discussed. Questions answered and patient/representative(s) expressed understanding.     - Discussed with:  Patient         Postoperative Care    Pain management: IV analgesics, Oral pain medications.   PONV prophylaxis: Ondansetron (or other 5HT-3), Dexamethasone or Solumedrol     Comments:                Ramakrishna Kowalski MD

## 2021-04-09 NOTE — DISCHARGE INSTRUCTIONS
Same Day Surgery Discharge Instructions for  Sedation and General Anesthesia       It's not unusual to feel dizzy, light-headed or faint for up to 24 hours after surgery or while taking pain medication.  If you have these symptoms: sit for a few minutes before standing and have someone assist you when you get up to walk or use the bathroom.      You should rest and relax for the next 24 hours. We recommend you make arrangements to have an adult stay with you for at least 24 hours after your discharge.  Avoid hazardous and strenuous activity.      DO NOT DRIVE any vehicle or operate mechanical equipment for 24 hours following the end of your surgery.  Even though you may feel normal, your reactions may be affected by the medication you have received.      Do not drink alcoholic beverages for 24 hours following surgery.       Slowly progress to your regular diet as you feel able. It's not unusual to feel nauseated and/or vomit after receiving anesthesia.  If you develop these symptoms, drink clear liquids (apple juice, ginger ale, broth, 7-up, etc. ) until you feel better.  If your nausea and vomiting persists for 24 hours, please notify your surgeon.        All narcotic pain medications, along with inactivity and anesthesia, can cause constipation. Drinking plenty of liquids and increasing fiber intake will help.      For any questions of a medical nature, call your surgeon.      Do not make important decisions for 24 hours.      If you had general anesthesia, you may have a sore throat for a couple of days related to the breathing tube used during surgery.  You may use Cepacol lozenges to help with this discomfort.  If it worsens or if you develop a fever, contact your surgeon.       If you feel your pain is not well managed with the pain medications prescribed by your surgeon, please contact your surgeon's office to let them know so they can address your concerns.       CoVid 19 Information    We want to give you  information regarding Covid. Please consult your primary care provider with any questions you might have.     Patient who have symptoms (cough, fever, or shortness of breath), need to isolate for 7 days from when symptoms started OR 72 hours after fever resolves (without fever reducing medications) AND improvement of respiratory symptoms (whichever is longer).      Isolate yourself at home (in own room/own bathroom if possible)    Do Not allow any visitors    Do Not go to work or school    Do Not go to [employer],  centers, shopping, or other public places.    Do Not shake hands.    Avoid close and intimate contact with others (hugging, kissing).    Follow CDC recommendations for household cleaning of frequently touched services.     After the initial 7 days, continue to isolate yourself from household members as much as possible. To continue decrease the risk of community spread and exposure, you and any members of your household should limit activities in public for 14 days after starting home isolation.     You can reference the following CDC link for helpful home isolation/care tips:  https://www.cdc.gov/coronavirus/2019-ncov/downloads/10Things.pdf    Protect Others:    Cover Your Mouth and Nose with a mask, disposable tissue or wash cloth to avoid spreading germs to others.    Wash your hands and face frequently with soap and water    Call Your Primary Doctor If: Breathing difficulty develops or you become worse.    For more information about COVID19 and options for caring for yourself at home, please visit the CDC website at https://www.cdc.gov/coronavirus/2019-ncov/about/steps-when-sick.html  For more options for care at [facility], please visit our website at [email or web address]      Limitions  No driving while on narcotics  No sex, tampons, tub baths, swimming pools x 2-4 weeks and until cleared by your doctor     Pain management:  Recommend scheduled Tylenol 1,000  mg every 6 hours and ibuprofen 600 mg every 6 hours x several days.    Call for:  Temperature >100.4 F  Increased nausea / vomiting  Drainage from or redness around incisions  Heavy vaginal bleeding (bleeding like a period or more than 1 pad per hour for 2 hours)  Increased pain    Normal to see:  Vaginal discharge or light bleeding  Fatigue  Soreness    5 mg of Oxycode was taken at 12:30 for pain.

## 2021-04-09 NOTE — OR NURSING
Patient is adequate for discharge to home from Phase 2. Ox4 and AVSS. Tolerating PO, denies nausea. Pain well controlled. Discharge instructions completed with mom Shagufta via phone per COVID guidelines. All belongings returned to patient and sent home.

## 2021-04-09 NOTE — BRIEF OP NOTE
[facility]    Brief Operative Note    Pre-operative diagnosis: Post-menopausal bleeding [N95.0], thickened endometrium   Post-operative diagnosis Same as pre-operative diagnosis    Procedure: Procedure(s):  HYSTEROSCOPY WITH ENDOMETRIAL SAMPLING ; POLYPECTOMY WITH MYOSURE; DILATION AND CURETTAGE  Surgeon: Surgeon(s) and Role:     * Maddie Vasquez MD - Primary  Anesthesia: General   Estimated blood loss: 15 ml  Drains: None  Specimens:   ID Type Source Tests Collected by Time Destination   A : endometrial curettings Tissue Endometrium SURGICAL PATHOLOGY EXAM Maddie Vasquez MD 4/9/2021 11:34 AM    B : Endometrial and Endocervical Polyps Tissue Other SURGICAL PATHOLOGY EXAM Maddie Vasquez MD 4/9/2021 11:35 AM      Findings:   see op note.  Complications: None.  Implants: * No implants in log *     ml  UOP not collected  Hysteroscopic fluid deficit 190 ml    Maddie Vasquez MD  11:42 AM  4/9/2021

## 2021-04-09 NOTE — ANESTHESIA CARE TRANSFER NOTE
Patient: Pinky Whatley    Procedure(s):  HYSTEROSCOPY WITH ENDOMETRIAL SAMPLING ; POLYPECTOMY WITH MYOSURE; DILATION AND CURETTAGE    Diagnosis: Post-menopausal bleeding [N95.0]  Diagnosis Additional Information: No value filed.    Anesthesia Type:   General     Note:    Oropharynx: oropharynx clear of all foreign objects and spontaneously breathing  Level of Consciousness: drowsy  Oxygen Supplementation: face mask  Level of Supplemental Oxygen (L/min / FiO2): 4  Independent Airway: airway patency satisfactory and stable  Dentition: dentition unchanged  Vital Signs Stable: post-procedure vital signs reviewed and stable  Report to RN Given: handoff report given  Patient transferred to: PACU  Comments: At end of procedure, spontaneous respirations, adequate tidal volumes, followed commands to voice, LMA removed atraumatically, oropharynx suctioned, airway patent after LMA removal. Oxygen via facemask at 4 liters per minute to PACU. Oxygen tubing connected to wall O2 in PACU, SpO2, NiBP, and EKG monitors and alarms on and functioning, report on patient's clinical status given to PACU RN, RN questions answered.  Handoff Report: Identifed the Patient, Identified the Reponsible Provider, Reviewed the pertinent medical history, Discussed the surgical course, Reviewed Intra-OP anesthesia mangement and issues during anesthesia, Set expectations for post-procedure period and Allowed opportunity for questions and acknowledgement of understanding      Vitals: (Last set prior to Anesthesia Care Transfer)  CRNA VITALS  4/9/2021 1111 - 4/9/2021 1146      4/9/2021             Resp Rate (set):  10        Electronically Signed By: BRANDIE Glaser CRNA  April 9, 2021  11:46 AM

## 2021-09-12 PROBLEM — J12.82 PNEUMONIA DUE TO 2019 NOVEL CORONAVIRUS: Status: ACTIVE | Noted: 2021-01-01

## 2021-09-12 PROBLEM — U07.1 ACUTE HYPOXEMIC RESPIRATORY FAILURE DUE TO COVID-19 (H): Status: ACTIVE | Noted: 2021-01-01

## 2021-09-12 PROBLEM — U07.1 PNEUMONIA DUE TO 2019 NOVEL CORONAVIRUS: Status: ACTIVE | Noted: 2021-01-01

## 2021-09-12 PROBLEM — J96.01 ACUTE HYPOXEMIC RESPIRATORY FAILURE DUE TO COVID-19 (H): Status: ACTIVE | Noted: 2021-01-01

## 2021-09-13 NOTE — PROGRESS NOTES
[facility]    Medicine Progress Note - Hospitalist Service       Date of Admission:  9/12/2021    Assessment & Plan           Pinky Whatley is a 55 year-old female with history of rheumatoid arthritis, diabetes mellitus type 2, hypothyroidism, hypertension who presents with progressive shortness of breath. Admitted on 9/12/2021 with COVID19 pneumonia and respiratory failure.     COVID19 pneumonia, bilateral  Acute hypoxic respiratory failure  Possible superimposed bacterial pneumonia  COVID19 in a vaccinated person  *Initially developed nighttime fevers 10-11 days prior to admission. Subsequently on 9/9 developed shortness of breath, dry cough, poor intake, chills, night sweats that steadily progressed over the next few days till she could no longer function due to her breathing difficulty. COVID19 PCR positive (9/12).   *CXR with left basilar infiltrate. CT chest PE protocol negative for PE, extensive groundglass opacities in both lungs characteristic of COVID-19 pneumonia  *O2 saturation 68% on room air. Required BiPAP.   *Vaccinated. Immunosuppressed status as below.   - Continue dexamethasone 6 mg daily for 10 days or hospital discharge  - Continue remdesivir for 5 days or hospital discharge  - Enoxaparin subcutaneous for DVT prophylaxis   - Continue BIPAP  - Procalcitonin 0.98.    - piperacillin-tazobactam IV and doxycycline IV   - MRSA nasal swab negative, no vancomycin  - Famotidine for GI prophylaxis  - Self prone as tolerated  - Monitor COVID19 labs   - If worsening respiratory status would give trial of IV diuretics      Diabetes mellitus, type 2, on long term insulin   HgbA1c 7.0% 9/13/21. Reports being on glargine 30 units daily   - Reduce prior to admission glargine only to 25 units daily given steroid needs and holding of other medications   - High sliding scale insulin per NPO protocol while on BiPAP   - Hold oral medications while hospitalized  - Hypoglycemia protocol       Rheumatoid arthritis  Prior to admission on Xeljanz, prednisone, hydroxychloroquine  - Hold prior to admission immunosuppressants due to acute infection  - Will be on higher dose steroids as above      Morbid obesity  Body mass index is 46.92 kg/m .. Increase in all-cause morbidity and mortality. Encourage weight loss.      Obstructive sleep apnea, severe  Previously recommended AutoPap, has not yet obtained machine due to financial issues.   - On BiPAP as above     Hypothyroidism  - Resume prior to admission levothyroxine with IV equivalent when dose confirmed by pharmacy      Hyperlipidemia  - Okay to hold prior to admission statin temporarily        Diet: NPO for Medical/Clinical Reasons Except for: No Exceptions    DVT Prophylaxis: Enoxaparin (Lovenox) SQ  Lamar Catheter: Not present  Central Lines: None  Code Status: Full Code      Disposition Plan   Expected discharge: 09/16/2021   recommended to prior living arrangement once O2 use less than 2 liters/minute.     The patient's care was discussed with the Bedside Nurse and Patient.    Misael Medeiros MD  Hospitalist Service  [facility]  Securely message with the Vocera Web Console (learn more here)  Text page via F[employer] Paging/Directory      Clinically Significant Risk Factors Present on Admission          # Hypocalcemia: Ca = 7.8 mg/dL (Ref range: 8.5 - 10.1 mg/dL) and/or iCa = N/A on admission, will replace as needed     # Platelet Defect: home medication list includes an antiplatelet medication      Time Spent on this Encounter   Pinky was seen and evaluated by me on 09/13/21.  She was in critical condition as the result of acute hypoxic respiratory failure due to covid 19.    Her condition is now Serious.      The acute issues managed by me today include  Hypoxic respiratory failure, acute   Supportive interventions provided and/or ordered by me include titration of bipap, self proning.     Total Critical Care time spent by  me, excluding procedures, was 30 minutes.    Misael Medeiros MD      ______________________________________________________________________    Interval History   Patient seen in PM, ongoing tachypnea, prolonged desats with activity.  BiPAP settings FiO2 100%, 16/7. While resting tidal volume 300-350 with resp rate of 40-45. While awake resp rate 30-40 with tidal volume 350-450.  Had desats with any movement, coughing, oral cares. Attempted titration, but unsuccessful.  Appeared comfortable despite tachypnea and did not appear to be tiring.  Denied feeling particularly tachypneic and actually felt well compared to presentation.     Data reviewed today: I reviewed all medications, new labs and imaging results over the last 24 hours. I personally reviewed no images or EKG's today.    Physical Exam   Vital Signs: Temp: 99.9  F (37.7  C) Temp src: Axillary BP: 106/64 Pulse: 75   Resp: (!) 36 SpO2: 95 % O2 Device: BiPAP/CPAP    Weight: 260 lbs 0 oz  Constitutional: Awake, alert, cooperative. Marked tachypnea. Does not appeared in distress.  Respiratory: diffuse crackles. Distant sounds.  Cardiovascular: Regular rate and rhythm, normal S1 and S2, and no murmur noted  GI: Normal bowel sounds, soft, non-distended, non-tender  Skin/Integumen: No rashes, no cyanosis, no edema  Other: obese      Data   Recent Labs   Lab 09/13/21  1326 09/13/21  0825 09/13/21  0443 09/12/21  2150 09/12/21  2149   WBC  --   --  4.8  --  9.4   HGB  --   --  10.2*  --  12.6   MCV  --   --  92  --  94   PLT  --   --  188  --  281   NA  --   --  136 133  --    POTASSIUM  --   --  4.3 4.5  --    CHLORIDE  --   --  109 100  --    CO2  --   --  21 22  --    BUN  --   --  20 27  --    CR  --   --  0.89 1.12*  --    ANIONGAP  --   --  6 11  --    CHANTELLE  --   --  7.8* 9.1  --    * 181* 210* 276*  --    ALBUMIN  --   --   --  3.3*  --    PROTTOTAL  --   --   --  8.0  --    BILITOTAL  --   --   --  0.7  --    ALKPHOS  --   --   --  89  --    ALT  --    --   --  50  --    AST  --   --   --  72*  --    TROPONIN  --   --   --   --  <0.015     Recent Results (from the past 24 hour(s))   XR Chest Port 1 View    Narrative    EXAM: XR CHEST PORT 1 VIEW  LOCATION: [facility]  DATE/TIME: 9/12/2021 9:59 PM    INDICATION: Shortness of breath.  COMPARISON: 4/20/2020.    FINDINGS: The heart is enlarged. There is no pulmonary edema. Left hemidiaphragm is elevated and there is left basilar infiltrate. Curvilinear atelectasis or scarring at the right lung base. The upper lungs are clear. No pneumothorax.      Impression    IMPRESSION: Left basilar pneumonia.   CT Chest Pulmonary Embolism w Contrast    Narrative    EXAM: CT CHEST PULMONARY EMBOLISM W CONTRAST  LOCATION: [facility]  DATE/TIME: 9/12/2021 11:15 PM    INDICATION: COVID-19 pneumonia with respiratory failure  COMPARISON: CT chest exam 04/20/2020  TECHNIQUE: CT chest pulmonary angiogram during arterial phase injection of IV contrast. Multiplanar reformats and MIP reconstructions were performed. Dose reduction techniques were used.   CONTRAST: 70mL Isovue-370    FINDINGS:  ANGIOGRAM CHEST: Pulmonary arteries are normal caliber and negative for pulmonary emboli. Thoracic aorta is negative for dissection. No CT evidence of right heart strain.    LUNGS AND PLEURA: Extensive groundglass opacities throughout both upper and lower lobes. Dependent atelectasis both bases. No effusions. Elevated left hemidiaphragm.    MEDIASTINUM/AXILLAE: A few borderline-enlarged mediastinal nodes.     CORONARY ARTERY CALCIFICATION: None.    UPPER ABDOMEN: Cholecystectomy.    MUSCULOSKELETAL: Hypertrophic changes thoracic spine.      Impression    IMPRESSION:  1.  No evidence for pulmonary emboli.  2.  Extensive groundglass opacities both lungs characteristic of Covid 19 pneumonia.    Commonly reported imaging features of (COVID-19) pneumonia are present. Influenza pneumonia and organizing  pneumonia as can be seen in the setting of drug toxicity and connective tissue disease can cause a similar imaging pattern.     Medications     - MEDICATION INSTRUCTIONS -       - MEDICATION INSTRUCTIONS -         remdesivir  100 mg Intravenous Q24H    And     sodium chloride 0.9%  50 mL Intravenous Q24H     dexamethasone  6 mg Intravenous Daily     doxycycline (VIBRAMYCIN) IV  100 mg Intravenous Q12H     enoxaparin ANTICOAGULANT  40 mg Subcutaneous BID     famotidine  20 mg Intravenous Q12H     insulin aspart  1-12 Units Subcutaneous Q4H     insulin glargine  25 Units Subcutaneous QAM AC     piperacillin-tazobactam  3.375 g Intravenous Q6H     sodium chloride (PF)  3 mL Intracatheter Q8H     sodium chloride (PF)  3 mL Intracatheter Q8H     sodium chloride 0.9 %  100 mL Intravenous Once

## 2021-09-13 NOTE — ED NOTES
DATE:  9/12/2021   TIME OF RECEIPT FROM LAB:  1223  LAB TEST:  Covid PCR  LAB VALUE:  Positive  RESULTS GIVEN WITH READ-BACK TO (PROVIDER):  Dr. Veronica  TIME LAB VALUE REPORTED TO PROVIDER:   0802

## 2021-09-13 NOTE — ED PROVIDER NOTES
History     Chief Complaint:  Respiratory Distress      HPI   Pinky Whatley is a 55 year old immunocompromised female who presents with Shortness of breath and cough.  The patient is on multiple immune-compromising medications including Xeljanz, Hydroxychloroquine, and Methotrexate.  She states that she lives with her mother who has been ill with symptoms that she thought were due to a medication reaction.  The patient herself reports that she is at the onset of a cough and shortness of breath on Thursday which has progressively worsened.  She has been checking her oxygen saturation at home which has been in the 70s and she discussed this with her aunt this evening who is a nurse and she brought her here to the emergency department.  While in the emergency department waiting room the patient states that her oxygen went down into the 50s and she was brought emergently back to the stabilization room.  The patient states she has the oxygen saturation monitor at home because she was on a ventilator a year ago due to sepsis from cholecystitis.  The patient denies chest pain, leg pain or swelling, history of PE or DVT, vomiting, diarrhea, sore throat, body aches.  Patient has had her Covid vaccines.    History is limited due to the patient's acute condition.      Review of Systems   Unable to perform ROS: Acuity of condition   Constitutional: Negative for chills and fever.   HENT: Negative for sore throat.    Respiratory: Positive for cough and shortness of breath. Negative for chest tightness.    Cardiovascular: Negative for chest pain and leg swelling.   Gastrointestinal: Negative for abdominal pain, blood in stool, diarrhea and vomiting.     Allergies:  The patient has no known allergies.    Medications:    Lipitor   Plaquenil   Lantus   Synthroid   Prinivil   Glucophage   Methotrexate   Anaprox   Deltasone   Ozempic   Azulfidine   Xeljanz    Past Medical History:    Anemia   Arthritis   Diabetes   Eczema   GERD    Hyperlipidemia   Hypertension   RA   Thyroid disease   Acute kidney failure   Cholecystitis   Herpes simplex 2     Past Surgical History:    Carpal tunnel release   D & C   Tonsillectomy   Cholecystectomy     Family History:    Mother- hypertension, heart murmur   Father- CAD     Social History:  The patient presents alone.     Physical Exam     Patient Vitals for the past 24 hrs:   Temp Temp src Pulse Resp SpO2   09/12/21 2146 99.3  F (37.4  C) Temporal -- -- --   09/12/21 2138 -- -- (!) 135 (!) 45 (!) 68 %       Physical Exam  Nursing note and vitals reviewed.  Constitutional:  Appears well-developed and well-nourished.  She appears in acute respiratory distress with tachypnea.  High BMI.  HENT:   Head:    Atraumatic.   Mouth/Throat:   Oropharynx is clear and moist. No oropharyngeal exudate.   Eyes:    Pupils are equal, round, and reactive to light.   Neck:    Normal range of motion. Neck supple.      No tracheal deviation present. No thyromegaly present.   Cardiovascular:  Normal rate, regular rhythm, no murmur   Pulmonary/Chest: Breath sounds are clear and equal without wheezes or crackles.  Abdominal:   Soft. Bowel sounds are normal. Exhibits no distension and      no mass. There is no tenderness.      There is no rebound and no guarding.   Musculoskeletal:  Exhibits no edema.   Lymphadenopathy:  No cervical adenopathy.   Neurological:   Alert and oriented to person, place, and time.   Skin:    Skin is mottled, but warm and dry. No rash noted. No pallor.      Emergency Department Course   ECG:  ECG taken at 2150, ECG read at 2153  Sinus tachycardia   Left axis deviation   Left bundle branch block    Rate 137 bpm. HI interval 128 ms. QRS duration 134 ms. QT/QTc 302/456 ms. P-R-T axes 34 -43 108.     Imaging:    CT Chest Pulmonary Embolism w Contrast  1.  No evidence for pulmonary emboli.  2.  Extensive groundglass opacities both lungs characteristic of Covid 19 pneumonia.  Commonly reported imaging features of  (COVID-19) pneumonia are present. Influenza pneumonia and organizing pneumonia as can be seen in the setting of drug toxicity and connective tissue disease can cause a similar imaging pattern.  Reading per radiology    XR Chest Port 1 View  Left basilar pneumonia.  Reading per radiology     Laboratory:  CBC: WBC 9.4, HGB 12.6,    CMP: glucose 276 (H), AST 72 (H), albumin 3.3 (L), GFR 56 (L) o/w WNL (Creatinine 1.12 (H))     Lactic acid (result time 2159) 2.9 (H)     Troponin (Collected 2149): <0.015    BNP: 45    Ddimer: 1.25 (H)    Procalcitonin: 0.19    Blood Gas Venous (collected 2302): pH 7.33, PCO2 40, PO2 31, Bicarbonate 21, FlO2 100, oxyhemoglobin 49 (L), base excess/deficit -4.6  Blood Gas Venous (collected 0003): pH 7.32, PCO2 44, PO2 29, Bicarbonate 23, FlO2 100, oxyhemoglobin 46 (L), base excess/deficit -3.4    Blood cultures pending x2    UA with microscopic: slightly cloudy appearance (A), glucose 100 (A), ketones 40 (A) protein albumin 200 (A), mucous present (A), RBC 3 (H), hyaline casts 11 (H) o/w WNL     Asymptomatic COVID19 Virus PCR by nasopharyngeal swab positive (A)    Procedures:  Bipap Treatment    Emergency Department Course:    Reviewed:  I reviewed nursing notes, vitals, past history and care everywhere    Assessments:  2145 I obtained history and examined the patient as noted above.   I rechecked the patient and explained findings.     Consults:   Dr. Hunter, Intensivist    Interventions:  Medications   remdesivir 200 mg in sodium chloride 0.9 % 250 mL intermittent infusion (0 mg Intravenous Stopped 9/13/21 0028)     Followed by   0.9% sodium chloride BOLUS (has no administration in time range)   remdesivir 100 mg in sodium chloride 0.9 % 250 mL intermittent infusion (has no administration in time range)     And   0.9% sodium chloride BOLUS (has no administration in time range)   sodium chloride 0.9 % CT scan flush use (has no administration in time range)   levalbuterol (XOPENEX  CONC) neb solution 1.25 mg (1.25 mg Nebulization Given 9/12/21 2155)   0.9% sodium chloride BOLUS (0 mLs Intravenous Stopped 9/12/21 2257)   0.9% sodium chloride BOLUS (0 mLs Intravenous Stopped 9/13/21 0028)   piperacillin-tazobactam (ZOSYN) 4.5 g vial to attach to  mL bag (0 g Intravenous Stopped 9/12/21 2246)   azithromycin (ZITHROMAX) 500 mg vial to attach to  mL bag (0 mg Intravenous Stopped 9/13/21 0028)   vancomycin 2500 mg in 0.9% NaCl 500 ml intermittent infusion 2,500 mg (2,500 mg Intravenous New Bag 9/12/21 2249)   dexamethasone PF (DECADRON) injection 6 mg (6 mg Intravenous Given 9/12/21 2229)   0.9% sodium chloride BOLUS (1,000 mLs Intravenous New Bag 9/13/21 0009)   iopamidol (ISOVUE-370) solution 70 mL (70 mLs Intravenous Given 9/12/21 2331)       Disposition:  The patient was admitted to the ICU under the care of Dr. Malagon.    Impression & Plan      CMS Diagnoses:   The patient has signs of Severe Sepsis     The patient has signs of Severe Sepsis as evidenced by:    1. 2 SIRS criteria, AND  2. Suspected infection, AND   3. Organ dysfunction: Lactic Acidosis with value >2.0    Time severe sepsis diagnosis confirmed: 21:59     3 Hour Severe Sepsis Bundle Completion:    1. Initial Lactic Acid Result:   Recent Labs   Lab Test 09/12/21 2159 04/21/20  1405 04/21/20  0544   LACT 2.9* 1.6 2.7*     2. Blood Cultures before Antibiotics: Yes  3. Broad Spectrum Antibiotics Administered:  yes       Anti-infectives (From admission through now)    Start     Dose/Rate Route Frequency Ordered Stop    09/12/21 2230  remdesivir 200 mg in sodium chloride 0.9 % 250 mL intermittent infusion     Note to Pharmacy: Gently invert the bag 20 times to mix the solution. Lyophilized powder product must be used for pediatric patients LESS than 40 kg.      200 mg  125-500 mL/hr over  Minutes Intravenous ONCE 09/12/21 2226 09/13/21 0028    09/12/21 2215  vancomycin 2500 mg in 0.9% NaCl 500 ml intermittent  "infusion 2,500 mg      25 mg/kg × 112.6 kg  over 120 Minutes Intravenous ONCE 09/12/21 2210 09/13/21 0049    09/12/21 2210  piperacillin-tazobactam (ZOSYN) 4.5 g vial to attach to  mL bag     Note to Pharmacy: For [facility], [facility] and [facility]: For Zosyn-naive patients, use the \"Zosyn initial dose + extended infusion\" order panel.    4.5 g  over 30 Minutes Intravenous ONCE 09/12/21 2207 09/12/21 2246    09/12/21 2210  azithromycin (ZITHROMAX) 500 mg vial to attach to  mL bag      500 mg  over 1 Hours Intravenous ONCE 09/12/21 2207 09/13/21 0028          4. Fluid volume administered in ED:  Full 30 mL/kg bolus given (see amount below).    BMI Readings from Last 1 Encounters:   09/12/21 46.92 kg/m      30 mL/kg fluids based on weight: 3,380 mL  30 mL/kg fluids based on IBW (must be >= 60 inches tall): 1,430 mL                Severe Sepsis reassessment:  1. Improved perfusion with O2 sat in 90's and heart rate 104.    Medical Decision Making:  I found this patient to have acute hypoxic respiratory failure due to acute COVID-19 pneumonia with severe sepsis.  She was placed on BiPAP with improvement of her respiratory condition and vital signs.  She was given IV hydration and broad-spectrum antibiotics since she is immunocompromise.  She was admitted to the ICU under the care of Dr. Malagon the hospitalist and I consulted intensive care unit physician Dr. Hunter.  At 1 point during her ED course I considered intubating her however she stated a strong preference not to be intubated since she said that was a very scary time of her life when she was on a ventilator when she had septic shock a year ago.  However her condition continued to steadily improve here in the emergency department and ultimately I did not feel that intubation was indicated during my management of her.  (Of note, the patient was not refusing intubation if her condition worsens).  The patient was signed out to Dr. Canales for further management until an " ICU bed becomes available.    Critical Care time:  was 70 minutes for this patient excluding procedures.    Covid-19  Pinky Whatley was evaluated during a global COVID-19 pandemic, which necessitated consideration that the patient might be at risk for infection with the SARS-CoV-2 virus that causes COVID-19.   Applicable protocols for evaluation were followed during the patient's care.   COVID-19 was considered as part of the patient's evaluation. The plan for testing is:  a test was obtained during this visit.    Diagnosis:    ICD-10-CM    1. Acute hypoxemic respiratory failure due to COVID-19 (H)  U07.1     J96.01    2. Pneumonia due to 2019 novel coronavirus  U07.1     J12.82        Discharge Medications:  New Prescriptions    No medications on file         Scribe Disclosure:  I, Talisha Kenney, am serving as a scribe at 9:47 PM on 9/12/2021 to document services personally performed by Liliam Veronica MD based on my observations and the provider's statements to me.      Liliam Veronica MD  09/13/21 0137       Liliam Veronica MD  09/13/21 0312

## 2021-09-13 NOTE — ED TRIAGE NOTES
Patient reports trouble breathing for 3 days, worsening today. Checked her pulse oxymetry and read 59% at home. In triage sats 65% on RA, unable to speak in complete sentences, gasping for air. Skin diaphoretic hot to touch, mottled.

## 2021-09-13 NOTE — H&P
[facility]    History and Physical - Hospitalist Service       Date of Admission:  9/12/2021    Assessment & Plan   Pinky Whatley is a 55 year-old female with history of rheumatoid arthritis, diabetes mellitus type 2, hypothyroidism, hypertension who presents with progressive shortness of breath. Admitted on 9/12/2021 with COVID19 pneumonia and respiratory failure.    COVID19 pneumonia, bilateral  Acute hypoxic respiratory failure  Possible superimposed bacterial pneumonia  *Initially developed nighttime fevers 10-11 days prior to admission. Subsequently on 9/9 developed shortness of breath, dry cough, poor intake, chills, night sweats that steadily progressed over the next few days till she could no longer function due to her breathing difficulty. COVID19 PCR positive (9/12).   *CXR with left basilar infiltrate. CT chest PE protocol negative for PE, extensive groundglass opacities in both lungs characteristic of COVID-19 pneumonia  *O2 saturation 68% on room air. Required BiPAP.   *Heart rate and oxygenation gradually improving with BiPAP therapy.  Continues to have an elevated respiratory rate, though patient repeatedly has denied any sense of tiring with her respiratory effort and feels comfortable on BiPAP.   *Vaccinated. Immunosuppressed status as below.   - Admit to IMC  - Continue dexamethasone 6 mg daily for 10 days or hospital discharge  - Continue remdesivir for 5 days or hospital discharge  - Enoxaparin subcutaneous for DVT prophylaxis   - Continue BIPAP, wean as tolerated  - Procalcitonin 0.98.  Given her immunosuppressed status and tenuous respiratory status, will continue antibiotics with piperacillin-tazobactam IV and doxycycline IV (avoid azithromycin with chronic hydroxychloroquine use). Received vancomycin IV x1 in Emergency Department; no history of MRSA, will hold on additional doses. Check MRSA swab  - Famotidine for GI prophylaxis  - Prone as tolerated  - Monitor  COVID19 labs     Diabetes mellitus, type 2, on long term insulin   HgbA1c 7.0% 9/13/21. Reports being on glargine 30 units daily   - Reduce prior to admission glargine only to 25 units daily given steroid needs and holding of other medications   - High sliding scale insulin per NPO protocol while on BiPAP   - Hold oral medications while hospitalized  - Hypoglycemia protocol     Rheumatoid arthritis  Prior to admission on Xeljanz, prednisone, hydroxychloroquine  - Hold prior to admission immunosuppressants due to acute infection  - Will be on higher dose steroids as above     Morbid obesity  Body mass index is 46.92 kg/m .. Increase in all-cause morbidity and mortality. Encourage weight loss.     Obstructive sleep apnea, severe  Previously recommended AutoPap, has not yet obtained machine due to financial issues.   - On BiPAP as above    Hypothyroidism  - Resume prior to admission levothyroxine with IV equivalent when dose confirmed by pharmacy     Hyperlipidemia  - Okay to hold prior to admission statin temporarily         Diet: NPO for Medical/Clinical Reasons Except for: No Exceptions    DVT Prophylaxis: Enoxaparin (Lovenox) SQ  Lamar Catheter: Not present  Code Status: Full Code      Disposition Plan   Admit to inpatient. Anticipate greater than or equal to 2 midnights prior to discharge.     Entered: Dheeraj Malagon MD 09/13/2021, 4:44 AM     The patient's care was discussed with the Patient, bedside RN and Emergency Department provider.      Time Spent on this Encounter   Pinky was seen and evaluated by me on 09/13/21.  She was in critical condition as the result of severe COVID19 with acute hypoxic respiratory failure.    Her condition is now Serious.      The acute issues managed by me today include severe COVID19 pneumonia with acute hypoxic respiratory failure   Supportive interventions provided and/or ordered by me include BiPAP    Total Critical Care time spent by me, excluding procedures, was 35  minutes.    Dheeraj Malagon MD      Clinically Significant Risk Factors Present on Admission              # Platelet Defect: home medication list includes an antiplatelet medication             Dheeraj Malagon MD  [facility]    ______________________________________________________________________    Chief Complaint   Shortness of breath for 4 days    History of Present Illness   Pinky Whatley is a 55 year old female who presents with the above chief complaint.    History is obtained from the patient. The patient reports about 10-11 days ago she developed fevers that occurred only at night, sometimes suppressed when she took acetaminophen, otherwise no associated symptoms. Beginning on 9/9 developed shortness of breath, dry cough, poor intake, chills, night sweats that steadily progressed over the next few days until she could no longer function due to her breathing difficulty. She is vaccinated against COVID19, has not had a previous infection to her knowledge. She has a history of rheumatoid arthritis and is on multiple immunosuppressants.     In the Emergency Department, the patient was seen by Dr. Veronica, with whom I discussed the patient's presenting symptoms and emergency department course.  Initial vital signs were a temperature of 99.3F, , /83, RR 45, SpO2 68% on room air. Work-up included CXR with left basilar infiltrate, D-dimer 1.25, CT PE protocol negative for PE, but with extensive bilateral ground glass infiltrates consistent with COVID. She was placed on BiPAP, received piperacillin-tazobactam IV, vancomycin IV, azithromycin IV, dexamethasone IV, remdesivir IV, with gradual improvement in her heart rate and respiratory status. Hospitalists were contacted for admission to the hospital.     Review of Systems    Complete 10 point review of systems assessed and negative except as noted in HPI.    Past Medical History    I have reviewed this patient's medical  history and updated it with pertinent information if needed.   Past Medical History:   Diagnosis Date     Anemia      Arthritis     rheumatoid     Diabetes (H)      Eczema      Fatty liver      Gastroesophageal reflux disease      Hyperlipidemia      Hypertension      RA (rheumatoid arthritis) (H)      Thyroid disease     hypothyroidism       Past Surgical History   I have reviewed this patient's surgical history and updated it with pertinent information if needed.  Past Surgical History:   Procedure Laterality Date     CARPAL TUNNEL RELEASE RT/LT       DILATION AND CURETTAGE, OPERATIVE HYSTEROSCOPY WITH MORCELLATOR, COMBINED N/A 4/9/2021    Procedure: HYSTEROSCOPY WITH ENDOMETRIAL SAMPLING ; POLYPECTOMY WITH MYOSURE; DILATION AND CURETTAGE;  Surgeon: Maddie Vasquez MD;  Location:  OR     ENDOSCOPIC RETROGRADE CHOLANGIOPANCREATOGRAM N/A 4/20/2020    Procedure: ENDOSCOPIC RETROGRADE CHOLANGIOPANCREATOGRAPHY;  Surgeon: Valentin Hernandez MD;  Location:  OR     ENDOSCOPIC RETROGRADE CHOLANGIOPANCREATOGRAM N/A 4/20/2020    Procedure: Endoscopic Retrograde Cholangiopancreatography, With Sphincterotomy;  Surgeon: Valentin Hernandez MD;  Location:  OR     ENDOSCOPIC RETROGRADE CHOLANGIOPANCREATOGRAM N/A 4/20/2020    Procedure: Endoscopic Retrograde Cholangiopancreatography, With Tube Or Stent Insertion;  Surgeon: Valentin Hernandez MD;  Location:  OR     ENDOSCOPIC RETROGRADE CHOLANGIOPANCREATOGRAM N/A 4/20/2020    Procedure: Endoscopic Retrograde Cholangiopancreatography, With Calculus Removal Using Balloon Catheter;  Surgeon: Valentin Hernandez MD;  Location:  OR     ENT SURGERY      tonsillectomy     GYN SURGERY      laparoscopy, excised fallopian tube on left     LAPAROSCOPIC CHOLECYSTECTOMY N/A 4/24/2020    Procedure: LAPAROSCOPIC CHOLECYSTECTOMY;  Surgeon: Agustina Nettles MD;  Location:  OR     ORTHOPEDIC SURGERY      bunion         Social History   I have reviewed this patient's  "social history and updated it with pertinent information if needed.  Social History     Tobacco Use     Smoking status: Never Smoker     Smokeless tobacco: Never Used   Substance Use Topics     Alcohol use: Yes     Comment: 2-3 per week     Drug use: Never     Lives with her mother     Family History   I have reviewed this patient's family history and updated it with pertinent information if needed.   Family History   Problem Relation Age of Onset     Hypertension Mother      Heart Murmur Mother      Coronary Artery Disease Father      Valvular heart disease Paternal Grandfather      Prior to Admission Medications   Prior to Admission Medications   Prescriptions Last Dose Informant Patient Reported? Taking?   Probiotic Product (PROBIOTIC PO)  Self Yes No   Sig: Take 1 capsule by mouth At Bedtime   Semaglutide (OZEMPIC, 1 MG/DOSE, SC)   Yes No   acetaminophen (TYLENOL) 325 MG tablet   No No   Sig: Take 2 tablets (650 mg) by mouth every 4 hours as needed for pain   aspirin 81 MG EC tablet  Self Yes No   Sig: Take 81 mg by mouth daily   atorvastatin (LIPITOR) 80 MG tablet  Self Yes No   Sig: Take 80 mg by mouth daily   diphenhydrAMINE-acetaminophen (TYLENOL PM)  MG tablet  Self Yes No   Sig: Take 2 tablets by mouth nightly as needed    hydroxychloroquine (PLAQUENIL) 200 MG tablet  Self Yes No   Sig: Take 200 mg by mouth 2 times daily   ibuprofen (ADVIL/MOTRIN) 200 MG tablet  Self Yes No   Sig: Take 400 mg by mouth See Admin Instructions Per patient she takes APAP or ibuprofen or naproxen twice a day. She \"changes it up\" every day but takes otc medication for pain twice a day.   insulin glargine (LANTUS PEN) 100 UNIT/ML pen   No No   Sig: Take 25 units at bedtime, increase dose as directed   levothyroxine (SYNTHROID/LEVOTHROID) 137 MCG tablet  Self Yes No   Sig: Take 137 mcg by mouth daily    lisinopril (PRINIVIL/ZESTRIL) 20 MG tablet  Self Yes No   Sig: Take 20 mg by mouth daily   metFORMIN (GLUCOPHAGE) 500 MG " "tablet  Self Yes No   Sig: Take 1,000 mg by mouth 2 times daily (with meals)   methotrexate 2.5 MG tablet  Self Yes No   Sig: Take 22.5 mg by mouth once a week on Monday (9 x 2.5 mg tablet)   multivitamin, therapeutic (THERA-VIT) TABS tablet  Self Yes No   Sig: Take 1 tablet by mouth daily   naproxen sodium (ANAPROX) 220 MG tablet  Self Yes No   Sig: Take 440 mg by mouth See Admin Instructions Per patient she takes APAP or ibuprofen or naproxen twice a day. She \"changes it up\" every day but takes otc medication for pain twice a day.   predniSONE (DELTASONE) 5 MG tablet  Self Yes No   Sig: Take 4 mg by mouth daily    sulfaSALAzine (AZULFIDINE) 500 MG tablet  Self Yes No   Sig: Take 1,000 mg by mouth 2 times daily   tofacitinib (XELJANZ) 5 MG tablet  Self Yes No   Sig: Take 5 mg by mouth 2 times daily      Facility-Administered Medications: None     Allergies   No Known Allergies    Physical Exam   Vital Signs: Temp: 99.9  F (37.7  C) Temp src: Axillary BP: 119/79 Pulse: 92   Resp: (!) 35 SpO2: 98 % O2 Device: BiPAP/CPAP    Weight: 248 lbs 4.8 oz    Constitutional: Ill-appearing   Eyes: PERRL, EOMI  HENT: Oropharynx clear, MMM  Respiratory: Diminished at bases L>R, no crackles or wheezes, able to speak in shorter sentences on BiPAP   Cardiovascular: RRR, no m/r/g. No peripheral edema.   GI: Soft, non-tender, non-distended. No rebound tenderness or guarding.   Skin: Warm, dry   Neurologic: Alert. Responding to questions appropriately. Following commands.   Psychiatric: Normal affect, appropriate      Data   Data reviewed today: I reviewed all medications, new labs and imaging results over the last 24 hours. I personally reviewed the EKG tracing showing sinus tachycardia with left bundle branch block (old).    Recent Labs   Lab 09/12/21 2150 09/12/21  2149   WBC  --  9.4   HGB  --  12.6   MCV  --  94   PLT  --  281     --    POTASSIUM 4.5  --    CHLORIDE 100  --    CO2 22  --    BUN 27  --    CR 1.12*  --  "   ANIONGAP 11  --    CHANTELLE 9.1  --    *  --    ALBUMIN 3.3*  --    PROTTOTAL 8.0  --    BILITOTAL 0.7  --    ALKPHOS 89  --    ALT 50  --    AST 72*  --    TROPONIN  --  <0.015       Recent Results (from the past 24 hour(s))   XR Chest Port 1 View    Narrative    EXAM: XR CHEST PORT 1 VIEW  LOCATION: [facility]  DATE/TIME: 9/12/2021 9:59 PM    INDICATION: Shortness of breath.  COMPARISON: 4/20/2020.    FINDINGS: The heart is enlarged. There is no pulmonary edema. Left hemidiaphragm is elevated and there is left basilar infiltrate. Curvilinear atelectasis or scarring at the right lung base. The upper lungs are clear. No pneumothorax.      Impression    IMPRESSION: Left basilar pneumonia.   CT Chest Pulmonary Embolism w Contrast    Narrative    EXAM: CT CHEST PULMONARY EMBOLISM W CONTRAST  LOCATION: [facility]  DATE/TIME: 9/12/2021 11:15 PM    INDICATION: COVID-19 pneumonia with respiratory failure  COMPARISON: CT chest exam 04/20/2020  TECHNIQUE: CT chest pulmonary angiogram during arterial phase injection of IV contrast. Multiplanar reformats and MIP reconstructions were performed. Dose reduction techniques were used.   CONTRAST: 70mL Isovue-370    FINDINGS:  ANGIOGRAM CHEST: Pulmonary arteries are normal caliber and negative for pulmonary emboli. Thoracic aorta is negative for dissection. No CT evidence of right heart strain.    LUNGS AND PLEURA: Extensive groundglass opacities throughout both upper and lower lobes. Dependent atelectasis both bases. No effusions. Elevated left hemidiaphragm.    MEDIASTINUM/AXILLAE: A few borderline-enlarged mediastinal nodes.     CORONARY ARTERY CALCIFICATION: None.    UPPER ABDOMEN: Cholecystectomy.    MUSCULOSKELETAL: Hypertrophic changes thoracic spine.      Impression    IMPRESSION:  1.  No evidence for pulmonary emboli.  2.  Extensive groundglass opacities both lungs characteristic of Covid 19 pneumonia.    Commonly  reported imaging features of (COVID-19) pneumonia are present. Influenza pneumonia and organizing pneumonia as can be seen in the setting of drug toxicity and connective tissue disease can cause a similar imaging pattern.

## 2021-09-13 NOTE — PLAN OF CARE
Pt admitted this am for resp distress due to covid pna. Rec'd pt on bipap.100%sats in the mid 90's, when removed for nasal swab and oral care dropped to chloe 70's. LS dim. Zosyn infusing Q4 hour BG. Review POC with patient.

## 2021-09-13 NOTE — PLAN OF CARE
VSS except RR. Pt states she is comfortable but RR 30-40 at times. MD is aware. Trending below 30 this evening. BIPAP at 100%, attempted to wean but desats with any activity/coughing. Continue close monitoring of resp status. Up to BSC with SBA but very dyspneic. Encouraged purewick, now in place. Spoke with pt about self proning, too difficult at this point, states she will roll to her side more often.

## 2021-09-13 NOTE — ED NOTES
Pt's aunt Kina Caban would like to be contacted when the COVID status results [phone]. She would like to come to visit in the morning if possible.

## 2021-09-13 NOTE — ED NOTES
Patient is maintaining SpO2 97% at current BIPAP settings and stated that her breathing is feeling much better. Patient denied increased work of breathing and is tolerating BIPAP well.

## 2021-09-13 NOTE — PROGRESS NOTES
Pt states her breathing feels fine, RR is mid 30's on BIPAP at 80%. Requests to get up to use commode. SBA to commode and back. Pt states she did fine and feels fine. RR in the 40's and significant increase in coughing while moving. 02 sats unable to get up over 88%, new 02 sensor placed, FI02 increased to 100%,

## 2021-09-14 NOTE — PROGRESS NOTES
[facility]    Medicine Progress Note - Hospitalist Service       Date of Admission:  9/12/2021    Assessment & Plan           Pinky Whatley is a 55 year-old female with history of rheumatoid arthritis, diabetes mellitus type 2, hypothyroidism, hypertension who presents with progressive shortness of breath. Admitted on 9/12/2021 with COVID19 pneumonia and respiratory failure.     COVID19 pneumonia, bilateral  Acute hypoxic respiratory failure requiring BiPAP  Possible superimposed bacterial pneumonia  COVID19 in a vaccinated person  *Initially developed nighttime fevers 10-11 days prior to admission. Subsequently on 9/9 developed shortness of breath, dry cough, poor intake, chills, night sweats that steadily progressed over the next few days till she could no longer function due to her breathing difficulty. COVID19 PCR positive (9/12).   *CXR with left basilar infiltrate. CT chest PE protocol negative for PE, extensive groundglass opacities in both lungs characteristic of COVID-19 pneumonia  *O2 saturation 68% on room air. Required BiPAP.   *Vaccinated. Immunosuppressed status as below.   *->92.5  - Continue dexamethasone 6 mg daily for 10 days or hospital discharge  - Continue remdesivir for 5 days or hospital discharge  - Enoxaparin subcutaneous for DVT prophylaxis   - Continue BIPAP  - Procalcitonin 0.98.    - piperacillin-tazobactam IV and doxycycline IV   - MRSA nasal swab negative, no vancomycin  - Famotidine for GI prophylaxis  - Self prone as tolerated  - Monitor COVID19 labs   - If worsening respiratory status would give trial of IV diuretics      Diabetes mellitus, type 2, on long term insulin   HgbA1c 7.0% 9/13/21. Reports being on glargine 30 units daily   - Reduce prior to admission glargine only to 20 units daily given steroid needs and holding of other medications   - High sliding scale insulin per NPO protocol while on BiPAP   - Hold oral medications while  hospitalized  - Hypoglycemia protocol      Rheumatoid arthritis  Prior to admission on Xeljanz, prednisone, hydroxychloroquine  - Hold prior to admission immunosuppressants due to acute infection  - Will be on higher dose steroids as above      Morbid obesity  Body mass index is 46.92 kg/m .. Increase in all-cause morbidity and mortality. Encourage weight loss.      Obstructive sleep apnea, severe  Previously recommended AutoPap, has not yet obtained machine due to financial issues.   - On BiPAP as above     Hypothyroidism  - Hold oral synthroid while NPO, resume once taking orals. If has prolonged course of bipap, will consider IV synthroid     Hyperlipidemia  - Okay to hold prior to admission statin temporarily        Diet: NPO for Medical/Clinical Reasons Except for: No Exceptions    DVT Prophylaxis: Enoxaparin (Lovenox) SQ  Lamar Catheter: Not present  Central Lines: None  Code Status: Full Code      Disposition Plan   Expected discharge: 09/16/2021   recommended to prior living arrangement once O2 use less than 2 liters/minute.     The patient's care was discussed with the Bedside Nurse and Patient.    Misael Medeiros MD  Hospitalist Service  [facility]  Securely message with the Vocera Web Console (learn more here)  Text page via [employer] Paging/Directory      Clinically Significant Risk Factors Present on Admission                Time Spent on this Encounter   Pinky was seen and evaluated by me on 09/14/21.  She was in critical condition as the result of acute hypoxic respiratory failure due to covid 19.    Her condition is now Serious and remains serious.      The acute issues managed by me today include  Acute Hypoxic respiratory failure requiring maximum bipap support   Supportive interventions provided and/or ordered by me include management of bipap support, failed attempt to wean.    Total Critical Care time spent by me, excluding procedures, was 30 minutes.    Misael HENDERSON  [place] MD      ______________________________________________________________________    Interval History   Patient seen in AM, Has ongoing tachypnea, prolonged desats with activity. States she still feels okay, slept well overnight. Does not feel that she is tiring out and does not look fatigued from her respiratory rate. Attempted titration of FiO2 to 90%, but desated quite quickly and had slow return.   BiPAP settings FiO2 100%, 17/7. While resting tidal volume 350-400 with resp rate of 35-40. While awake resp rate 30-35 with tidal volume 350-550.  Overall appears that she remains stable to maybe slightly improved but remains critically ill requiring maximum bipap support.    Discussed she needs to use purewick. Will not get her out of bed to commode. Will not place roberts at this time.     Data reviewed today: I reviewed all medications, new labs and imaging results over the last 24 hours. I personally reviewed no images or EKG's today.    Physical Exam   Vital Signs: Temp: 98  F (36.7  C) Temp src: Axillary BP: 124/77 Pulse: 82   Resp: (!) 34 SpO2: 96 % O2 Device: BiPAP/CPAP    Weight: 257 lbs 9.6 oz  Constitutional: Awake, alert, cooperative. Rapid tachypnea. Does not appeared in distress.  Respiratory: diffuse crackles. Distant sounds.  Cardiovascular: Regular rate and rhythm, normal S1 and S2, and no murmur noted  GI: Normal bowel sounds, soft, non-distended, non-tender  Skin/Integumen: No rashes, no cyanosis, no edema  Other: obese      Data   Recent Labs   Lab 09/14/21  0751 09/14/21  0534 09/14/21  0415 09/13/21  0443 09/12/21  2150 09/12/21  2150 09/12/21  2149   WBC  --  5.7  --  4.8  --   --  9.4   HGB  --  9.8*  --  10.2*  --   --  12.6   MCV  --  95  --  92  --   --  94   PLT  --  235  --  188  --   --  281   NA  --  141  --  136  --  133  --    POTASSIUM  --  4.1  --  4.3  --  4.5  --    CHLORIDE  --  111*  --  109  --  100  --    CO2  --  23  --  21  --  22  --    BUN  --  26  --  20  --  27  --     CR  --  0.91  --  0.89  --  1.12*  --    ANIONGAP  --  7  --  6  --  11  --    CHANTELLE  --  8.9  --  7.8*  --  9.1  --    * 157* 157* 210*   < > 276*  --    ALBUMIN  --   --   --   --   --  3.3*  --    PROTTOTAL  --   --   --   --   --  8.0  --    BILITOTAL  --   --   --   --   --  0.7  --    ALKPHOS  --   --   --   --   --  89  --    ALT  --   --   --   --   --  50  --    AST  --   --   --   --   --  72*  --    TROPONIN  --   --   --   --   --   --  <0.015    < > = values in this interval not displayed.     No results found for this or any previous visit (from the past 24 hour(s)).  Medications     - MEDICATION INSTRUCTIONS -       - MEDICATION INSTRUCTIONS -       sodium chloride         remdesivir  100 mg Intravenous Q24H    And     sodium chloride 0.9%  50 mL Intravenous Q24H     dexamethasone  6 mg Intravenous Daily     doxycycline (VIBRAMYCIN) IV  100 mg Intravenous Q12H     enoxaparin ANTICOAGULANT  40 mg Subcutaneous BID     famotidine  20 mg Intravenous Q12H     insulin aspart  1-12 Units Subcutaneous Q4H     [START ON 9/15/2021] insulin glargine  20 Units Subcutaneous QAM AC     piperacillin-tazobactam  3.375 g Intravenous Q6H     sodium chloride (PF)  3 mL Intracatheter Q8H     sodium chloride (PF)  3 mL Intracatheter Q8H

## 2021-09-14 NOTE — PROGRESS NOTES
House NP rounded on Pt, has a low Pao2/Fio2 score,- updated that vitals stable, RR30's,tolerating BIPAP, pt refusing to prone. Pt states breathing feels ok, denies SOB, maintaining margarito on 100% FIO2. Pt does de-sat rapidly during oral cares.     -advised to continue to monitor closely, advised that during this critical time avoid/minimize oral cares as possible. Encourage pt to reposition at least onto her side, avoid supine positioning.

## 2021-09-14 NOTE — PLAN OF CARE
Care plan note:      Recent Vitals:  Temp: 97.5  F (36.4  C) Temp src: Axillary BP: 129/86 Pulse: 73   Resp: 30 SpO2: 100 % O2 Device: BiPAP/CPAP      Orientation/Neuro: Alert and Oriented x 4  Pain: The patient is not having any pain.   Tele: Sinus rhythm    IV medications: IV antibiotics   Mobility: Bedrest   Skin: With in normal limits   GI: WDL  : WDL     Diet: Tolerating diet:   NPO  Orders Placed This Encounter      NPO for Medical/Clinical Reasons Except for: No Exceptions      Safety/Concerns:  Fall Risk, Aspiration precautions, and Isolation precautions  Aggression Color: Green    Plan: Pt remains on BIPAP, tolerating well, however RR high 20's to 40's.  Following blood sugar Q4H. Reviewed covid precautions. Encouraging positioning to sideling, as refusing to prone.     Continue to monitor.      Elly Gonzalez RN

## 2021-09-14 NOTE — PROGRESS NOTES
/75   Pulse 67   Temp 97.8  F (36.6  C) (Axillary)   Resp (!) 33   Wt 117.9 kg (260 lb)   LMP  (LMP Unknown)   SpO2 95%   BMI 49.13 kg/m      Pt on BiPAP throughout the night and tolerating well. ABG drawn to evaluate V/Q and P/F. ABG below and PF Ratio 92. VSS with diminished BS. No distress noted but de-sats with NIV removal.    CPAP/BiPAP Settings  IPAP: 17  EPAP: 7  Rate: 12  FiO2: 100%  Timed Inspiration (sec): 1.0    Recent Labs   Lab 09/13/21 2042 09/13/21  0003 09/12/21  2302   PH 7.35  --   --    PCO2 39  --   --    PO2 92  --   --    HCO3 21  --   --    O2PER 100 100 100     Will continue to assess NIV settings, V/Q and titrate as tolerated. RT following.    ROMEL LEMA, RT

## 2021-09-14 NOTE — PROGRESS NOTES
Respiratory care note.placed on HFNC at 97% fi02, 55lpm .02 sats at 90-93% sat when pt isn't talking. RR30's  Pt stated she was doing OK. Will watch pt tonight. Can place back on Bipap . BMunyanRT

## 2021-09-14 NOTE — PROGRESS NOTES
Care plan note:      Recent Vitals:  Temp: 98  F (36.7  C) Temp src: Axillary BP: 103/79 Pulse: 80   Resp: (!) 39 SpO2: 97 % O2 Device: BiPAP/CPAP      Orientation/Neuro: Alert and Oriented x 4  Pain: The patient is not having any pain.              Tele: Sinus rhythm               IV medications: IV antibiotics              Mobility: Bedrest              Skin: With in normal limits              GI: WDL  : WDL-purewick r/t decreased activity tolerance                 Diet: Tolerating diet:   NPO  Orders Placed This Encounter      NPO for Medical/Clinical Reasons Except for: No Exceptions                 Safety/Concerns:  Fall Risk, Aspiration precautions, and Isolation precautions  Aggression Color: Green     Plan: Pt remains on BIPAP, tolerating well, however RR 30's to 40's.  Following blood sugar Q4H.  Encouraging positioning to sidelying, refuses to move off left sidelying.              Continue to monitor.        Elly Gonzalez RN

## 2021-09-15 NOTE — PROGRESS NOTES
[facility] ICU RESPIRATORY NOTE        Date of Admission: 9/12/2021    Date of Intubation (most recent):9/15/2021    Reason for Mechanical Ventilation:Respiratory failure    Number of Days on Mechanical Ventilation:1    Met Criteria for Spontaneous Breathing Trial:No    Reason for No Spontaneous Breathing Trial:Pt is on a PEEP 14, FiO2 100%, and full strength Veletri is running.    Significant Events Today:Pt was intubated due to respiratory failure on BiPAP. Veletri was started. Vent settings changed throughout the day per MD order.    ABG Results:   Recent Labs   Lab 09/15/21  1617 09/15/21  1354 09/15/21  0828 09/15/21  0813 09/13/21  2042 09/13/21 2042   PH 7.31* 7.33*  --  7.30*  --  7.35   PCO2 46* 46*  --  51*  --  39   PO2 68* 54*  --  22*  --  92   HCO3 23 24  --  25  --  21   O2PER 100 80 2 100   < > 100    < > = values in this interval not displayed.       Current Vent Settings: Ventilation Mode: CMV/AC  (Continuous Mandatory Ventilation/ Assist Control)  FiO2 (%): 100 %  Rate Set (breaths/minute): 14 breaths/min  Tidal Volume Set (mL): 320 mL  PEEP (cm H2O): 14 cmH2O  Oxygen Concentration (%): 100 %  Resp: 18      Plan:Will cont full vent support for now and will assess for weaning readiness daily.    Delores Miller, RT

## 2021-09-15 NOTE — CONSULTS
"CLINICAL NUTRITION SERVICES  -  ASSESSMENT NOTE      Recommendations Ordered by Registered Dietitian (RD): Vital HP at 10 mL/hr to provide:  240 kcal, 21 g protein, 27 g CHO, no fiber, 201 mL H2O  Total with Propofol = 1425 kcal (13 kcal/kg)  Flushes 60 mL every 4 hours   Certavite daily    Malnutrition: % Weight Loss:  Weight loss does not meet criteria for malnutrition (likely fluid shifts)  % Intake:  </= 50% for >/= 5 days (severe malnutrition)(will meet 9/16)  Subcutaneous Fat Loss:  Unable to determine   Muscle Loss:  Unable to determine   Fluid Retention:  None noted    Malnutrition Diagnosis: Unable to determine due to lack of Nutrition Focused Physical Exam         REASON FOR ASSESSMENT  Pinky Whatley is a 55 year old female seen by Registered Dietitian for Provider Order - Registered Dietitian to Assess and Order TF per Medical Nutrition Therapy Protocol      NUTRITION HISTORY  - Information obtained from [facility] as patient intubated and unable to provide.    - Patient was seen by my colleague on 4/26/21 and the following history was obtained at that time:  Pt is on a regular diet at home  She lives with her mom who prepares the evening meal (main meal of the day with meat/starch, veg).  Otherwise, she tried to prepare simple foods for herself for breakfast and lunch.  She tries to watch sugars and salt.  Usual appetite and intake has been good.  No food allergies/intolerances.      CURRENT NUTRITION ORDERS  Diet Order:     NPO day #4    Current Intake/Tolerance:  N/A      NUTRITION FOCUSED PHYSICAL ASSESSMENT FOR DIAGNOSING MALNUTRITION)  No:  COVID+ isolation                 Observed:    Unable     Obtained from Chart/Interdisciplinary Team:  None     ANTHROPOMETRICS  Height: 5'1\"  Weight: 112.6 kg (248#)(9/12)  Body mass index is 46.9 kg/m   Weight Status:  Obesity Grade III BMI >40  IBW: 47.7 kg   % IBW: 236%  Weight History:   Wt Readings from Last 10 Encounters:   09/15/21 115 kg (253 lb 8.5 oz) "   04/09/21 119.4 kg (263 lb 3.2 oz)   04/29/20 142 kg (313 lb 0.9 oz)   10/14/19 135 kg (297 lb 9.6 oz)   09/07/19 130.2 kg (287 lb)     Weight up from admit - likely fluids (on Lasix)    LABS  CRP 91.8 (H)    MEDICATIONS  Propofol at 44.9 mL/hr= 1185 kcal   Norepi (pressor)      ASSESSED NUTRITION NEEDS PER APPROVED PRACTICE GUIDELINES:    Dosing Weight 112.6 kg (energy) and 47.7 kg (protein)  Estimated Energy Needs: 7260-5524 kcals (11-14 Kcal/Kg)  Justification: obese and vented  Estimated Protein Needs:  grams protein (2-2.5 g pro/Kg)  Justification: hypercatabolism with critical illness and obesity guidelines   Estimated Fluid Needs: 5470-6219 mL (1 mL/Kcal)  Justification: maintenance    MALNUTRITION:  % Weight Loss:  Weight loss does not meet criteria for malnutrition (likely fluid shifts)  % Intake:  </= 50% for >/= 5 days (severe malnutrition)(will meet 9/16)  Subcutaneous Fat Loss:  Unable to determine   Muscle Loss:  Unable to determine   Fluid Retention:  None noted    Malnutrition Diagnosis: Unable to determine due to lack of Nutrition Focused Physical Exam     NUTRITION DIAGNOSIS:  Inadequate protein-energy intake related to NPO with plans to start TF today as evidenced by meeting 0% protein and 95% energy needs from Propofol       NUTRITION INTERVENTIONS  Recommendations / Nutrition Prescription  Vital HP at 10 mL/hr to provide:  240 kcal, 21 g protein, 27 g CHO, no fiber, 201 mL H2O  Total with Propofol = 1425 kcal (13 kcal/kg)  Flushes 60 mL every 4 hours   Certavite daily     Implementation  Nutrition education: Not appropriate at this time due to patient condition  EN Composition, EN Schedule, Feeding Tube Flush, Multivitamin/Mineral:  Orders written to begin TF at 10 mL/hr, Flushes and Certavite as above   Collaboration and Referral of Nutrition care:  Patient discussed today during interdisciplinary bedside rounds     Nutrition Goals  Patient will meet % needs within the next 2-3  days     MONITORING AND EVALUATION:  Progress towards goals will be monitored and evaluated per protocol and Practice Guidelines    Dorota Jurado RD, LD, CNSC   Clinical Dietitian - [facility]

## 2021-09-15 NOTE — PHARMACY-ADMISSION MEDICATION HISTORY
Pharmacy Medication History  Admission medication history interview status for the 9/12/2021  admission is complete. See EPIC admission navigator for prior to admission medications     Location of Interview: Outside patient room but on unit  Medication history sources: Patient's family/friend (mother), Surescripts and Pharmacy ([facility])    In the past week, patient estimated taking medication this percent of the time: 50-90% due to other    Additional medication history information:   1. Pharmacy unable to speak with patient and family does not know medications. Pt now intubated, list updated per outside fill records and [facility]. Unable to confirm supplements and over the counter products.  2. Sulfasalazine last filled 3/15/2021 x90 days  3. Pharmacy will update PTA list if needed.     Medication reconciliation completed by provider prior to medication history? No    Time spent in this activity: 20 minutes    Prior to Admission medications    Medication Sig Last Dose Taking? Auth Provider   atorvastatin (LIPITOR) 80 MG tablet Take 80 mg by mouth daily  Yes Unknown, Entered By History   hydroxychloroquine (PLAQUENIL) 200 MG tablet Take 200 mg by mouth 2 times daily  Yes Unknown, Entered By History   insulin glargine (LANTUS PEN) 100 UNIT/ML pen Take 25 units at bedtime, increase dose as directed  Patient taking differently: Inject 30 Units Subcutaneous At Bedtime   Yes Dheeraj Malagon MD   levothyroxine (SYNTHROID/LEVOTHROID) 137 MCG tablet Take 137 mcg by mouth daily   Yes Unknown, Entered By History   lisinopril (PRINIVIL/ZESTRIL) 20 MG tablet Take 20 mg by mouth daily  Yes Unknown, Entered By History   metFORMIN (GLUCOPHAGE) 500 MG tablet Take 1,000 mg by mouth 2 times daily (with meals)  Yes Unknown, Entered By History   methotrexate 2.5 MG tablet Take 22.5 mg by mouth once a week on Monday (9 x 2.5 mg tablet)  Yes Unknown, Entered By History   predniSONE (DELTASONE) 5 MG tablet Take 4 mg by mouth daily    "Yes Unknown, Entered By History   semaglutide (OZEMPIC (1 MG/DOSE)) 2 MG/1.5ML pen Inject 0.5 mg Subcutaneous every 7 days   Yes Reported, Patient   tofacitinib (XELJANZ) 5 MG tablet Take 5 mg by mouth 2 times daily  Yes Unknown, Entered By History   acetaminophen (TYLENOL) 325 MG tablet Take 2 tablets (650 mg) by mouth every 4 hours as needed for pain   Damaris Anderson PA-C   aspirin 81 MG EC tablet Take 81 mg by mouth daily   Unknown, Entered By History   diphenhydrAMINE-acetaminophen (TYLENOL PM)  MG tablet Take 2 tablets by mouth nightly as needed    Unknown, Entered By History   ibuprofen (ADVIL/MOTRIN) 200 MG tablet Take 400 mg by mouth See Admin Instructions Per patient she takes APAP or ibuprofen or naproxen twice a day. She \"changes it up\" every day but takes otc medication for pain twice a day.   Unknown, Entered By History   multivitamin, therapeutic (THERA-VIT) TABS tablet Take 1 tablet by mouth daily   Unknown, Entered By History   naproxen sodium (ANAPROX) 220 MG tablet Take 440 mg by mouth See Admin Instructions Per patient she takes APAP or ibuprofen or naproxen twice a day. She \"changes it up\" every day but takes otc medication for pain twice a day.   Unknown, Entered By History   Probiotic Product (PROBIOTIC PO) Take 1 capsule by mouth At Bedtime   Unknown, Entered By History   sulfaSALAzine (AZULFIDINE) 500 MG tablet Take 1,000 mg by mouth 2 times daily   Unknown, Entered By History     The information provided in this note is only as accurate as the sources available at the time of update(s)     Daniele GloriaD, PGY-1 Resident   "

## 2021-09-15 NOTE — PROGRESS NOTES
Respiratory care note. Intubated pt with CRNA 0900 today.100% 20lpm Ambu bagging pt to ICU. Ett tube at 23cm  , 7.5 ETT. 02 sats 88% with intubation.Will follow pt in ICU today. BMunyanRT

## 2021-09-15 NOTE — CODE/RAPID RESPONSE
[facility]    RRT Note  9/15/2021   Time Called: 4628    RRT called for: tachypnea    Assessment & Plan   IMPRESSION & PLAN:    Pinky Whatley is a 55 year old female w/ PMH of rheumatoid arthritis on immunosuppressive agents, DM2, hypothyroidism, hypertension who was admitted on 9/12/2021 for COVID-19 pneumonia respiratory failure.  She has been alternating between high flow nasal cannula and NIPPV for the last several days.  When on NIPPV she is on 1.0 FiO2, 70/7.    Patient had used HFNC for several hours overnight returned to NIPPV around 6 this morning.  There after patient really had a hard time regaining respiratory stability as she was frequently tachypneic in the 40s.  For this reason RRT was called.    On my arrival patient is in respiratory distress with accessory muscle use and tachypnea.  Her minute ventilation is in the high 20s, respiratory rate is 40s to 50s on 17/7 NIPPV with 1.0 FiO2.  There is no diaphoresis but she is unable to speak in full sentences.  No subcutaneous emphysema.  She has inspiratory wheezes in the right upper lobe.  Heart rate is in the 130s to 140s, she is hypertensive with SBP's in the 140s, SPO2 labile anywhere from 72-92 primarily in the 80 to 92% range    VS:  As above  Immuncompromised: yes    Impression  Acute hypoxic resp failure 2/2 covid 19 pneumonia in immune compromised but vaccinated host  Differential diagnosis:  -pt ruled out for PE on day of admit, remains on [facility]    INTERVENTIONS:  -dilaudid 0.5mg IV x1 stat repeated for a second dose  -stat abg was ordered but VBG was obtained  -secure additional IV accss  -I spoke with patient she is agreeable to intubation just very scared about it given prior experience.  She is hopeful to postpone as long as possible.  -20 mg IV Lasix x1.  She appears euvolemic, has a negative I&O balance, goal is to dry lungs out and hopefully temporize situation until patient can be moved to ICU  -It is my  opinion that patient has impending respiratory failure and will need mechanical ventilation before the end of the day.  I have made arrangements to transfer patient to intensive care.  I did verbally confirm with patient that she is open for intubation.  Patient was able to speak with mother prior to transfer and intubation  -On transferring patient from bed to stretcher despite premedication with above second dose of hydromorphone patient head progressive hypoxia with SPO2 in the 70s and respiratory rate in the 50s.  SPO2 was slow to recover only reaching mid 80s.  It was my opinion that it was unsafe to transfer patient even up 1 floor to ICU without intubation.  -Overhead stat paged anesthesia for intubation prior to transfer to optimize patient's safety  -Patient was intubated at 9:10 AM with 7.5 ET tube by anesthesia providers on first attempt  -Propofol infusion started at 30 mcg/kg  -Patient was transferred to intensive care post intubation with SPO2 90% and  mmHg, heart rate 140s, bag ventilation to ETT  -Post intubation on arrival to ICU I have updated patient's mother Shagufta on the change in her situation.  In my earlier discussion I did share with both pt and her mom my concern of patient's high risk of  mortality  -picc ordered in case prolonged wait for ICU bed, anticipate need for central venous access    Working diagnosis: Progressive acute hypoxic respiratory failure secondary to Covid-19 pneumonia    At the end of the RRT patient been intubated and was transferred to intensive care.    Disposition: ICU    Discussed with and defer further cares to [facility] hospitalist attending physician  and intensivist attending physician      Code Status: Full Code    Allergies   No Known Allergies    Physical Exam   Vital Signs with Ranges:  Temp:  [98.1  F (36.7  C)-98.4  F (36.9  C)] 98.4  F (36.9  C)  Pulse:  [] 118  Resp:  [29-49] 45  BP: (103-149)/(67-84) 149/84  FiO2 (%):   [96 %-100 %] 100 %  SpO2:  [80 %-98 %] 88 %  I/O last 3 completed shifts:  In: 100 [I.V.:100]  Out: 600 [Urine:600]    Constitutional: vs as above  General:  adult pt lying in bed w/ acute distress, accessory muscle use, tachypnea  Neuro: +follows commands wiggle toes and show 2 fingers bilat, face symmetric, tongue midline, speech fluent  Eyes defer  Head, ENT & mouth: NC/AT, dry  Neck: supple  CV S1S2 ST  resp: tachypneic w/ RR 40s, insp wheeze RUL, no subcutaneous air on palpation   gi:normoactive bowel sounds, soft, nontender, nondisteded  Ext: no edema  Skin: no rashes on exposed skin  Lymph:defer  Musculoskeletal no bony joint deformities    Data     ABG:  -  Recent Labs   Lab 09/15/21  0828 09/15/21  0813 09/15/21  0813   PH  --   --  7.30*   PCO2  --   --  51*   PO2  --   --  22*   HCO3  --   --  25   O2PER 2   < > 100    < > = values in this interval not displayed.       CBC with Diff:  Recent Labs   Lab Test 09/15/21  0612 04/21/20  0544 04/20/20  0603   WBC 8.8   < > 6.8   HGB 11.6*   < > 12.0   MCV 93   < > 97      < > 185   INR  --   --  1.09    < > = values in this interval not displayed.        Comprehensive Metabolic Panel:  Recent Labs   Lab 09/15/21  0612 09/13/21  0443 09/12/21  2150      < > 133   POTASSIUM 4.0   < > 4.5   CHLORIDE 112*   < > 100   CO2 23   < > 22   ANIONGAP 7   < > 11   *   < > 276*   BUN 29   < > 27   CR 0.87   < > 1.12*   GFRESTIMATED 76   < > 56*   CHANTELLE 9.6   < > 9.1   PROTTOTAL  --   --  8.0   ALBUMIN  --   --  3.3*   BILITOTAL  --   --  0.7   ALKPHOS  --   --  89   AST  --   --  72*   ALT  --   --  50    < > = values in this interval not displayed.       UA:  Recent Labs   Lab 09/12/21 2247   COLOR Yellow   APPEARANCE Slightly Cloudy*   URINEGLC 100 *   URINEBILI Negative   URINEKETONE 40 *   SG 1.031   UBLD Trace*   URINEPH 5.5   PROTEIN 200 *   NITRITE Negative   LEUKEST Negative   RBCU 3*   WBCU 3       Time Spent on this Encounter   I spent 1h 40  minutes minutes (9643-2371) of critical care time on the unit/floor managing the care of Pinky Whatley. Upon evaluation, this patient had a high probability of imminent or life-threatening deterioration due to acute hypoxic resp failure, which required my direct attention, intervention, and personal management including decision to intubate pt 100% of my time was spent at the bedside counseling the patient and/or coordinating care regarding services listed in this note.    Bhumi Hightower, [facility]  Hospitalist [place] [facility]  [phone]

## 2021-09-15 NOTE — PROGRESS NOTES
Received order for PICC line placement this a.m. Assessed upper arms bilaterally with US and did not find a vein that would be viable for PICC placement. Brachial vein extremely small on both arms. Basilic veins were small as well but additionally were wrapped around the artery so as to eliminate a safe option for PICC placement. Notified bedside RN who will discuss other options for vascular access with MD.

## 2021-09-15 NOTE — PLAN OF CARE
VSS, A/Ox4, on HFNC 55L, denies pain, SOB/MALCOLM, strict bedrest d/t respiratory status, , 110, pure wick in place, on intermittent abx, PIV TKO, tele NSR, advanced to clears per order d/t pt being off bipap for >2 hours, put back on BiPAP at 630 d/t SaO2 levels dropping, and returned to NPO, continue to monitor

## 2021-09-15 NOTE — PROCEDURES
[facility]    Arterial line placement    Date/Time: 9/15/2021 1:21 PM  Performed by: Doron Aaron MD  Authorized by: Doron Aaron MD     UNIVERSAL PROTOCOL   Site Marked: Yes  Prior Images Obtained and Reviewed:  Yes  Required items: Required blood products, implants, devices and special equipment available    Patient identity confirmed:  Arm band  Patient was reevaluated immediately before administering moderate or deep sedation or anesthesia  Confirmation Checklist:  Patient's identity using two indicators  Time out: Immediately prior to the procedure a time out was called    Universal Protocol: the Joint Commission Universal Protocol was followed    Preparation: Patient was prepped and draped in usual sterile fashion        Indication: multiple ABGs respiratory failure hemodynamic monitoring  Location:  Left radial       ANESTHESIA    Anesthesia: Local infiltration  Local Anesthetic:  Lidocaine 1% without epinephrine  Anesthetic Total (mL):  2      SEDATION    Patient Sedated: No      PROCEDURE DETAILS    Needle Gauge:  20  Seldinger technique: Seldinger technique used    Number of Attempts:  2  Post-procedure:  Line sutured and dressing applied    PROCEDURE   Length of time physician/provider present for 1:1 monitoring during sedation: 15

## 2021-09-15 NOTE — PLAN OF CARE
[facility] Nursing [facility]    Patient Information  Name: Pinky Whatley  Age: 55 year old    Assessment  Orientation/Neuro: A&O x 4  Cardiac/Tele: SR  Resp: Dyspnea on exertion, pt tolerating high flow well  GI/: GI - due for bowel movement,declined PRNs,  - external cath in place  CMS: WDL  Mobility: strict bedrest  Pain: Denies  Diet: Orders Placed This Encounter      NPO for Medical/Clinical Reasons Except for: No Exceptions    Vital Signs  B/P: 130/75, T: 98.4, P: 80, R: 30, O2: 96% on high flow    Plan  Plan/Other: continue ABX and wean O2 as able    Laila Valente RN

## 2021-09-15 NOTE — PROGRESS NOTES
Admitted to room 345 from [facility] after RRT. Intubated pre-arrival. Fighting ventilator, FELIZ purposefully on arrival. Sedated with propofol, versed, dilaudid and fentanyl for the purpose of ventilation/oxygenation.

## 2021-09-15 NOTE — ANESTHESIA PROCEDURE NOTES
Airway       Patient location during procedure: ICU       Procedure Start/Stop Times: 9/15/2021 9:10 AM  Staff -        CRNA: Lashawn Jha APRN CRNA       Other Anesthesia Staff: Margo San       Performed By: LYNN  Consent for Airway        Urgency: emergent       Consent: The procedure was performed in an emergent situation.  Report Obtained from Primary Care Team       History regarding most recent potassium obtained: Yes       History regarding presence/absence of renal failure obtained:Yes       History regarding stroke/CVA obtained:Yes       History regarding presence/absence of NM disorder: YesIndications and Patient Condition       Indications for airway management: airway protection       Mallampati: I     Induction type:intravenous       Mask difficulty assessment: 0 - not attempted    Final Airway Details       Final airway type: endotracheal airway       Successful airway: ETT - single  Endotracheal Airway Details        ETT size (mm): 7.5       Successful intubation technique: video laryngoscopy       VL Blade Size: Glidescope 3       Grade View of Cords: 1       Adjucts: stylet       Position: Right       Measured from: gums/teeth       Secured at (cm): 22       Bite block used: None    Post intubation assessment        ETT secured, Vent settings by primary/ICU team, Primary/ICU team to review CXR, Sedation to be ordered by primary/ICU team and No apparent complications       Placement verified by: capnometry, equal breath sounds and chest rise        Number of attempts at approach: 1       Secured with: commercial tube almonte       Ease of procedure: easy       Dentition: Intact and Unchanged

## 2021-09-15 NOTE — PROCEDURES
[facility]    Central line    Date/Time: 9/15/2021 1:19 PM  Performed by: Doron Aaron MD  Authorized by: Doron Aaron MD   Indications: vascular access and central pressure monitoring    UNIVERSAL PROTOCOL   Site Marked: Yes  Prior Images Obtained and Reviewed:  Yes  Required items: Required blood products, implants, devices and special equipment available    Patient identity confirmed:  Arm band  Patient was reevaluated immediately before administering moderate or deep sedation or anesthesia  Confirmation Checklist:  Patient's identity using two indicators  Time out: Immediately prior to the procedure a time out was called    Universal Protocol: the Joint Commission Universal Protocol was followed    Preparation: Patient was prepped and draped in usual sterile fashion    ESBL (mL):  5         ANESTHESIA    Local Anesthetic: Lidocaine 1% without epinephrine  Anesthetic Total (mL):  2      SEDATION    Patient Sedated: No      Preparation: skin prepped with 2% chlorhexidine  Skin prep agent dried: skin prep agent completely dried prior to procedure  Sterile barriers: all five maximum sterile barriers used - cap, mask, sterile gown, sterile gloves, and large sterile sheet  Hand hygiene: hand hygiene performed prior to central venous catheter insertion  Patient position: Trendelenburg  Catheter type: triple lumen  Catheter size: 7.5 Fr  Ultrasound guidance: yes  Sterile ultrasound techniques: sterile gel and sterile probe covers were used  Number of attempts: 1  Successful placement: yes  Post-procedure: line sutured and dressing applied  Assessment: blood return through all ports and free fluid flow    PROCEDURE    XR chest ordered   Length of time physician/provider present for 1:1 monitoring during sedation: 30

## 2021-09-15 NOTE — CONSULTS
Critical Care Progress Note      09/15/2021    Name: Pinky Whatley MRN#: [id number]   Age: 55 year old YOB: 1965     Hsptl Day# 3  ICU DAY # 1    MV DAY # 1             Problem List:   Active Problems:    Acute hypoxemic respiratory failure due to COVID-19 (H)    Pneumonia due to 2019 novel coronavirus           Summary/Hospital Course:     Pinky Whatley is a 55 year-old female with history of rheumatoid arthritis, diabetes mellitus type 2, hypothyroidism, hypertension who presents with progressive shortness of breath. Admitted on 9/12/2021 with COVID19 pneumonia and respiratory failure. O2 saturation drops to 68% on RA, was put on BiPAP with increasing FiO2 requirement.     Today morning she has increased labored breathing on BiPAP  FiO2 100%, RR 35-45 RRT activated. Patient intubated before shifting to ICU    Patient is vaccinated, on multiple immunosuppressive medications for RA (Xeljanz, Hydroxychloroquine and Methotrexate)      Assessment and plan :     Pinky Whatley is a 55 year old female admitted on 9/12/2021 for Acute respiratory failure 2/2 COVID-19 pneumonia   I have personally reviewed the daily labs, imaging studies, cultures and discussed the case with referring physician and consulting physicians.   My assessment and plan by system for this patient is as follows:    Neurology/Psychiatry:   1. Encephalopathy, drug induced  2. Anxiety  3. Analgesia  Plan  Propofol ggt  Fentanyl ggt  Analgesia with PRN Dilaudid  Delirium prevention    Cardiovascular:   1. Hemodynamics -Tachycardia, no Hypotension.   2. Rhythm - Regular   3. Ischemia - no sings of ischemia  Plan   - Tele  -  Continue monitoring.  - A-line for monitoring    Pulmonary/Ventilator Management:   # Acute respiratory failure 2/2 COVID-19 Pneumonia   # possible superimposed bacterial pneumonia   # CTA ruled out PE.  1. Airway intubated and ventilated   2. Oxygenation/ventilation/mechanics:     Ventilation Mode: CMV/AC   (Continuous Mandatory Ventilation/ Assist Control)  FiO2 (%): 100 %  Rate Set (breaths/minute): 14 breaths/min  Tidal Volume Set (mL): 300 mL  PEEP (cm H2O): 12 cmH2O  Oxygen Concentration (%): 100 %  Resp: (!) 36    Plan  - Repeat Chest XR  - Send Sputum cx   - Blood gas.  - possible intubation and lung protective ventilation.  - Veletri Full strength     GI and Nutrition :   1. No prior issues, been NPO for the last 2 days on BiPAP  2. Enteral feedings  Plan  -Post pyloric feeding tube insertion     Renal/Fluids/Electrolytes:   1. No prior kidney disease, Baseline Cr seems to be 0.87  2. Electrolyte abnormality none  3. Acid/base status respiratory acidosis  4. Volume status    Intake/Output Summary (Last 24 hours) at 9/15/2021 0901  Last data filed at 9/15/2021 0745  Gross per 24 hour   Intake 100 ml   Output 1400 ml   Net -1300 ml       Plan  - Try to maintain euvolumeia   - monitor function and electrolytes as needed with replacement per ICU protocols.  - generally avoid nephrotoxic agents such as NSAID, IV contrast unless specifically required  - adjust medications as needed for renal clearance  - follow I/O's as appropriate.    Infectious Disease:   1. COVID-19 pneumonia  # Started remdisivir and dexamethasone.   2.Suspected superimposed penumonia   # on Zosyn and doxycycline   3. Procalcitonin 0.98  > 92.5 > 91.8 Lactic acid 2.9 (9/12) not repeated   Plan  - Continue on dexamethasone and remdisivir.   - continue on zosyn pending sputum cx  - repeat CRP     Endocrine:   1. Type 2 DM, HbA1c 7   2. Hypothyroidism  3. Hyperlipidemia   4. Morbid obesity BMI 46.92  Plan  - insulin ggt, goal sugar <180  To resume oral synthroid     Hematology/Oncology:   1. Anemia, no signs, symptoms of active blood loss  Plan  - Follow Hb trends.       MSKL/Rheum:  1.Rheumatoid arthritis on Xeljanz, Hydroxychloroquine and Methotrexate  Plan  - Hold RA medications       IV/Access:   1. Venous access - CVL to be inserted   2.  Arterial access - A-line to be required   Plan  - central access required and necessary      ICU Prophylaxis:   1. DVT: Hep Subq/ LMWH/mechanical  2. VAP: HOB 30 degrees, chlorhexidine rinse  3. Stress Ulcer: PPI/H2 blocker  4. Restraints: Nonviolent soft two point restraints required and necessary for patient safety and continued cares and good effect as patient continues to pull at necessary lines, tubes despite education and distraction. Will readdress daily.   5. Wound care - per unit routine   6. Feeding - NJ tube insertion and nutrition consult   7. Family Update: as per unit protocol  8. Disposition - ICU        Key goals for next 24 hours:   1. Lung protective ventilation  2. Workup for pneumonia   3. Repeat XR chest, and blood gas          Key Medications:       remdesivir  100 mg Intravenous Q24H    And     sodium chloride 0.9%  50 mL Intravenous Q24H     dexamethasone  6 mg Intravenous Daily     doxycycline (VIBRAMYCIN) IV  100 mg Intravenous Q12H     enoxaparin ANTICOAGULANT  40 mg Subcutaneous BID     famotidine  20 mg Intravenous Q12H     insulin aspart  1-12 Units Subcutaneous Q4H     insulin glargine  20 Units Subcutaneous QAM AC     piperacillin-tazobactam  3.375 g Intravenous Q6H     sodium chloride (PF)  3 mL Intracatheter Q8H       - MEDICATION INSTRUCTIONS -       - MEDICATION INSTRUCTIONS -       sodium chloride 10 mL/hr at 09/14/21 1003               Physical Examination:   Temp:  [98.1  F (36.7  C)-98.4  F (36.9  C)] 98.4  F (36.9  C)  Pulse:  [] 118  Resp:  [29-49] 45  BP: (103-149)/(67-84) 149/84  FiO2 (%):  [96 %-100 %] 100 %  SpO2:  [80 %-98 %] 88 %    Intake/Output Summary (Last 24 hours) at 9/15/2021 0838  Last data filed at 9/15/2021 0745  Gross per 24 hour   Intake 100 ml   Output 1400 ml   Net -1300 ml     Wt Readings from Last 4 Encounters:   09/15/21 115 kg (253 lb 8.5 oz)   04/09/21 119.4 kg (263 lb 3.2 oz)   04/29/20 142 kg (313 lb 0.9 oz)   10/14/19 135 kg (297 lb 9.6 oz)      BP - Mean:  [] 105  FiO2 (%): 100 %  Resp: (!) 45    Recent Labs   Lab 09/15/21  0813 09/13/21 2042 09/13/21  0003 09/12/21  2302   PH 7.30* 7.35  --   --    PCO2 51* 39  --   --    PO2 22* 92  --   --    HCO3 25 21  --   --    O2PER 100 100 100 100       GEN: no acute distress, morbidly obese    HEENT: head ncat, sclera anicteric, OP patent, trachea midline   PULM: unlabored synchronous with vent, clear anteriorly    CV/COR: RRR S1S2 no gallop,  No rub, no murmur  ABD: soft nontender, hypoactive bowel sounds, no mass  EXT:  -ve Edema   warm  NEURO: grossly intact  SKIN: no obvious rash  LINES: clean, dry intact         Data:   All data and imaging reviewed     ROUTINE ICU LABS (Last four results)  CMP  Recent Labs   Lab 09/15/21  0612 09/15/21  0449 09/15/21  0026 09/14/21  1927 09/14/21  0751 09/14/21  0534 09/13/21  0825 09/13/21  0443 09/12/21  2150 09/12/21  2150     --   --   --   --  141  --  136  --  133   POTASSIUM 4.0  --   --   --   --  4.1  --  4.3  --  4.5   CHLORIDE 112*  --   --   --   --  111*  --  109  --  100   CO2 23  --   --   --   --  23  --  21  --  22   ANIONGAP 7  --   --   --   --  7  --  6  --  11   * 110* 131* 160*   < > 157*   < > 210*   < > 276*   BUN 29  --   --   --   --  26  --  20  --  27   CR 0.87  --   --   --   --  0.91  --  0.89  --  1.12*   GFRESTIMATED 76  --   --   --   --  72  --  74  --  56*   CHANTELLE 9.6  --   --   --   --  8.9  --  7.8*  --  9.1   PROTTOTAL  --   --   --   --   --   --   --   --   --  8.0   ALBUMIN  --   --   --   --   --   --   --   --   --  3.3*   BILITOTAL  --   --   --   --   --   --   --   --   --  0.7   ALKPHOS  --   --   --   --   --   --   --   --   --  89   AST  --   --   --   --   --   --   --   --   --  72*   ALT  --   --   --   --   --   --   --   --   --  50    < > = values in this interval not displayed.     CBC  Recent Labs   Lab 09/15/21  0612 09/14/21  0534 09/13/21  0443 09/12/21  5419   WBC 8.8 5.7 4.8 9.4   RBC 3.74*  3.22* 3.37* 4.14   HGB 11.6* 9.8* 10.2* 12.6   HCT 34.8* 30.6* 31.0* 38.7   MCV 93 95 92 94   MCH 31.0 30.4 30.3 30.4   MCHC 33.3 32.0 32.9 32.6   RDW 13.9 13.8 13.8 13.8    235 188 281     INRNo lab results found in last 7 days.  Arterial Blood Gas  Recent Labs   Lab 09/15/21  0813 09/13/21 2042 09/13/21 0003 09/12/21  2302   PH 7.30* 7.35  --   --    PCO2 51* 39  --   --    PO2 22* 92  --   --    HCO3 25 21  --   --    O2PER 100 100 100 100       All cultures:  No results for input(s): CULT in the last 168 hours.  No results found for this or any previous visit (from the past 24 hour(s)).      Billing: This patient is critically ill: Yes. Total critical care time today 30 min.

## 2021-09-16 NOTE — PROGRESS NOTES
ABGs sent at 20:00 RR increased from 14 to 20. Repeat ABGs sent at 21:00 no vent changes. Patient proned at 22:00. Earings placed in med box.

## 2021-09-16 NOTE — PLAN OF CARE
[id number]: Throughout shift attempted to synchronize w/ vent. Pt now paralyzed with plans to prone. Sedated @ BIS of 40, TOF performed. Vec gtt, prop, dex, fent and levo in place. Brianda placed this shift. Mother updated.

## 2021-09-16 NOTE — PLAN OF CARE
Neuro: Paralyzed and sedated. Precedex stopped due to bradycardia.   Cardio: Attempted to titrate off levophed but patient was not able to maintain. Still on a whiff of levo.   Resp: LS clear. Brief inspiratory wheezes when supine.   GI: NJ advanced to post pyloric. BS hypoactive. No BM. Trickle feeds started.  : Adequate UOP  Pain: CPOT 0  Activity: Supined at 1400. To be re-proned at 2200  Skin: Intact  Family updated this shift.

## 2021-09-16 NOTE — PROCEDURES
[facility]    Arterial line placement    Date/Time: 9/16/2021 6:24 PM  Performed by: Doron Aaron MD  Authorized by: Doron Aaron MD     UNIVERSAL PROTOCOL   Site Marked: Yes  Prior Images Obtained and Reviewed:  Yes  Required items: Required blood products, implants, devices and special equipment available    Patient identity confirmed:  Arm band  Patient was reevaluated immediately before administering moderate or deep sedation or anesthesia  Confirmation Checklist:  Patient's identity using two indicators  Time out: Immediately prior to the procedure a time out was called    Universal Protocol: the Joint Commission Universal Protocol was followed    Preparation: Patient was prepped and draped in usual sterile fashion        Indication: multiple ABGs respiratory failure hemodynamic monitoring  Location:  Left radial       ANESTHESIA    Local Anesthetic: Lidocaine 1% without epinephrine  Anesthetic Total (mL):  2      SEDATION    Patient Sedated: Yes    Sedation:  Propofol  Vital signs: Vital signs monitored during sedation      PROCEDURE DETAILS    Needle Gauge:  20  Seldinger technique: Seldinger technique used    Number of Attempts:  1  Post-procedure:  Line sutured and dressing applied    PROCEDURE   Length of time physician/provider present for 1:1 monitoring during sedation: 10

## 2021-09-16 NOTE — PROGRESS NOTES
[facility] ICU RESPIRATORY NOTE           Date of Admission: 9/12/2021     Date of Intubation (most recent):9/15/2021     Reason for Mechanical Ventilation:Respiratory failure     Number of Days on Mechanical Ventilation:2     Met Criteria for Spontaneous Breathing Trial:No     Reason for No Spontaneous Breathing Trial:Pt is on a PEEP 14, FiO2 80%, and full strength Veletri is running.     Significant Events Today: Prone at 2200    BP (!) 86/56   Pulse 56   Temp 98.8  F (37.1  C)   Resp 24   Wt 114.5 kg (252 lb 6.8 oz)   LMP  (LMP Unknown)   SpO2 100%   BMI 47.70 kg/m       Recent Labs   Lab 09/16/21  0107 09/15/21  2301 09/15/21  2104 09/15/21  1954   PH 7.25* 7.21* 7.20* 7.17*   PCO2 53* 57* 60* 66*   PO2 129* 211* 132* 135*   HCO3 23 23 23 24   O2PER 100 100 100 100     Venous Blood Gas  Recent Labs   Lab 09/16/21  0107 09/15/21  2301 09/15/21  2104 09/15/21  1954 09/15/21  1354 09/15/21  0828 09/13/21 2042 09/13/21  0003 09/12/21 2302 09/12/21 2302   PHV  --   --   --   --   --  7.25*  --  7.32  --  7.33   PCO2V  --   --   --   --   --  60*  --  44  --  40   PO2V  --   --   --   --   --  24*  --  29  --  31   HCO3V  --   --   --   --   --  26  --  23  --  21   ÁNGEL  --   --   --   --   --  -2.1  --  -3.4  --  -4.6   O2PER 100 100 100 100   < > 2   < > 100   < > 100    < > = values in this interval not displayed.     Ventilation Mode: CMV/AC  (Continuous Mandatory Ventilation/ Assist Control)  FiO2 (%): 100 %  Rate Set (breaths/minute): 24 breaths/min  Tidal Volume Set (mL): 320 mL  PEEP (cm H2O): 14 cmH2O  Oxygen Concentration (%): 80 %  Resp: 23      ROMEL LEMA, RT

## 2021-09-16 NOTE — PROGRESS NOTES
[facility] ICU RESPIRATORY NOTE        Date of Admission: 9/12/2021    Date of Intubation (most recent): 9/15/21    Reason for Mechanical Ventilation: Resp Failure    Number of Days on Mechanical Ventilation: 2    Met Criteria for Spontaneous Breathing Trial: No    Reason for No Spontaneous Breathing Trial: Prone, PEEP 14, 80% FiO2    Significant Events Today: Supine at 1400, placed ETAD; goal to supine for 8 hours.    ABG Results:   Recent Labs   Lab 09/16/21  1154 09/16/21  0107 09/15/21  2301 09/15/21  2104   PH 7.29* 7.25* 7.21* 7.20*   PCO2 47* 53* 57* 60*   PO2 133* 129* 211* 132*   HCO3 23 23 23 23   O2PER 80 100 100 100       Current Vent Settings: Ventilation Mode: CMV/AC  (Continuous Mandatory Ventilation/ Assist Control)  FiO2 (%): 80 %  Rate Set (breaths/minute): 24 breaths/min  Tidal Volume Set (mL): 320 mL  PEEP (cm H2O): 14 cmH2O  Oxygen Concentration (%): 80 %  Resp: 23      Skin Assessment: No skin issues observed when Etad placed.  Mepilex on upper lip    Plan: Continue full vent support, wean oxygen as able.    Dian Hadley, RT

## 2021-09-16 NOTE — PROGRESS NOTES
Critical Care Progress Note                                                  09/16/2021          Name: Pinky Whatley MRN#: [id number]   Age: 55 year old YOB: 1965      Hsptl Day# 4  ICU DAY # 2     MV DAY # 2                Problem List:   Active Problems:    Acute hypoxemic respiratory failure due to COVID-19 (H)    Pneumonia due to 2019 novel coronavirus             Summary/Hospital Course:      Pinky Whatley is a 55 year-old female with history of rheumatoid arthritis, diabetes mellitus type 2, hypothyroidism, hypertension who presents with progressive shortness of breath. Admitted on 9/12/2021 with COVID19 pneumonia and respiratory failure. O2 saturation drops to 68% on RA, was put on BiPAP with increasing FiO2 requirement.      Today morning she has increased labored breathing on BiPAP  FiO2 100%, RR 35-45 RRT activated. Patient intubated before shifting to ICU     Patient is vaccinated, on multiple immunosuppressive medications for RA (Xeljanz, Hydroxychloroquine and Methotrexate)    Interim Hx:   Poor mechanics with dyssynchrony and poor oxygenation. P/F ratio was < 100 paralyzed and proned 9/15 at 10 pm       Assessment and plan :      Pinky Whatley is a 55 year old female admitted on 9/12/2021 for Acute respiratory failure 2/2 COVID-19 pneumonia   I have personally reviewed the daily labs, imaging studies, cultures and discussed the case with referring physician and consulting physicians.   My assessment and plan by system for this patient is as follows:     Neurology/Psychiatry:   1. Encephalopathy, drug induced  2. Paralyzed with vec ggt   Plan  Propofol ggt  Precedex ggt  Fentanyl ggt  RASS goal -4 for ventilation synchrony      Cardiovascular:   1. Hemodynamics -Tachycardia had hypotension yesterday requiring levophed ggt (currently off now)  2. Rhythm - Regular   3. Ischemia - no sings of ischemia  4. Known HTN  Plan   - Tele  -  Hold PTA Anti-HTN   - A-line for  monitoring     Pulmonary/Ventilator Management:   # Acute respiratory failure 2/2 COVID-19 Pneumonia   # possible superimposed bacterial pneumonia   # CTA ruled out PE.  # proned since 9/15 10 pm   1. Airway intubated and ventilated   2. Oxygenation/ventilation/mechanics: Pplat 30, P drive 16, synchronous with the vent      Ventilation Mode: CMV/AC  (Continuous Mandatory Ventilation/ Assist Control)  FiO2 (%): 100 %  Rate Set (breaths/minute): 24 breaths/min  Tidal Volume Set (mL): 320 mL  PEEP (cm H2O): 14 cmH2O  Oxygen Concentration (%): 80 %  Resp: 24    Blood gas at 1 am: 7.25 / 53 /  129 . P/F ratio 129     Plan  - follow Sputum cx   - Blood gas at 12 pm.   - Veletri Full strength      GI and Nutrition :   1. No prior issues, been NPO for the last 2 days on BiPAP  2. Enteral feedings  Plan  - Appreciate nutrition consult and help     Renal/Fluids/Electrolytes:   1. No prior kidney disease, Baseline Cr seems to be 0.87  2. Electrolyte abnormality none  3. Acid/base status respiratory acidosis  4. Volume status     Intake/Output Summary (Last 24 hours) at 9/16/2021 0816  Last data filed at 9/16/2021 0600  Gross per 24 hour   Intake 2062.19 ml   Output 1510 ml   Net 552.19 ml     Plan  - Try to maintain euvolumeia   - monitor function and electrolytes as needed with replacement per ICU protocols.  - generally avoid nephrotoxic agents such as NSAID, IV contrast unless specifically required  - adjust medications as needed for renal clearance  - follow I/O's as appropriate.     Infectious Disease:   1. COVID-19 pneumonia  # Started remdisivir and dexamethasone.   2.Suspected superimposed penumonia , pending sputum cx  # on Zosyn and doxycycline   3. Procalcitonin increased to 3.58 from 0.98  > 92.5 > 91.8 Lactic acid 2.9 normalized to 1  Plan  - Continue on dexamethasone and remdisivir.   - continue on zosyn pending sputum cx       Endocrine:   1. Type 2 DM, HbA1c 7   2. Hypothyroidism  3. Hyperlipidemia   4.  Morbid obesity BMI 46.92  Plan  - insulin ggt, goal sugar <180  - To resume oral synthroid      Hematology/Oncology:   1. Anemia, no signs, symptoms of active blood loss  Plan  - Follow Hb trends.         MSKL/Rheum:  1.Rheumatoid arthritis on Xeljanz, Hydroxychloroquine and Methotrexate  Plan  - Hold RA medications         IV/Access:   1. Venous access - CVL in the RIJ  2. Arterial access - A-line in the left radial   Plan  - central access required and necessary        ICU Prophylaxis:   1. DVT: LMWH/mechanical  2. VAP: HOB 30 degrees, chlorhexidine rinse  3. Stress Ulcer: PPI  4. Restraints: Nonviolent soft two point restraints required and necessary for patient safety and continued cares and good effect as patient continues to pull at necessary lines, tubes despite education and distraction. Will readdress daily.   5. Wound care - per unit routine   6. Feeding - NJ tube insertion and nutrition consult     7. Family Update: as per unit protocol  8. Disposition - ICU           Key goals for next 24 hours:   1. Lung protective ventilation  2. repeat blood gas.  3. Possible supine position if P/F ratio improved            Key Medications:       remdesivir  100 mg Intravenous Q24H    And     sodium chloride 0.9%  50 mL Intravenous Q24H     artificial tears   Both Eyes Q8H     chlorhexidine  15 mL Mouth/Throat Q12H     dexamethasone  6 mg Intravenous Daily     doxycycline (VIBRAMYCIN) IV  100 mg Intravenous Q12H     enoxaparin ANTICOAGULANT  60 mg Subcutaneous BID     insulin aspart  1-12 Units Subcutaneous Q4H     insulin glargine  20 Units Subcutaneous QAM AC     levothyroxine  137 mcg Oral Daily     multivitamins w/minerals  15 mL Per Feeding Tube Daily     pantoprazole  40 mg Per Feeding Tube QAM AC    Or     pantoprazole (PROTONIX) IV  40 mg Intravenous QAM AC     piperacillin-tazobactam  3.375 g Intravenous Q6H     sodium chloride (PF)  10-40 mL Intracatheter Q7 Days     sodium chloride (PF)  3 mL Intracatheter  Q8H       dexmedetomidine 0.5 mcg/kg/hr (09/16/21 0243)     dextrose       epoprostenol (VELETRI) 20 mcg/mL in sterile water inhalation solution 20 ng/kg/min (09/16/21 0718)     fentaNYL 200 mcg/hr (09/16/21 0129)     propofol (DIPRIVAN) infusion 45 mcg/kg/min (09/16/21 0751)    And     - MEDICATION INSTRUCTIONS -       - MEDICATION INSTRUCTIONS -       - MEDICATION INSTRUCTIONS -       norepinephrine 0.02 mcg/kg/min (09/16/21 0249)     sodium chloride 20 mL/hr (09/15/21 1117)     vecuronium (NORCURON) infusion ADULT 0.6 mcg/kg/min (09/16/21 0753)               Physical Examination:   Temp:  [98.8  F (37.1  C)-101.7  F (38.7  C)] 98.8  F (37.1  C)  Pulse:  [] 54  Resp:  [13-90] 24  BP: ()/(42-90) 86/56  MAP:  [6 mmHg-110 mmHg] 6 mmHg  Arterial Line BP: (1-136)/(0-104) 1/0  FiO2 (%):  [100 %] 100 %  SpO2:  [88 %-100 %] 100 %    Intake/Output Summary (Last 24 hours) at 9/16/2021 0821  Last data filed at 9/16/2021 0600  Gross per 24 hour   Intake 2062.19 ml   Output 1510 ml   Net 552.19 ml     Wt Readings from Last 4 Encounters:   09/16/21 114.5 kg (252 lb 6.8 oz)   04/09/21 119.4 kg (263 lb 3.2 oz)   04/29/20 142 kg (313 lb 0.9 oz)   10/14/19 135 kg (297 lb 9.6 oz)     Arterial Line BP: (1-136)/(0-104) 1/0  MAP:  [6 mmHg-110 mmHg] 6 mmHg  BP - Mean:  [56-95] 66  Ventilation Mode: CMV/AC  (Continuous Mandatory Ventilation/ Assist Control)  FiO2 (%): 100 %  Rate Set (breaths/minute): 24 breaths/min  Tidal Volume Set (mL): 320 mL  PEEP (cm H2O): 14 cmH2O  Oxygen Concentration (%): 80 %  Resp: 24    Recent Labs   Lab 09/16/21  0107 09/15/21  2301 09/15/21  2104 09/15/21  1954   PH 7.25* 7.21* 7.20* 7.17*   PCO2 53* 57* 60* 66*   PO2 129* 211* 132* 135*   HCO3 23 23 23 24   O2PER 100 100 100 100       GEN: sedated, prone positon   HEENT: head ncat, sclera anicteric, OP patent, trachea midline   PULM: unlabored synchronous with vent, clear posteriorly   CV/COR: RRR S1S2 no gallop,  No rub, no murmur  ABD: Not  done   EXT: -ve Edema   warm  NEURO: grossly intact  SKIN: no obvious rash  LINES: clean, dry intact         Data:   All data and imaging reviewed     ROUTINE ICU LABS (Last four results)  CMP  Recent Labs   Lab 09/16/21  0804 09/16/21  0347 09/16/21  0105 09/15/21  2028 09/15/21  1816 09/15/21  1623 09/15/21  1121 09/15/21  0612 09/14/21  0751 09/14/21  0534 09/13/21  0825 09/13/21  0443 09/12/21 2150 09/12/21 2150   NA  --   --   --   --   --   --   --  142  --  141  --  136  --  133   POTASSIUM  --   --   --   --   --   --   --  4.0  --  4.1  --  4.3  --  4.5   CHLORIDE  --   --   --   --   --   --   --  112*  --  111*  --  109  --  100   CO2  --   --   --   --   --   --   --  23  --  23  --  21  --  22   ANIONGAP  --   --   --   --   --   --   --  7  --  7  --  6  --  11   * 193* 223* 208*  --    < >   < > 119*   < > 157*   < > 210*   < > 276*   BUN  --   --   --   --   --   --   --  29  --  26  --  20  --  27   CR  --   --   --   --   --   --   --  0.87  --  0.91  --  0.89  --  1.12*   GFRESTIMATED  --   --   --   --   --   --   --  76  --  72  --  74  --  56*   CHANTELLE  --   --   --   --   --   --   --  9.6  --  8.9  --  7.8*  --  9.1   MAG  --   --   --   --  1.8  --   --   --   --   --   --   --   --   --    PHOS  --   --   --   --  4.1  --   --   --   --   --   --   --   --   --    PROTTOTAL  --   --   --   --   --   --   --   --   --   --   --   --   --  8.0   ALBUMIN  --   --   --   --   --   --   --   --   --   --   --   --   --  3.3*   BILITOTAL  --   --   --   --   --   --   --   --   --   --   --   --   --  0.7   ALKPHOS  --   --   --   --   --   --   --   --   --   --   --   --   --  89   AST  --   --   --   --   --   --   --   --   --   --   --   --   --  72*   ALT  --   --   --   --   --   --   --   --   --   --   --   --   --  50    < > = values in this interval not displayed.     CBC  Recent Labs   Lab 09/15/21  1816 09/15/21  0612 09/14/21  0534 09/13/21  0443   WBC 13.1* 8.8 5.7 4.8   RBC  3.49* 3.74* 3.22* 3.37*   HGB 10.7* 11.6* 9.8* 10.2*   HCT 33.2* 34.8* 30.6* 31.0*   MCV 95 93 95 92   MCH 30.7 31.0 30.4 30.3   MCHC 32.2 33.3 32.0 32.9   RDW 14.2 13.9 13.8 13.8    332 235 188     INRNo lab results found in last 7 days.  Arterial Blood Gas  Recent Labs   Lab 09/16/21  0107 09/15/21  2301 09/15/21  2104 09/15/21  1954   PH 7.25* 7.21* 7.20* 7.17*   PCO2 53* 57* 60* 66*   PO2 129* 211* 132* 135*   HCO3 23 23 23 24   O2PER 100 100 100 100       All cultures:  No results for input(s): CULT in the last 168 hours.  Recent Results (from the past 24 hour(s))   XR Chest Port 1 View    Narrative    CHEST ONE VIEW  9/15/2021 1:40 PM     HISTORY: Check ETT position    COMPARISON: 9/12/2021      Impression    IMPRESSION: Endotracheal tube tip 4 cm from the harshal. Extensive  bilateral infiltrates. Right IJ line noted with tip near the atrial  caval junction. Enteric tube tip below the margin of the study.    GILBERTO REICH MD         SYSTEM ID:  [id number]   XR Abdomen Port 1 View    Narrative    ABDOMEN ONE VIEW PORTABLE  9/15/2021 1:40 PM     HISTORY: Tube feeding placement verification.    COMPARISON: None.       Impression    IMPRESSION: Feeding tube tip in the antrum of the stomach.    GILBERTO REICH MD         SYSTEM ID:  [id number]         Billing: This patient is critically ill: Yes. Total critical care time today 30 min.

## 2021-09-17 NOTE — PROGRESS NOTES
CLINICAL NUTRITION SERVICES - REASSESSMENT NOTE      Recommendations Ordered by Registered Dietitian (RD):   Increase TF Vital HP to 20 mL/hr = 480 kcals, 42 gm pro, 400 mL H20, 52 gm CHO, no fiber.  Prosource 2 packets every 8 hrs (6 packets per day) = 240 kcals, 66 gm pro  Total (TF + Prosource + Propofol):  1540 kcals (14 kcal/kg), 108 gm pro (2.3 gm/kg)   Malnutrition: (9/15)  % Weight Loss:  Weight loss does not meet criteria for malnutrition (likely fluid shifts)  % Intake:  </= 50% for >/= 5 days (severe malnutrition)(will meet 9/16)  Subcutaneous Fat Loss:  Unable to determine   Muscle Loss:  Unable to determine   Fluid Retention:  None noted     Malnutrition Diagnosis: Unable to determine due to lack of Nutrition Focused Physical Exam        EVALUATION OF PROGRESS TOWARD GOALS   Diet:  NPO on vent    Nutrition Support:  Trickle TF was started not until 9/16 (1000) as below:    Nutrition Support Enteral:  Type of Feeding Tube: ND  Enteral Frequency:  Continuous  Enteral Regimen: Vital HP at 10 mL/hr  Total Enteral Provisions: 240 kcal, 21 g protein (0.4 g/kg and 22% pro needs), 27 g CHO, no fiber, 201 mL H2O   Free Water Flush: 60 mL every 4 hrs  MVI liquid - to meet vitamin/mineral needs while on low volume TF    Propofol running at 31.1 mL/hr = 821 kcals.  Total (TF + Propofol):  1061 kcals (9 kcal/kg and 85% energy needs)    Intake/Tolerance:   No recorded stool since 9/12.  RN started prn bowel regimen yesterday (Senokot given) and will plan to resume when pt turned supine later today.  9/16:   (H)  BGM:  162, 202, 186, 227, 205, 168 - High Resistant sliding scale insulin + Lantus 20 Units in am --> Will possibly be adjusted per discussion in rounds.  I/O:  3099/890.  Wt:  123 kg (up 8.5 kg from yesterday --> ? Accuracy).      ASSESSED NUTRITION NEEDS PER APPROVED PRACTICE GUIDELINES:     Dosing Weight 112.6 kg (energy) and 47.7 kg (protein)  Estimated Energy Needs: 2384-2675 kcals (11-14  Kcal/Kg)  Justification: obese and vented  Estimated Protein Needs:  grams protein (2-2.5 g pro/Kg)  Justification: hypercatabolism with critical illness and obesity guidelines     NEW FINDINGS:   9/15:  Proned 2200   9/15:  AXR = FT tip in gastric antrum (NG)   9/16:  Supine 1400, Proned 2300   9/16:  AXR = FT tip is now in good position at the duodenojejunal junction (ND    Pt remains on Vecuronium and Norepinephrine.  Plan to Supine later today.    Previous Goals (9/15):   Patient will meet % needs within the next 2-3 days   Evaluation: Not met yet, but in progress today    Previous Nutrition Diagnosis (9/15):   Inadequate protein-energy intake related to NPO with plans to start TF today as evidenced by meeting 0% protein and 95% energy needs from Propofol   Evaluation: Improving      CURRENT NUTRITION DIAGNOSIS  Inadequate enteral nutrition infusion related to low TF rate as evidenced by TF + Propofol meeting only 85% energy and 22% protein needs.    INTERVENTIONS  Recommendations / Nutrition Prescription  Increase TF Vital HP to 20 mL/hr = 480 kcals, 42 gm pro, 400 mL H20, 52 gm CHO, no fiber.  Prosource 2 packets every 8 hrs (6 packets per day) = 240 kcals, 66 gm pro  Total (TF + Prosource + Propofol):  1540 kcals (14 kcal/kg), 108 gm pro (2.3 gm/kg)    Implementation  Collaboration and Referral of Nutrition care - Pt was discussed during ICU interdisciplinary rounds this morning and received approval from Dr. Aaron to modify TF to meet estimated needs.  EN Schedule and Medical Food Supplement - Above orders entered in Epic    Goals  TF + Prosource + Propofol will meet % estimated needs    MONITORING AND EVALUATION:  Progress towards goals will be monitored and evaluated per protocol and Practice Guidelines    Mayra Gee, RD, LD, CNSC

## 2021-09-17 NOTE — PROVIDER NOTIFICATION
Spoke with Dr. Aaron about patient's dyssynchony with the vent ands decreased saturation to 88%, Was idrected to increase her PEEP to 14 and okayed the increase of FiO2 to 80%. Stated that Respirations between 24-26 was acceptable.

## 2021-09-17 NOTE — PROVIDER NOTIFICATION
Patient became tachycardic with occasional ectopy in mid to upper 120s at around 1100. This was reported to Dr. Arora. Directed to continue to monitor for now.

## 2021-09-17 NOTE — PROGRESS NOTES
Critical Care Progress Note                                                  09/17/2021             Name: Pinky Whatley MRN#: [id number]   Age: 55 year old YOB: 1965      Hsptl Day# 5  ICU DAY # 3     MV DAY # 3                Problem List:   Active Problems:    Acute hypoxemic respiratory failure due to COVID-19 (H)    Pneumonia due to 2019 novel coronavirus             Summary/Hospital Course:      Pinky Whatley is a 55 year-old female with history of rheumatoid arthritis, diabetes mellitus type 2, hypothyroidism, hypertension who presents with progressive shortness of breath. Admitted on 9/12/2021 with COVID19 pneumonia and respiratory failure. O2 saturation drops to 68% on RA, was put on BiPAP with increasing FiO2 requirement.      Today morning she has increased labored breathing on BiPAP  FiO2 100%, RR 35-45 RRT activated. Patient intubated before shifting to ICU     Patient is vaccinated, on multiple immunosuppressive medications for RA (Xeljanz, Hydroxychloroquine and Methotrexate)    Poor mechanics with dyssynchrony and poor oxygenation. P/F ratio was < 100 paralyzed and proned 9/15 at 10 pm     Interim Hx:   9/16 - P/F ratio improved with proning, supine positioned in the afternoon but oxygenation became worse with p/f ratio around 100. proned at 2200  - Bradycardia, precedex ggt stopped        Assessment and plan :      Pinky Whatley is a 55 year old female admitted on 9/12/2021 for Acute respiratory failure 2/2 COVID-19 pneumonia   I have personally reviewed the daily labs, imaging studies, cultures and discussed the case with referring physician and consulting physicians.   My assessment and plan by system for this patient is as follows:     Neurology/Psychiatry:   1. Encephalopathy, drug induced  2. Paralyzed with vec ggt   Plan  Propofol ggt  Fentanyl ggt  RASS goal -4 for ventilation synchrony      Cardiovascular:   1. Hemodynamics - bradycardia - stopped precedex, improved.  Hypotension requiring low dose levophed.   2. Rhythm - Regular   3. Ischemia - no sings of ischemia  4. Known HTN  Plan   - Tele  -  Hold PTA Anti-HTN   - A-line for monitoring     Pulmonary/Ventilator Management:   # Acute respiratory failure 2/2 COVID-19 Pneumonia   # possible superimposed bacterial pneumonia / sputum cx: negative   # CTA ruled out PE.  # proned since 9/16 10 pm   1. Airway intubated and ventilated   2. Oxygenation/ventilation/mechanics: Pplat 30, P drive 16, synchronous with the vent       Ventilation Mode: CMV/AC  (Continuous Mandatory Ventilation/ Assist Control)  FiO2 (%): 80 %  Rate Set (breaths/minute): 24 breaths/min  Tidal Volume Set (mL): 320 mL  PEEP (cm H2O): 14 cmH2O  Oxygen Concentration (%): 80 %  Resp: 25    Blood gas at 3 am: 7.28 / 54 / 122 / p/F 152      Plan  - Blood gas at 12 pm.   - Veletri Full strength      GI and Nutrition :   1. No prior issues  2. Enteral feedings  Plan  - Appreciate nutrition consult and help   - PPI for ppx     Renal/Fluids/Electrolytes:   1. No prior kidney disease, Baseline Cr seems to be 0.87  2. Electrolyte abnormality none  3. Acid/base status respiratory acidosis  4. Volume status     Intake/Output Summary (Last 24 hours) at 9/17/2021 0951  Last data filed at 9/17/2021 0800  Gross per 24 hour   Intake 3217.94 ml   Output 910 ml   Net 2307.94 ml     Plan  - Try to maintain euvolumeia   - monitor function and electrolytes as needed with replacement per ICU protocols.  - generally avoid nephrotoxic agents such as NSAID, IV contrast unless specifically required  - adjust medications as needed for renal clearance  - follow I/O's as appropriate.     Infectious Disease:   1. COVID-19 pneumonia  # Started remdisivir and dexamethasone.   2.Suspected superimposed penumonia , pending sputum cx  # on Zosyn and doxycycline   3. Procalcitonin increased to 3.58 from 0.98  > 92.5 > 91.8 Lactic acid 2.9 normalized to 1  Plan  -complete dexamethasone and  remdisivir courses  - continue on zosyn   - doxycycline for 5 days       Endocrine:   1. Type 2 DM, HbA1c 7   2. Hypothyroidism  3. Hyperlipidemia   4. Morbid obesity BMI 46.92  Plan  - insulin ggt, goal sugar <180  - To resume oral synthroid  - Repeat TSH.      Hematology/Oncology:   1. Anemia, no signs, symptoms of active blood loss  Plan  - Follow Hb trends.         MSKL/Rheum:  1.Rheumatoid arthritis on Xeljanz, Hydroxychloroquine and Methotrexate  Plan  - Hold RA medications      IV/Access:   1. Venous access - CVL in the RIJ  2. Arterial access - A-line in the left radial   Plan  - central access required and necessary        ICU Prophylaxis:   1. DVT: LMWH/mechanical  2. VAP: HOB 30 degrees, chlorhexidine rinse  3. Stress Ulcer: PPI  4. Restraints: Nonviolent soft two point restraints required and necessary for patient safety and continued cares and good effect as patient continues to pull at necessary lines, tubes despite education and distraction. Will readdress daily.   5. Wound care - per unit routine   6. Feeding - Enteral feeding   7. Family Update: as per unit protocol  8. Disposition - ICU        Key goals for next 24 hours:   1. Lung protective ventilation  2. repeat blood gas.  3. Possible supine position if P/F ratio improved          Key Medications:       artificial tears   Both Eyes Q8H     chlorhexidine  15 mL Mouth/Throat Q12H     dexamethasone  6 mg Intravenous Daily     doxycycline (VIBRAMYCIN) IV  100 mg Intravenous Q12H     enoxaparin ANTICOAGULANT  60 mg Subcutaneous BID     insulin aspart  1-12 Units Subcutaneous Q4H     insulin glargine  20 Units Subcutaneous QAM AC     levothyroxine  137 mcg Oral Daily     multivitamins w/minerals  15 mL Per Feeding Tube Daily     pantoprazole  40 mg Per Feeding Tube QAM AC    Or     pantoprazole (PROTONIX) IV  40 mg Intravenous QAM AC     piperacillin-tazobactam  3.375 g Intravenous Q6H     sodium chloride (PF)  10-40 mL Intracatheter Q7 Days      sodium chloride (PF)  3 mL Intracatheter Q8H       dexmedetomidine Stopped (09/16/21 1537)     dextrose       epoprostenol (VELETRI) 20 mcg/mL in sterile water inhalation solution 20 ng/kg/min (09/17/21 0810)     fentaNYL 200 mcg/hr (09/17/21 0014)     propofol (DIPRIVAN) infusion 45 mcg/kg/min (09/17/21 0645)    And     - MEDICATION INSTRUCTIONS -       - MEDICATION INSTRUCTIONS -       - MEDICATION INSTRUCTIONS -       norepinephrine 0.03 mcg/kg/min (09/16/21 2043)     sodium chloride 30 mL/hr at 09/16/21 1106     vecuronium (NORCURON) infusion ADULT 0.5 mcg/kg/min (09/17/21 0833)               Physical Examination:   Temp:  [96.6  F (35.9  C)-98.1  F (36.7  C)] 97.7  F (36.5  C)  Pulse:  [47-72] 72  Resp:  [0-30] 25  BP: (118-121)/(68-77) 120/77  MAP:  [65 mmHg-98 mmHg] 74 mmHg  Arterial Line BP: ()/(50-92) 105/56  FiO2 (%):  [80 %] 80 %  SpO2:  [92 %-100 %] 99 %    Intake/Output Summary (Last 24 hours) at 9/17/2021 1001  Last data filed at 9/17/2021 0800  Gross per 24 hour   Intake 2853.54 ml   Output 855 ml   Net 1998.54 ml     Wt Readings from Last 4 Encounters:   09/17/21 123 kg (271 lb 2.7 oz)   04/09/21 119.4 kg (263 lb 3.2 oz)   04/29/20 142 kg (313 lb 0.9 oz)   10/14/19 135 kg (297 lb 9.6 oz)     Arterial Line BP: ()/(50-92) 105/56  MAP:  [65 mmHg-98 mmHg] 74 mmHg  BP - Mean:  [90-93] 93  Ventilation Mode: CMV/AC  (Continuous Mandatory Ventilation/ Assist Control)  FiO2 (%): 80 %  Rate Set (breaths/minute): 24 breaths/min  Tidal Volume Set (mL): 320 mL  PEEP (cm H2O): 14 cmH2O  Oxygen Concentration (%): 80 %  Resp: 25    Recent Labs   Lab 09/17/21  0348 09/16/21  1716 09/16/21  1154 09/16/21  0107   PH 7.28* 7.28* 7.29* 7.25*   PCO2 54* 48* 47* 53*   PO2 122* 88 133* 129*   HCO3 25 22 23 23   O2PER 80 80 80 100       GEN: no acute distress   HEENT: head ncat, sclera anicteric, OP patent, trachea midline   PULM: unlabored synchronous with vent, clear posteriorly  CV/COR: RRR S1S2 no gallop,  No  rub, no murmur  ABD: soft nontender, hypoactive bowel sounds, no mass  EXT:  -ve Edema   warm  NEURO: paralyzed and sedated   SKIN: no obvious rash  LINES: clean, dry intact         Data:   All data and imaging reviewed     ROUTINE ICU LABS (Last four results)  CMP  Recent Labs   Lab 09/17/21  0828 09/17/21  0645 09/17/21  0347 09/16/21  2347 09/16/21  1152 09/16/21  0812 09/15/21  2028 09/15/21  1816 09/15/21  1121 09/15/21  0612 09/14/21  0751 09/14/21  0534 09/13/21  0443 09/12/21  2150   NA  --  142  --   --   --  141  --   --   --  142  --  141   < > 133   POTASSIUM  --  3.7  --   --   --  4.1  --   --   --  4.0  --  4.1   < > 4.5   CHLORIDE  --  112*  --   --   --  111*  --   --   --  112*  --  111*   < > 100   CO2  --  27  --   --   --  24  --   --   --  23  --  23   < > 22   ANIONGAP  --  3  --   --   --  6  --   --   --  7  --  7   < > 11   * 165* 168* 205*   < > 177*   < >  --    < > 119*   < > 157*   < > 276*   BUN  --  20  --   --   --  23  --   --   --  29  --  26   < > 27   CR  --  0.69  --   --   --  0.85  --   --   --  0.87  --  0.91   < > 1.12*   GFRESTIMATED  --  >90  --   --   --  78  --   --   --  76  --  72   < > 56*   CHANTELLE  --  8.7  --   --   --  8.9  --   --   --  9.6  --  8.9   < > 9.1   MAG  --   --   --   --   --   --   --  1.8  --   --   --   --   --   --    PHOS  --   --   --   --   --   --   --  4.1  --   --   --   --   --   --    PROTTOTAL  --   --   --   --   --  6.1*  --   --   --   --   --   --   --  8.0   ALBUMIN  --   --   --   --   --  2.0*  --   --   --   --   --   --   --  3.3*   BILITOTAL  --   --   --   --   --  0.6  --   --   --   --   --   --   --  0.7   ALKPHOS  --   --   --   --   --  71  --   --   --   --   --   --   --  89   AST  --   --   --   --   --  33  --   --   --   --   --   --   --  72*   ALT  --   --   --   --   --  30  --   --   --   --   --   --   --  50    < > = values in this interval not displayed.     CBC  Recent Labs   Lab 09/17/21  0645  09/16/21  0812 09/15/21  1816 09/15/21  0612   WBC 7.7 9.5 13.1* 8.8   RBC 3.27* 3.55* 3.49* 3.74*   HGB 10.0* 10.9* 10.7* 11.6*   HCT 30.6* 33.2* 33.2* 34.8*   MCV 94 94 95 93   MCH 30.6 30.7 30.7 31.0   MCHC 32.7 32.8 32.2 33.3   RDW 13.6 13.9 14.2 13.9    367 337 332     INR  Recent Labs   Lab 09/16/21  1820   INR 1.42*     Arterial Blood Gas  Recent Labs   Lab 09/17/21  0348 09/16/21  1716 09/16/21  1154 09/16/21  0107   PH 7.28* 7.28* 7.29* 7.25*   PCO2 54* 48* 47* 53*   PO2 122* 88 133* 129*   HCO3 25 22 23 23   O2PER 80 80 80 100       All cultures:  No results for input(s): CULT in the last 168 hours.  Recent Results (from the past 24 hour(s))   XR Abdomen Port 1 View    Narrative    XR ABDOMEN PORT 1 VIEWS 9/16/2021 4:40 PM    HISTORY: NJ post pyloric placement    COMPARISON: 9/15/2021      Impression    IMPRESSION: Feeding tube has been advanced and the tip is now in good  position at the duodenojejunal junction. Plastic biliary stent is  unchanged.    PAL MARINA MD         SYSTEM ID:  [id number]         Billing: This patient is critically ill: Yes. Total critical care time today 30 min.

## 2021-09-17 NOTE — PROCEDURES
[facility]    Arterial line placement    Date/Time: 9/17/2021 5:42 PM  Performed by: Doron Aaron MD  Authorized by: Doron Aaron MD     UNIVERSAL PROTOCOL   Site Marked: Yes  Prior Images Obtained and Reviewed:  Yes  Required items: Required blood products, implants, devices and special equipment available    Patient identity confirmed:  Arm band  Patient was reevaluated immediately before administering moderate or deep sedation or anesthesia  Confirmation Checklist:  Patient's identity using two indicators  Time out: Immediately prior to the procedure a time out was called    Universal Protocol: the Joint Commission Universal Protocol was followed    Preparation: Patient was prepped and draped in usual sterile fashion        Indication: multiple ABGs respiratory failure hemodynamic monitoring  Location:  Left femoral       ANESTHESIA    Local Anesthetic: Lidocaine 1% without epinephrine      SEDATION    Patient Sedated: Yes    Sedation:  Propofol  Vital signs: Vital signs monitored during sedation      PROCEDURE DETAILS    Needle Gauge:  20  Seldinger technique: Seldinger technique used    Number of Attempts:  1  Post-procedure:  Line sutured and dressing applied    PROCEDURE   Length of time physician/provider present for 1:1 monitoring during sedation: 20

## 2021-09-17 NOTE — PLAN OF CARE
Neuro: Paralyzed. BIS variable from upper 20s to mid 40s. Had a period of time without vent compliance this shift from about 1300 till about 1500. This was reported to MD. Respiration between 24 - 27. Paralytic turned up and bloused with fent and prop. This seem to resolve with the medication plus repositioning. Paralytic and sedating medication turned back down.   Cardio. A period of tachy in 120s from 1100 till around 1400. This was reported to MD. Off levophed this shift. Lost radial line and femoral art line placed this shift.    Resp: LS course. Did not tolerate decreased vent setting. Supined at 1500. Proned again at 1630.   GI: BS active. Passing gas. No BM  : Adequate uop  Pain: CPOT 0  Skin: area under belly excoriated. Cleaned and barrier cream applied.   Mother updated.   (4) rarely moist

## 2021-09-18 NOTE — PROGRESS NOTES
[facility] RESPIRATORY NOTE        Date of Admission: 9/12/2021    Date of Intubation (most recent): 9/15/21    Reason for Mechanical Ventilation: Covid     Number of Days on Mechanical Ventilation: 3    Met Criteria for Spontaneous Breathing Trial: No    Reason for No Spontaneous Breathing Trial: Prone, PEEP 14    Significant Events Today: Proned at 1600 Q2 head turns    ABG Results:   Recent Labs   Lab 09/17/21  1155 09/17/21  0348 09/16/21  1716 09/16/21  1154   PH 7.22* 7.28* 7.28* 7.29*   PCO2 68* 54* 48* 47*   PO2 63* 122* 88 133*   HCO3 28 25 22 23   O2PER 70 80 80 80       Current Vent Settings: Ventilation Mode: CMV/AC  (Continuous Mandatory Ventilation/ Assist Control)  FiO2 (%): 80 %  Rate Set (breaths/minute): 24 breaths/min  Tidal Volume Set (mL): 320 mL  PEEP (cm H2O): 14 cmH2O  Oxygen Concentration (%): 80 %  Resp: 23      Skin Assessment: Clean and dry    Plan: Will continue to follow.     Colleen Ferreira

## 2021-09-18 NOTE — PLAN OF CARE
Shift: 1900-0730      Neuro: Pt is unresponsive, RASS is -5. TOF is 0/4, even with lowering paralytic and sedation overnight; BIS within goal of 40-50.     CV: Heart rhythm is sinus; afebrile; BP's soft-MD aware; arterial line extremely positional, and difficult to maneuver given pts prone position      Resp: Lungs are coarse throughout; pt remains proned; small amount of inline and oral secretions     GI: TF at goal rate of 20; large, watery BM this shift    : Lamar in place, low but adequate output     Skin: see skin assessment in flowsheets     Mobility/Activity: Head turns every 2hrs while proned throughout the shift; pt is very stiff and her neck is difficult to turn side to side    Pain: Fentanyl gtt for pain     Family Updated: No contact with family this shift.

## 2021-09-18 NOTE — PROGRESS NOTES
[facility] ICU RESPIRATORY NOTE        Date of Admission: 9/12/2021    Date of Intubation (most recent):  9/15/21    Reason for Mechanical Ventilation: Resp failure    Number of Days on Mechanical Ventilation: 4    Met Criteria for Spontaneous Breathing Trial: No    Reason for No Spontaneous Breathing Trial: Pt proned    Significant Events Today: Prone Positioning Note      Date of initial prone positioning:    Number of days prone:  1  Frequency of head turns:Q2  Was patient placed supine today: Yes       ETT/Skin assessment:    Was ETT repositioned and re-taped today: Yes    Skin barriers used:  Cavalon on face and mepilex on upper lip    Plan:  Pt proned at 1740 and plan to keep proned and on full vent support overnight            ABG Results:   Recent Labs   Lab 09/18/21  1625 09/18/21  0957 09/17/21  1155 09/17/21  0348   PH 7.29* 7.25* 7.22* 7.28*   PCO2 61* 66* 68* 54*   PO2 65* 122* 63* 122*   HCO3 29* 29* 28 25   O2PER 80 100 70 80       Current Vent Settings: Ventilation Mode: CMV/AC  (Continuous Mandatory Ventilation/ Assist Control)  FiO2 (%): 80 %  Rate Set (breaths/minute): 24 breaths/min  Tidal Volume Set (mL): 320 mL  PEEP (cm H2O): 14 cmH2O  Oxygen Concentration (%): 80 %  Peak Inspiratory Pressure (cm H2O) (Drager Delores): 36  Resp: 24        Scott Rios, RT

## 2021-09-18 NOTE — PROGRESS NOTES
[facility]   CRITICAL CARE NOTE     Pinky Whatley MRN: [id number]  1965  Date of Admission:9/12/2021          Problem List:   1. Acute respiratory failure   2. COVID PNA     24-Hour Goals   1. Supine today  2. Re-insert arterial line      Overnight Events   No acute issues overnight. Left fem arterial line pulled out inadvertently overnight       Lines/Tubes/Draines/Devices   .  CVC TRIPLE LUMEN Right Internal jugular (Active)   Site Assessment WDL 09/18/21 0800   Dressing Type Chlorhexidine sponge;Transparent 09/18/21 0800   Dressing Status clean;dry;intact 09/18/21 0800   Dressing Intervention new dressing 09/17/21 2000   Dressing Change Due 09/24/21 09/17/21 2000   Line Necessity yes, meets criteria 09/18/21 0800   Blue - Status infusing 09/18/21 0800   Brown - Status infusing 09/18/21 0800   Red - Status infusing 09/15/21 1600   White - Status infusing 09/18/21 0800   Phlebitis Scale 0-->no symptoms 09/18/21 0800   Infiltration? no 09/18/21 0800   Infiltration Scale 0 09/18/21 0800   Infiltration Site Treatment Method  None 09/18/21 0800   Number of days: 3       ETT Single 7.5 mm (Active)   Secured at (cm) 23 cm 09/18/21 0835   Measured from Lips 09/18/21 0350   Position Other (Comment) 09/18/21 0400   Secured by Cloth tape 09/18/21 0835   Bite Block None Present 09/18/21 0350   Site Appearance Clean;Dry 09/18/21 0350   Tube Care Site care done 09/17/21 0415   Cuff Assessment Minimal occluding volume 09/18/21 0350   Safety Measures Manual resuscitator/PEEP valve in room 09/18/21 0350   Number of days: 3       NG/OG/NJ Tube Nasojejunal 10 fr Left nostril (Active)   Site Description WDL 09/18/21 0800   Status Enteral Feedings 09/18/21 0800   Placement Confirmation Respiratory status unchanged 09/18/21 0800   Melcher-[place] (cm marking) at nare/mouth 113 cm 09/18/21 0800   Intake (ml) 90 ml 09/18/21 0800   Flush/Free Water (mL) 60 mL 09/18/21 0800   Residual (mL) 0 mL 09/18/21 0800    Output (ml) 0 ml 09/16/21 0400   Number of days: 3       Urethral Catheter (Active)   Tube Description UTV 09/17/21 2000   Catheter Care Done;Catheter wipes 09/18/21 0800   Collection Container Standard;Patent 09/18/21 0800   Securement Method Tape 09/18/21 0800   Rationale for Continued Use Strict 1-2 Hour I&O;Deep Sedation/Paralysis 09/18/21 0800   Urine Output 80 mL 09/18/21 0800   Number of days: 3       Wound 04/26/20 Medial;Lower Back Abrasion(s) abrasion on lower back (Active)   Number of days: 510       Wound Pelvis Other (comment) (Active)   Wound Bed Red;Pink 09/17/21 2000   Essence-wound Assessment Excoriated 09/17/21 2000   Drainage Amount Scant 09/17/21 2000   Drainage Color/Characteristics Serosanguineous 09/17/21 2000   Wound Care/Cleansing Barrier applied  09/17/21 2000   Dressing Protective barrier 09/17/21 2000   Dressing Status Clean, dry, intact 09/17/21 2000   Number of days: 1       Wound Leg Skin tear (Active)   Wound Bed Pink;Drainage 09/18/21 0400   Essence-wound Assessment Pale 09/18/21 0400   Estimated Circumference ( if not measured dime sized 09/18/21 0400   Drainage Amount Scant 09/18/21 0400   Drainage Color/Characteristics Purulent 09/18/21 0400   Wound Care/Cleansing Soap and water;Barrier applied  09/18/21 0400   Dressing Foam 09/18/21 0400   Dressing Status Clean, dry, intact 09/18/21 0400   Number of days: 0       Incision/Surgical Site 04/24/20 Abdomen (Active)   Number of days: 512          ICU Prophylaxis:   1. DVT: LMWH/mechanical  2. VAP: HOB 30 degrees, chlorhexidine rinse  3. Stress Ulcer: PPI  4. Restraints: Nonviolent soft two point restraints required and necessary for patient safety and continued cares and good effect as patient continues to pull at necessary lines, tubes despite education and distraction. Will readdress daily.   5. IV Access - central access required and necessary for continued patient cares  6. Feeding - NPO      Medications:       artificial tears   Both  Eyes Q8H     chlorhexidine  15 mL Mouth/Throat Q12H     dexamethasone  6 mg Intravenous Daily     enoxaparin ANTICOAGULANT  60 mg Subcutaneous BID     insulin aspart  1-12 Units Subcutaneous Q4H     insulin glargine  20 Units Subcutaneous QAM AC     levothyroxine  137 mcg Oral Daily     midodrine  2.5 mg Oral TID w/meals     multivitamins w/minerals  15 mL Per Feeding Tube Daily     pantoprazole  40 mg Per Feeding Tube QAM AC    Or     pantoprazole (PROTONIX) IV  40 mg Intravenous QAM AC     piperacillin-tazobactam  3.375 g Intravenous Q6H     sodium chloride (PF)  10-40 mL Intracatheter Q7 Days     sodium chloride (PF)  3 mL Intracatheter Q8H     albuterol, artificial tears ophthalmic solution, dextrose, glucose **OR** dextrose **OR** glucagon, fentaNYL, HOLD MEDICATION, HOLD MEDICATION, HYDROmorphone, lidocaine 4%, lidocaine (buffered or not buffered), lidocaine (buffered or not buffered), propofol (DIPRIVAN) infusion **AND** propofol **AND** CK total **AND** Triglycerides **AND** - MEDICATION INSTRUCTIONS - **AND** Notify Physician, - MEDICATION INSTRUCTIONS -, midazolam, naloxone **OR** naloxone **OR** naloxone **OR** naloxone, nitroGLYcerin, - MEDICATION INSTRUCTIONS -, ondansetron **OR** ondansetron, polyethylene glycol, prochlorperazine **OR** prochlorperazine **OR** prochlorperazine, senna-docusate **OR** senna-docusate, sodium chloride (PF), sodium chloride (PF), sodium chloride (PF), sodium chloride (PF)      Review of Systems:   Unable to obtain due to critical illness   CONSTITUTIONAL: negative for fever, chills, change in weight  INTEGUMENTARY/SKIN: no rash or obvious new lesions  ENT/MOUTH: no sore throat, new sinus pain or nasal drainage  RESP: see interval history  CV: negative for chest pain, palpitations or peripheral edema  GI: no nausea, vomiting, change in stools  : no dysuria  MUSCULOSKELETAL: no myalgias, arthralgias  ENDOCRINE: blood sugars with adequate control  PSYCHIATRIC: mood  stable  LYMPHATIC: no new lymphadenopathy  HEME: no bleeding or easy bruisability  NEURO: no numbness, weakness, headaches         Physical Exam:   Temp:  [97  F (36.1  C)-98.2  F (36.8  C)] 97.3  F (36.3  C)  Pulse:  [] 84  Resp:  [0-31] 23  BP: ()/(42-81) 112/64  MAP:  [33 mmHg-280 mmHg] 45 mmHg  Arterial Line BP: ()/() 48/42  FiO2 (%):  [80 %-100 %] 80 %  SpO2:  [85 %-100 %] 92 %    Intake/Output Summary (Last 24 hours) at 9/18/2021 0952  Last data filed at 9/18/2021 0800  Gross per 24 hour   Intake 2920.76 ml   Output 989 ml   Net 1931.76 ml     Wt Readings from Last 4 Encounters:   09/18/21 120.2 kg (264 lb 15.9 oz)   04/09/21 119.4 kg (263 lb 3.2 oz)   04/29/20 142 kg (313 lb 0.9 oz)   10/14/19 135 kg (297 lb 9.6 oz)     Arterial Line BP: ()/() 48/42  MAP:  [33 mmHg-280 mmHg] 45 mmHg  BP - Mean:  [52-93] 81  Ventilation Mode: CMV/AC  (Continuous Mandatory Ventilation/ Assist Control)  FiO2 (%): 80 %  Rate Set (breaths/minute): 24 breaths/min  Tidal Volume Set (mL): 320 mL  PEEP (cm H2O): 14 cmH2O  Oxygen Concentration (%): 100 %  Resp: 23    Recent Labs   Lab 09/17/21  1155 09/17/21  0348 09/16/21  1716 09/16/21  1154   PH 7.22* 7.28* 7.28* 7.29*   PCO2 68* 54* 48* 47*   PO2 63* 122* 88 133*   HCO3 28 25 22 23   O2PER 70 80 80 80       GEN: no acute distress   HEENT: head ncat, sclera anicteric, OP patent, trachea midline   PULM: unlabored synchronous with vent, clear anteriorly    CV/COR: RRR S1S2 no gallop,  No rub, no murmur  ABD: soft nontender, hypoactive bowel sounds, no mass  EXT:  Edema   warm  NEURO: grossly intact  SKIN: no obvious rash      Assessment and plan :     Neurology/Psychiatry/Pain/Sedation:   1. Sedation for vent synchrony. RASS -5 on propofol, fentanyl and paralytic infusion. Too critically ill to wean.     Cardiovascular/Hemodynamics/Renal/Pulm/Vent/GI/ID/Endo/Hematology:   1. Acute respiratory failure, ARDS 2/2 COVID PNA. DP 18-20 on PEEP 14 and Vt  6cc/kg. On 100% FiO2 with veletri. Plan supine today. No other vent changes today. Off of levophed with sched midodrine. Received remdesivir, LMWH and decadron. Received 5 doses of doxy and currently on zosyn for CAP/HCAP. UOP adequate.   2. Has DM2, ICU glucose protocol  3. Hypothyroidism. Replacement   4. NPO    Disposition/Code Status/Other  1. Critically ill  2. Code: Full    I have personally reviewed the daily labs, imaging studies, cultures and discussed the case with referring physician and consulting physicians.     This patient is critically ill and I have provided 30 minutes of critical care time (excluding procedures) on September 18, 2021.     Noe Orona MD, MHA   of Medicine  Interventional Pulmonary  Department of Pulmonary, Allergy, Critical Care and Sleep Medicine   [facility] [facility]  Pager: [phone]   Office: [phone]  Email: [email or web address]    ROUTINE ICU LABS (Last four results)  CMP  Recent Labs   Lab 09/18/21  0753 09/18/21  0442 09/18/21  0014 09/17/21 2004 09/17/21  0828 09/17/21  0645 09/16/21  1152 09/16/21  0812 09/15/21  2028 09/15/21  1816 09/15/21  1121 09/15/21  0612 09/14/21  0751 09/14/21  0534 09/13/21  0443 09/12/21  2150   NA  --   --   --   --   --  142  --  141  --   --   --  142  --  141   < > 133   POTASSIUM  --   --   --   --   --  3.7  --  4.1  --   --   --  4.0  --  4.1   < > 4.5   CHLORIDE  --   --   --   --   --  112*  --  111*  --   --   --  112*  --  111*   < > 100   CO2  --   --   --   --   --  27  --  24  --   --   --  23  --  23   < > 22   ANIONGAP  --   --   --   --   --  3  --  6  --   --   --  7  --  7   < > 11   * 180* 199* 216*   < > 165*   < > 177*   < >  --    < > 119*   < > 157*   < > 276*   BUN  --   --   --   --   --  20  --  23  --   --   --  29  --  26   < > 27   CR  --   --   --   --   --  0.69  --  0.85  --   --   --  0.87  --  0.91   < > 1.12*   GFRESTIMATED  --   --   --   --   --  >90  --  78   --   --   --  76  --  72   < > 56*   CHANTELLE  --   --   --   --   --  8.7  --  8.9  --   --   --  9.6  --  8.9   < > 9.1   MAG  --   --   --   --   --   --   --   --   --  1.8  --   --   --   --   --   --    PHOS  --   --   --   --   --   --   --   --   --  4.1  --   --   --   --   --   --    PROTTOTAL  --   --   --   --   --   --   --  6.1*  --   --   --   --   --   --   --  8.0   ALBUMIN  --   --   --   --   --   --   --  2.0*  --   --   --   --   --   --   --  3.3*   BILITOTAL  --   --   --   --   --   --   --  0.6  --   --   --   --   --   --   --  0.7   ALKPHOS  --   --   --   --   --   --   --  71  --   --   --   --   --   --   --  89   AST  --   --   --   --   --   --   --  33  --   --   --   --   --   --   --  72*   ALT  --   --   --   --   --   --   --  30  --   --   --   --   --   --   --  50    < > = values in this interval not displayed.     CBC  Recent Labs   Lab 09/17/21  0645 09/16/21  0812 09/15/21  1816 09/15/21  0612   WBC 7.7 9.5 13.1* 8.8   RBC 3.27* 3.55* 3.49* 3.74*   HGB 10.0* 10.9* 10.7* 11.6*   HCT 30.6* 33.2* 33.2* 34.8*   MCV 94 94 95 93   MCH 30.6 30.7 30.7 31.0   MCHC 32.7 32.8 32.2 33.3   RDW 13.6 13.9 14.2 13.9    367 337 332     INR  Recent Labs   Lab 09/16/21  1820   INR 1.42*     Arterial Blood Gas  Recent Labs   Lab 09/17/21  1155 09/17/21  0348 09/16/21  1716 09/16/21  1154   PH 7.22* 7.28* 7.28* 7.29*   PCO2 68* 54* 48* 47*   PO2 63* 122* 88 133*   HCO3 28 25 22 23   O2PER 70 80 80 80

## 2021-09-18 NOTE — PLAN OF CARE
Shift summary:  - Neuro: sedated, paralyzed, unresponsive; Pupils PERRL, sluggish. BIS monitoring WDL. TOF 0/4, paralytic titrated down, no change in assessment.  -CV: WDL; New art line occasionally dampened. Blood pressure WDL, confirmed with cuff pressures.  -Respiratory: Supine for 8 hours, re-proned. Lung sounds coarse, ronchi. Sats WDL.\  -GI: Tube feeds at  goal; Hyperglycemic, treated per sliding scale.   -: Urine output low, however adequate.  -Pain: On Fentanyl drip.  -Skin: Breakdown in skin folds, moisture associated, InterDry in place.  -Family: Mom Shagufta updated over the phone; All questions answered and concerns addressed, emotional support provided.

## 2021-09-18 NOTE — PROCEDURES
[facility]    Arterial line placement    Date/Time: 9/18/2021 10:11 AM  Performed by: Noe Orona MD  Authorized by: Noe Orona MD     UNIVERSAL PROTOCOL   Site Marked: Yes  Prior Images Obtained and Reviewed:  Yes  Required items: Required blood products, implants, devices and special equipment available    Patient identity confirmed:  Verbally with patient, provided demographic data and arm band  Patient was reevaluated immediately before administering moderate or deep sedation or anesthesia  Confirmation Checklist:  Patient's identity using two indicators  Universal Protocol: the Joint Commission Universal Protocol was followed        Indication:  Location: right brachial       ANESTHESIA    Local Anesthetic: Lidocaine 1% with epinephrine      PROCEDURE DETAILS    Needle Gauge:  22  Seldinger technique: Seldinger technique used  Cutdown:  Cutdown required  Number of Attempts:  1  Post-procedure:  Line sutured and dressing applied    PROCEDURE   Length of time physician/provider present for 1:1 monitoring during sedation: 0

## 2021-09-18 NOTE — PROVIDER NOTIFICATION
Notified Dr. June that pt's BP's have been soft, MAPs low 60's. Levo order was d/c'd previously in the day. Also notified MD that no labs were ordered for AM. New new orders at this time.

## 2021-09-19 NOTE — PROGRESS NOTES
[facility] RESPIRATORY NOTE           Date of Admission: 9/12/2021     Date of Intubation (most recent):  9/15/21     Reason for Mechanical Ventilation: Resp failure     Number of Days on Mechanical Ventilation: 4     Met Criteria for Spontaneous Breathing Trial: No     Reason for No Spontaneous Breathing Trial: Pt proned    Significant Events Today: Pt remained proned overnight, head turns Q2    Recent Labs   Lab 09/18/21  1625 09/18/21  0957 09/17/21  1155 09/17/21  0348   PH 7.29* 7.25* 7.22* 7.28*   PCO2 61* 66* 68* 54*   PO2 65* 122* 63* 122*   HCO3 29* 29* 28 25   O2PER 80 100 70 80     Ventilation Mode: CMV/AC  (Continuous Mandatory Ventilation/ Assist Control)  FiO2 (%): 100 %  Rate Set (breaths/minute): 24 breaths/min  Tidal Volume Set (mL): 320 mL  PEEP (cm H2O): 14 cmH2O  Oxygen Concentration (%): 100 %  Peak Inspiratory Pressure (cm H2O) (Drager Delores): 36  Resp: 24    MIK May, RRT

## 2021-09-19 NOTE — PROGRESS NOTES
Pt desaturated to 75% during 20:00 head turn, 100% FiO2, volumes/pressures good, Pplat 22, increased peep to 18 per MD. SpO2 now 86%.      MIK May, RRT

## 2021-09-19 NOTE — PROGRESS NOTES
[facility]   CRITICAL CARE NOTE     Pinky Whatley MRN: [id number]  1965  Date of Admission:9/12/2021          Problem List:   1. Acute respiratory failure   2. COVID PNA      24-Hour Goals   1. keep prone   2. Increase diuretics       Overnight Events   No acute issues overnight. Left fem arterial line pulled out inadvertently overnight       Lines/Tubes/Draines/Devices   .  Arterial Line 09/18/21 Brachial (Active)   Site Assessment WDL 09/19/21 0400   Line Status Pulsatile blood flow 09/19/21 0400   Art Line Waveform Appropriate;Square wave test performed 09/19/21 0400   Art Line Interventions Zeroed and calibrated;Leveled;Connections checked and tightened;Flushed per protocol 09/19/21 0400   Color/Movement/Sensation Capillary refill greater than 3 sec 09/19/21 0400   Line Necessity Yes, meets criteria 09/19/21 0400   Dressing Type Transparent 09/19/21 0400   Dressing Status Clean, dry, intact 09/19/21 0400   Dressing Intervention Dressing changed/new dressing 09/18/21 1600   Number of days: 1       CVC TRIPLE LUMEN Right Internal jugular (Active)   Site Assessment WDL 09/19/21 0400   Dressing Type Chlorhexidine sponge;Transparent 09/19/21 0400   Dressing Status clean;dry;intact 09/18/21 2000   Dressing Intervention new dressing 09/17/21 2000   Dressing Change Due 09/24/21 09/17/21 2000   Line Necessity yes, meets criteria 09/18/21 0800   Blue - Status infusing 09/19/21 0400   Brown - Status infusing 09/19/21 0400   Red - Status infusing 09/15/21 1600   White - Status infusing 09/19/21 0400   Phlebitis Scale 0-->no symptoms 09/19/21 0400   Infiltration? no 09/19/21 0400   Infiltration Scale 0 09/18/21 1600   Infiltration Site Treatment Method  None 09/19/21 0400   Number of days: 4       ETT Single 7.5 mm (Active)   Secured at (cm) 23 cm 09/18/21 1600   Measured from Lips 09/18/21 1600   Position Other (Comment) 09/18/21 1600   Secured by Cloth tape 09/18/21 1600   Bite Block None  Present 09/18/21 1600   Site Appearance Clean;Dry 09/18/21 1600   Tube Care Site care done 09/18/21 1600   Cuff Assessment Minimal occluding volume 09/18/21 0350   Safety Measures Manual resuscitator at bedside 09/19/21 0227   Number of days: 4       NG/OG/NJ Tube Nasojejunal 10 fr Left nostril (Active)   Site Description WDL 09/19/21 0000   Status Enteral Feedings 09/19/21 0400   Placement Confirmation [place] unchanged 09/19/21 0400   [place] (cm marking) at nare/mouth 113 cm 09/18/21 2000   Intake (ml) 75 ml 09/19/21 0800   Flush/Free Water (mL) 60 mL 09/18/21 2000   Residual (mL) 0 mL 09/18/21 1600   Output (ml) 0 ml 09/16/21 0400   Number of days: 4       Urethral Catheter (Active)   Tube Description UTV 09/17/21 2000   Catheter Care Catheter wipes 09/18/21 2000   Collection Container Standard;Patent 09/19/21 0400   Securement Method Tape 09/18/21 1600   Rationale for Continued Use Strict 1-2 Hour I&O;Deep Sedation/Paralysis 09/19/21 0000   Urine Output 120 mL 09/19/21 1000   Number of days: 4       Wound 04/26/20 Medial;Lower Back Abrasion(s) abrasion on lower back (Active)   Number of days: 511       Wound Pelvis Other (comment) (Active)   Wound Bed Red;Pink 09/18/21 2000   Essence-wound Assessment Excoriated 09/18/21 2000   Drainage Amount Scant 09/18/21 1600   Drainage Color/Characteristics Serosanguineous 09/18/21 1600   Wound Care/Cleansing Barrier applied  09/18/21 1600   Dressing Protective barrier 09/18/21 1600   Dressing Status Clean, dry, intact 09/18/21 1600   Number of days: 2       Wound Leg Skin tear (Active)   Wound Bed [place] 09/18/21 1600   Essence-wound Assessment Pale 09/18/21 1600   Estimated Circumference ( if not measured dime sized 09/18/21 0400   Drainage Amount None 09/18/21 1600   Drainage Color/Characteristics Purulent 09/18/21 0400   Wound Care/Cleansing Soap and water 09/18/21 1600   Dressing Foam 09/18/21 2000   Dressing Status Clean, dry, intact 09/18/21 2000   Number of days: 1        Incision/Surgical Site 04/24/20 Abdomen (Active)   Number of days: 513        ICU Prophylaxis:   1. DVT: LMWH/mechanical  2. VAP: HOB 30 degrees, chlorhexidine rinse  3. Stress Ulcer: PPI  4. Restraints: Nonviolent soft two point restraints required and necessary for patient safety and continued cares and good effect as patient continues to pull at necessary lines, tubes despite education and distraction. Will readdress daily.   5. IV Access - central access required and necessary for continued patient cares  6. Feeding - NPO      Medications:       artificial tears   Both Eyes Q8H     chlorhexidine  15 mL Mouth/Throat Q12H     dexamethasone  6 mg Intravenous Daily     enoxaparin ANTICOAGULANT  60 mg Subcutaneous BID     insulin aspart  1-12 Units Subcutaneous Q4H     insulin glargine  20 Units Subcutaneous QAM AC     levothyroxine  137 mcg Oral Daily     midodrine  2.5 mg Oral TID w/meals     multivitamins w/minerals  15 mL Per Feeding Tube Daily     pantoprazole  40 mg Per Feeding Tube QAM AC    Or     pantoprazole (PROTONIX) IV  40 mg Intravenous QAM AC     piperacillin-tazobactam  3.375 g Intravenous Q6H     sodium chloride (PF)  10-40 mL Intracatheter Q7 Days         Review of Systems:   Unable to obtain due to critical illness          Physical Exam:   Temp:  [97.7  F (36.5  C)-99  F (37.2  C)] 98.8  F (37.1  C)  Pulse:  [] 104  Resp:  [0-62] 19  BP: ()/(48-92) 142/73  MAP:  [57 mmHg-99 mmHg] 76 mmHg  Arterial Line BP: ()/(43-71) 120/52  FiO2 (%):  [80 %-100 %] 100 %  SpO2:  [80 %-100 %] 95 %    Intake/Output Summary (Last 24 hours) at 9/19/2021 1122  Last data filed at 9/19/2021 1000  Gross per 24 hour   Intake 1620.58 ml   Output 1185 ml   Net 435.58 ml     Wt Readings from Last 4 Encounters:   09/19/21 120.8 kg (266 lb 5.1 oz)   04/09/21 119.4 kg (263 lb 3.2 oz)   04/29/20 142 kg (313 lb 0.9 oz)   10/14/19 135 kg (297 lb 9.6 oz)     Arterial Line BP: ()/(43-71) 120/52  MAP:  [57  mmHg-99 mmHg] 76 mmHg  BP - Mean:  [] 104  Ventilation Mode: CMV/AC  (Continuous Mandatory Ventilation/ Assist Control)  FiO2 (%): 100 %  Rate Set (breaths/minute): 24 breaths/min  Tidal Volume Set (mL): 320 mL  PEEP (cm H2O): 14 cmH2O  Oxygen Concentration (%): 100 %  Peak Inspiratory Pressure (cm H2O) (Drager Delores): 36  Resp: 19    Recent Labs   Lab 09/19/21  0604 09/18/21  1625 09/18/21  0957 09/17/21  1155   PH 7.27* 7.29* 7.25* 7.22*   PCO2 68* 61* 66* 68*   PO2 56* 65* 122* 63*   HCO3 31* 29* 29* 28   O2PER 100 80 100 70       GEN: no acute distress   HEENT: head ncat, sclera anicteric, OP patent, trachea midline   PULM: unlabored synchronous with vent, clear anteriorly    CV/COR: RRR S1S2 no gallop,  No rub, no murmur  ABD: soft nontender, hypoactive bowel sounds, no mass  EXT:  Edema   warm  NEURO: grossly intact  SKIN: no obvious rash      Assessment and plan :     Neurology/Psychiatry/Pain/Sedation:   1. Sedation for vent synchrony. RASS -5 on propofol, fentanyl and paralytic infusion. Too critically ill to wean.      Cardiovascular/Hemodynamics/Renal/Pulm/Vent/GI/ID/Endo/Hematology:   1. Acute respiratory failure, ARDS 2/2 COVID PNA. DP 18 on PEEP 14 and Vt 6cc/kg. On 100% FiO2 with veletri. Proned early this morning for acute desaturation following head turn. No other vent changes today. Off of levophed with sched midodrine. Received remdesivir, LMWH and decadron. Received 5 doses of doxy and currently on zosyn for CAP/HCAP. UOP adequate. Increase diuresis today  2. Has DM2, ICU glucose protocol  3. Hypothyroidism. Replacement   4. NPO     Disposition/Code Status/Other  1. Critically ill  2. Code: Full    I have personally reviewed the daily labs, imaging studies, cultures and discussed the case with referring physician and consulting physicians.     This patient is critically ill and I have provided 30 minutes of critical care time (excluding procedures) on September 19, 2021.     Noe Orona  KARINE VILLALPANDO   of Medicine  Interventional Pulmonary  Department of Pulmonary, Allergy, Critical Care and Sleep Medicine   [facility], [employer]  Pager: [phone]   Office: [phone]  Email: [email or web address]    ROUTINE ICU LABS (Last four results)  CMP  Recent Labs   Lab 09/19/21  0754 09/19/21  0453 09/19/21  0421 09/19/21  0011 09/17/21  0828 09/17/21  0645 09/16/21  1152 09/16/21  0812 09/15/21  2028 09/15/21  1816 09/15/21  1121 09/15/21  0612 09/13/21  0443 09/12/21  2150   NA  --   --  143  --   --  142  --  141  --   --   --  142   < > 133   POTASSIUM  --   --  3.9  --   --  3.7  --  4.1  --   --   --  4.0   < > 4.5   CHLORIDE  --   --  111*  --   --  112*  --  111*  --   --   --  112*   < > 100   CO2  --   --  30  --   --  27  --  24  --   --   --  23   < > 22   ANIONGAP  --   --  2*  --   --  3  --  6  --   --   --  7   < > 11   * 168* 188* 192*   < > 165*   < > 177*   < >  --    < > 119*   < > 276*   BUN  --   --  33*  --   --  20  --  23  --   --   --  29   < > 27   CR  --   --  0.61  --   --  0.69  --  0.85  --   --   --  0.87   < > 1.12*   GFRESTIMATED  --   --  >90  --   --  >90  --  78  --   --   --  76   < > 56*   CHANTELLE  --   --  8.6  --   --  8.7  --  8.9  --   --   --  9.6   < > 9.1   MAG  --   --   --   --   --   --   --   --   --  1.8  --   --   --   --    PHOS  --   --   --   --   --   --   --   --   --  4.1  --   --   --   --    PROTTOTAL  --   --   --   --   --   --   --  6.1*  --   --   --   --   --  8.0   ALBUMIN  --   --   --   --   --   --   --  2.0*  --   --   --   --   --  3.3*   BILITOTAL  --   --   --   --   --   --   --  0.6  --   --   --   --   --  0.7   ALKPHOS  --   --   --   --   --   --   --  71  --   --   --   --   --  89   AST  --   --   --   --   --   --   --  33  --   --   --   --   --  72*   ALT  --   --   --   --   --   --   --  30  --   --   --   --   --  50    < > = values in this interval not displayed.     CBC  Recent Labs   Lab  09/19/21  0421 09/17/21  0645 09/16/21  0812 09/15/21  1816   WBC 7.8 7.7 9.5 13.1*   RBC 3.42* 3.27* 3.55* 3.49*   HGB 10.3* 10.0* 10.9* 10.7*   HCT 32.3* 30.6* 33.2* 33.2*   MCV 94 94 94 95   MCH 30.1 30.6 30.7 30.7   MCHC 31.9 32.7 32.8 32.2   RDW 14.0 13.6 13.9 14.2    324 367 337     INR  Recent Labs   Lab 09/16/21  1820   INR 1.42*     Arterial Blood Gas  Recent Labs   Lab 09/19/21  0604 09/18/21  1625 09/18/21  0957 09/17/21  1155   PH 7.27* 7.29* 7.25* 7.22*   PCO2 68* 61* 66* 68*   PO2 56* 65* 122* 63*   HCO3 31* 29* 29* 28   O2PER 100 80 100 70       All cultures:  No results for input(s): CULT in the last 168 hours.  Recent Results (from the past 24 hour(s))   XR Chest Port 1 View    Narrative    CHEST PORTABLE ONE VIEW   9/18/2021 12:58 PM     HISTORY: Check lung status.    COMPARISON: Chest x-ray on 9/15/2021.      Impression    IMPRESSION: Single AP view of the chest was obtained. Endotracheal  tube tip projects over lower thoracic trachea approximately 1.5 cm  from the harshal. Enteric tube crosses the diaphragm with the distal  tip outside the field of view. Right IJ central catheter tip projects  over high right atrium. Stable cardiomediastinal silhouette. Bilateral  basilar predominant dense pulmonary opacities, not significantly  changed as compared to 9/15/2021 exam. No significant pleural effusion  or pneumothorax.    NICOLETTE NIEVES MD         SYSTEM ID:  [id number]

## 2021-09-19 NOTE — PROGRESS NOTES
[facility] RESPIRATORY NOTE        Date of Admission: 9/12/2021    Date of Intubation (most recent): 9/15/21    Reason for Mechanical Ventilation: Resp failure    Number of Days on Mechanical Ventilation: 5    Met Criteria for Spontaneous Breathing Trial: No    Reason for No Spontaneous Breathing Trial: Pt proned    Significant Events Today: Prone Positioning Note      Number of days prone:  2  Frequency of head turns:  Q2 until 1400 due to pt desatting per MD  Was patient placed supine today: No  Reason for not placing patient supine today:   Per MD    ETT/Skin assessment:    Was ETT repositioned and re-taped today: No  Reason for not re-taping ETT today: Pt not stabe enough to supine      Plan:  Pt to remain proned and on full vent support overnight            ABG Results:   Recent Labs   Lab 09/19/21  0604 09/18/21  1625 09/18/21  0957 09/17/21  1155   PH 7.27* 7.29* 7.25* 7.22*   PCO2 68* 61* 66* 68*   PO2 56* 65* 122* 63*   HCO3 31* 29* 29* 28   O2PER 100 80 100 70       Current Vent Settings: Ventilation Mode: CMV/AC  (Continuous Mandatory Ventilation/ Assist Control)  FiO2 (%): 100 %  Rate Set (breaths/minute): 24 breaths/min  Tidal Volume Set (mL): 320 mL  PEEP (cm H2O): 14 cmH2O  Oxygen Concentration (%): 100 %  Peak Inspiratory Pressure (cm H2O) (Drager Delores): 37  Resp: 16          Scott Rios, RT

## 2021-09-19 NOTE — PLAN OF CARE
Pt intubated, sedated, proned, and paralyzed.  Vecuronium increased in early shift due to overbreathing ventillator.  Norepi initiated for low BP/MAP below 65.  Head turned q2, difficult recovery after turn.  FiO2 increased to 100%.  Voiding adequate clear yellow urine via roberts.  BM overnight.  Chlorhexadine bath this AM.  Plan to continue abx, monitor ventillation/sedation.

## 2021-09-19 NOTE — PLAN OF CARE
Shift summary    - Neuro: sedated, paralyzed, unresponsive; Pupils PERRL, sluggish. BIS monitoring within goal. TOF 0/4, however paralytic to be titrated to ventilator compliance not TOF, as per MD.  -CV: Normotensive to hypertensive in the first part of the day; Hypotensive in the afternoon after aggressive diuresis, restarted on low dose Levophed. Normal sinus rhythm with occasional PACs; Sluggish peripheral perfusion.  -Respiratory: Remained prone for the day (no supination) as per MD, blood gas result and respiratory instability; Requires FiO2 of 100% and PEEP of 14 to maintain sats >90%. Desaturation with head turns, as per Dr Orona no head turns at this time (last one at 1600); Attempted to wean FiO2 multiple times, however patient remains on 100%  -GI: Tube feeds at  goal; Hyperglycemic, treated per sliding scale. Expressed concern to MD about degree of hyperglycemia, no new orders at this time.  -: Good urine output, put out >3L after diuresis with Lasix.  -Pain: On Fentanyl drip, two instances of bolus from pump for presumed pain (tachycardia, increased BP).  -Skin: Breakdown in skin folds, moisture associated, InterDry in place.  -Family: Virginia Eagle updated over the phone; All questions answered and concerns addressed, emotional support provided.

## 2021-09-20 NOTE — PLAN OF CARE
Pt intubated, sedated and paralyzed.  Tolerating ventilator well.  Diuresing large quantities of clear yellow/green urine, 2l overnight.  Repositioning resumed, pt tolerating well.  AM CO2 elevated, provider notified, ventilator rate increased.  Plan to maintain ventilator and pressor support, continue ABX, monitor I/O

## 2021-09-20 NOTE — PLAN OF CARE
Shift summary     - Neuro: Continues to be sedated, paralyzed and unresponsive to stimuli; Pupils PERRL, 4 mm, sluggish. BIS monitoring within goal, adequately sedated. TOF 0/4, however paralytic to be titrated to ventilator compliance not TOF, as per MD.  -CV: On low dose Levophed to maintain MAP>65. Unable to wean vasopressor off. Normal sinus rhythm with occasional PACs and rare PVCs.   -Respiratory: Supined for two hours in the morning; Promptly re-proned as per MD due to critical blood gas results and respiratory instability (sats 88%); Patient's sats in low 80s after pronation; Despite all interventions, it took 4-5 hours to recover sats to 93-94%. Further head turns at this time deferred as per MD due to respiratory instability, remains proned. Recommended blood gas check before possibly resuming head turns.  -GI: Tube feeds at goal; Hyperglycemic, treated per sliding scale. Lantus dose increased to 25 units for tighter blood sugar control.  -: Continues to be diuresed with scheduled Lasix, responded well (see intake & output).  -Pain: On Fentanyl drip,no need for additional pain medication doses as per vital signs and assessment.  -Skin: Breakdown in skin folds, moisture associated, InterDry in place. Small scattered abrasions, protective foam dressings in place. Unable to frequently assess frontal skin areas due to respiratory instability with turns. All possible protective measures for preventing skin breakdown applied.  -Labs: Recommended CBC and BMP this evening due to large diuresis. Recommended ABG prior to possible restart of head turns.  -Family: Virginia Eagle updated over the phone by RN and MD; All questions answered, emotional support provided.

## 2021-09-20 NOTE — PROGRESS NOTES
[facility] ICU RESPIRATORY NOTE           Date of Admission: 9/12/2021     Date of Intubation (most recent): 9/15/21     Reason for Mechanical Ventilation: Resp failure     Number of Days on Mechanical Ventilation: 6     Met Criteria for Spontaneous Breathing Trial: No     Reason for No Spontaneous Breathing Trial: Pt proned    Recent Labs   Lab 09/19/21  0604 09/18/21  1625 09/18/21  0957 09/17/21  1155   PH 7.27* 7.29* 7.25* 7.22*   PCO2 68* 61* 66* 68*   PO2 56* 65* 122* 63*   HCO3 31* 29* 29* 28   O2PER 100 80 100 70     Ventilation Mode: CMV/AC  (Continuous Mandatory Ventilation/ Assist Control)  FiO2 (%): 100 %  Rate Set (breaths/minute): 24 breaths/min  Tidal Volume Set (mL): 320 mL  PEEP (cm H2O): 14 cmH2O  Oxygen Concentration (%): 100 %  Peak Inspiratory Pressure (cm H2O) (Drager Delores): 36  Resp: 24    Significant Events Today:    Pt remained proned overnight, no head turns per Dr. Orona/dayshift team due to significant desaturation during head turns. Pt maintained SpO2 92-95% overnight, spoke to overnight  elizabeth VILLALPANDO to re-attempt head turns. Pt turned at 0400, tolerated well. Will pass on to dayshift.     MIK May, RRT

## 2021-09-20 NOTE — PROGRESS NOTES
[facility] ICU RESPIRATORY NOTE        Date of Admission: 9/12/2021    Date of Intubation (most recent):  9/15/21    Reason for Mechanical Ventilation: Resp failure    Number of Days on Mechanical Ventilation: 6    Met Criteria for Spontaneous Breathing Trial: No    Reason for No Spontaneous Breathing Trial: Pt proned     Significant Events Today: Prone Positioning Note      Number of days prone: 1  Frequency of head turns: Not turning head due to low sats  Was patient placed supine today: Yes    ETT/Skin assessment:    Was ETT repositioned and re-taped today: Yes   Skin barriers used: Cavilon and mepilex on upper lip    Plan: Pt to remain proned and on full vent support overnight            ABG Results:   Recent Labs   Lab 09/20/21  0954 09/20/21  0756 09/20/21  0439 09/19/21  0604   PH 7.37 7.32* 7.29* 7.27*   PCO2 68* 76* 81* 68*   PO2 51* 78* 89 56*   HCO3 40* 39* 39* 31*   O2PER 100 100 100 100       Current Vent Settings: Ventilation Mode: CMV/AC  (Continuous Mandatory Ventilation/ Assist Control)  FiO2 (%): 100 %  Rate Set (breaths/minute): 28 breaths/min  Tidal Volume Set (mL): 370 mL  PEEP (cm H2O): 14 cmH2O  Oxygen Concentration (%): 100 %  Peak Inspiratory Pressure (cm H2O) (Drager Delores): 43  Resp: 28    Scott Rios, RT

## 2021-09-20 NOTE — PROGRESS NOTES
ICU Progress Note    A/P:  55F RA on immunosuppression admitted 9/15 w/ acute hypoxemic respiratory failure 2/2 COVID-19.     I have personally reviewed the daily labs, imaging studies, cultures and discussed the case with referring physician and consulting physicians.     Neuro  # Sedation for mechanical ventilation  # Paralysis   - fentanyl gtt  - propofol gtt  - vecuronium gtt  - RASS -5    Pulmonary  # Acute hypoxemic and hypercarbic respiratory failure  # ARDS  - low tidal volume ventilation  - vent settings below  - inhaled epoprostenol  - currently supine, plan to re-prone later today  - given body habitus and comorbidities, I do not feel she is an ECMO candidate     Cardiac  # Distributive shock - 2/2 sedation and critical illness  # Hx HTN  - MAP > 65  - norepi  - scheduled midodrine   - hold home anti-HTN    Renal  # No acute issues  - monitor UOP, Cr, I/O  - scheduled furosemide     GI  # Morbid obesity  - RD to manage TF    Heme/Onc  # Coagulopathy 2/2 COVID-19  - monitor CBC  - enoxaparin    ID  # COVID-19  # Possible superimposed bacterial PNA  - completed remdesivir  - on dexamethasone  - completed empiric ABx w/ pip-tazo and doxy  - not a candidate for toci or baricitinib     Endo  # DM2  # Hypothyroidism  - monitor BG  - glargine 20u  - sliding scale insulin   - levothyroxine     Rheum  # Rheumatoid arthritis  - hold home hydroxychloroquine, methotrexate, prednisone, and tofacitinib     PPX  1. DVT: enoxaparin   2. VAP: HOB 30 degrees, chlorhexidine rinse  3. Stress Ulcer: PPI  4. Restraints: Nonviolent soft two point restraints required and necessary for patient safety and continued cares and good effect as patient continues to pull at necessary lines, tubes despite education and distraction. Will readdress daily.   5. Wound care - per unit routine   6. Feeding - TF  7. Mother did not answer phone, left voicemail.    Interval Hx:  Worsening hypercarbia ON, RR and Vt increased.     Unable to obtain  ROS 2/2 sedation/intubation.     /61   Pulse 77   Temp 98.4  F (36.9  C)   Resp 28   Wt 124.4 kg (274 lb 4 oz)   LMP  (LMP Unknown)   SpO2 92%   BMI 51.82 kg/m    Gen: prone  Neuro: pupils equal  HEENT: anicteric, ETT in place  Card: RRR  Pulm: coarse b/l   Abd: obese  MSK: 1+ edema b/l   Skin: no obvious rash    Ventilation Mode: CMV/AC  (Continuous Mandatory Ventilation/ Assist Control)  FiO2 (%): 100 %  Rate Set (breaths/minute): 28 breaths/min  Tidal Volume Set (mL): 370 mL  PEEP (cm H2O): 14 cmH2O  Oxygen Concentration (%): 100 %  Peak Inspiratory Pressure (cm H2O) (Drager Delores): 36  Resp: 28        Intake/Output Summary (Last 24 hours) at 9/20/2021 0930  Last data filed at 9/20/2021 0800  Gross per 24 hour   Intake 2491.06 ml   Output 6065 ml   Net -3573.94 ml       Labs: reviewed    Imaging: reviewed    Billing: Patient is critically ill. Total critical care time today, excluding procedures, was 55 minutes.    Ubaldo Arora MD  Pulmonary Disease and Critical Care Medicine   [facility]

## 2021-09-20 NOTE — PROGRESS NOTES
CLINICAL NUTRITION SERVICES - REASSESSMENT NOTE      Malnutrition: (9/15)  % Weight Loss:  Weight loss does not meet criteria for malnutrition (likely fluid shifts)  % Intake:  </= 50% for >/= 5 days (severe malnutrition)(will meet 9/16)  Subcutaneous Fat Loss:  Unable to determine   Muscle Loss:  Unable to determine   Fluid Retention:  None noted     Malnutrition Diagnosis: Unable to determine due to lack of Nutrition Focused Physical Exam       EVALUATION OF PROGRESS TOWARD GOALS   Diet:  NPO on vent     Nutrition Support:  TF increased to 20 mL/hr on 9/17 and continues as follows ~    Nutrition Support Enteral:  Type of Feeding Tube: Nasoduodenal   Enteral Frequency:  Continuous  Enteral Regimen: Vital HP at 20 mL/hr  Total Enteral Provisions: 480 kcal, 42 g protein, 400 mL H2O, 52 g CHO, no fiber   Free Water Flush: 60 mL every 4 hours   ProSource 2 pkts every 8 hours = 240 kcal, 66 g protein  Propofol at 20.7 mL/hr= 546 kcal   Total = 1266 kcal (11 kcal/kg), 108 g protein (2.3 g/kg)      Intake/Tolerance:    No labs today   -251 with High sliding scale insulin, Lantus 20 units in am   BM x 2 yest and x 1 on 9/18   I/O 3132/6115, wt 124.4 kg (up 11.8 kg from 9/12) - IV Lasix       ASSESSED NUTRITION NEEDS:  Dosing Weight 112.6 kg (energy) and 47.7 kg (protein)  Estimated Energy Needs: 8702-8617 kcals (11-14 Kcal/Kg)  Justification: obese and vented  Estimated Protein Needs:  grams protein (2-2.5 g pro/Kg)  Justification: hypercatabolism with critical illness and obesity guidelines       NEW FINDINGS:   9/17:  Supined at 1500 --> Proned again at 1630   9/20:  Supine --> plan to re-prone     Norepi drip    Previous Goals (9/17):   TF + Prosource + Propofol will meet % estimated needs  Evaluation: Met    Previous Nutrition Diagnosis (9/17):   Inadequate enteral nutrition infusion related to low TF rate as evidenced by TF + Propofol meeting only 85% energy and 22% protein needs  Evaluation:  Resolved       CURRENT NUTRITION DIAGNOSIS  No nutrition diagnosis identified at this time    INTERVENTIONS  Recommendations / Nutrition Prescription  Continue Vital HP at 20 mL/hr + ProSource as above     Implementation  Collaboration and Referral of Nutrition care:  Patient discussed today during interdisciplinary bedside rounds     Goals  TF + ProSource + Propofol will continue to meet % needs     MONITORING AND EVALUATION:  Progress towards goals will be monitored and evaluated per protocol and Practice Guidelines    Dorota Jurado RD, LD, CNSC   Clinical Dietitian - [facility]

## 2021-09-21 NOTE — PROVIDER NOTIFICATION
Notified intensivist (student) that no repeat ABG is ordered, clarified that they would like one this shift. Also clarified to continue to hold of on head turns through the night, given pt's low O2 sats on 100% and the fact that she desats with repositions and movement.

## 2021-09-21 NOTE — PROVIDER NOTIFICATION
Notified MD of pt's temp slowly increasing. MD aware, no new orders. Continue to monitor and notify if there is a large spike in temp.     MD also aware of new ABG result-no new orders.

## 2021-09-21 NOTE — PLAN OF CARE
Shift summary     - Neuro: Sedated, paralyzed and unresponsive to stimuli; Pupils PERRL, 4 mm, sluggish. BIS monitoring within goal, except from short periods of increased index values with stimulation; sedation goal of BIS 40-50 subsequently achieved with PRN Versed. TOF 0/4; paralytic titrated to ventilator compliance not TOF, as per MD.  -CV: On low dose Levophed to maintain MAP>65. Unable to wean vasopressor off. Normal sinus rhythm with occasional PACs and rare PVCs.  -Respiratory: Prone for the entire day shift; Peep increased to 16. Only one head turn completed, patient dropped sats into low 80s, slowly recovered within an hour to baseline 89-91%  Further head turns deferred as per MD due to severe respiratory compromise with movement. Plan is to check blood gas in the AM to make any further decisions.   -GI: Tube feeds at goal; Hyperglycemic, treated per sliding scale. Evening Lantus dose increased to 30 units.  -: Lasix decreased to one dose a day. Urine output adequate, green-yellow, clear.   -Pain: Appears to be well controlled with Fentanyl drip.  -Skin: Breakdown in skin folds, moisture associated, InterDry in place. Small scattered abrasions, protective foam dressings in place. Unable to frequently assess frontal skin areas due to respiratory instability with turns. All possible protective measures for preventing skin breakdown applied.  -Labs: Potassium and Phos replaced, recheck in the AM.  -Family: Virginia Eagle updated over the phone by MD.

## 2021-09-21 NOTE — PLAN OF CARE
Shift: 2444-8549      Neuro: Pt is unresponsive, RASS goal of -5 met; pupils are equal but sluggish    CV: Heart rhythm is sinus; BP's managed with levo drip; fever slowly improving through the shift; weak pulses     Resp: Lungs are coarse/diminished; minimal secretions; pt has zane satting 88%-91% throughout the night on 100%FiO2 vent settings. ABG drawn around 2200, no changes made per MD    GI:Tube feeding at goal rate of 20; no BM this shift     : Lamar in place, adequate output     Skin: See skin assessment in flowsheets    Mobility/Activity: No head turns overnight per MD, d/t desatting with any movement, ge head turns     Pain: Fentanyl gtt for pain    Family Updated: No contact with family this shift

## 2021-09-21 NOTE — PROGRESS NOTES
ICU Progress Note    A/P:  55F RA on immunosuppression admitted 9/15 w/ acute hypoxemic respiratory failure 2/2 COVID-19.     I have personally reviewed the daily labs, imaging studies, cultures and discussed the case with referring physician and consulting physicians.     Neuro  # Sedation for mechanical ventilation  # Paralysis   - fentanyl gtt  - propofol gtt  - vecuronium gtt  - RASS -5    Pulmonary  # Acute hypoxemic and hypercarbic respiratory failure  # ARDS - Pplat 27 today, DP 13  - low tidal volume ventilation  - vent settings below  - inhaled epoprostenol  - keep prone today  - trial increased PEEP to 16, doing better with this today, yesterday pt desatted w/ increase PEEP and I question if this PEEP compromises her CO enough to decrease DO2  - pt has very tenuous status and desats w/ even head turns, holding off for now  - given body habitus and comorbidities, I do not feel she is an ECMO candidate     Cardiac  # Distributive shock - 2/2 sedation and critical illness  # Hx HTN  - MAP > 65  - norepi  - scheduled midodrine   - hold home anti-HTN    Renal  # No acute issues  - monitor UOP, Cr, I/O  - 60mg furosemide daily  - 500mg acetazolamide today    GI  # Morbid obesity  - RD to manage TF    Heme/Onc  # Coagulopathy 2/2 COVID-19  - monitor CBC  - enoxaparin    ID  # COVID-19  # Possible superimposed bacterial PNA  - completed remdesivir  - on dexamethasone, extend course to 20d  - completed empiric ABx w/ pip-tazo and doxy  - not a candidate for toci or baricitinib     Endo  # DM2  # Hypothyroidism  - monitor BG  - glargine 30u  - sliding scale insulin   - levothyroxine     Rheum  # Rheumatoid arthritis  - hold home hydroxychloroquine, methotrexate, prednisone, and tofacitinib     PPX  1. DVT: enoxaparin   2. VAP: HOB 30 degrees, chlorhexidine rinse  3. Stress Ulcer: PPI  4. Restraints: Nonviolent soft two point restraints required and necessary for patient safety and continued cares and good effect as  patient continues to pull at necessary lines, tubes despite education and distraction. Will readdress daily.   5. Wound care - per unit routine   6. Feeding - TF  7. Family to be contacted via phone.    Interval Hx:  Continues to have very tenuous respiratory status. Yesterday did not tolerate supination and once prone again sats remained in the 80s for several hours. Desat this AM while prone w/ head turns.      Unable to obtain ROS 2/2 sedation/intubation.     /69   Pulse 86   Temp 99.9  F (37.7  C)   Resp 27   Wt 122.6 kg (270 lb 4.5 oz)   LMP  (LMP Unknown)   SpO2 (!) 88%   BMI 51.07 kg/m    Gen: prone  Neuro: pupils equal  HEENT: anicteric, ETT in place  Card: RRR  Pulm: coarse b/l   Abd: obese  MSK: 1+ edema b/l   Skin: no obvious rash    Ventilation Mode: CMV/AC  (Continuous Mandatory Ventilation/ Assist Control)  FiO2 (%): 100 %  Rate Set (breaths/minute): 28 breaths/min  Tidal Volume Set (mL): 370 mL  PEEP (cm H2O): 14 cmH2O  Oxygen Concentration (%): 100 %  Peak Inspiratory Pressure (cm H2O) (Drager Delores): 38  Resp: 27        Intake/Output Summary (Last 24 hours) at 9/20/2021 0930  Last data filed at 9/20/2021 0800  Gross per 24 hour   Intake 2491.06 ml   Output 6065 ml   Net -3573.94 ml       Labs: reviewed    Imaging: reviewed    Billing: Patient is critically ill. Total critical care time today, excluding procedures, was 60 minutes.    Ubaldo Arora MD  Pulmonary Disease and Critical Care Medicine   [facility]

## 2021-09-21 NOTE — PROGRESS NOTES
[facility] ICU RESPIRATORY NOTE    Date of Admission: 9/12/2021    Date of Intubation (most recent):  9/15/21    Reason for Mechanical Ventilation: Resp failure    Number of Days on Mechanical Ventilation: 7    Met Criteria for Pressure Support Trial: No      Significant Events Today: Pt remains proned, unable to do head turns due to pt desats and slow recovery.     ABG Results:   Last Arterial Blood Gas:  pH Arterial   Date Value Ref Range Status   09/20/2021 7.48 (H) 7.35 - 7.45 Final   04/21/2020 7.29 (L) 7.35 - 7.45 pH Final     pCO2 Arterial   Date Value Ref Range Status   09/20/2021 57 (H) 35 - 45 mm Hg Final   04/21/2020 44 35 - 45 mm Hg Final     pO2 Arterial   Date Value Ref Range Status   09/20/2021 67 (L) 80 - 105 mm Hg Final   04/21/2020 96 80 - 105 mm Hg Final     Bicarbonate Arterial   Date Value Ref Range Status   09/20/2021 42 (H) 21 - 28 mmol/L Final   04/21/2020 21 21 - 28 mmol/L Final     Base Excess/Deficit (+/-)   Date Value Ref Range Status   09/20/2021 16.5 (H) -9.0 - 1.8 mmol/L Final     ETT/Skin assessment:     Was ETT repositioned and re-taped today: Yes (9/20)  Skin barriers used: Cavilon and mepilex on upper lip     Current Vent Settings: Ventilation Mode: CMV/AC  (Continuous Mandatory Ventilation/ Assist Control)  FiO2 (%): 100 %  Rate Set (breaths/minute): 28 breaths/min  Tidal Volume Set (mL): 370 mL  PEEP (cm H2O): 14 cmH2O  Oxygen Concentration (%): 100 %  Peak Inspiratory Pressure (cm H2O) (Drager Delores): 43  Resp: 28    Plan:  Continue in proned position and full strength veletri

## 2021-09-22 NOTE — PROGRESS NOTES
[facility] ICU RESPIRATORY NOTE    Date of Admission:  9/12/2021    Date of Intubation (most recent): 9/15/21    Reason for Mechanical Ventilation: Respiratory failure.     Number of Days on Mechanical Ventilation: 8    Met Criteria for Pressure Support Trial: No    Reason for No Pressure Support Trial: Per MD    Significant Events Today: None    ABG Results: Results for RADHA JACOBO (MRN [id number]) as of 9/22/2021 04:20   Ref. Range 9/21/2021 10:28   pH Arterial Latest Ref Range: 7.35 - 7.45  7.50 (H)   pCO2 Arterial Latest Ref Range: 35 - 45 mm Hg 55 (H)   PO2 Arterial Latest Ref Range: 80 - 105 mm Hg 67 (L)   Bicarbonate Arterial Latest Ref Range: 21 - 28 mmol/L 43 (H)   Base Excess Art Latest Ref Range: -9.0 - 1.8 mmol/L 17.1 (H)   FIO2 Unknown 100   Oxyhemoglobin Arterial Latest Ref Range: 92 - 100 % 93     Ventilation Mode: CMV/AC  (Continuous Mandatory Ventilation/ Assist Control)  FiO2 (%): 100 %  Rate Set (breaths/minute): 28 breaths/min  Tidal Volume Set (mL): 370 mL  PEEP (cm H2O): 16 cmH2O  Oxygen Concentration (%): 100 %  Peak Inspiratory Pressure (cm H2O) (Drager Delores): 40  Resp: 28    Plan: Continue on full vent support, proned and on full strength veletri.    Plan:

## 2021-09-22 NOTE — PROGRESS NOTES
SPIRITUAL HEALTH SERVICES  SPIRITUAL ASSESSMENT Progress Note  [facility] ICU     REFERRAL SOURCE: Initial Outreach to Family    Voicemail left for patient's mother, Shagufta introducing availability of SHS in the ICU.     PLAN: I will try to check in with Shagufta again in a few days to assess coping and supports.     Nasreen Mason  Associate    Phone: [phone]  Pager: [phone]

## 2021-09-22 NOTE — PLAN OF CARE
Shift summary     - Neuro: Sedated, paralyzed and unresponsive to stimuli; Pupils PERRL, 4 mm, sluggish. BIS monitoring within goal, adequately sedated. TOF 0/4 (4/4 this morning, paralytic titrated up, then back to morning dose).  -CV: Patient went into A-Fib RVR (rate 130s - 150s) at 0900. Pronounced blood pressure variation with volatile changes in rate noted. Respiratory status further affected by rapid arrhythmia. Levophed switched to Phenylephrine to maintain MAP>65. Given 1L bous of NS, 2 gm of Magnesium, two boluses of Amiodarone and started on Amiodarone drip. Converted to sinus rhythm at 1240. Blood pressure normalized on low dose Phenylephrine.  -Respiratory: No change / no improvement. Unable to be turned, remains prone in the same position, as per MD. Tidal volume lowered to 300 after patient went into A-Fib RVR. Desaturated to 80s with A-Fib, slowly recovered after conversion to sinus rhythm.   -: Continues to be diuresed with scheduled Lasix, responded well (see intake & output).  -Pain: On Fentanyl drip for pain control.  -Skin: Breakdown in skin folds, moisture associated, InterDry in place. Small scattered abrasions, protective foam dressings in place. Unable to assess frontal skin areas due to respiratory instability with turns, and as per MD order. All possible protective measures for preventing skin breakdown applied.  -Labs: Overall no significant change. Blood gas values remain critical.  -Family: Virginia Eagle updated over the phone by MD; Patient's code status changed to DNR.

## 2021-09-22 NOTE — PROGRESS NOTES
[facility] ICU RESPIRATORY NOTE        Date of Admission: 9/12/2021    Date of Intubation (most recent): 9/15/21    Reason for Mechanical Ventilation: Respiratory failure.    Number of Days on Mechanical Ventilation: 7    Met Criteria for Spontaneous Breathing Trial: No    Reason for No Spontaneous Breathing Trial: PEEP >8    Significant Events Today: None    ABG Results:   Recent Labs   Lab 09/21/21  1028 09/20/21  2152 09/20/21  0954 09/20/21  0756   PH 7.50* 7.48* 7.37 7.32*   PCO2 55* 57* 68* 76*   PO2 67* 67* 51* 78*   HCO3 43* 42* 40* 39*   O2PER 100 100 100 100       Current Vent Settings: Ventilation Mode: CMV/AC  (Continuous Mandatory Ventilation/ Assist Control)  FiO2 (%): 100 %  Rate Set (breaths/minute): 28 breaths/min  Tidal Volume Set (mL): 370 mL  PEEP (cm H2O): 16 cmH2O  Oxygen Concentration (%): 100 %  Peak Inspiratory Pressure (cm H2O) (Drager Delores): 40  Resp: 27      Plan: Will continue with full ventilatory support  Tonight.     Lilo Lemon, RT

## 2021-09-22 NOTE — PROGRESS NOTES
ICU Progress Note    A/P:    55F RA on immunosuppression admitted 9/15 w/ acute hypoxemic respiratory failure 2/2 COVID-19.      I have personally reviewed the daily labs, imaging studies, cultures and discussed the case with referring physician and consulting physicians.      Neuro  # Sedation for mechanical ventilation  # Paralysis   - fentanyl gtt  - propofol gtt  - vecuronium gtt  - RASS -5     Pulmonary  # Acute hypoxemic and hypercarbic respiratory failure  # ARDS - Pplat 39 today, DP 23 / p/F 80   - 8mg/kg Vt   - will try to decrease PEEP to 14   - inhaled epoprostenol  - keep prone today  - pt has very tenuous status and desats w/ even head turns, holding off for now  - Not a candidate for ECMO      Cardiac  # Distributive shock - 2/2 sedation and critical illness  # Hx HTN  - MAP > 65  - norepi low dose  - scheduled midodrine   - hold home anti-HTN     Renal  # No acute issues  - monitor UOP, Cr, I/O  - 60mg furosemide daily  - balance is + 1420 mL      GI  # Morbid obesity  - RD to manage TF     Heme/Onc  # Coagulopathy 2/2 COVID-19  - monitor CBC  - enoxaparin     ID  # COVID-19  # Possible superimposed bacterial PNA  - completed remdesivir  - on dexamethasone, extend course to 20d  - completed empiric ABx w/ pip-tazo and doxy  - not a candidate for toci or baricitinib      Endo  # DM2  # Hypothyroidism  - monitor BG  - glargine 30u  - sliding scale insulin   - levothyroxine      Rheum  # Rheumatoid arthritis  - hold home hydroxychloroquine, methotrexate, prednisone, and tofacitinib      PPX  1. DVT: enoxaparin   2. VAP: HOB 30 degrees, chlorhexidine rinse  3. Stress Ulcer: PPI  4. Restraints: Nonviolent soft two point restraints required and necessary for patient safety and continued cares and good effect as patient continues to pull at necessary lines, tubes despite education and distraction. Will readdress daily.   5. Wound care - per unit routine   6. Feeding - TF  7. Family to be contacted via  phone.    Interval Hx:    Desaturations on minimal movements and head turns. High pressures today Pplat 39. P/F 80. Dismal prognosis.     Unable to obtain ROS 2/2 sedation/intubation.     BP 97/51   Pulse 64   Temp 98.8  F (37.1  C)   Resp 28   Wt 124.1 kg (273 lb 9.5 oz)   LMP  (LMP Unknown)   SpO2 92%   BMI 51.69 kg/m    Gen: prone  Neuro: pupils equal  HEENT: anicteric, ETT in place  Card: RRR  Pulm: diminished b/l   Abd: obese, not examined   MSK: 1+ edema b/l   Skin: no obvious rash    Ventilation Mode: CMV/AC  (Continuous Mandatory Ventilation/ Assist Control)  FiO2 (%): 100 %  Rate Set (breaths/minute): 28 breaths/min  Tidal Volume Set (mL): 370 mL  PEEP (cm H2O): 16 cmH2O  Oxygen Concentration (%): 100 %  Peak Inspiratory Pressure (cm H2O) (Drager Delores): 40  Resp: 28        Intake/Output Summary (Last 24 hours) at 9/22/2021 0847  Last data filed at 9/22/2021 0600  Gross per 24 hour   Intake 2935.83 ml   Output 1755 ml   Net 1180.83 ml       Labs: reviewed    Imaging: reviewed    Billing: Patient is critically ill. Total critical care time today, excluding procedures, was 30 minutes.    DANIEL Da Silva  SICU Fellow, N

## 2021-09-22 NOTE — PLAN OF CARE
Shift Summary: Pt desat to lowest of 78 during course of night, administration of 20mg prop bolus managed to increase to adequate levels.Temperature has improved, otherwise no other overt changes.   Neuro: Does not follow. No response to noxious stimuli. TOF 4/4, Vec gtt unchanged d/t synchrony with vent. BIS 38-52, spiked to 70-80's @ times.   CV: SR w/occ PAC's. MAP goal >65 achieved, BP managed w/ levo.   Resp: LS coarse with pleural rub heard over lower lobes. Tolerating vent. Minimal secretions.   GI: BS hypo, no BM's overnight. TF @ goal rate of 20.    : Lamar intact, adequate output. In for strict monitoring.   Pain: CPOT of 0. On fent gtt.   Activity: No head turns to be done d/t tenuous status, desatting with head turns in the past.   Gtt: Vec @ 0.4. Prop @ 45. Fent @ 200. Levo @ 0.04. Veletri @ 20.   Skin: Skin excoriation assessed and noted under pannus. Skin care and interdry placed for barrier.      Plan:  Continue with plan of cares until further updates for care goals.

## 2021-09-22 NOTE — PROGRESS NOTES
Talked to patient mom, Shagufta, in details regarding her situation and how critical it is. We agreed to limit goals of care and not escalate the current regimens/medications, and in case of cardiac arrest DNR.    Code status changed

## 2021-09-23 NOTE — PROGRESS NOTES
SPIRITUAL HEALTH SERVICES  SPIRITUAL ASSESSMENT Progress Note  [facility] ICU     REFERRAL SOURCE: RN    Pt Pinky had been transitioned to comfort measures. Received consult to check in with family.    I called patient's mother, Shagufta and introduced shs. Shagufta was quite tearful on the phone and shares she was at the  home making arrangements. She names she was unable to go to the hospital given how emotional this all is for her. She shares her son is with her and providing support.     I spent some time offering grief support and inquired with Shagufta if there is anything she or Pinky would find meaningful or comforting during this time. She shares there isn't, aside from having someone in the room to share words of comfort and hold her hand, which her bedside RN has been doing. Shagufta expressed finding great comfort in the nursing staff who were caring for her daughter.    PLAN: No further plans for follow up, shs continues to remain available.     Nasreen Mason  Associate    Phone: [phone]  Pager: [phone]

## 2021-09-23 NOTE — PLAN OF CARE
Shift Summary: Pt stable overnight. Weaned off Micah and Amio gtt. Decreased sedation with no concerns.   Neuro: PERRL. Does not follow. Does not withdraw. On prop for sedation. Continued use of Vec for paralytic. TOF @ goal of 2/4.   CV: Sinus rhythm, became rebecca at beginning of shift, amio gtt turned off. BP goals met, micah gtt turned off.   Resp: LS coarse. Tolerating vent. Minimal secretions.   GI: BS hypo, no BM. Miralax and senna given for constipation. TF @ goal rate of 20.   : Lamar intact, adequate output. In for strict monitoring.   Pain: CPOT 0. On fent for pain management.   Activity: No head turns for patient due to tenuous O2.    Gtt: Prop @ 30, Fent @ 200, Vec @ 0.4. Veletri @ 20.   Skin: Scattered bruising, some skin abrasions on abdominal pannus covered with interdry along with some powder for moisture barrier.      Plan:  Continue with plan of cares.

## 2021-09-23 NOTE — DEATH PRONOUNCEMENT
MD DEATH PRONOUNCEMENT    Called to pronounce Pinky Whatley dead.    Physical Exam: Unresponsive to noxious stimuli, Spontaneous respirations absent, Breath sounds absent, Carotid pulse absent, Heart sounds absent and Pupillary light reflex absent    Patient was pronounced dead at 13:38 PM, 2021.    Preliminary Cause of Death: Acute respiratory failure secondary to COVID-19 pneumonia     Active Problems:    Acute hypoxemic respiratory failure due to COVID-19 (H)    Pneumonia due to 2019 novel coronavirus       Infectious disease present?: YES    Communicable disease present? (examples: HIV, chicken pox, TB, Ebola, CJD) :  YES    Multi-drug resistant organism present? (example: MRSA): NO    Please consider an autopsy if any of the following exist:  NO Unexpected or unexplained death during or following any dental, medical, or surgical diagnostic treatment procedures.   NO Death of mother at or up to seven days after delivery.     NO All  and pediatric deaths.     NO Death where the cause is sufficiently obscure to delay completion of the death certificate.   NO Deaths in which autopsy would confirm a suspected illness/condition that would affect surviving family members or recipients of transplanted organs.     The following deaths must be reported to the 's Office:  NO A death that may be due entirely or in part to any factors other than natural disease (recent surgery, recent trauma, suspected abuse/neglect).   NO A death that may be an accident, suicide, or homicide.     NO Any sudden, unexpected death in which there is no prior history of significant heart disease or any other condition associated with sudden death.   NO A death under suspicious, unusual, or unexpected circumstances.    NO Any death which is apparently due to natural causes but in which the  does not have a personal physician familiar with the patient s medical history, social, or environmental situation or  the circumstances of the terminal event.   NO Any death apparently due to Sudden Infant Death Syndrome.     NO Deaths that occur during, in association with, or as consequences of a diagnostic, therapeutic, or anesthetic procedure.   NO Any death in which a fracture of a major bone has occurred within the past (6) six months.   NO A death of persons note seen by their physician within 120 days of demise.     NO Any death in which the  was an inmate of a public institution or was in the custody of Law Enforcement personnel.   NO  All unexpected deaths of children   NO Solid organ donors   NO Unidentified bodies   NO Deaths of persons whose bodies are to be cremated or otherwise disposed of so that the bodies will later be unavailable for examination;   NO Deaths unattended by a physician outside of a licensed healthcare facility or licensed residential hospice program   NO Deaths occurring within 24 hours of arrival to a health care facility if death is unexpected.    NO Deaths associated with the decedent s employment.   NO Deaths attributed to acts of terrorism.   NO Any death in which there is uncertainty as to whether it is a medical examiner s care should be discussed with the medical investigator.        Body disposition: Body released to the  home.    PRIYANKA Whatley

## 2021-09-23 NOTE — PROGRESS NOTES
Patient is hypercapnic, desaturating, not tolerating head turns, high airway pressures, prone for the 4th day, on/off arrhythmia.    Discussed with her mother this morning and we agreed to proceed with comfort measures.    Will supine the patient and start comfort care.

## 2021-09-23 NOTE — PROGRESS NOTES
[facility] ICU RESPIRATORY NOTE     Date of Admission:  9/12/2021     Date of Intubation (most recent): 9/15/21     Reason for Mechanical Ventilation: Respiratory failure.     Number of Days on Mechanical Ventilation: 9     Met Criteria for Pressure Support Trial: No     Reason for No Pressure Support Trial: Per MD     Significant Events Today: None overnight, pt remains proned, no head turns per MD    Recent Labs   Lab 09/22/21  0557 09/21/21  1028 09/20/21  2152 09/20/21  0954   PH 7.45 7.50* 7.48* 7.37   PCO2 53* 55* 57* 68*   PO2 70* 67* 67* 51*   HCO3 37* 43* 42* 40*   O2PER 100 100 100 100     Ventilation Mode: CMV/AC  (Continuous Mandatory Ventilation/ Assist Control)  FiO2 (%): 100 %  Rate Set (breaths/minute): 28 breaths/min  Tidal Volume Set (mL): 300 mL  PEEP (cm H2O): 16 cmH2O  Oxygen Concentration (%): 100 %  Peak Inspiratory Pressure (cm H2O) (Drager Delores): 36  Resp: 28    MIK May, RRT

## 2021-09-23 NOTE — PROGRESS NOTES
Comfort measures initiated as per family's wishes and MD order. Patent off of Amiodarone, Vecuronium and Veletri. Patient supined. All alarms turned off.

## 2021-09-23 NOTE — DISCHARGE SUMMARY
[facility]    Discharge Summary  [facility]ensive [facility]    Date of Admission:  9/12/2021  Date of Discharge:  9/23/2021  Discharging Provider: Doron Aaron    Discharge Diagnoses   Active Problems:    Acute hypoxemic respiratory failure due to COVID-19 (H)    Pneumonia due to 2019 novel coronavirus       History of Present Illness   Pinky Whatley is an 55 year old female who presented with acute hypoxemic respiratory failure 2/2 COVID-19    Hospital Course   Pinky Whatley was admitted on 9/12/2021.  The following problems were addressed during her hospitalization:    Active Problems:    Acute hypoxemic respiratory failure due to COVID-19 (H)    Pneumonia due to 2019 novel coronavirus      Doron Aaron MD    Significant Results and Procedures   Most Recent 3 CBC's:Recent Labs   Lab Test 09/23/21  0408 09/22/21  0401 09/21/21  0401   WBC 8.8 8.3 8.3   HGB 9.9* 8.9* 10.5*   MCV 99 96 94    185 246      Most Recent 3 BMP's:  Recent Labs   Lab Test 09/23/21  0806 09/23/21  0408 09/23/21  0406 09/22/21  0800 09/22/21  0401 09/21/21  1238 09/21/21  1028 09/21/21  0828 09/21/21  0401   NA  --  143  --   --  143  --   --   --  142   POTASSIUM  --  4.7  --   --  4.1  --  3.6   < > 3.3*   CHLORIDE  --  104  --   --  104  --   --   --  100   CO2  --  38*  --   --  35*  --   --   --  39*   BUN  --  35*  --   --  32*  --   --   --  32*   CR  --  0.49*  --   --  0.48*  --   --   --  0.57   ANIONGAP  --  1*  --   --  4  --   --   --  3   CHANTELLE  --  8.7  --   --  8.2*  --   --   --  8.5   * 194* 192*   < > 194*   < >  --    < > 181*  176*    < > = values in this interval not displayed.     Most Recent 2 LFT's:  Recent Labs   Lab Test 09/16/21  0812 09/12/21  2150   AST 33 72*   ALT 30 50   ALKPHOS 71 89   BILITOTAL 0.6 0.7     Most Recent INR's and Anticoagulation Dosing History:  Anticoagulation Dose History     Recent Dosing and Labs Latest Ref Rng & Units 4/20/2020  9/16/2021    INR 0.85 - 1.15 1.09 1.42(H)        Most Recent 3 Troponin's:  Recent Labs   Lab Test 09/12/21  2149 04/20/20  0603 04/19/20  2138   TROPI  --  <0.015 <0.015   TROPONIN <0.015  --   --      Most Recent Cholesterol Panel:No lab results found.  Most Recent 6 Bacteria Isolates From Any Culture (See EPIC Reports for Culture Details):  Recent Labs   Lab Test 09/07/19  1450 09/07/19  1415   CULT No growth No growth     Most Recent TSH, T4 and A1c Labs:  Recent Labs   Lab Test 09/13/21  0443 04/20/20  0603 09/09/19  0609   TSH  --   --  5.63*   T4  --   --  1.34   A1C 7.0*   < >  --     < > = values in this interval not displayed.     Results for orders placed or performed during the hospital encounter of 09/12/21   XR Chest Port 1 View    Narrative    EXAM: XR CHEST PORT 1 VIEW  LOCATION: [facility]  DATE/TIME: 9/12/2021 9:59 PM    INDICATION: Shortness of breath.  COMPARISON: 4/20/2020.    FINDINGS: The heart is enlarged. There is no pulmonary edema. Left hemidiaphragm is elevated and there is left basilar infiltrate. Curvilinear atelectasis or scarring at the right lung base. The upper lungs are clear. No pneumothorax.      Impression    IMPRESSION: Left basilar pneumonia.   CT Chest Pulmonary Embolism w Contrast    Narrative    EXAM: CT CHEST PULMONARY EMBOLISM W CONTRAST  LOCATION: [facility]  DATE/TIME: 9/12/2021 11:15 PM    INDICATION: COVID-19 pneumonia with respiratory failure  COMPARISON: CT chest exam 04/20/2020  TECHNIQUE: CT chest pulmonary angiogram during arterial phase injection of IV contrast. Multiplanar reformats and MIP reconstructions were performed. Dose reduction techniques were used.   CONTRAST: 70mL Isovue-370    FINDINGS:  ANGIOGRAM CHEST: Pulmonary arteries are normal caliber and negative for pulmonary emboli. Thoracic aorta is negative for dissection. No CT evidence of right heart strain.    LUNGS AND PLEURA: Extensive groundglass  opacities throughout both upper and lower lobes. Dependent atelectasis both bases. No effusions. Elevated left hemidiaphragm.    MEDIASTINUM/AXILLAE: A few borderline-enlarged mediastinal nodes.     CORONARY ARTERY CALCIFICATION: None.    UPPER ABDOMEN: Cholecystectomy.    MUSCULOSKELETAL: Hypertrophic changes thoracic spine.      Impression    IMPRESSION:  1.  No evidence for pulmonary emboli.  2.  Extensive groundglass opacities both lungs characteristic of Covid 19 pneumonia.    Commonly reported imaging features of (COVID-19) pneumonia are present. Influenza pneumonia and organizing pneumonia as can be seen in the setting of drug toxicity and connective tissue disease can cause a similar imaging pattern.   XR Chest Port 1 View    Narrative    CHEST ONE VIEW  9/15/2021 1:40 PM     HISTORY: Check ETT position    COMPARISON: 9/12/2021      Impression    IMPRESSION: Endotracheal tube tip 4 cm from the harshal. Extensive  bilateral infiltrates. Right IJ line noted with tip near the atrial  caval junction. Enteric tube tip below the margin of the study.    GILBERTO REICH MD         SYSTEM ID:  [id number]   XR Abdomen Port 1 View    Narrative    ABDOMEN ONE VIEW PORTABLE  9/15/2021 1:40 PM     HISTORY: Tube feeding placement verification.    COMPARISON: None.       Impression    IMPRESSION: Feeding tube tip in the antrum of the stomach.    GILBERTO REICH MD         SYSTEM ID:  [id number]   XR Abdomen Port 1 View    Narrative    XR ABDOMEN PORT 1 VIEWS 9/16/2021 4:40 PM    HISTORY: NJ post pyloric placement    COMPARISON: 9/15/2021      Impression    IMPRESSION: Feeding tube has been advanced and the tip is now in good  position at the duodenojejunal junction. Plastic biliary stent is  unchanged.    PAL MARINA MD         SYSTEM ID:  [id number]   XR Chest Port 1 View    Narrative    CHEST PORTABLE ONE VIEW   9/18/2021 12:58 PM     HISTORY: Check lung status.    COMPARISON: Chest x-ray on 9/15/2021.      Impression     IMPRESSION: Single AP view of the chest was obtained. Endotracheal  tube tip projects over lower thoracic trachea approximately 1.5 cm  from the harshal. Enteric tube crosses the diaphragm with the distal  tip outside the field of view. Right IJ central catheter tip projects  over high right atrium. Stable cardiomediastinal silhouette. Bilateral  basilar predominant dense pulmonary opacities, not significantly  changed as compared to 9/15/2021 exam. No significant pleural effusion  or pneumothorax.    DORON NIEVES MD         SYSTEM ID:  [id number]   XR Chest Port 1 View    Narrative    CHEST ONE VIEW PORTABLE 2021 9:56 AM     HISTORY: ET tube  conformation.    COMPARISON: 2021.      Impression    IMPRESSION: Tip of the endotracheal tube is 3.2 cm above the harshal.  Feeding tube extends below the left hemidiaphragm. There is a right  internal jugular central line that terminates at the atrial caval  junction. Bilateral interstitial, ground-glass, and airspace opacities  have worsened compared to prior. No pneumothorax or significant  pleural effusion.    KAY POTTER MD         SYSTEM ID:  [id number]         Pending Results   No pending results   Unresulted Labs Ordered in the Past 30 Days of this Admission     No orders found from 2021 to 2021.          Code Status   DNR / DNI    Primary Care Physician   Laura Evans        Time Spent on this Encounter   I, Doron Aaron MD, personally saw the patient today and spent less than or equal to 30 minutes discharging this patient.    Discharge Disposition   Patient  during this admission  Condition at discharge:     Consultations This Hospital Stay   PHARMACY TO [facility]  INTENSIVIST IP CONSULT  VASCULAR ACCESS ADULT IP CONSULT  VASCULAR ACCESS ADULT IP CONSULT  NUTRITION SERVICES ADULT IP CONSULT  PHARMACY IP CONSULT  NUTRITION SERVICES ADULT IP CONSULT    Discharge Orders   No discharge procedures on file.  Discharge  "Medications   Current Discharge Medication List      CONTINUE these medications which have NOT CHANGED    Details   atorvastatin (LIPITOR) 80 MG tablet Take 80 mg by mouth daily      hydroxychloroquine (PLAQUENIL) 200 MG tablet Take 200 mg by mouth 2 times daily      insulin glargine (LANTUS PEN) 100 UNIT/ML pen Take 25 units at bedtime, increase dose as directed    Comments: If Lantus is not covered by insurance, may substitute Basaglar at same dose and frequency.    Associated Diagnoses: Type 2 diabetes mellitus without complication, with long-term current use of insulin (H)      levothyroxine (SYNTHROID/LEVOTHROID) 137 MCG tablet Take 137 mcg by mouth daily       lisinopril (PRINIVIL/ZESTRIL) 20 MG tablet Take 20 mg by mouth daily      metFORMIN (GLUCOPHAGE) 500 MG tablet Take 1,000 mg by mouth 2 times daily (with meals)      methotrexate 2.5 MG tablet Take 22.5 mg by mouth once a week on Monday (9 x 2.5 mg tablet)      predniSONE (DELTASONE) 5 MG tablet Take 4 mg by mouth daily       semaglutide (OZEMPIC (1 MG/DOSE)) 2 MG/1.5ML pen Inject 0.5 mg Subcutaneous every 7 days       tofacitinib (XELJANZ) 5 MG tablet Take 5 mg by mouth 2 times daily      acetaminophen (TYLENOL) 325 MG tablet Take 2 tablets (650 mg) by mouth every 4 hours as needed for pain  Qty:      Associated Diagnoses: Abdominal pain, acute      aspirin 81 MG EC tablet Take 81 mg by mouth daily      diphenhydrAMINE-acetaminophen (TYLENOL PM)  MG tablet Take 2 tablets by mouth nightly as needed       ibuprofen (ADVIL/MOTRIN) 200 MG tablet Take 400 mg by mouth See Admin Instructions Per patient she takes APAP or ibuprofen or naproxen twice a day. She \"changes it up\" every day but takes otc medication for pain twice a day.      multivitamin, therapeutic (THERA-VIT) TABS tablet Take 1 tablet by mouth daily      naproxen sodium (ANAPROX) 220 MG tablet Take 440 mg by mouth See Admin Instructions Per patient she takes APAP or ibuprofen or naproxen " "twice a day. She \"changes it up\" every day but takes otc medication for pain twice a day.      Probiotic Product (PROBIOTIC PO) Take 1 capsule by mouth At Bedtime      sulfaSALAzine (AZULFIDINE) 500 MG tablet Take 1,000 mg by mouth 2 times daily           Allergies   No Known Allergies  Data   Most Recent 3 CBC's:Recent Labs   Lab Test 09/23/21  0408 09/22/21  0401 09/21/21  0401   WBC 8.8 8.3 8.3   HGB 9.9* 8.9* 10.5*   MCV 99 96 94    185 246      Most Recent 3 BMP's:  Recent Labs   Lab Test 09/23/21  0806 09/23/21  0408 09/23/21  0406 09/22/21  0800 09/22/21  0401 09/21/21  1238 09/21/21  1028 09/21/21  0828 09/21/21  0401   NA  --  143  --   --  143  --   --   --  142   POTASSIUM  --  4.7  --   --  4.1  --  3.6   < > 3.3*   CHLORIDE  --  104  --   --  104  --   --   --  100   CO2  --  38*  --   --  35*  --   --   --  39*   BUN  --  35*  --   --  32*  --   --   --  32*   CR  --  0.49*  --   --  0.48*  --   --   --  0.57   ANIONGAP  --  1*  --   --  4  --   --   --  3   CHANTELLE  --  8.7  --   --  8.2*  --   --   --  8.5   * 194* 192*   < > 194*   < >  --    < > 181*  176*    < > = values in this interval not displayed.     Most Recent 2 LFT's:  Recent Labs   Lab Test 09/16/21  0812 09/12/21  2150   AST 33 72*   ALT 30 50   ALKPHOS 71 89   BILITOTAL 0.6 0.7     Most Recent INR's and Anticoagulation Dosing History:  Anticoagulation Dose History     Recent Dosing and Labs Latest Ref Rng & Units 4/20/2020 9/16/2021    INR 0.85 - 1.15 1.09 1.42(H)        Most Recent 3 Troponin's:  Recent Labs   Lab Test 09/12/21  2149 04/20/20  0603 04/19/20  2138   TROPI  --  <0.015 <0.015   TROPONIN <0.015  --   --      Most Recent Cholesterol Panel:No lab results found.  Most Recent 6 Bacteria Isolates From Any Culture (See EPIC Reports for Culture Details):  Recent Labs   Lab Test 09/07/19  1450 09/07/19  1415   CULT No growth No growth     Most Recent TSH, T4 and A1c Labs:  Recent Labs   Lab Test 09/13/21  0443 " 04/20/20  0603 09/09/19  0609   TSH  --   --  5.63*   T4  --   --  1.34   A1C 7.0*   < >  --     < > = values in this interval not displayed.     Results for orders placed or performed during the hospital encounter of 09/12/21   XR Chest Port 1 View    Narrative    EXAM: XR CHEST PORT 1 VIEW  LOCATION: [facility]  DATE/TIME: 9/12/2021 9:59 PM    INDICATION: Shortness of breath.  COMPARISON: 4/20/2020.    FINDINGS: The heart is enlarged. There is no pulmonary edema. Left hemidiaphragm is elevated and there is left basilar infiltrate. Curvilinear atelectasis or scarring at the right lung base. The upper lungs are clear. No pneumothorax.      Impression    IMPRESSION: Left basilar pneumonia.   CT Chest Pulmonary Embolism w Contrast    Narrative    EXAM: CT CHEST PULMONARY EMBOLISM W CONTRAST  LOCATION: [facility]  DATE/TIME: 9/12/2021 11:15 PM    INDICATION: COVID-19 pneumonia with respiratory failure  COMPARISON: CT chest exam 04/20/2020  TECHNIQUE: CT chest pulmonary angiogram during arterial phase injection of IV contrast. Multiplanar reformats and MIP reconstructions were performed. Dose reduction techniques were used.   CONTRAST: 70mL Isovue-370    FINDINGS:  ANGIOGRAM CHEST: Pulmonary arteries are normal caliber and negative for pulmonary emboli. Thoracic aorta is negative for dissection. No CT evidence of right heart strain.    LUNGS AND PLEURA: Extensive groundglass opacities throughout both upper and lower lobes. Dependent atelectasis both bases. No effusions. Elevated left hemidiaphragm.    MEDIASTINUM/AXILLAE: A few borderline-enlarged mediastinal nodes.     CORONARY ARTERY CALCIFICATION: None.    UPPER ABDOMEN: Cholecystectomy.    MUSCULOSKELETAL: Hypertrophic changes thoracic spine.      Impression    IMPRESSION:  1.  No evidence for pulmonary emboli.  2.  Extensive groundglass opacities both lungs characteristic of Covid 19 pneumonia.    Commonly  reported imaging features of (COVID-19) pneumonia are present. Influenza pneumonia and organizing pneumonia as can be seen in the setting of drug toxicity and connective tissue disease can cause a similar imaging pattern.   XR Chest Port 1 View    Narrative    CHEST ONE VIEW  9/15/2021 1:40 PM     HISTORY: Check ETT position    COMPARISON: 9/12/2021      Impression    IMPRESSION: Endotracheal tube tip 4 cm from the harshal. Extensive  bilateral infiltrates. Right IJ line noted with tip near the atrial  caval junction. Enteric tube tip below the margin of the study.    GILBERTO REICH MD         SYSTEM ID:  [id number]   XR Abdomen Port 1 View    Narrative    ABDOMEN ONE VIEW PORTABLE  9/15/2021 1:40 PM     HISTORY: Tube feeding placement verification.    COMPARISON: None.       Impression    IMPRESSION: Feeding tube tip in the antrum of the stomach.    GILBERTO REICH MD         SYSTEM ID:  [id number]   XR Abdomen Port 1 View    Narrative    XR ABDOMEN PORT 1 VIEWS 9/16/2021 4:40 PM    HISTORY: NJ post pyloric placement    COMPARISON: 9/15/2021      Impression    IMPRESSION: Feeding tube has been advanced and the tip is now in good  position at the duodenojejunal junction. Plastic biliary stent is  unchanged.    PAL MARINA MD         SYSTEM ID:  [id number]   XR Chest Port 1 View    Narrative    CHEST PORTABLE ONE VIEW   9/18/2021 12:58 PM     HISTORY: Check lung status.    COMPARISON: Chest x-ray on 9/15/2021.      Impression    IMPRESSION: Single AP view of the chest was obtained. Endotracheal  tube tip projects over lower thoracic trachea approximately 1.5 cm  from the harshal. Enteric tube crosses the diaphragm with the distal  tip outside the field of view. Right IJ central catheter tip projects  over high right atrium. Stable cardiomediastinal silhouette. Bilateral  basilar predominant dense pulmonary opacities, not significantly  changed as compared to 9/15/2021 exam. No significant pleural effusion  or  pneumothorax.    NICOLETTE NIEVES MD         SYSTEM ID:  [id number]   XR Chest Port 1 View    Narrative    CHEST ONE VIEW PORTABLE September 20, 2021 9:56 AM     HISTORY: ET tube  conformation.    COMPARISON: 9/18/2021.      Impression    IMPRESSION: Tip of the endotracheal tube is 3.2 cm above the harshal.  Feeding tube extends below the left hemidiaphragm. There is a right  internal jugular central line that terminates at the atrial caval  junction. Bilateral interstitial, ground-glass, and airspace opacities  have worsened compared to prior. No pneumothorax or significant  pleural effusion.    KAY POTTER MD         SYSTEM ID:  [id number]

## 2021-09-23 NOTE — PROGRESS NOTES
CLINICAL NUTRITION SERVICES - REASSESSMENT NOTE      RECOMMENDATIONS FOR MD/PROVIDER TO ORDER: Consider scheduled bowel meds    Recommendations Ordered by Registered Dietitian (RD): Currently Vital HP is on back order and our hospital stock has been depleted.... therefore change TF regimen as follows ~  Promote (NO FIBER) at 35 mL/hr to provide:  840 kcal, 52 g protein, 109 g CHO, no fiber, 705 mL H2O  Reduce ProSource to 2 pkts BID (4 total per day)= 160 kcal and 44 g protein   Total (TF + ProSource + Propofol)= 1546 kcal (14 kcal/kg), 96 g protein (2 g/kg)   Malnutrition: (9/15)  % Weight Loss:  Weight loss does not meet criteria for malnutrition (likely fluid shifts)  % Intake:  </= 50% for >/= 5 days (severe malnutrition)(will meet 9/16)  Subcutaneous Fat Loss:  Unable to determine   Muscle Loss:  Unable to determine   Fluid Retention:  None noted     Malnutrition Diagnosis: Unable to determine due to lack of Nutrition Focused Physical Exam       EVALUATION OF PROGRESS TOWARD GOALS   Diet:  NPO on vent     Nutrition Support:  Patient continues on goal TF as follows ~    Nutrition Support Enteral:  Type of Feeding Tube: Nasoduodenal   Enteral Frequency:  Continuous  Enteral Regimen: Vital HP at 20 mL/hr  Total Enteral Provisions: 480 kcal, 42 g protein, 400 mL H2O, 52 g CHO, no fiber   Free Water Flush: 60 mL every 4 hours  ProSource 2 pkts every 8 hours = 240 kcal and 66 g protein  Propofol at 20.7 mL/hr= 546 kcal   Total = 1266 kcal (11 kcal/kg), 108 g protein (2.3 g/kg)      Intake/Tolerance:    K and Phos normal, Mg 2.4 (H)  -243 with High sliding scale insulin and Lantus 30 units at HS  Last BM 9/19 - Bowel meds PRN (got Miralax and Senna today)  I/O 5212/3455, wt 121.3 kg (up 8.7 kg from 9/12 weight) - Patient on Lasix         ASSESSED NUTRITION NEEDS:  Dosing Weight 112.6 kg (energy) and 47.7 kg (protein)  Estimated Energy Needs: 6666-7829 kcals (11-14 Kcal/Kg)  Justification: obese and  vented  Estimated Protein Needs:  grams protein (2-2.5 g pro/Kg)  Justification: hypercatabolism with critical illness and obesity guidelines      NEW FINDINGS:   Patient continues on Vec, Phenylephrine, Propofol as above  MVI-M daily     Patient remains proned with no head turns    Previous Goals (9/20):   TF + ProSource + Propofol will continue to meet % needs   Evaluation: Met    Previous Nutrition Diagnosis (9/20):   No nutrition diagnosis identified at this time  Evaluation: No change      CURRENT NUTRITION DIAGNOSIS  No nutrition diagnosis identified at this time     INTERVENTIONS  Recommendations / Nutrition Prescription  Currently Vital HP is on back order and our hospital stock has been depleted.... therefore change TF regimen as follows ~  Promote (NO FIBER) at 35 mL/hr to provide:  840 kcal, 52 g protein, 109 g CHO, no fiber, 705 mL H2O  Reduce ProSource to 2 pkts BID (4 total per day)= 160 kcal and 44 g protein   Total (TF + ProSource + Propofol)= 1546 kcal (14 kcal/kg), 96 g protein (2 g/kg)    Consider scheduled bowel meds     Implementation  EN Composition and Medical Food Supplement:  TF goal adjustments made as above     Goals  TF + ProSource + Propofol will meet % needs   Patient will stool once every 24-48 hours     MONITORING AND EVALUATION:  Progress towards goals will be monitored and evaluated per protocol and Practice Guidelines    Dorota Jurado RD, LD, CNSC   Clinical Dietitian - [facility]

## 2021-09-28 LAB
ATRIAL RATE - MUSE: 159 BPM
DIASTOLIC BLOOD PRESSURE - MUSE: NORMAL MMHG
INTERPRETATION ECG - MUSE: NORMAL
P AXIS - MUSE: NORMAL DEGREES
PR INTERVAL - MUSE: NORMAL MS
QRS DURATION - MUSE: 74 MS
QT - MUSE: 262 MS
QTC - MUSE: 403 MS
R AXIS - MUSE: -3 DEGREES
SYSTOLIC BLOOD PRESSURE - MUSE: NORMAL MMHG
T AXIS - MUSE: 143 DEGREES
VENTRICULAR RATE- MUSE: 142 BPM

## 2024-06-25 NOTE — PROGRESS NOTES
Bilateral Otitis Media. We will treat with antibiotics as prescribed and comfort measures such as ibuprofen and acetaminophen.  The antibiotics will likely only treat the ear pain from the infection. Coughing and congestion are still viral in nature and will take longer to improve.  If the pain is not improving in 48 hours, call back.     Prone Positioning Note      Date of initial prone positioning:    Number of days prone: 1  Frequency of head turns: Q2H  Was patient placed supine today: No  Reason for not placing patient supine today:1ST DAY PRONE    ETT/Skin assessment:    Was ETT repositioned and re-taped today: YES  Skin assessment around ETT: MEPILEX PLACED ON CHEEKS AND UPPER LIP  Skin barriers used: YES    Plan: EVALUATE V/Q, HEAD TURNS Q2H AND TITRATE SETTINGS WHEN APPROPRIATE   ROMEL LEMA, RT

## (undated) DEVICE — SYR 30ML LL W/O NDL 302832

## (undated) DEVICE — SU VICRYL 3-0 SH 27" J316H

## (undated) DEVICE — SOL NACL 0.9% IRRIG 1000ML BOTTLE 2F7124

## (undated) DEVICE — PACK SET-UP STD 9102

## (undated) DEVICE — SYR 10ML LL W/O NDL 302995

## (undated) DEVICE — SU MONOCRYL 4-0 PS-2 18" UND Y496G

## (undated) DEVICE — CONNECTOR STOPCOCK 3 WAY MALE LL 2C6204

## (undated) DEVICE — SEAL SET MYOSURE ROD LENS SCOPE SINGLE USE 40-902

## (undated) DEVICE — BASIN SET MINOR DISP

## (undated) DEVICE — SOL NACL 0.9% INJ 1000ML BAG 2B1324X

## (undated) DEVICE — ENDO TROCAR SLEEVE KII Z-THREADED 05X100MM CTS02

## (undated) DEVICE — ESU GROUND PAD ADULT W/CORD E7507

## (undated) DEVICE — SOL NACL 0.9% IRRIG 3000ML BAG 2B7477

## (undated) DEVICE — PREP CHLORAPREP 26ML TINTED ORANGE  260815

## (undated) DEVICE — LINEN TOWEL PACK X5 5464

## (undated) DEVICE — LINEN BASIC PACK 5468

## (undated) DEVICE — SYR 20ML LL W/O NDL

## (undated) DEVICE — ESU GROUND PAD UNIVERSAL W/O CORD

## (undated) DEVICE — KIT PROCEDURE FLUENT IN/OUT FLOWPAK TISS TRAP FLT-112S

## (undated) DEVICE — PEN MARKING SKIN W/LABELS 31145884

## (undated) DEVICE — SYSTEM CLEARIFY VISUALIZATION 21-345

## (undated) DEVICE — SUCTION IRR STRYKERFLOW II W/TIP 250-070-520

## (undated) DEVICE — SYR 03ML 22GA 1.5" ECLIPSE

## (undated) DEVICE — BLADE KNIFE SURG 11 371111

## (undated) DEVICE — RAD RX OPTIRAY 320 (50ML) IOVERSOL 68% CHARGE PER ML

## (undated) DEVICE — GLOVE PROTEXIS MICRO 6.0  2D73PM60

## (undated) DEVICE — CUP AND LID 2PK 2OZ STERILE  SSK9006A

## (undated) DEVICE — SOL WATER IRRIG 1000ML BOTTLE 2F7114

## (undated) DEVICE — GLOVE PROTEXIS BLUE W/NEU-THERA 6.0  2D73EB60

## (undated) DEVICE — PACK LAP CHOLE SLC15LCFSD

## (undated) DEVICE — ADH SKIN CLOSURE PREMIERPRO EXOFIN 1.0ML 3470

## (undated) DEVICE — ENDO TROCAR FIRST ENTRY KII FIOS Z-THRD 11X100MM CTF33

## (undated) DEVICE — PACK TVT HYSTEROSCOPY SMA15HYFSE

## (undated) DEVICE — SUCTION CANISTER STRAW 65652-008

## (undated) DEVICE — CLIP APPLIER ENDO ROTATING 10MM MED/LG ER320

## (undated) DEVICE — ENDO TROCAR FIRST ENTRY KII FIOS Z-THRD 05X100MM CTF03

## (undated) DEVICE — ENDO POUCH UNIV RETRIEVAL SYSTEM INZII 10MM CD001

## (undated) DEVICE — SYR 01ML 25GA 5/8" TBC

## (undated) DEVICE — DEVICE SUTURE GRASPER TROCAR CLOSURE 14GA PMITCSG

## (undated) DEVICE — ESU HOLDER LAP INST DISP PURPLE LONG 330MM H-PRO-330

## (undated) DEVICE — SUCTION CANISTER MEDIVAC LINER 3000ML W/LID 65651-530

## (undated) DEVICE — SU VICRYL 0 UR-6 27" J603H

## (undated) DEVICE — Device

## (undated) DEVICE — GLOVE PROTEXIS W/NEU-THERA 6.5  2D73TE65

## (undated) RX ORDER — FENTANYL CITRATE 50 UG/ML
INJECTION, SOLUTION INTRAMUSCULAR; INTRAVENOUS
Status: DISPENSED
Start: 2020-04-20

## (undated) RX ORDER — PROPOFOL 10 MG/ML
INJECTION, EMULSION INTRAVENOUS
Status: DISPENSED
Start: 2020-04-24

## (undated) RX ORDER — LIDOCAINE HYDROCHLORIDE 20 MG/ML
INJECTION, SOLUTION EPIDURAL; INFILTRATION; INTRACAUDAL; PERINEURAL
Status: DISPENSED
Start: 2020-04-24

## (undated) RX ORDER — LIDOCAINE HYDROCHLORIDE 20 MG/ML
INJECTION, SOLUTION EPIDURAL; INFILTRATION; INTRACAUDAL; PERINEURAL
Status: DISPENSED
Start: 2020-04-20

## (undated) RX ORDER — FENTANYL CITRATE 50 UG/ML
INJECTION, SOLUTION INTRAMUSCULAR; INTRAVENOUS
Status: DISPENSED
Start: 2021-01-01

## (undated) RX ORDER — FENTANYL CITRATE 50 UG/ML
INJECTION, SOLUTION INTRAMUSCULAR; INTRAVENOUS
Status: DISPENSED
Start: 2020-04-24

## (undated) RX ORDER — PROPOFOL 10 MG/ML
INJECTION, EMULSION INTRAVENOUS
Status: DISPENSED
Start: 2021-01-01

## (undated) RX ORDER — ALBUMIN, HUMAN INJ 5% 5 %
SOLUTION INTRAVENOUS
Status: DISPENSED
Start: 2020-04-20

## (undated) RX ORDER — NEOSTIGMINE METHYLSULFATE 1 MG/ML
VIAL (ML) INJECTION
Status: DISPENSED
Start: 2020-04-24

## (undated) RX ORDER — PROPOFOL 10 MG/ML
INJECTION, EMULSION INTRAVENOUS
Status: DISPENSED
Start: 2020-04-20

## (undated) RX ORDER — HYDROMORPHONE HYDROCHLORIDE 1 MG/ML
INJECTION, SOLUTION INTRAMUSCULAR; INTRAVENOUS; SUBCUTANEOUS
Status: DISPENSED
Start: 2020-04-24

## (undated) RX ORDER — ONDANSETRON 2 MG/ML
INJECTION INTRAMUSCULAR; INTRAVENOUS
Status: DISPENSED
Start: 2021-01-01

## (undated) RX ORDER — ONDANSETRON 2 MG/ML
INJECTION INTRAMUSCULAR; INTRAVENOUS
Status: DISPENSED
Start: 2020-04-20

## (undated) RX ORDER — OXYCODONE HYDROCHLORIDE 5 MG/1
TABLET ORAL
Status: DISPENSED
Start: 2021-01-01

## (undated) RX ORDER — DEXAMETHASONE SODIUM PHOSPHATE 4 MG/ML
INJECTION, SOLUTION INTRA-ARTICULAR; INTRALESIONAL; INTRAMUSCULAR; INTRAVENOUS; SOFT TISSUE
Status: DISPENSED
Start: 2020-04-24

## (undated) RX ORDER — ONDANSETRON 2 MG/ML
INJECTION INTRAMUSCULAR; INTRAVENOUS
Status: DISPENSED
Start: 2020-04-24

## (undated) RX ORDER — GLYCOPYRROLATE 0.2 MG/ML
INJECTION, SOLUTION INTRAMUSCULAR; INTRAVENOUS
Status: DISPENSED
Start: 2020-04-24

## (undated) RX ORDER — DEXAMETHASONE SODIUM PHOSPHATE 4 MG/ML
INJECTION, SOLUTION INTRA-ARTICULAR; INTRALESIONAL; INTRAMUSCULAR; INTRAVENOUS; SOFT TISSUE
Status: DISPENSED
Start: 2021-01-01

## (undated) RX ORDER — EPINEPHRINE IN SOD CHLOR,ISO 1 MG/10 ML
SYRINGE (ML) INTRAVENOUS
Status: DISPENSED
Start: 2020-04-20

## (undated) RX ORDER — LIDOCAINE HYDROCHLORIDE 20 MG/ML
INJECTION, SOLUTION EPIDURAL; INFILTRATION; INTRACAUDAL; PERINEURAL
Status: DISPENSED
Start: 2021-01-01

## (undated) RX ORDER — DEXAMETHASONE SODIUM PHOSPHATE 4 MG/ML
INJECTION, SOLUTION INTRA-ARTICULAR; INTRALESIONAL; INTRAMUSCULAR; INTRAVENOUS; SOFT TISSUE
Status: DISPENSED
Start: 2020-04-20